# Patient Record
Sex: FEMALE | Race: WHITE | NOT HISPANIC OR LATINO | Employment: OTHER | URBAN - METROPOLITAN AREA
[De-identification: names, ages, dates, MRNs, and addresses within clinical notes are randomized per-mention and may not be internally consistent; named-entity substitution may affect disease eponyms.]

---

## 2017-01-10 RX ORDER — OMEPRAZOLE 40 MG/1
CAPSULE, DELAYED RELEASE ORAL
Qty: 90 CAPSULE | Refills: 49 | Status: SHIPPED | OUTPATIENT
Start: 2017-01-10 | End: 2018-02-01 | Stop reason: SDUPTHER

## 2017-03-25 RX ORDER — ATORVASTATIN CALCIUM 10 MG/1
TABLET, FILM COATED ORAL
Qty: 90 TABLET | Refills: 0 | Status: SHIPPED | OUTPATIENT
Start: 2017-03-25 | End: 2017-06-23 | Stop reason: SDUPTHER

## 2017-04-21 RX ORDER — ZOLPIDEM TARTRATE 10 MG/1
10 TABLET ORAL NIGHTLY PRN
Qty: 90 TABLET | Refills: 0 | Status: SHIPPED | OUTPATIENT
Start: 2017-04-21 | End: 2017-07-17 | Stop reason: SDUPTHER

## 2017-04-21 NOTE — TELEPHONE ENCOUNTER
RX FAXED  PT INFORMED    ----- Message from Brice Camarena Jr., MD sent at 4/21/2017  3:59 PM EDT -----  Contact: [phone]  Okay if time please check  ----- Message -----     From: Elli Mason MA     Sent: 4/21/2017   2:29 PM       To: Brice Camarena Jr., MD        ----- Message -----     From: Lainey Doss     Sent: 4/21/2017   2:17 PM       To: Elli Mason MA    PT NEEDS REFILL ON HER AMBIEN SENT TO [facility]. PLEASE CALL WHEN READY

## 2017-05-26 RX ORDER — SITAGLIPTIN 100 MG/1
TABLET, FILM COATED ORAL
Qty: 90 TABLET | Refills: 0 | Status: SHIPPED | OUTPATIENT
Start: 2017-05-26 | End: 2017-08-28 | Stop reason: SDUPTHER

## 2017-06-17 RX ORDER — GLIMEPIRIDE 2 MG/1
TABLET ORAL
Qty: 270 TABLET | Refills: 0 | Status: SHIPPED | OUTPATIENT
Start: 2017-06-17 | End: 2017-09-15 | Stop reason: SDUPTHER

## 2017-06-23 RX ORDER — ATORVASTATIN CALCIUM 10 MG/1
TABLET, FILM COATED ORAL
Qty: 90 TABLET | Refills: 0 | Status: SHIPPED | OUTPATIENT
Start: 2017-06-23 | End: 2017-09-24 | Stop reason: SDUPTHER

## 2017-06-30 RX ORDER — CANAGLIFLOZIN 100 MG/1
TABLET, FILM COATED ORAL
Qty: 90 TABLET | Refills: 0 | Status: SHIPPED | OUTPATIENT
Start: 2017-06-30 | End: 2017-09-28 | Stop reason: SDUPTHER

## 2017-07-10 RX ORDER — LEVOTHYROXINE SODIUM 0.07 MG/1
TABLET ORAL
Qty: 90 TABLET | Refills: 1 | Status: SHIPPED | OUTPATIENT
Start: 2017-07-10 | End: 2018-01-06 | Stop reason: SDUPTHER

## 2017-07-12 ENCOUNTER — OFFICE VISIT (OUTPATIENT)
Dept: FAMILY MEDICINE CLINIC | Facility: CLINIC | Age: 62
End: 2017-07-12

## 2017-07-12 VITALS
HEART RATE: 97 BPM | SYSTOLIC BLOOD PRESSURE: 100 MMHG | OXYGEN SATURATION: 98 % | TEMPERATURE: 98.9 F | HEIGHT: 69 IN | BODY MASS INDEX: 32.76 KG/M2 | RESPIRATION RATE: 16 BRPM | DIASTOLIC BLOOD PRESSURE: 70 MMHG | WEIGHT: 221.2 LBS

## 2017-07-12 DIAGNOSIS — R31.9 HEMATURIA: Primary | ICD-10-CM

## 2017-07-12 DIAGNOSIS — H92.02 OTALGIA, LEFT: ICD-10-CM

## 2017-07-12 LAB
BACTERIA UR QL AUTO: NORMAL /HPF
BILIRUB UR QL STRIP: NEGATIVE
CLARITY UR: CLEAR
COLOR UR: YELLOW
GLUCOSE UR STRIP-MCNC: ABNORMAL MG/DL
HGB UR QL STRIP.AUTO: ABNORMAL
KETONES UR QL STRIP: NEGATIVE
LEUKOCYTE ESTERASE UR QL STRIP.AUTO: NEGATIVE
NITRITE UR QL STRIP: NEGATIVE
PH UR STRIP.AUTO: 5.5 [PH] (ref 4.6–8)
PROT UR QL STRIP: NEGATIVE
RBC # UR: NORMAL /HPF
REF LAB TEST METHOD: NORMAL
SP GR UR STRIP: 1.01 (ref 1–1.03)
SQUAMOUS #/AREA URNS HPF: NORMAL /HPF
UROBILINOGEN UR QL STRIP: ABNORMAL
WBC UR QL AUTO: NORMAL /HPF

## 2017-07-12 PROCEDURE — 81001 URINALYSIS AUTO W/SCOPE: CPT | Performed by: NURSE PRACTITIONER

## 2017-07-12 PROCEDURE — 87186 SC STD MICRODIL/AGAR DIL: CPT | Performed by: NURSE PRACTITIONER

## 2017-07-12 PROCEDURE — 87086 URINE CULTURE/COLONY COUNT: CPT | Performed by: NURSE PRACTITIONER

## 2017-07-12 PROCEDURE — 99213 OFFICE O/P EST LOW 20 MIN: CPT | Performed by: NURSE PRACTITIONER

## 2017-07-12 RX ORDER — PRAMIPEXOLE DIHYDROCHLORIDE 0.5 MG/1
0.5 TABLET ORAL 2 TIMES DAILY
COMMUNITY
Start: 2017-04-25

## 2017-07-12 RX ORDER — FLUTICASONE PROPIONATE 50 MCG
2 SPRAY, SUSPENSION (ML) NASAL DAILY
Qty: 1 BOTTLE | Refills: 12 | Status: SHIPPED | OUTPATIENT
Start: 2017-07-12 | End: 2018-02-02

## 2017-07-12 NOTE — PROGRESS NOTES
Subjective   Wendi Ramos is a 61 y.o. female.     History of Present Illness   C/o dysuria, she states this started a couple of days ago, she tried cranberry and a lot of water. She thinks she flushed a lot of that out. She has left ear pain, he denies fever. Ear pain started about 1 week ago, she started her pain is traveling down her neck, she can feel fluid in her ear, she denies seasonal allergies, she states her hearing is normal. She did noticed cloudy urine initially. She denies sore throat and sinus pressure, she did not try any medication for ear.     The following portions of the patient's history were reviewed and updated as appropriate: allergies, current medications, past family history, past medical history, past social history, past surgical history and problem list.    Review of Systems   Constitutional: Negative for chills, diaphoresis and fever.   HENT: Positive for ear pain. Negative for ear discharge, postnasal drip, rhinorrhea, sinus pressure and sore throat.    Respiratory: Negative for cough and shortness of breath.    Cardiovascular: Negative for chest pain.   Musculoskeletal: Negative for arthralgias and myalgias.   Neurological: Negative for dizziness, light-headedness and headaches.   All other systems reviewed and are negative.      Objective   Physical Exam   Constitutional: She is oriented to person, place, and time. She appears well-developed and well-nourished.   HENT:   Head: Normocephalic.   Right Ear: Tympanic membrane and ear canal normal.   Left Ear: A middle ear effusion is present.   Nose: Nose normal.   Mouth/Throat: Uvula is midline, oropharynx is clear and moist and mucous membranes are normal.   Eyes: Pupils are equal, round, and reactive to light.   Neck: Neck supple.   Cardiovascular: Normal rate, regular rhythm and normal heart sounds.    Pulmonary/Chest: Effort normal and breath sounds normal.   Abdominal: There is no CVA tenderness.   Musculoskeletal: Normal range  of motion.   Lymphadenopathy:     She has no cervical adenopathy.   Neurological: She is alert and oriented to person, place, and time.   Skin: Skin is warm and dry.   Psychiatric: She has a normal mood and affect. Her behavior is normal. Judgment and thought content normal.   Nursing note and vitals reviewed.      Assessment/Plan   Wendi was seen today for urinary tract infection and earache.    Diagnoses and all orders for this visit:    Hematuria  -     Urinalysis With Microscopic  -     Urinalysis  -     Urinalysis, Microscopic Only  -     Urine Culture    Otalgia, left    Other orders  -     fluticasone (FLONASE) 50 MCG/ACT nasal spray; 2 sprays into each nostril Daily.        UA today, send for culture and call with results.  Start flonase 2 sprays each nostril daily.  May try claritin OTC as needed.   If ear pain persists, will consider ENT refer, previously saw , she will call for appt if needed. Deferred abx today.  Drink plenty of H2O, wipe front to back after urination.   Avoid bladder irritants- coffee, caffeine, carbonation.  Increase fluid intake, get plenty of rest.   Patient agrees with plan of care and understands instructions. Call if worsening symptoms or any problems or concerns.

## 2017-07-12 NOTE — PATIENT INSTRUCTIONS
UA today, send for culture and call with results.  Start flonase 2 sprays each nostril daily.  May try claritin OTC as needed.   If ear pain persists, will consider ENT refer, previously saw , she will call for appt if needed. Deferred abx today.  Drink plenty of H2O, wipe front to back after urination.   Avoid bladder irritants- coffee, caffeine, carbonation.  Increase fluid intake, get plenty of rest.   Patient agrees with plan of care and understands instructions. Call if worsening symptoms or any problems or concerns.

## 2017-07-14 ENCOUNTER — TELEPHONE (OUTPATIENT)
Dept: FAMILY MEDICINE CLINIC | Facility: CLINIC | Age: 62
End: 2017-07-14

## 2017-07-14 DIAGNOSIS — N30.01 ACUTE CYSTITIS WITH HEMATURIA: Primary | ICD-10-CM

## 2017-07-14 LAB — BACTERIA SPEC AEROBE CULT: ABNORMAL

## 2017-07-14 RX ORDER — CIPROFLOXACIN 500 MG/1
500 TABLET, FILM COATED ORAL 2 TIMES DAILY
Qty: 14 TABLET | Refills: 0 | Status: SHIPPED | OUTPATIENT
Start: 2017-07-14 | End: 2018-02-02

## 2017-07-14 NOTE — TELEPHONE ENCOUNTER
----- Message from ALESSANDRA Fitzgerald sent at 7/14/2017  8:02 AM EDT -----  Please call patient with results. She should monitor BS, cipro and amaryl can lower her BS. Call with problems.    Pt informed

## 2017-07-14 NOTE — TELEPHONE ENCOUNTER
----- Message from ALESSANDRA Fitzgerald sent at 7/14/2017  8:00 AM EDT -----  Please call patient with results. Her urine culture showed infection. We will start Cipro 2x day for 7 days, call with problems.    Pt informed of lab results

## 2017-07-17 RX ORDER — ZOLPIDEM TARTRATE 10 MG/1
10 TABLET ORAL NIGHTLY PRN
Qty: 90 TABLET | Refills: 0 | OUTPATIENT
Start: 2017-07-17 | End: 2017-10-19 | Stop reason: SDUPTHER

## 2017-08-28 RX ORDER — SITAGLIPTIN 100 MG/1
TABLET, FILM COATED ORAL
Qty: 90 TABLET | Refills: 0 | Status: SHIPPED | OUTPATIENT
Start: 2017-08-28 | End: 2017-11-26 | Stop reason: SDUPTHER

## 2017-09-15 RX ORDER — GLIMEPIRIDE 2 MG/1
TABLET ORAL
Qty: 270 TABLET | Refills: 0 | Status: SHIPPED | OUTPATIENT
Start: 2017-09-15 | End: 2017-12-14 | Stop reason: SDUPTHER

## 2017-09-20 PROBLEM — E11.9 TYPE 2 DIABETES MELLITUS WITHOUT COMPLICATION: Status: ACTIVE | Noted: 2017-09-20

## 2017-09-25 RX ORDER — ATORVASTATIN CALCIUM 10 MG/1
TABLET, FILM COATED ORAL
Qty: 90 TABLET | Refills: 0 | Status: SHIPPED | OUTPATIENT
Start: 2017-09-25 | End: 2017-12-24 | Stop reason: SDUPTHER

## 2017-09-28 RX ORDER — CANAGLIFLOZIN 100 MG/1
TABLET, FILM COATED ORAL
Qty: 90 TABLET | Refills: 0 | Status: SHIPPED | OUTPATIENT
Start: 2017-09-28 | End: 2017-12-27 | Stop reason: SDUPTHER

## 2017-10-10 ENCOUNTER — CLINICAL SUPPORT (OUTPATIENT)
Dept: FAMILY MEDICINE CLINIC | Facility: CLINIC | Age: 62
End: 2017-10-10

## 2017-10-10 PROCEDURE — 90686 IIV4 VACC NO PRSV 0.5 ML IM: CPT | Performed by: INTERNAL MEDICINE

## 2017-10-10 PROCEDURE — 90471 IMMUNIZATION ADMIN: CPT | Performed by: INTERNAL MEDICINE

## 2017-10-19 RX ORDER — ZOLPIDEM TARTRATE 10 MG/1
10 TABLET ORAL NIGHTLY PRN
Qty: 90 TABLET | Refills: 0 | OUTPATIENT
Start: 2017-10-19 | End: 2018-01-03 | Stop reason: SDUPTHER

## 2017-11-27 RX ORDER — SITAGLIPTIN 100 MG/1
TABLET, FILM COATED ORAL
Qty: 90 TABLET | Refills: 0 | Status: SHIPPED | OUTPATIENT
Start: 2017-11-27 | End: 2018-02-01 | Stop reason: SDUPTHER

## 2017-12-14 RX ORDER — GLIMEPIRIDE 2 MG/1
TABLET ORAL
Qty: 270 TABLET | Refills: 0 | Status: SHIPPED | OUTPATIENT
Start: 2017-12-14 | End: 2018-02-01 | Stop reason: SDUPTHER

## 2017-12-26 RX ORDER — ATORVASTATIN CALCIUM 10 MG/1
TABLET, FILM COATED ORAL
Qty: 90 TABLET | Refills: 0 | Status: SHIPPED | OUTPATIENT
Start: 2017-12-26 | End: 2018-02-01 | Stop reason: SDUPTHER

## 2017-12-27 RX ORDER — CANAGLIFLOZIN 100 MG/1
TABLET, FILM COATED ORAL
Qty: 90 TABLET | Refills: 0 | Status: SHIPPED | OUTPATIENT
Start: 2017-12-27 | End: 2018-01-31 | Stop reason: SDUPTHER

## 2018-01-03 ENCOUNTER — TELEPHONE (OUTPATIENT)
Dept: FAMILY MEDICINE CLINIC | Facility: CLINIC | Age: 63
End: 2018-01-03

## 2018-01-03 RX ORDER — METOPROLOL SUCCINATE 50 MG
50 TABLET, EXTENDED RELEASE 24 HR ORAL DAILY
Qty: 90 TABLET | Refills: 0 | Status: SHIPPED | OUTPATIENT
Start: 2018-01-03 | End: 2018-01-15 | Stop reason: SDUPTHER

## 2018-01-03 RX ORDER — ZOLPIDEM TARTRATE 10 MG/1
10 TABLET ORAL NIGHTLY PRN
Qty: 90 TABLET | Refills: 0 | OUTPATIENT
Start: 2018-01-03 | End: 2018-01-15 | Stop reason: SDUPTHER

## 2018-01-03 NOTE — TELEPHONE ENCOUNTER
CARDIOLOGIST JOSE ANTONIO, PRESCRIBING TOPROL, SHE DOES NOT LIKE JOSE ANTONIO SO NOW SUPPOSED TO GO TO DR JIMENEZ, BUT CANNOT BE SEEN UNTIL February 13, AND SHE IS OUT IF TOPROL AND DR BRDAY WILL NOT FILL IT. WOULD YOU CALL IT IN FOR HER? SHE IS COMPLETELY OUT. TOPROL 50 MG 1 TAB EACH PM   [facility] [phone] WILL NEED AT LEAST 2 MONTHS TO GET HER THROUGH. PLEASE.

## 2018-01-08 RX ORDER — LEVOTHYROXINE SODIUM 0.07 MG/1
TABLET ORAL
Qty: 90 TABLET | Refills: 1 | Status: SHIPPED | OUTPATIENT
Start: 2018-01-08 | End: 2018-02-01 | Stop reason: SDUPTHER

## 2018-01-15 ENCOUNTER — TELEPHONE (OUTPATIENT)
Dept: FAMILY MEDICINE CLINIC | Facility: CLINIC | Age: 63
End: 2018-01-15

## 2018-01-15 RX ORDER — ZOLPIDEM TARTRATE 10 MG/1
10 TABLET ORAL NIGHTLY PRN
Qty: 90 TABLET | Refills: 0 | OUTPATIENT
Start: 2018-01-15 | End: 2018-04-20 | Stop reason: SDUPTHER

## 2018-01-15 RX ORDER — METOPROLOL SUCCINATE 50 MG
50 TABLET, EXTENDED RELEASE 24 HR ORAL DAILY
Qty: 90 TABLET | Refills: 3 | Status: SHIPPED | OUTPATIENT
Start: 2018-01-15 | End: 2018-02-01 | Stop reason: SDUPTHER

## 2018-01-15 NOTE — TELEPHONE ENCOUNTER
REFILL ON zolpidem (AMBIEN) 10 MG tablet  METOPROLOL SUCC 50 MG ONCE DAILY     3 MONTH SUPPLY ON BOTH RX'S

## 2018-02-01 RX ORDER — LEVOTHYROXINE SODIUM 0.07 MG/1
75 TABLET ORAL DAILY
Qty: 10 TABLET | Refills: 0 | Status: SHIPPED | OUTPATIENT
Start: 2018-02-01 | End: 2018-02-08 | Stop reason: SDUPTHER

## 2018-02-01 RX ORDER — OMEPRAZOLE 40 MG/1
40 CAPSULE, DELAYED RELEASE ORAL DAILY
Qty: 10 CAPSULE | Refills: 49 | Status: SHIPPED | OUTPATIENT
Start: 2018-02-01 | End: 2018-02-08 | Stop reason: SDUPTHER

## 2018-02-01 RX ORDER — METOPROLOL SUCCINATE 50 MG
50 TABLET, EXTENDED RELEASE 24 HR ORAL DAILY
Qty: 10 TABLET | Refills: 0 | Status: SHIPPED | OUTPATIENT
Start: 2018-02-01 | End: 2018-06-02 | Stop reason: CLARIF

## 2018-02-01 RX ORDER — GLIMEPIRIDE 2 MG/1
2 TABLET ORAL
Qty: 21 TABLET | Refills: 0 | Status: SHIPPED | OUTPATIENT
Start: 2018-02-01 | End: 2018-02-08 | Stop reason: SDUPTHER

## 2018-02-01 RX ORDER — ATORVASTATIN CALCIUM 10 MG/1
10 TABLET, FILM COATED ORAL NIGHTLY
Qty: 10 TABLET | Refills: 0 | Status: SHIPPED | OUTPATIENT
Start: 2018-02-01 | End: 2018-02-08 | Stop reason: SDUPTHER

## 2018-02-02 ENCOUNTER — TELEPHONE (OUTPATIENT)
Dept: FAMILY MEDICINE CLINIC | Facility: CLINIC | Age: 63
End: 2018-02-02

## 2018-02-02 ENCOUNTER — OFFICE VISIT (OUTPATIENT)
Dept: FAMILY MEDICINE CLINIC | Facility: CLINIC | Age: 63
End: 2018-02-02

## 2018-02-02 VITALS
SYSTOLIC BLOOD PRESSURE: 108 MMHG | OXYGEN SATURATION: 94 % | HEART RATE: 98 BPM | TEMPERATURE: 97.8 F | DIASTOLIC BLOOD PRESSURE: 70 MMHG | HEIGHT: 69 IN | WEIGHT: 214.4 LBS | BODY MASS INDEX: 31.76 KG/M2

## 2018-02-02 DIAGNOSIS — E11.9 DIABETES MELLITUS WITHOUT COMPLICATION (HCC): Primary | ICD-10-CM

## 2018-02-02 DIAGNOSIS — E03.8 OTHER SPECIFIED HYPOTHYROIDISM: ICD-10-CM

## 2018-02-02 DIAGNOSIS — I10 HYPERTENSION, ESSENTIAL: ICD-10-CM

## 2018-02-02 DIAGNOSIS — E78.49 OTHER HYPERLIPIDEMIA: ICD-10-CM

## 2018-02-02 LAB
ALBUMIN SERPL-MCNC: 4.6 G/DL (ref 3.5–5.2)
ALBUMIN UR-MCNC: 20 MG/L (ref 0–20)
ALBUMIN/GLOB SERPL: 1.2 G/DL
ALP SERPL-CCNC: 90 U/L (ref 39–117)
ALT SERPL W P-5'-P-CCNC: 40 U/L (ref 1–33)
ANION GAP SERPL CALCULATED.3IONS-SCNC: 13.4 MMOL/L
ARTICHOKE IGE QN: 123 MG/DL (ref 0–100)
AST SERPL-CCNC: 34 U/L (ref 1–32)
BILIRUB SERPL-MCNC: 0.4 MG/DL (ref 0.1–1.2)
BUN BLD-MCNC: 13 MG/DL (ref 8–23)
BUN/CREAT SERPL: 17.6 (ref 7–25)
CALCIUM SPEC-SCNC: 10 MG/DL (ref 8.6–10.5)
CHLORIDE SERPL-SCNC: 99 MMOL/L (ref 98–107)
CHOLEST SERPL-MCNC: 286 MG/DL (ref 0–200)
CO2 SERPL-SCNC: 26.6 MMOL/L (ref 22–29)
CREAT BLD-MCNC: 0.74 MG/DL (ref 0.57–1)
GFR SERPL CREATININE-BSD FRML MDRD: 79 ML/MIN/1.73
GLOBULIN UR ELPH-MCNC: 4 GM/DL
GLUCOSE BLD-MCNC: 178 MG/DL (ref 65–99)
HBA1C MFR BLD: 7.51 % (ref 4.8–5.6)
HDLC SERPL-MCNC: 36 MG/DL (ref 40–60)
LDLC SERPL CALC-MCNC: ABNORMAL MG/DL (ref 0–100)
LDLC/HDLC SERPL: ABNORMAL {RATIO}
POTASSIUM BLD-SCNC: 4.6 MMOL/L (ref 3.5–5.2)
PROT SERPL-MCNC: 8.6 G/DL (ref 6–8.5)
SODIUM BLD-SCNC: 139 MMOL/L (ref 136–145)
TRIGL SERPL-MCNC: 1055 MG/DL (ref 0–150)
TSH SERPL DL<=0.05 MIU/L-ACNC: 4.18 MIU/ML (ref 0.27–4.2)
VLDLC SERPL-MCNC: ABNORMAL MG/DL (ref 5–40)

## 2018-02-02 PROCEDURE — 83721 ASSAY OF BLOOD LIPOPROTEIN: CPT | Performed by: INTERNAL MEDICINE

## 2018-02-02 PROCEDURE — 82043 UR ALBUMIN QUANTITATIVE: CPT | Performed by: INTERNAL MEDICINE

## 2018-02-02 PROCEDURE — 80061 LIPID PANEL: CPT | Performed by: INTERNAL MEDICINE

## 2018-02-02 PROCEDURE — 80053 COMPREHEN METABOLIC PANEL: CPT | Performed by: INTERNAL MEDICINE

## 2018-02-02 PROCEDURE — 84443 ASSAY THYROID STIM HORMONE: CPT | Performed by: INTERNAL MEDICINE

## 2018-02-02 PROCEDURE — 83036 HEMOGLOBIN GLYCOSYLATED A1C: CPT | Performed by: INTERNAL MEDICINE

## 2018-02-02 PROCEDURE — 36415 COLL VENOUS BLD VENIPUNCTURE: CPT | Performed by: INTERNAL MEDICINE

## 2018-02-02 PROCEDURE — 99214 OFFICE O/P EST MOD 30 MIN: CPT | Performed by: INTERNAL MEDICINE

## 2018-02-02 RX ORDER — LEFLUNOMIDE 20 MG/1
20 TABLET ORAL DAILY
COMMUNITY
End: 2021-02-12

## 2018-02-02 NOTE — PROGRESS NOTES
Subjective   Wendi Ramos is a 62 y.o. female.   Glu up follow-up on thyroid diabetes blood pressure and lipids compliant with meds also has rheumatoid arthritis.  Overall doing fairly well  History of Present Illness   Energy level feels okay thinks her thyroid is in lower lobe minimal fatigue complaints.  Fasting for lipids and hemoglobin A1c and will get thyroid today also.  Known rheumatoid arthritis for which she takes medication.    Review of Systems   Constitutional: Positive for fatigue.   Neurological: Positive for weakness.   All other systems reviewed and are negative.      Objective   Vitals:    02/02/18 0916   BP: 108/70   Pulse: 98   Temp: 97.8 °F (36.6 °C)   SpO2: 94%   Weight: 97.3 kg (214 lb 6.4 oz)     Physical Exam   Constitutional: She appears well-developed and well-nourished.   HENT:   Head: Normocephalic.   Right Ear: External ear normal.   Eyes: Conjunctivae are normal. Pupils are equal, round, and reactive to light.   Cardiovascular: Normal rate, regular rhythm and normal heart sounds.    Pulmonary/Chest: Effort normal and breath sounds normal.   Abdominal: Soft. Bowel sounds are normal.   Neurological: She is alert.   Unremarkable gait and station   Skin: Skin is warm and dry.   Nursing note and vitals reviewed.      No results found for: INR    Procedures    Assessment/Plan   1.  Type 2 diabetes plan await pending lab if satisfactory recheck 6 months    2.  Hypertension controlled plan continue present medicines with recheck in 6 months    3.  Hyperlipidemia await lab    4.  Hypothyroidism await TSH if she is in the high range of normal may increase her Synthroid dose by 12.5 for potential fatigue issues await pending lab    Much of this encounter note is an electronic transcription/translation of spoken language to printed text.  The electronic translation of spoken language may permit erroneous, or at times, nonsensical words or phrases to be inadvertently transcribed.  Although I have  reviewed the note for such errors, some may still exist.

## 2018-02-02 NOTE — TELEPHONE ENCOUNTER
PATIENT NEEDS SOMETHING ELSE THEN RAISAOKAKENYA THIS MEDICATION IS COSTING OVER $130.00  PATIENT CAN NOT TAKE METFORMIN

## 2018-02-08 RX ORDER — OMEPRAZOLE 40 MG/1
40 CAPSULE, DELAYED RELEASE ORAL DAILY
Qty: 90 CAPSULE | Refills: 1 | Status: SHIPPED | OUTPATIENT
Start: 2018-02-08 | End: 2018-06-05 | Stop reason: SDUPTHER

## 2018-02-08 RX ORDER — ATORVASTATIN CALCIUM 10 MG/1
10 TABLET, FILM COATED ORAL NIGHTLY
Qty: 90 TABLET | Refills: 1 | Status: SHIPPED | OUTPATIENT
Start: 2018-02-08 | End: 2018-06-29 | Stop reason: SDUPTHER

## 2018-02-08 RX ORDER — GLIMEPIRIDE 2 MG/1
2 TABLET ORAL
Qty: 90 TABLET | Refills: 1 | Status: SHIPPED | OUTPATIENT
Start: 2018-02-08 | End: 2019-02-08 | Stop reason: SDUPTHER

## 2018-02-08 RX ORDER — LEVOTHYROXINE SODIUM 0.07 MG/1
75 TABLET ORAL DAILY
Qty: 90 TABLET | Refills: 1 | Status: SHIPPED | OUTPATIENT
Start: 2018-02-08 | End: 2018-06-05 | Stop reason: SDUPTHER

## 2018-02-09 RX ORDER — METFORMIN HYDROCHLORIDE 500 MG/1
500 TABLET, EXTENDED RELEASE ORAL
Qty: 30 TABLET | Refills: 1 | Status: SHIPPED | OUTPATIENT
Start: 2018-02-09 | End: 2018-03-15 | Stop reason: SDUPTHER

## 2018-02-12 DIAGNOSIS — R79.89 ELEVATED LFTS: ICD-10-CM

## 2018-02-12 DIAGNOSIS — R77.8 ELEVATED TOTAL PROTEIN: Primary | ICD-10-CM

## 2018-02-12 DIAGNOSIS — E78.2 ELEVATED TRIGLYCERIDES WITH HIGH CHOLESTEROL: ICD-10-CM

## 2018-02-19 ENCOUNTER — TELEPHONE (OUTPATIENT)
Dept: FAMILY MEDICINE CLINIC | Facility: CLINIC | Age: 63
End: 2018-02-19

## 2018-02-19 RX ORDER — ICOSAPENT ETHYL 1000 MG/1
2 CAPSULE ORAL 2 TIMES DAILY WITH MEALS
Qty: 56 CAPSULE | Refills: 0 | Status: ON HOLD | COMMUNITY
Start: 2018-02-19 | End: 2018-06-09 | Stop reason: ALTCHOICE

## 2018-02-19 NOTE — TELEPHONE ENCOUNTER
----- Message from Brice Camarena Jr., MD sent at 2/19/2018 12:12 PM EST -----  Regarding: FW: Prescription Question  Contact: [phone]  Just keep patient's supplied with samples until she sees GI.  ----- Message -----     From: Elli Mason MA     Sent: 2/13/2018   1:20 PM       To: Brice Camarena Jr., MD  Subject: FW: Prescription Question                            ----- Message -----     From: Wendi Ramos     Sent: 2/13/2018   1:13 PM       To: brian [facility]  Subject: Prescription Question                            Elli Martinez,    The Vascepa coupon can't be used on any Medicare or Medicare Supplemental Insurance.  Mccall!!    The pharmacist checked for a cheaper generic, but it was still $97 a month.  Double Mccall!!    Going to [facility] (Dr. Aldair Trejo-who I've been to several times in the past) in 3 weeks.  Should I just wait to see what he says?    Thanks,   Wendi

## 2018-02-26 RX ORDER — SITAGLIPTIN 100 MG/1
TABLET, FILM COATED ORAL
Qty: 90 TABLET | Refills: 0 | Status: SHIPPED | OUTPATIENT
Start: 2018-02-26 | End: 2018-05-31 | Stop reason: SDUPTHER

## 2018-02-27 ENCOUNTER — LAB (OUTPATIENT)
Dept: FAMILY MEDICINE CLINIC | Facility: CLINIC | Age: 63
End: 2018-02-27

## 2018-02-27 DIAGNOSIS — E78.2 ELEVATED TRIGLYCERIDES WITH HIGH CHOLESTEROL: ICD-10-CM

## 2018-02-27 DIAGNOSIS — R77.8 ELEVATED TOTAL PROTEIN: ICD-10-CM

## 2018-02-27 LAB
ALBUMIN SERPL-MCNC: 4.4 G/DL (ref 3.5–5.2)
ALBUMIN/GLOB SERPL: 1.3 G/DL
ALP SERPL-CCNC: 89 U/L (ref 39–117)
ALT SERPL W P-5'-P-CCNC: 41 U/L (ref 1–33)
ANION GAP SERPL CALCULATED.3IONS-SCNC: 14.5 MMOL/L
ARTICHOKE IGE QN: 116 MG/DL (ref 0–100)
AST SERPL-CCNC: 25 U/L (ref 1–32)
BILIRUB SERPL-MCNC: 0.3 MG/DL (ref 0.1–1.2)
BUN BLD-MCNC: 11 MG/DL (ref 8–23)
BUN/CREAT SERPL: 17.2 (ref 7–25)
CALCIUM SPEC-SCNC: 9.9 MG/DL (ref 8.6–10.5)
CHLORIDE SERPL-SCNC: 97 MMOL/L (ref 98–107)
CHOLEST SERPL-MCNC: 257 MG/DL (ref 0–200)
CO2 SERPL-SCNC: 28.5 MMOL/L (ref 22–29)
CREAT BLD-MCNC: 0.64 MG/DL (ref 0.57–1)
GFR SERPL CREATININE-BSD FRML MDRD: 93 ML/MIN/1.73
GLOBULIN UR ELPH-MCNC: 3.4 GM/DL
GLUCOSE BLD-MCNC: 248 MG/DL (ref 65–99)
HDLC SERPL-MCNC: 40 MG/DL (ref 40–60)
LDLC SERPL CALC-MCNC: ABNORMAL MG/DL (ref 0–100)
LDLC/HDLC SERPL: ABNORMAL {RATIO}
POTASSIUM BLD-SCNC: 4.4 MMOL/L (ref 3.5–5.2)
PROT SERPL-MCNC: 7.8 G/DL (ref 6–8.5)
SODIUM BLD-SCNC: 140 MMOL/L (ref 136–145)
TRIGL SERPL-MCNC: 609 MG/DL (ref 0–150)
VLDLC SERPL-MCNC: ABNORMAL MG/DL (ref 5–40)

## 2018-02-27 PROCEDURE — 83721 ASSAY OF BLOOD LIPOPROTEIN: CPT | Performed by: INTERNAL MEDICINE

## 2018-02-27 PROCEDURE — 80061 LIPID PANEL: CPT | Performed by: INTERNAL MEDICINE

## 2018-02-27 PROCEDURE — 36415 COLL VENOUS BLD VENIPUNCTURE: CPT | Performed by: INTERNAL MEDICINE

## 2018-02-27 PROCEDURE — 80053 COMPREHEN METABOLIC PANEL: CPT | Performed by: INTERNAL MEDICINE

## 2018-02-28 LAB
ALBUMIN SERPL-MCNC: 3.9 G/DL (ref 2.9–4.4)
ALBUMIN/GLOB SERPL: 1 {RATIO} (ref 0.7–1.7)
ALPHA1 GLOB FLD ELPH-MCNC: 0.3 G/DL (ref 0–0.4)
ALPHA2 GLOB SERPL ELPH-MCNC: 1 G/DL (ref 0.4–1)
B-GLOBULIN SERPL ELPH-MCNC: 1.3 G/DL (ref 0.7–1.3)
GAMMA GLOB SERPL ELPH-MCNC: 1.3 G/DL (ref 0.4–1.8)
GLOBULIN SER CALC-MCNC: 3.9 G/DL (ref 2.2–3.9)
Lab: NORMAL
M-SPIKE: NORMAL G/DL
PROT PATTERN SERPL ELPH-IMP: NORMAL
PROT SERPL-MCNC: 7.8 G/DL (ref 6–8.5)

## 2018-03-01 RX ORDER — FENOFIBRATE 150 MG/1
150 CAPSULE ORAL DAILY
Qty: 30 EACH | Refills: 1 | Status: SHIPPED | OUTPATIENT
Start: 2018-03-01 | End: 2018-03-01 | Stop reason: SDUPTHER

## 2018-03-14 RX ORDER — GLIMEPIRIDE 2 MG/1
TABLET ORAL
Qty: 270 TABLET | Refills: 0 | Status: ON HOLD | OUTPATIENT
Start: 2018-03-14 | End: 2018-06-09 | Stop reason: ALTCHOICE

## 2018-03-15 ENCOUNTER — OFFICE (AMBULATORY)
Dept: URBAN - METROPOLITAN AREA CLINIC 75 | Facility: CLINIC | Age: 63
End: 2018-03-15

## 2018-03-15 VITALS
HEART RATE: 82 BPM | WEIGHT: 217 LBS | SYSTOLIC BLOOD PRESSURE: 142 MMHG | DIASTOLIC BLOOD PRESSURE: 80 MMHG | HEIGHT: 69 IN

## 2018-03-15 DIAGNOSIS — K30 FUNCTIONAL DYSPEPSIA: ICD-10-CM

## 2018-03-15 DIAGNOSIS — R94.5 ABNORMAL RESULTS OF LIVER FUNCTION STUDIES: ICD-10-CM

## 2018-03-15 DIAGNOSIS — D12.6 BENIGN NEOPLASM OF COLON, UNSPECIFIED: ICD-10-CM

## 2018-03-15 DIAGNOSIS — R19.7 DIARRHEA, UNSPECIFIED: ICD-10-CM

## 2018-03-15 DIAGNOSIS — D12.0 BENIGN NEOPLASM OF CECUM: ICD-10-CM

## 2018-03-15 DIAGNOSIS — K31.9 DISEASE OF STOMACH AND DUODENUM, UNSPECIFIED: ICD-10-CM

## 2018-03-15 DIAGNOSIS — K29.50 UNSPECIFIED CHRONIC GASTRITIS WITHOUT BLEEDING: ICD-10-CM

## 2018-03-15 DIAGNOSIS — R10.13 EPIGASTRIC PAIN: ICD-10-CM

## 2018-03-15 DIAGNOSIS — D12.3 BENIGN NEOPLASM OF TRANSVERSE COLON: ICD-10-CM

## 2018-03-15 DIAGNOSIS — R93.3 ABNORMAL FINDINGS ON DIAGNOSTIC IMAGING OF OTHER PARTS OF DI: ICD-10-CM

## 2018-03-15 DIAGNOSIS — K44.9 DIAPHRAGMATIC HERNIA WITHOUT OBSTRUCTION OR GANGRENE: ICD-10-CM

## 2018-03-15 DIAGNOSIS — R19.4 CHANGE IN BOWEL HABIT: ICD-10-CM

## 2018-03-15 PROCEDURE — 99214 OFFICE O/P EST MOD 30 MIN: CPT | Performed by: NURSE PRACTITIONER

## 2018-03-15 RX ORDER — METFORMIN HYDROCHLORIDE 500 MG/1
500 TABLET, EXTENDED RELEASE ORAL
Qty: 90 TABLET | Refills: 3 | Status: SHIPPED | OUTPATIENT
Start: 2018-03-15 | End: 2018-03-16 | Stop reason: SDUPTHER

## 2018-03-16 RX ORDER — METFORMIN HYDROCHLORIDE 500 MG/1
500 TABLET, EXTENDED RELEASE ORAL
Qty: 90 TABLET | Refills: 3 | Status: SHIPPED | OUTPATIENT
Start: 2018-03-16 | End: 2018-05-02 | Stop reason: SDUPTHER

## 2018-03-26 RX ORDER — ATORVASTATIN CALCIUM 10 MG/1
TABLET, FILM COATED ORAL
Qty: 90 TABLET | Refills: 0 | Status: SHIPPED | OUTPATIENT
Start: 2018-03-26 | End: 2018-05-31 | Stop reason: SDUPTHER

## 2018-03-27 RX ORDER — CANAGLIFLOZIN 100 MG/1
TABLET, FILM COATED ORAL
Qty: 90 TABLET | Refills: 0 | Status: ON HOLD | OUTPATIENT
Start: 2018-03-27 | End: 2018-06-09 | Stop reason: ALTCHOICE

## 2018-04-16 RX ORDER — METFORMIN HYDROCHLORIDE 500 MG/1
500 TABLET, EXTENDED RELEASE ORAL
Qty: 30 TABLET | Refills: 1 | Status: SHIPPED | OUTPATIENT
Start: 2018-04-16 | End: 2018-05-02 | Stop reason: SDUPTHER

## 2018-04-20 RX ORDER — ZOLPIDEM TARTRATE 10 MG/1
10 TABLET ORAL NIGHTLY PRN
Qty: 90 TABLET | Refills: 0 | Status: SHIPPED | OUTPATIENT
Start: 2018-04-20 | End: 2018-07-18 | Stop reason: SDUPTHER

## 2018-04-24 ENCOUNTER — CLINICAL SUPPORT (OUTPATIENT)
Dept: FAMILY MEDICINE CLINIC | Facility: CLINIC | Age: 63
End: 2018-04-24

## 2018-04-24 PROCEDURE — 90632 HEPA VACCINE ADULT IM: CPT | Performed by: INTERNAL MEDICINE

## 2018-04-24 PROCEDURE — 90471 IMMUNIZATION ADMIN: CPT | Performed by: INTERNAL MEDICINE

## 2018-05-02 RX ORDER — METFORMIN HYDROCHLORIDE 500 MG/1
500 TABLET, EXTENDED RELEASE ORAL
Qty: 90 TABLET | Refills: 3 | Status: SHIPPED | OUTPATIENT
Start: 2018-05-02 | End: 2019-06-27 | Stop reason: SDUPTHER

## 2018-05-07 DIAGNOSIS — M54.5 LOW BACK PAIN, UNSPECIFIED BACK PAIN LATERALITY, UNSPECIFIED CHRONICITY, WITH SCIATICA PRESENCE UNSPECIFIED: Primary | ICD-10-CM

## 2018-05-31 ENCOUNTER — OFFICE VISIT (OUTPATIENT)
Dept: NEUROSURGERY | Facility: CLINIC | Age: 63
End: 2018-05-31

## 2018-05-31 VITALS
DIASTOLIC BLOOD PRESSURE: 95 MMHG | BODY MASS INDEX: 30.75 KG/M2 | HEART RATE: 93 BPM | HEIGHT: 69 IN | WEIGHT: 207.6 LBS | SYSTOLIC BLOOD PRESSURE: 169 MMHG

## 2018-05-31 DIAGNOSIS — M54.16 LUMBAR RADICULOPATHY: Primary | ICD-10-CM

## 2018-05-31 PROCEDURE — 99204 OFFICE O/P NEW MOD 45 MIN: CPT | Performed by: PHYSICIAN ASSISTANT

## 2018-05-31 RX ORDER — HYDROCODONE BITARTRATE AND ACETAMINOPHEN 10; 325 MG/1; MG/1
1 TABLET ORAL 2 TIMES DAILY PRN
Refills: 0 | COMMUNITY
Start: 2018-05-22 | End: 2018-07-30 | Stop reason: SDUPTHER

## 2018-05-31 RX ORDER — METOPROLOL TARTRATE 50 MG/1
50 TABLET, FILM COATED ORAL EVERY 12 HOURS SCHEDULED
COMMUNITY
Start: 2018-02-22 | End: 2019-07-31 | Stop reason: SDUPTHER

## 2018-05-31 RX ORDER — DEXAMETHASONE 4 MG/1
8 TABLET ORAL TAKE AS DIRECTED
Qty: 2 TABLET | Refills: 0 | Status: SHIPPED | OUTPATIENT
Start: 2018-05-31 | End: 2018-06-07 | Stop reason: HOSPADM

## 2018-05-31 NOTE — PROGRESS NOTES
Subjective   Patient ID: Wendi Ramos is a 62 y.o. female is being seen for consultation today at the request of Brice Camarena Jr., MD for back pain that radiates down into bilateral legs. Patient reports that this pain started 02/14/2018. Patient is currently going to pain management. She is taking Flexeril 10 mg, Norco  mg TID, and Percocet  mg PRN for pain. Patient has a history of NICK and they didn't help. She also had trigger shots. Patient cant stand longer then 2-3 minutes and then the pain is very intense.    Back Pain   This is a recurrent problem. The current episode started more than 1 month ago. The problem occurs constantly. The problem has been gradually worsening since onset. The pain is present in the sacro-iliac. The quality of the pain is described as burning, aching, shooting and stabbing. The pain radiates to the left thigh. The pain is at a severity of 9/10. The pain is the same all the time. The symptoms are aggravated by bending, position and standing. Associated symptoms include leg pain, numbness, paresthesias, tingling and weakness. Pertinent negatives include no abdominal pain, bladder incontinence, bowel incontinence, chest pain, dysuria, fever, headaches, paresis, pelvic pain, perianal numbness or weight loss. Risk factors include lack of exercise, obesity and sedentary lifestyle. Treatments tried: NICK, Pain managment, Trigger shots, PT.   Leg Pain    The incident occurred more than 1 week ago. There was no injury mechanism. The pain is present in the left leg, left hip, left thigh, left knee, left ankle, left heel, left foot and left toes. The quality of the pain is described as shooting and stabbing. The pain is at a severity of 10/10. The pain is severe. The pain has been constant since onset. Associated symptoms include an inability to bear weight, muscle weakness, numbness and tingling. Pertinent negatives include no loss of motion or loss of sensation. She reports  no foreign bodies present. The symptoms are aggravated by movement and weight bearing. The treatment provided no relief.       The following portions of the patient's history were reviewed and updated as appropriate: allergies, current medications, past family history, past medical history, past social history, past surgical history and problem list.    Review of Systems   Constitutional: Positive for activity change and diaphoresis. Negative for fever and weight loss.   Cardiovascular: Negative for chest pain.   Gastrointestinal: Negative for abdominal pain and bowel incontinence.   Genitourinary: Negative for bladder incontinence, dysuria and pelvic pain.   Musculoskeletal: Positive for arthralgias, back pain, gait problem, joint swelling and myalgias.   Allergic/Immunologic: Positive for immunocompromised state.   Neurological: Positive for tingling, seizures, weakness, numbness and paresthesias. Negative for headaches.        Tingling   All other systems reviewed and are negative.      Objective   Physical Exam   Constitutional: She is oriented to person, place, and time. She appears well-developed and well-nourished.   HENT:   Head: Normocephalic and atraumatic.   Right Ear: External ear normal.   Left Ear: External ear normal.   Eyes: Conjunctivae and EOM are normal. Pupils are equal, round, and reactive to light. Right eye exhibits no discharge. Left eye exhibits no discharge.   Neck: Normal range of motion. Neck supple. No tracheal deviation present.   Pulmonary/Chest: Effort normal. No stridor. No respiratory distress.   Musculoskeletal: Normal range of motion. She exhibits no edema, tenderness or deformity.   Neurological: She is alert and oriented to person, place, and time. She has normal strength and normal reflexes. She displays no atrophy, no tremor and normal reflexes. No cranial nerve deficit or sensory deficit. She exhibits normal muscle tone. She displays a negative Romberg sign. She displays no  seizure activity. Coordination and gait normal.   No long tract signs   Skin: Skin is warm and dry.   Psychiatric: She has a normal mood and affect. Her behavior is normal. Judgment and thought content normal.   Nursing note and vitals reviewed.      Assessment/Plan   Independent Review of Radiographic Studies:    I did review the lumbar spine MRI done without contrast on February 22, 2018.  It does show multilevel disc degeneration with a central to right sided disc protrusion at L2-L3 with mild central stenosis and mild right foraminal narrowing.  There is also a moderate left paracentral disc protrusion at L3-L4 which is causing moderate left lateral recess narrowing.  Medical Decision Making:    Ms. Ramos was referred to us by Dr. Camarena for a 3 to four-month history of low back pain that radiates into the left buttock and lateral leg.  The pain began without accident or injury.  The back pain is quite mild at this point but the leg pain is severe and constant and severely limiting.  She denies any incontinence or focal weakness but finds any activity such as standing or walking extremely painful.  Laying down helps only minimally.  She has had 2 epidurals one in February and one in March by Dr. Gan without any relief.  She is currently using Norco with very minimal relief.    Her exam does not reveal any focal deficits.    Did review the MRI with her.  She does have moderate at least foraminal narrowing at L3-L4 secondary to a disc protrusion but her symptoms seem far greater than would be expected based on the MRI findings.  The fact that she has not improved with conservative management raises the concern that the nerve compression is more significant than the MRI indicates.  She is tearful and quite miserable.  I suggested moving forward with a myelogram and discussed the associated risks of bleeding, infection and headache with her.  She does wish to proceed.  She will continue to get any pain  medications from Dr. Gan and otherwise follow-up with Dr. Randall after the myelogram.   Wendi was seen today for back pain and leg pain.    Diagnoses and all orders for this visit:    Lumbar radiculopathy  -     IR Myelogram Lumbar Spine; Future  -     CT Lumbar Spine Without Contrast; Future  -     No Lab Testing Needed; Standing  -     dexamethasone (DECADRON) 4 MG tablet; Take 2 tablets by mouth Take As Directed. Take both tablets by mouth 2 hours before myelogram  -     XR Spine Lumbar Complete With Flex & Ext; Future      Return for follow up after radiology test with Dr Randall.

## 2018-06-05 RX ORDER — OMEPRAZOLE 40 MG/1
CAPSULE, DELAYED RELEASE ORAL
Qty: 90 CAPSULE | Refills: 1 | Status: SHIPPED | OUTPATIENT
Start: 2018-06-05 | End: 2018-07-06

## 2018-06-05 RX ORDER — LEVOTHYROXINE SODIUM 0.07 MG/1
TABLET ORAL
Qty: 90 TABLET | Refills: 1 | Status: SHIPPED | OUTPATIENT
Start: 2018-06-05 | End: 2018-07-06

## 2018-06-05 RX ORDER — GLIMEPIRIDE 2 MG/1
TABLET ORAL
Qty: 90 TABLET | Refills: 1 | Status: ON HOLD | OUTPATIENT
Start: 2018-06-05 | End: 2018-06-09 | Stop reason: ALTCHOICE

## 2018-06-07 ENCOUNTER — HOSPITAL ENCOUNTER (OUTPATIENT)
Dept: CT IMAGING | Facility: HOSPITAL | Age: 63
Discharge: HOME | End: 2018-06-07
Admitting: PHYSICIAN ASSISTANT

## 2018-06-07 ENCOUNTER — HOSPITAL ENCOUNTER (OUTPATIENT)
Dept: GENERAL RADIOLOGY | Facility: HOSPITAL | Age: 63
Discharge: HOME | End: 2018-06-07

## 2018-06-07 VITALS
OXYGEN SATURATION: 96 % | HEART RATE: 79 BPM | HEIGHT: 69 IN | SYSTOLIC BLOOD PRESSURE: 115 MMHG | BODY MASS INDEX: 30.76 KG/M2 | WEIGHT: 207.67 LBS | RESPIRATION RATE: 16 BRPM | DIASTOLIC BLOOD PRESSURE: 72 MMHG

## 2018-06-07 DIAGNOSIS — M54.16 LUMBAR RADICULOPATHY: ICD-10-CM

## 2018-06-07 PROCEDURE — A9270 NON-COVERED ITEM OR SERVICE: HCPCS | Performed by: NEUROLOGICAL SURGERY

## 2018-06-07 PROCEDURE — 0 IOPAMIDOL 41 % SOLUTION: Performed by: NEUROLOGICAL SURGERY

## 2018-06-07 PROCEDURE — 72265 MYELOGRAPHY L-S SPINE: CPT

## 2018-06-07 PROCEDURE — 72114 X-RAY EXAM L-S SPINE BENDING: CPT

## 2018-06-07 PROCEDURE — 63710000001 HYDROCODONE-ACETAMINOPHEN 5-325 MG TABLET: Performed by: NEUROLOGICAL SURGERY

## 2018-06-07 PROCEDURE — 72131 CT LUMBAR SPINE W/O DYE: CPT

## 2018-06-07 RX ORDER — HYDROCODONE BITARTRATE AND ACETAMINOPHEN 5; 325 MG/1; MG/1
1 TABLET ORAL EVERY 4 HOURS PRN
Status: DISCONTINUED | OUTPATIENT
Start: 2018-06-07 | End: 2018-06-08 | Stop reason: HOSPADM

## 2018-06-07 RX ORDER — ACETAMINOPHEN 325 MG/1
650 TABLET ORAL EVERY 4 HOURS PRN
Status: DISCONTINUED | OUTPATIENT
Start: 2018-06-07 | End: 2018-06-08 | Stop reason: HOSPADM

## 2018-06-07 RX ORDER — LIDOCAINE HYDROCHLORIDE 10 MG/ML
10 INJECTION, SOLUTION INFILTRATION; PERINEURAL ONCE
Status: COMPLETED | OUTPATIENT
Start: 2018-06-07 | End: 2018-06-07

## 2018-06-07 RX ADMIN — LIDOCAINE HYDROCHLORIDE 4 ML: 10 INJECTION, SOLUTION INFILTRATION; PERINEURAL at 07:27

## 2018-06-07 RX ADMIN — IOPAMIDOL 20 ML: 408 INJECTION, SOLUTION INTRATHECAL at 07:28

## 2018-06-07 RX ADMIN — HYDROCODONE BITARTATE AND ACETAMINOPHEN 1 TABLET: 5; 325 TABLET ORAL at 07:50

## 2018-06-07 NOTE — DISCHARGE INSTRUCTIONS
EDUCATION /DISCHARGE INSTRUCTIONS:  A myelogram is a special radiology procedure of the spinal cord, spinal nerves and other related structures.  You will be awake during the examination.  An area of your lower back will be cleansed with an antiseptic solution.  The physician will inject a numbing medication in your lower back.  While your back is numb, a needle will be placed in the lower back area.  A small amount of spinal fluid may be withdrawn and sent to the lab if ordered by your physician. While the needle is in the back, an injection of a contrast material (xray dye) will be given through the needle.  The contrast material will allow the physician to see the spinal cord and spinal nerves.  Once injected, the needle will be removed and a band aid will be placed over the injection site.  The table will be tilted during the process to allow the contrast material to flow to particular areas in the spine.  Following the injection and xrays, you will be taken to the CT scan where more pictures will be taken. After the procedure is finished, the contrast material will be absorbed by your body and eliminated through your kidneys.  The radiologist will study and interpret your myelogram and send the results to your physician.    Procedure risks of a myelogram include, but are not limited to:  *  Bleeding   *  seizure  *  Infection   *  Headache, possibly severe requiring  *  Contrast reaction      a blood patch  *  Nerve or cord injury  *  Paralysis and death    Benefits of the procedure:  *  Best examination for delineating pathology related to spinal cord compression from a disc and/or nerve root compression    Alternatives to the procedure:  MRI - a non invasive procedure requiring intravenous contrast injection.  Cannot be done on patients with certain pacemakers or metal in the body.  MRI risks include possible reaction to the contrast material, movement of metal located in the body.  Benefit to MRI:   Non-invasive and usually painless procedure.  THIS EDUCATION INFORMATION WAS REVIEWED PRIOR TO PROCEDURE AND CONSENT. Patient initials________________Time 0640    Important information following your myelogram:  *  Lie down with your head elevated no more than 2 pillows high today & tonight  *  Tomorrow, lie down with 1 pillow all day and all night  *  Sit up to eat meals and use the bathroom, otherwise, lie down  *  Do not drive for 48 hours following a myelogram  *  You may remove the bandage and shower in the morning  *  Increase your fluids for the next 24 hours.  Caffeinated drinks are encouraged.      Resume taking diabetic medication on   METFORMIN   6/10/18 Michael  Resume taking blood thinner or aspirin on 6/8/18    CALL YOUR PHYSICIAN FOR THE FOLLOWING:  * Pain at the injections site  * Reddness, swelling, bruising or drainage at the injection site.  * A fever by mouth of 101.0  * Any new symptoms    Headaches are a common side effect after a myelogram.  If you get a headache, you should stay flat in bed and drink plenty of fluids. If the headache persist and does not go away with rest/medication, CALL Dr. Randall at [phone]

## 2018-06-08 ENCOUNTER — ANESTHESIA (OUTPATIENT)
Dept: PERIOP | Facility: HOSPITAL | Age: 63
End: 2018-06-08

## 2018-06-08 ENCOUNTER — HOSPITAL ENCOUNTER (OUTPATIENT)
Facility: HOSPITAL | Age: 63
Discharge: HOME | End: 2018-06-09
Attending: EMERGENCY MEDICINE | Admitting: HOSPITALIST

## 2018-06-08 ENCOUNTER — TELEPHONE (OUTPATIENT)
Dept: INTERVENTIONAL RADIOLOGY/VASCULAR | Facility: HOSPITAL | Age: 63
End: 2018-06-08

## 2018-06-08 ENCOUNTER — APPOINTMENT (OUTPATIENT)
Dept: MRI IMAGING | Facility: HOSPITAL | Age: 63
End: 2018-06-08

## 2018-06-08 ENCOUNTER — ANESTHESIA EVENT (OUTPATIENT)
Dept: PERIOP | Facility: HOSPITAL | Age: 63
End: 2018-06-08

## 2018-06-08 ENCOUNTER — APPOINTMENT (OUTPATIENT)
Dept: GENERAL RADIOLOGY | Facility: HOSPITAL | Age: 63
End: 2018-06-08

## 2018-06-08 DIAGNOSIS — M51.26 LUMBAR DISC HERNIATION: ICD-10-CM

## 2018-06-08 DIAGNOSIS — M54.40 LOW BACK PAIN WITH SCIATICA, SCIATICA LATERALITY UNSPECIFIED, UNSPECIFIED BACK PAIN LATERALITY, UNSPECIFIED CHRONICITY: ICD-10-CM

## 2018-06-08 DIAGNOSIS — G83.4 CAUDA EQUINA SYNDROME (HCC): ICD-10-CM

## 2018-06-08 DIAGNOSIS — R93.89 ABNORMAL MRI: ICD-10-CM

## 2018-06-08 DIAGNOSIS — R15.9 INCONTINENCE OF FECES, UNSPECIFIED FECAL INCONTINENCE TYPE: Primary | ICD-10-CM

## 2018-06-08 LAB
ANION GAP SERPL CALCULATED.3IONS-SCNC: 14.9 MMOL/L
BASOPHILS # BLD AUTO: 0.07 10*3/MM3 (ref 0–0.2)
BASOPHILS NFR BLD AUTO: 0.9 % (ref 0–1.5)
BUN BLD-MCNC: 11 MG/DL (ref 8–23)
BUN/CREAT SERPL: 14.5 (ref 7–25)
CALCIUM SPEC-SCNC: 9.9 MG/DL (ref 8.6–10.5)
CHLORIDE SERPL-SCNC: 100 MMOL/L (ref 98–107)
CO2 SERPL-SCNC: 25.1 MMOL/L (ref 22–29)
CREAT BLD-MCNC: 0.76 MG/DL (ref 0.57–1)
DEPRECATED RDW RBC AUTO: 41.9 FL (ref 37–54)
EOSINOPHIL # BLD AUTO: 0.19 10*3/MM3 (ref 0–0.7)
EOSINOPHIL NFR BLD AUTO: 2.5 % (ref 0.3–6.2)
ERYTHROCYTE [DISTWIDTH] IN BLOOD BY AUTOMATED COUNT: 12.5 % (ref 11.7–13)
GFR SERPL CREATININE-BSD FRML MDRD: 77 ML/MIN/1.73
GLUCOSE BLD-MCNC: 249 MG/DL (ref 65–99)
HCT VFR BLD AUTO: 40.6 % (ref 35.6–45.5)
HGB BLD-MCNC: 13.5 G/DL (ref 11.9–15.5)
IMM GRANULOCYTES # BLD: 0.02 10*3/MM3 (ref 0–0.03)
IMM GRANULOCYTES NFR BLD: 0.3 % (ref 0–0.5)
LYMPHOCYTES # BLD AUTO: 3.33 10*3/MM3 (ref 0.9–4.8)
LYMPHOCYTES NFR BLD AUTO: 44.6 % (ref 19.6–45.3)
MCH RBC QN AUTO: 30.3 PG (ref 26.9–32)
MCHC RBC AUTO-ENTMCNC: 33.3 G/DL (ref 32.4–36.3)
MCV RBC AUTO: 91 FL (ref 80.5–98.2)
MONOCYTES # BLD AUTO: 0.52 10*3/MM3 (ref 0.2–1.2)
MONOCYTES NFR BLD AUTO: 7 % (ref 5–12)
NEUTROPHILS # BLD AUTO: 3.35 10*3/MM3 (ref 1.9–8.1)
NEUTROPHILS NFR BLD AUTO: 45 % (ref 42.7–76)
PLATELET # BLD AUTO: 244 10*3/MM3 (ref 140–500)
PMV BLD AUTO: 12 FL (ref 6–12)
POTASSIUM BLD-SCNC: 4.1 MMOL/L (ref 3.5–5.2)
RBC # BLD AUTO: 4.46 10*6/MM3 (ref 3.9–5.2)
SODIUM BLD-SCNC: 140 MMOL/L (ref 136–145)
WBC NRBC COR # BLD: 7.46 10*3/MM3 (ref 4.5–10.7)

## 2018-06-08 PROCEDURE — A9577 INJ MULTIHANCE: HCPCS | Performed by: EMERGENCY MEDICINE

## 2018-06-08 PROCEDURE — 25010000002 PROPOFOL 10 MG/ML EMULSION: Performed by: ANESTHESIOLOGY

## 2018-06-08 PROCEDURE — 25010000002 DEXAMETHASONE PER 1 MG

## 2018-06-08 PROCEDURE — G0378 HOSPITAL OBSERVATION PER HR: HCPCS

## 2018-06-08 PROCEDURE — 72020 X-RAY EXAM OF SPINE 1 VIEW: CPT

## 2018-06-08 PROCEDURE — 72158 MRI LUMBAR SPINE W/O & W/DYE: CPT

## 2018-06-08 PROCEDURE — 25010000002 MIDAZOLAM PER 1 MG

## 2018-06-08 PROCEDURE — 25010000002 DEXAMETHASONE PER 1 MG: Performed by: ANESTHESIOLOGY

## 2018-06-08 PROCEDURE — 25010000002 SUCCINYLCHOLINE PER 20 MG: Performed by: ANESTHESIOLOGY

## 2018-06-08 PROCEDURE — 80048 BASIC METABOLIC PNL TOTAL CA: CPT | Performed by: PHYSICIAN ASSISTANT

## 2018-06-08 PROCEDURE — 63030 LAMOT DCMPRN NRV RT 1 LMBR: CPT | Performed by: NEUROLOGICAL SURGERY

## 2018-06-08 PROCEDURE — 0 GADOBENATE DIMEGLUMINE 529 MG/ML SOLUTION: Performed by: EMERGENCY MEDICINE

## 2018-06-08 PROCEDURE — 85025 COMPLETE CBC W/AUTO DIFF WBC: CPT | Performed by: PHYSICIAN ASSISTANT

## 2018-06-08 PROCEDURE — 99284 EMERGENCY DEPT VISIT MOD MDM: CPT

## 2018-06-08 PROCEDURE — 25010000002 DEXAMETHASONE PER 1 MG: Performed by: PHYSICIAN ASSISTANT

## 2018-06-08 PROCEDURE — 96374 THER/PROPH/DIAG INJ IV PUSH: CPT

## 2018-06-08 PROCEDURE — 99222 1ST HOSP IP/OBS MODERATE 55: CPT | Performed by: NEUROLOGICAL SURGERY

## 2018-06-08 RX ORDER — BISACODYL 10 MG
10 SUPPOSITORY, RECTAL RECTAL DAILY PRN
Status: DISCONTINUED | OUTPATIENT
Start: 2018-06-08 | End: 2018-06-09 | Stop reason: HOSPADM

## 2018-06-08 RX ORDER — DEXAMETHASONE SODIUM PHOSPHATE 4 MG/ML
INJECTION, SOLUTION INTRA-ARTICULAR; INTRALESIONAL; INTRAMUSCULAR; INTRAVENOUS; SOFT TISSUE
Status: COMPLETED
Start: 2018-06-08 | End: 2018-06-08

## 2018-06-08 RX ORDER — FAMOTIDINE 10 MG/ML
INJECTION, SOLUTION INTRAVENOUS
Status: COMPLETED
Start: 2018-06-08 | End: 2018-06-08

## 2018-06-08 RX ORDER — SODIUM CHLORIDE AND POTASSIUM CHLORIDE 150; 900 MG/100ML; MG/100ML
100 INJECTION, SOLUTION INTRAVENOUS CONTINUOUS
Status: DISCONTINUED | OUTPATIENT
Start: 2018-06-08 | End: 2018-06-09 | Stop reason: HOSPADM

## 2018-06-08 RX ORDER — DEXAMETHASONE SODIUM PHOSPHATE 10 MG/ML
INJECTION INTRAMUSCULAR; INTRAVENOUS AS NEEDED
Status: DISCONTINUED | OUTPATIENT
Start: 2018-06-08 | End: 2018-06-09 | Stop reason: SURG

## 2018-06-08 RX ORDER — ONDANSETRON 4 MG/1
4 TABLET, ORALLY DISINTEGRATING ORAL EVERY 6 HOURS PRN
Status: DISCONTINUED | OUTPATIENT
Start: 2018-06-08 | End: 2018-06-09 | Stop reason: HOSPADM

## 2018-06-08 RX ORDER — FAMOTIDINE 10 MG/ML
20 INJECTION, SOLUTION INTRAVENOUS ONCE
Status: COMPLETED | OUTPATIENT
Start: 2018-06-08 | End: 2018-06-08

## 2018-06-08 RX ORDER — MORPHINE SULFATE 2 MG/ML
1 INJECTION, SOLUTION INTRAMUSCULAR; INTRAVENOUS
Status: DISCONTINUED | OUTPATIENT
Start: 2018-06-08 | End: 2018-06-09 | Stop reason: HOSPADM

## 2018-06-08 RX ORDER — DEXAMETHASONE SODIUM PHOSPHATE 4 MG/ML
4 INJECTION, SOLUTION INTRA-ARTICULAR; INTRALESIONAL; INTRAMUSCULAR; INTRAVENOUS; SOFT TISSUE EVERY 6 HOURS
Status: DISCONTINUED | OUTPATIENT
Start: 2018-06-09 | End: 2018-06-09 | Stop reason: HOSPADM

## 2018-06-08 RX ORDER — SODIUM CHLORIDE 0.9 % (FLUSH) 0.9 %
1-10 SYRINGE (ML) INJECTION AS NEEDED
Status: DISCONTINUED | OUTPATIENT
Start: 2018-06-08 | End: 2018-06-09 | Stop reason: HOSPADM

## 2018-06-08 RX ORDER — FENTANYL CITRATE 50 UG/ML
INJECTION, SOLUTION INTRAMUSCULAR; INTRAVENOUS
Status: COMPLETED
Start: 2018-06-08 | End: 2018-06-08

## 2018-06-08 RX ORDER — MIDAZOLAM HYDROCHLORIDE 1 MG/ML
2 INJECTION INTRAMUSCULAR; INTRAVENOUS
Status: DISCONTINUED | OUTPATIENT
Start: 2018-06-08 | End: 2018-06-09 | Stop reason: HOSPADM

## 2018-06-08 RX ORDER — ONDANSETRON 2 MG/ML
4 INJECTION INTRAMUSCULAR; INTRAVENOUS EVERY 6 HOURS PRN
Status: DISCONTINUED | OUTPATIENT
Start: 2018-06-08 | End: 2018-06-09 | Stop reason: HOSPADM

## 2018-06-08 RX ORDER — ACETAMINOPHEN 325 MG/1
650 TABLET ORAL EVERY 4 HOURS PRN
Status: DISCONTINUED | OUTPATIENT
Start: 2018-06-08 | End: 2018-06-09 | Stop reason: HOSPADM

## 2018-06-08 RX ORDER — LIDOCAINE HYDROCHLORIDE 10 MG/ML
INJECTION, SOLUTION INFILTRATION; PERINEURAL AS NEEDED
Status: DISCONTINUED | OUTPATIENT
Start: 2018-06-08 | End: 2018-06-09 | Stop reason: SURG

## 2018-06-08 RX ORDER — MIDAZOLAM HYDROCHLORIDE 1 MG/ML
1 INJECTION INTRAMUSCULAR; INTRAVENOUS
Status: DISCONTINUED | OUTPATIENT
Start: 2018-06-08 | End: 2018-06-09 | Stop reason: HOSPADM

## 2018-06-08 RX ORDER — DEXTROSE MONOHYDRATE 25 G/50ML
25 INJECTION, SOLUTION INTRAVENOUS
Status: DISCONTINUED | OUTPATIENT
Start: 2018-06-08 | End: 2018-06-09 | Stop reason: HOSPADM

## 2018-06-08 RX ORDER — BISACODYL 5 MG/1
5 TABLET, DELAYED RELEASE ORAL DAILY PRN
Status: DISCONTINUED | OUTPATIENT
Start: 2018-06-08 | End: 2018-06-09 | Stop reason: HOSPADM

## 2018-06-08 RX ORDER — ONDANSETRON 4 MG/1
4 TABLET, FILM COATED ORAL EVERY 6 HOURS PRN
Status: DISCONTINUED | OUTPATIENT
Start: 2018-06-08 | End: 2018-06-09 | Stop reason: HOSPADM

## 2018-06-08 RX ORDER — MIDAZOLAM HYDROCHLORIDE 1 MG/ML
INJECTION INTRAMUSCULAR; INTRAVENOUS
Status: COMPLETED
Start: 2018-06-08 | End: 2018-06-08

## 2018-06-08 RX ORDER — FENTANYL CITRATE 50 UG/ML
INJECTION, SOLUTION INTRAMUSCULAR; INTRAVENOUS AS NEEDED
Status: DISCONTINUED | OUTPATIENT
Start: 2018-06-08 | End: 2018-06-09 | Stop reason: SURG

## 2018-06-08 RX ORDER — NALOXONE HCL 0.4 MG/ML
0.4 VIAL (ML) INJECTION
Status: DISCONTINUED | OUTPATIENT
Start: 2018-06-08 | End: 2018-06-09 | Stop reason: HOSPADM

## 2018-06-08 RX ORDER — OXYCODONE AND ACETAMINOPHEN 7.5; 325 MG/1; MG/1
1 TABLET ORAL EVERY 4 HOURS PRN
Status: DISCONTINUED | OUTPATIENT
Start: 2018-06-08 | End: 2018-06-09 | Stop reason: HOSPADM

## 2018-06-08 RX ORDER — SODIUM CHLORIDE 0.9 % (FLUSH) 0.9 %
10 SYRINGE (ML) INJECTION AS NEEDED
Status: DISCONTINUED | OUTPATIENT
Start: 2018-06-08 | End: 2018-06-09 | Stop reason: HOSPADM

## 2018-06-08 RX ORDER — SUCCINYLCHOLINE CHLORIDE 20 MG/ML
INJECTION INTRAMUSCULAR; INTRAVENOUS AS NEEDED
Status: DISCONTINUED | OUTPATIENT
Start: 2018-06-08 | End: 2018-06-09 | Stop reason: SURG

## 2018-06-08 RX ORDER — SODIUM CHLORIDE, SODIUM LACTATE, POTASSIUM CHLORIDE, CALCIUM CHLORIDE 600; 310; 30; 20 MG/100ML; MG/100ML; MG/100ML; MG/100ML
9 INJECTION, SOLUTION INTRAVENOUS CONTINUOUS
Status: DISCONTINUED | OUTPATIENT
Start: 2018-06-08 | End: 2018-06-09 | Stop reason: HOSPADM

## 2018-06-08 RX ORDER — NICOTINE POLACRILEX 4 MG
15 LOZENGE BUCCAL
Status: DISCONTINUED | OUTPATIENT
Start: 2018-06-08 | End: 2018-06-09 | Stop reason: HOSPADM

## 2018-06-08 RX ORDER — PROPOFOL 10 MG/ML
VIAL (ML) INTRAVENOUS AS NEEDED
Status: DISCONTINUED | OUTPATIENT
Start: 2018-06-08 | End: 2018-06-09 | Stop reason: SURG

## 2018-06-08 RX ORDER — CLINDAMYCIN PHOSPHATE 900 MG/50ML
INJECTION INTRAVENOUS
Status: COMPLETED
Start: 2018-06-08 | End: 2018-06-09

## 2018-06-08 RX ORDER — CLINDAMYCIN PHOSPHATE 900 MG/50ML
900 INJECTION INTRAVENOUS ONCE
Status: COMPLETED | OUTPATIENT
Start: 2018-06-08 | End: 2018-06-08

## 2018-06-08 RX ORDER — LIDOCAINE HYDROCHLORIDE 20 MG/ML
INJECTION, SOLUTION INFILTRATION; PERINEURAL AS NEEDED
Status: DISCONTINUED | OUTPATIENT
Start: 2018-06-08 | End: 2018-06-08

## 2018-06-08 RX ADMIN — GADOBENATE DIMEGLUMINE 19 ML: 529 INJECTION, SOLUTION INTRAVENOUS at 19:54

## 2018-06-08 RX ADMIN — DEXAMETHASONE SODIUM PHOSPHATE 10 MG: 10 INJECTION INTRAMUSCULAR; INTRAVENOUS at 21:15

## 2018-06-08 RX ADMIN — SUCCINYLCHOLINE CHLORIDE 140 MG: 20 INJECTION, SOLUTION INTRAMUSCULAR; INTRAVENOUS; PARENTERAL at 23:11

## 2018-06-08 RX ADMIN — MIDAZOLAM 2 MG: 1 INJECTION INTRAMUSCULAR; INTRAVENOUS at 23:04

## 2018-06-08 RX ADMIN — CLINDAMYCIN PHOSPHATE 900 MG: 900 INJECTION INTRAVENOUS at 23:06

## 2018-06-08 RX ADMIN — MIDAZOLAM HYDROCHLORIDE 2 MG: 1 INJECTION INTRAMUSCULAR; INTRAVENOUS at 23:04

## 2018-06-08 RX ADMIN — LIDOCAINE HYDROCHLORIDE 80 MG: 10 INJECTION, SOLUTION INFILTRATION; PERINEURAL at 23:09

## 2018-06-08 RX ADMIN — DEXAMETHASONE SODIUM PHOSPHATE 4 MG: 4 INJECTION, SOLUTION INTRAMUSCULAR; INTRAVENOUS at 23:01

## 2018-06-08 RX ADMIN — DEXAMETHASONE SODIUM PHOSPHATE 8 MG: 10 INJECTION INTRAMUSCULAR; INTRAVENOUS at 23:14

## 2018-06-08 RX ADMIN — PROPOFOL 180 MG: 10 INJECTION, EMULSION INTRAVENOUS at 23:10

## 2018-06-08 RX ADMIN — CLINDAMYCIN PHOSPHATE 900 MG: 900 INJECTION INTRAVENOUS at 23:15

## 2018-06-08 RX ADMIN — FENTANYL CITRATE 100 MCG: 50 INJECTION, SOLUTION INTRAMUSCULAR; INTRAVENOUS at 23:08

## 2018-06-08 RX ADMIN — SODIUM CHLORIDE, POTASSIUM CHLORIDE, SODIUM LACTATE AND CALCIUM CHLORIDE 9 ML/HR: 600; 310; 30; 20 INJECTION, SOLUTION INTRAVENOUS at 22:50

## 2018-06-08 RX ADMIN — FAMOTIDINE 20 MG: 10 INJECTION, SOLUTION INTRAVENOUS at 22:59

## 2018-06-08 RX ADMIN — CLINDAMYCIN PHOSPHATE 900 MG: 18 INJECTION, SOLUTION INTRAMUSCULAR; INTRAVENOUS at 23:06

## 2018-06-08 RX ADMIN — FENTANYL CITRATE 50 MCG: 50 INJECTION, SOLUTION INTRAMUSCULAR; INTRAVENOUS at 23:35

## 2018-06-08 RX ADMIN — FAMOTIDINE 20 MG: 10 INJECTION INTRAVENOUS at 22:59

## 2018-06-08 NOTE — ED NOTES
Back pain x 4 months.  For the past 3 months pt has lost control off and on of bowel.  Pt stated this week the loss of control has gotten a lot worse.     Minna Garcia  06/08/18 [id number]

## 2018-06-08 NOTE — ED TRIAGE NOTES
Pt states that she has chronic back pain and had a myelogram yesterday at Dr. Randall's office. States that her pain is slightly better today but that she has lost control of her bowels. States that she usually has some short warning but today did not have any warning at all.

## 2018-06-08 NOTE — ED PROVIDER NOTES
EMERGENCY DEPARTMENT ENCOUNTER    Room Number:  35/35  Date seen:  6/8/2018  Time seen: 5:16 PM  PCP: Brice Camarena MD   Pain management specialist- Dr Donny Gan   Neurosurgeon- Dr Randall  History provided by- patient    HPI:  Chief complaint: bowel incontinence   Context:Wendi Ramos is a 62 y.o. female who states that she has had worsening of her chronic diffuse lower back pain since 2/2018 (no known injury or trauma). For this duration, she notes that she has also had intermittent LLE numbness/tingling and occasional bowel incontinence. She reports that she has seen PMD, pain management, and neurosurgeon for her sx and underwent l-spine myelogram, CT l-spine, and l-spine xray yesterday (results still pending) at neurosurgeon's office. Pt notes that today, she had an episode of bowel incontinence with normal stool that was worse than her previous episodes. Specifically, she usually she has a 5 to 10 second warning before she has bowel incontinence but did not have this warning today. She denies acute change in her chronic diffuse lower back pain today, acute change in chronic LLE numbness/tingling today, new numbness, motor loss, saddle anesthesia, bladder incontinence, N/V/D, abd pain, chest pain, dyspnea, fevers, and chills. She states that she last underwent outpatient MRI l-spine approximately 2 weeks ago. Additionally, she reports that she called neurosurgeon's office and was referred to ED for further evaluation. Pt has no other complaints at this time.     Onset: abrupt  Location: GI  Radiation: none  Duration: ongoing since 2/2018, worse today  Timing: episodic since 2/2018  Character: none  Aggravating Factors: none  Alleviating Factors: none  Severity: moderate    MEDICAL RECORD REVIEW    MRI l-spine report from 6/24/18-  1) L3-L4- disc protrusion resulting in moderate left lateral recess stenosis of the L4 nerve root  2) L4-L5- 3mm broad based disc protrusion  3) L2-L3- chronic 2mm disc  protrusion    CT l-spine myelogram report from 18-   The lumbar myelogram and postmyelogram CT scan of the lumbar spine demonstrates the followin. There is a small hiatus hernia measuring 7.6 cm.     2. The lower thoracic spinal cord appears normal. There is no  significant disc or bony abnormality at T9-T10 or T10-11.     3. At T11-12, there is a large central disc herniation causing marked  thecal sac indentation and localized flattening deformity of the  anterior margin of the conus extending slightly more to the left. There  is no foraminal encroachment.     4. At T12-L1, there is no disc abnormality. There is slight facet  spurring on the left without central or foraminal encroachment.     5. At L1-2, there is minimal annular disc bulge without central or  foraminal encroachment.     6. At L2-3, there is a prominent broad-based annular disc protrusion  combined with considerable facet hypertrophy and thickening of the  ligamentum flavum and producing moderate central stenosis. There is no  foraminal encroachment.     7.  At L3-4, there is an extremely large disc herniation including what  appears to be an extruded disc fragment extending cephalad along the  posterior margin of the L3 vertebral body and extending more to the  left. The disc fragment measures up to 12 mm in transverse diameter, 9  mm in AP diameter, and 19 mm in height. It causes very marked thecal sac  indentation with associated stenosis as well as left foraminal  encroachment. There is also very prominent posterior displacement of the  left L4 nerve root.     8. At L4-5, there is a prominent broad-based disc bulge extending more  to the right causing localized thecal sac indentation and slight  posterior deviation of the right L5 nerve root. There is no foraminal  encroachment.     9. At L5-S1, there is a mild broad-based disc bulge combined with  slight facet hypertrophy. There is no central or foraminal encroachment.        ALLERGIES  Sulfamethoxazole w/trimethoprim 800-160  [sulfamethoxazole-trimethoprim]; Adhesive tape; Cefpodoxime; Cephalosporins; Metformin and related; Topiramate; and Latex    PAST MEDICAL HISTORY  Active Ambulatory Problems     Diagnosis Date Noted   • Type 2 diabetes mellitus without complication 09/20/2017   • Lumbar radiculopathy 05/31/2018     Resolved Ambulatory Problems     Diagnosis Date Noted   • No Resolved Ambulatory Problems     Past Medical History:   Diagnosis Date   • Diabetes mellitus    • Fibromyalgia    • GERD (gastroesophageal reflux disease)    • Hiatal hernia    • Hyperlipidemia    • Hypertension    • Hypothyroidism    • Peripheral neuropathy    • Rheumatoid arthritis    • Rheumatoid arthritis 2005   • Seizures    • Type 2 diabetes mellitus    • West Nile fever        PAST SURGICAL HISTORY  Past Surgical History:   Procedure Laterality Date   • ABDOMINOPLASTY     • ADENOIDECTOMY     • BILATERAL BREAST REDUCTION     • BRONCHOSCOPY N/A 12/15/2016    Procedure: BRONCHOSCOPY WITH BAL AND ENDOBRONCHIAL ULTRASOUND WITH FNA;  Surgeon: Diego Ponce MD;  Location: [facility] ENDOSCOPY;  Service:    • CARDIAC ABLATION     • HYSTERECTOMY     • LASIK     • MOUTH SURGERY     • REPLACEMENT TOTAL KNEE Left    • TONSILLECTOMY         FAMILY HISTORY  Family History   Problem Relation Age of Onset   • Diabetes Mother    • Neuropathy Father    • Colon polyps Father    • No Known Problems Sister    • Diabetes Brother    • No Known Problems Son        SOCIAL HISTORY  Social History     Social History   • Marital status:      Spouse name: N/A   • Number of children: 1   • Years of education: 14     Occupational History   • RETIRED      Social History Main Topics   • Smoking status: Never Smoker   • Smokeless tobacco: Never Used   • Alcohol use Yes      Comment: social   • Drug use: No   • Sexual activity: Defer     Other Topics Concern   • Not on file     Social History Narrative    LIVES WITH SPOUSE        REVIEW OF SYSTEMS  Review of Systems   Constitutional: Negative for chills and fever.   HENT: Negative.    Eyes: Negative.    Respiratory: Negative for cough and shortness of breath.    Cardiovascular: Negative for chest pain.   Gastrointestinal: Negative for abdominal pain, diarrhea, nausea and vomiting.        Bowel incontinence (chronic, worse today)   Genitourinary: Negative.  Negative for difficulty urinating and dysuria.        No bladder incontinence    Musculoskeletal: Back pain: chronic diffuse lower back pain, worse since 2/2018, no acute change today.   Skin: Negative.    Neurological: Negative for weakness. Numbness: chronic LLE numbness/tingling since 2/2018 (no acute change today), no new numbness.        No saddle anesthesia    Psychiatric/Behavioral: Negative.        PHYSICAL EXAM  ED Triage Vitals   Temp Heart Rate Resp BP SpO2   06/08/18 1443 06/08/18 1443 06/08/18 1454 06/08/18 1454 06/08/18 1443   97.7 °F (36.5 °C) 86 18 135/84 96 % WNL      Temp src Heart Rate Source Patient Position BP Location FiO2 (%)   06/08/18 1443 06/08/18 1443 06/08/18 1454 06/08/18 1649 --   Tympanic Monitor Sitting Right arm      Physical Exam   Constitutional: She is oriented to person, place, and time and well-developed, well-nourished, and in no distress.   HENT:   Head: Normocephalic and atraumatic.   Right Ear: External ear normal.   Left Ear: External ear normal.   Nose: Nose normal.   Eyes: Conjunctivae are normal.   Neck: Normal range of motion.   Cardiovascular: Normal rate and regular rhythm.    Pulmonary/Chest: Effort normal and breath sounds normal. No respiratory distress.   Abdominal: Soft. She exhibits no distension. There is no tenderness.   Musculoskeletal: Normal range of motion.   Diffuse lumbosacral tenderness, no cervical or thoracic tenderness    Sensation is intact to light touch throughout the bilateral lower extremities. Muscle strength is 5/5 and symmetrical with plantarflexion and EHL.  Achilles and patellar reflexes are diminished bilaterally. DP and PT pulses are 2+ bilaterally.      Neurological: She is alert and oriented to person, place, and time.   Skin: Skin is warm and dry.   Psychiatric: Affect normal.   Nursing note and vitals reviewed.      LAB RESULTS  No results found for this or any previous visit (from the past 24 hour(s)).    I ordered the above labs and reviewed the results    RADIOLOGY  MRI Lumbar Spine With & Without Contrast (Preliminary result)   Result time 06/08/18 21:01:36   Preliminary result by Uziel Self MD (06/08/18 21:01:36)                Impression:    1. At L3-4 there is a large disc extrusion with potential disc fragment  that extends posteriorly and superiorly adjacent to the posterior left  aspect of the L3 vertebral body contributing to severe central canal and  left lateral recess stenosis with loss of normal CSF signal surrounding  the nerve roots at this level.  2. At T11-12 there is a central posterior disc protrusion/extrusion  contacting the anterior thoracic cord with moderate canal narrowing.  3. At L2-3 there is moderate narrowing of the central canal due to disc  bulge and bilateral facet arthritis.  4. At L4-5 there is a disc bulge and small right posterior disc  protrusion with mild narrowing of the central canal and narrowing of the  right lateral recess.     Findings discussed with Rebeakh Berg in the emergency department on  06/08/2018 at 8:28 p.m..               Narrative:    MRI LUMBAR SPINE WITH AND WITHOUT CONTRAST     HISTORY: 62-year-old low back and left leg pain and weakness. Bowel  incontinence.     TECHNIQUE: MRI lumbar spine: Sagittal T1, T2 fat sat, PD as well as  axial T1 and T2-weighted sequences. Gadolinium administered  intravenously followed by axial T1 and sagittal T1 fat saturated  sequences.     COMPARISON: CT myelogram lumbar spine 06/07/2018.     FINDINGS: There is a scoliotic curvature of the lumbar spine that  is  convex to the right at L3. The tip of the conus medullaris terminates at  mid body L1 level.     At T11-12 there is a central posterior disc extrusion that extends  posteriorly 8 mm and measures 8 mm transverse and contacts the anterior  thoracic cord with flattening and moderate narrowing of the central  canal. The neural foramina are patent.     At T12-L1 Central canal and neuroforamina are patent.     At L1-2 there is a mild broad disc bulge eccentric to the left with mild  narrowing of the left lateral recess. The central canal and  neuroforamina are patent.     At L2-3 there is disc space narrowing and there is a broad disc bulge.  Mild facet arthritis and flavum thickening are present and there is  moderate narrowing of the central canal and mild bilateral foraminal  narrowing. There is Schmorl's node formation along the inferior endplate  of L2.     At L3-4 there is a disc extrusion that appears to originate at the L3-4  space. Large extrusion extends posteriorly filling the central and left  aspect of the central canal with severe canal stenosis and loss of CSF  signal surrounding the nerve roots at this level. There is severe  narrowing of the left lateral recess. Disc extrusion or extruded  fragment measures 2.5 cm in height extending superiorly posterior to the  left aspect of the L3 vertebral body. Disc extrusion at the level of the  disc space measures 2 cm transverse and extends posteriorly 1.1 cm.  There is also endplate spurring and facet overgrowth with moderate left  neural foraminal narrowing. The right neural foramen is patent.     At L4-5 there is a disc bulge and small right posterior disc protrusion  with mild narrowing the central canal and narrowing of the right lateral  recess .This appears to contact and mildly deflects the transverse right  L5 nerve.     Neuroforamina are patent.     At L5-S1 broad disc bulge and there is mild facet overgrowth. The  central canal and neuroforamina are  "patent.                       I ordered the above noted radiological studies and reviewed the images on the PACS system.  Spoke with Dr. Self (radiologist) regarding MRI  l-spine scan results    MEDICATIONS GIVEN IN ER  Medications   gadobenate dimeglumine (MULTIHANCE) injection 19 mL (19 mL Intravenous Given 6/8/18 1954)       PROCEDURES  Procedures      PROGRESS AND CONSULTS    Progress Notes:       1809- Ordered MRI l-spine for further evaluation.    1927- Reviewed pt's history and workup with Dr. Perdomo.  After a bedside evaluation; Dr Perdomo agrees with the plan of care.     2023- Obtained MRI l-spine results from Dr Self (radiologist)-> Very large disc hernination at L3-L4. In the context of clinical sx that are concerning for cord compression, neurosurgery should be consulted.     2025- Discussed case with and transferred care to JUAN Elizabeth. Pending neurosurgery call back and final disposition.       Disposition vitals:  /81   Pulse 83   Temp 97.7 °F (36.5 °C) (Tympanic)   Resp 16   Ht 175.3 cm (69\")   Wt 90.7 kg (200 lb)   SpO2 94%   BMI 29.53 kg/m²         Documentation assistance provided by sierra Lentz for Rebekah Berg PA-C.  Information recorded by the scribe was done at my direction and has been verified and validated by me.      Fer Lentz  06/08/18 [id number]       JUAN Vaz  06/11/18 1343    "

## 2018-06-09 ENCOUNTER — APPOINTMENT (OUTPATIENT)
Dept: GENERAL RADIOLOGY | Facility: HOSPITAL | Age: 63
End: 2018-06-09

## 2018-06-09 VITALS
HEART RATE: 90 BPM | DIASTOLIC BLOOD PRESSURE: 67 MMHG | BODY MASS INDEX: 29.62 KG/M2 | HEIGHT: 69 IN | OXYGEN SATURATION: 98 % | RESPIRATION RATE: 15 BRPM | WEIGHT: 200 LBS | TEMPERATURE: 98.4 F | SYSTOLIC BLOOD PRESSURE: 116 MMHG

## 2018-06-09 LAB
ANION GAP SERPL CALCULATED.3IONS-SCNC: 16.2 MMOL/L
BUN BLD-MCNC: 13 MG/DL (ref 8–23)
BUN/CREAT SERPL: 17.1 (ref 7–25)
CALCIUM SPEC-SCNC: 9.2 MG/DL (ref 8.6–10.5)
CHLORIDE SERPL-SCNC: 96 MMOL/L (ref 98–107)
CO2 SERPL-SCNC: 23.8 MMOL/L (ref 22–29)
CREAT BLD-MCNC: 0.76 MG/DL (ref 0.57–1)
DEPRECATED RDW RBC AUTO: 42.5 FL (ref 37–54)
ERYTHROCYTE [DISTWIDTH] IN BLOOD BY AUTOMATED COUNT: 12.5 % (ref 11.7–13)
GFR SERPL CREATININE-BSD FRML MDRD: 77 ML/MIN/1.73
GLUCOSE BLD-MCNC: 431 MG/DL (ref 65–99)
GLUCOSE BLDC GLUCOMTR-MCNC: 336 MG/DL (ref 70–130)
HBA1C MFR BLD: 9 % (ref 4.8–5.6)
HCT VFR BLD AUTO: 38.8 % (ref 35.6–45.5)
HGB BLD-MCNC: 12.2 G/DL (ref 11.9–15.5)
MAGNESIUM SERPL-MCNC: 2.1 MG/DL (ref 1.6–2.4)
MCH RBC QN AUTO: 29.4 PG (ref 26.9–32)
MCHC RBC AUTO-ENTMCNC: 31.4 G/DL (ref 32.4–36.3)
MCV RBC AUTO: 93.5 FL (ref 80.5–98.2)
PHOSPHATE SERPL-MCNC: 4 MG/DL (ref 2.5–4.5)
PLATELET # BLD AUTO: 200 10*3/MM3 (ref 140–500)
PMV BLD AUTO: 11.9 FL (ref 6–12)
POTASSIUM BLD-SCNC: 4.4 MMOL/L (ref 3.5–5.2)
RBC # BLD AUTO: 4.15 10*6/MM3 (ref 3.9–5.2)
SODIUM BLD-SCNC: 136 MMOL/L (ref 136–145)
WBC NRBC COR # BLD: 4.8 10*3/MM3 (ref 4.5–10.7)

## 2018-06-09 PROCEDURE — A9270 NON-COVERED ITEM OR SERVICE: HCPCS | Performed by: HOSPITALIST

## 2018-06-09 PROCEDURE — 63710000001 INSULIN ASPART PER 5 UNITS: Performed by: HOSPITALIST

## 2018-06-09 PROCEDURE — 85027 COMPLETE CBC AUTOMATED: CPT | Performed by: HOSPITALIST

## 2018-06-09 PROCEDURE — 84100 ASSAY OF PHOSPHORUS: CPT | Performed by: HOSPITALIST

## 2018-06-09 PROCEDURE — 99024 POSTOP FOLLOW-UP VISIT: CPT | Performed by: NURSE PRACTITIONER

## 2018-06-09 PROCEDURE — A9270 NON-COVERED ITEM OR SERVICE: HCPCS | Performed by: NEUROLOGICAL SURGERY

## 2018-06-09 PROCEDURE — 25810000003 SODIUM CHLORIDE 0.9 % WITH KCL 20 MEQ 20-0.9 MEQ/L-% SOLUTION: Performed by: HOSPITALIST

## 2018-06-09 PROCEDURE — G0378 HOSPITAL OBSERVATION PER HR: HCPCS

## 2018-06-09 PROCEDURE — 63710000001 GLIPIZIDE 5 MG TABLET: Performed by: NEUROLOGICAL SURGERY

## 2018-06-09 PROCEDURE — 76000 FLUOROSCOPY <1 HR PHYS/QHP: CPT

## 2018-06-09 PROCEDURE — 63710000001 LINAGLIPTIN 5 MG TABLET: Performed by: NEUROLOGICAL SURGERY

## 2018-06-09 PROCEDURE — 83735 ASSAY OF MAGNESIUM: CPT | Performed by: HOSPITALIST

## 2018-06-09 PROCEDURE — 80048 BASIC METABOLIC PNL TOTAL CA: CPT | Performed by: HOSPITALIST

## 2018-06-09 PROCEDURE — 63710000001 PANTOPRAZOLE 40 MG TABLET DELAYED-RELEASE: Performed by: NEUROLOGICAL SURGERY

## 2018-06-09 PROCEDURE — 36415 COLL VENOUS BLD VENIPUNCTURE: CPT | Performed by: HOSPITALIST

## 2018-06-09 PROCEDURE — 63710000001 METOPROLOL TARTRATE 50 MG TABLET: Performed by: NEUROLOGICAL SURGERY

## 2018-06-09 PROCEDURE — 83036 HEMOGLOBIN GLYCOSYLATED A1C: CPT | Performed by: HOSPITALIST

## 2018-06-09 PROCEDURE — 25010000002 ONDANSETRON PER 1 MG: Performed by: ANESTHESIOLOGY

## 2018-06-09 PROCEDURE — 25010000002 FENTANYL CITRATE (PF) 100 MCG/2ML SOLUTION: Performed by: ANESTHESIOLOGY

## 2018-06-09 PROCEDURE — 82962 GLUCOSE BLOOD TEST: CPT

## 2018-06-09 RX ORDER — FENOFIBRATE 145 MG/1
145 TABLET, COATED ORAL DAILY
Status: DISCONTINUED | OUTPATIENT
Start: 2018-06-09 | End: 2018-06-09 | Stop reason: HOSPADM

## 2018-06-09 RX ORDER — ONDANSETRON 2 MG/ML
4 INJECTION INTRAMUSCULAR; INTRAVENOUS EVERY 6 HOURS PRN
Status: DISCONTINUED | OUTPATIENT
Start: 2018-06-09 | End: 2018-06-09 | Stop reason: HOSPADM

## 2018-06-09 RX ORDER — PRAMIPEXOLE DIHYDROCHLORIDE 0.5 MG/1
0.5 TABLET ORAL NIGHTLY
Status: DISCONTINUED | OUTPATIENT
Start: 2018-06-09 | End: 2018-06-09 | Stop reason: HOSPADM

## 2018-06-09 RX ORDER — OXYCODONE HYDROCHLORIDE AND ACETAMINOPHEN 5; 325 MG/1; MG/1
1 TABLET ORAL ONCE AS NEEDED
Status: DISCONTINUED | OUTPATIENT
Start: 2018-06-09 | End: 2018-06-09 | Stop reason: HOSPADM

## 2018-06-09 RX ORDER — ATORVASTATIN CALCIUM 10 MG/1
10 TABLET, FILM COATED ORAL NIGHTLY
Status: DISCONTINUED | OUTPATIENT
Start: 2018-06-09 | End: 2018-06-09 | Stop reason: HOSPADM

## 2018-06-09 RX ORDER — GLIPIZIDE 5 MG/1
2.5 TABLET ORAL
Status: DISCONTINUED | OUTPATIENT
Start: 2018-06-09 | End: 2018-06-09 | Stop reason: HOSPADM

## 2018-06-09 RX ORDER — BISACODYL 5 MG/1
10 TABLET, DELAYED RELEASE ORAL DAILY PRN
Status: DISCONTINUED | OUTPATIENT
Start: 2018-06-09 | End: 2018-06-09 | Stop reason: HOSPADM

## 2018-06-09 RX ORDER — HYDROMORPHONE HYDROCHLORIDE 1 MG/ML
0.5 INJECTION, SOLUTION INTRAMUSCULAR; INTRAVENOUS; SUBCUTANEOUS
Status: DISCONTINUED | OUTPATIENT
Start: 2018-06-09 | End: 2018-06-09 | Stop reason: HOSPADM

## 2018-06-09 RX ORDER — ONDANSETRON 2 MG/ML
4 INJECTION INTRAMUSCULAR; INTRAVENOUS ONCE AS NEEDED
Status: DISCONTINUED | OUTPATIENT
Start: 2018-06-09 | End: 2018-06-09 | Stop reason: HOSPADM

## 2018-06-09 RX ORDER — MAGNESIUM HYDROXIDE 1200 MG/15ML
LIQUID ORAL AS NEEDED
Status: DISCONTINUED | OUTPATIENT
Start: 2018-06-08 | End: 2018-06-09 | Stop reason: HOSPADM

## 2018-06-09 RX ORDER — PROMETHAZINE HYDROCHLORIDE 25 MG/ML
12.5 INJECTION, SOLUTION INTRAMUSCULAR; INTRAVENOUS ONCE AS NEEDED
Status: DISCONTINUED | OUTPATIENT
Start: 2018-06-09 | End: 2018-06-09 | Stop reason: HOSPADM

## 2018-06-09 RX ORDER — FENTANYL CITRATE 50 UG/ML
25 INJECTION, SOLUTION INTRAMUSCULAR; INTRAVENOUS
Status: DISCONTINUED | OUTPATIENT
Start: 2018-06-09 | End: 2018-06-09 | Stop reason: HOSPADM

## 2018-06-09 RX ORDER — NALOXONE HCL 0.4 MG/ML
0.4 VIAL (ML) INJECTION AS NEEDED
Status: DISCONTINUED | OUTPATIENT
Start: 2018-06-09 | End: 2018-06-09 | Stop reason: HOSPADM

## 2018-06-09 RX ORDER — LEFLUNOMIDE 20 MG/1
20 TABLET ORAL DAILY
Status: DISCONTINUED | OUTPATIENT
Start: 2018-06-09 | End: 2018-06-09 | Stop reason: HOSPADM

## 2018-06-09 RX ORDER — DOCUSATE SODIUM 100 MG/1
100 CAPSULE, LIQUID FILLED ORAL 2 TIMES DAILY PRN
Status: DISCONTINUED | OUTPATIENT
Start: 2018-06-09 | End: 2018-06-09 | Stop reason: HOSPADM

## 2018-06-09 RX ORDER — FLUMAZENIL 0.1 MG/ML
0.2 INJECTION INTRAVENOUS AS NEEDED
Status: DISCONTINUED | OUTPATIENT
Start: 2018-06-09 | End: 2018-06-09 | Stop reason: HOSPADM

## 2018-06-09 RX ORDER — HYDROMORPHONE HYDROCHLORIDE 1 MG/ML
0.25 INJECTION, SOLUTION INTRAMUSCULAR; INTRAVENOUS; SUBCUTANEOUS
Status: DISCONTINUED | OUTPATIENT
Start: 2018-06-09 | End: 2018-06-09 | Stop reason: HOSPADM

## 2018-06-09 RX ORDER — ZOLPIDEM TARTRATE 5 MG/1
10 TABLET ORAL NIGHTLY PRN
Status: DISCONTINUED | OUTPATIENT
Start: 2018-06-09 | End: 2018-06-09 | Stop reason: HOSPADM

## 2018-06-09 RX ORDER — OXYCODONE AND ACETAMINOPHEN 10; 325 MG/1; MG/1
1 TABLET ORAL EVERY 4 HOURS PRN
Status: DISCONTINUED | OUTPATIENT
Start: 2018-06-09 | End: 2018-06-09 | Stop reason: HOSPADM

## 2018-06-09 RX ORDER — DIPHENHYDRAMINE HYDROCHLORIDE 50 MG/ML
6.25 INJECTION INTRAMUSCULAR; INTRAVENOUS
Status: DISCONTINUED | OUTPATIENT
Start: 2018-06-09 | End: 2018-06-09 | Stop reason: HOSPADM

## 2018-06-09 RX ORDER — HYDROXYCHLOROQUINE SULFATE 200 MG/1
200 TABLET, FILM COATED ORAL
Status: DISCONTINUED | OUTPATIENT
Start: 2018-06-09 | End: 2018-06-09 | Stop reason: HOSPADM

## 2018-06-09 RX ORDER — NALOXONE HCL 0.4 MG/ML
0.4 VIAL (ML) INJECTION
Status: DISCONTINUED | OUTPATIENT
Start: 2018-06-09 | End: 2018-06-09 | Stop reason: HOSPADM

## 2018-06-09 RX ORDER — DULOXETIN HYDROCHLORIDE 60 MG/1
60 CAPSULE, DELAYED RELEASE ORAL DAILY
Status: DISCONTINUED | OUTPATIENT
Start: 2018-06-09 | End: 2018-06-09 | Stop reason: HOSPADM

## 2018-06-09 RX ORDER — PROMETHAZINE HYDROCHLORIDE 25 MG/1
25 SUPPOSITORY RECTAL ONCE AS NEEDED
Status: DISCONTINUED | OUTPATIENT
Start: 2018-06-09 | End: 2018-06-09 | Stop reason: HOSPADM

## 2018-06-09 RX ORDER — PROMETHAZINE HYDROCHLORIDE 25 MG/1
12.5 TABLET ORAL ONCE AS NEEDED
Status: DISCONTINUED | OUTPATIENT
Start: 2018-06-09 | End: 2018-06-09 | Stop reason: HOSPADM

## 2018-06-09 RX ORDER — LEVOTHYROXINE SODIUM 0.07 MG/1
75 TABLET ORAL DAILY
Status: DISCONTINUED | OUTPATIENT
Start: 2018-06-09 | End: 2018-06-09 | Stop reason: HOSPADM

## 2018-06-09 RX ORDER — CLINDAMYCIN PHOSPHATE 600 MG/50ML
600 INJECTION INTRAVENOUS EVERY 8 HOURS
Status: DISCONTINUED | OUTPATIENT
Start: 2018-06-09 | End: 2018-06-09 | Stop reason: HOSPADM

## 2018-06-09 RX ORDER — HYDRALAZINE HYDROCHLORIDE 20 MG/ML
5 INJECTION INTRAMUSCULAR; INTRAVENOUS
Status: DISCONTINUED | OUTPATIENT
Start: 2018-06-09 | End: 2018-06-09 | Stop reason: HOSPADM

## 2018-06-09 RX ORDER — GLIPIZIDE 5 MG/1
5 TABLET ORAL
Status: DISCONTINUED | OUTPATIENT
Start: 2018-06-09 | End: 2018-06-09 | Stop reason: HOSPADM

## 2018-06-09 RX ORDER — LEVETIRACETAM 500 MG/1
500 TABLET ORAL DAILY
Status: DISCONTINUED | OUTPATIENT
Start: 2018-06-09 | End: 2018-06-09 | Stop reason: HOSPADM

## 2018-06-09 RX ORDER — ONDANSETRON 2 MG/ML
INJECTION INTRAMUSCULAR; INTRAVENOUS AS NEEDED
Status: DISCONTINUED | OUTPATIENT
Start: 2018-06-09 | End: 2018-06-09 | Stop reason: SURG

## 2018-06-09 RX ORDER — MORPHINE SULFATE 2 MG/ML
6 INJECTION, SOLUTION INTRAMUSCULAR; INTRAVENOUS
Status: DISCONTINUED | OUTPATIENT
Start: 2018-06-09 | End: 2018-06-09 | Stop reason: HOSPADM

## 2018-06-09 RX ORDER — BACLOFEN 10 MG/1
20 TABLET ORAL 2 TIMES DAILY
Status: DISCONTINUED | OUTPATIENT
Start: 2018-06-09 | End: 2018-06-09 | Stop reason: HOSPADM

## 2018-06-09 RX ORDER — ONDANSETRON 4 MG/1
4 TABLET, FILM COATED ORAL EVERY 6 HOURS PRN
Status: DISCONTINUED | OUTPATIENT
Start: 2018-06-09 | End: 2018-06-09 | Stop reason: HOSPADM

## 2018-06-09 RX ORDER — OXCARBAZEPINE 300 MG/1
600 TABLET, FILM COATED ORAL EVERY 12 HOURS SCHEDULED
Status: DISCONTINUED | OUTPATIENT
Start: 2018-06-09 | End: 2018-06-09 | Stop reason: HOSPADM

## 2018-06-09 RX ORDER — HYDROCODONE BITARTRATE AND ACETAMINOPHEN 5; 325 MG/1; MG/1
2 TABLET ORAL EVERY 4 HOURS PRN
Status: DISCONTINUED | OUTPATIENT
Start: 2018-06-09 | End: 2018-06-09 | Stop reason: HOSPADM

## 2018-06-09 RX ORDER — SODIUM CHLORIDE, SODIUM LACTATE, POTASSIUM CHLORIDE, CALCIUM CHLORIDE 600; 310; 30; 20 MG/100ML; MG/100ML; MG/100ML; MG/100ML
75 INJECTION, SOLUTION INTRAVENOUS CONTINUOUS
Status: DISCONTINUED | OUTPATIENT
Start: 2018-06-09 | End: 2018-06-09 | Stop reason: HOSPADM

## 2018-06-09 RX ORDER — PANTOPRAZOLE SODIUM 40 MG/1
40 TABLET, DELAYED RELEASE ORAL EVERY MORNING
Status: DISCONTINUED | OUTPATIENT
Start: 2018-06-09 | End: 2018-06-09 | Stop reason: HOSPADM

## 2018-06-09 RX ORDER — LABETALOL HYDROCHLORIDE 5 MG/ML
5 INJECTION, SOLUTION INTRAVENOUS
Status: DISCONTINUED | OUTPATIENT
Start: 2018-06-09 | End: 2018-06-09 | Stop reason: HOSPADM

## 2018-06-09 RX ORDER — PROMETHAZINE HYDROCHLORIDE 25 MG/1
25 TABLET ORAL ONCE AS NEEDED
Status: DISCONTINUED | OUTPATIENT
Start: 2018-06-09 | End: 2018-06-09 | Stop reason: HOSPADM

## 2018-06-09 RX ORDER — ONDANSETRON 4 MG/1
4 TABLET, ORALLY DISINTEGRATING ORAL EVERY 6 HOURS PRN
Status: DISCONTINUED | OUTPATIENT
Start: 2018-06-09 | End: 2018-06-09 | Stop reason: HOSPADM

## 2018-06-09 RX ORDER — METOPROLOL TARTRATE 50 MG/1
50 TABLET, FILM COATED ORAL EVERY 12 HOURS SCHEDULED
Status: DISCONTINUED | OUTPATIENT
Start: 2018-06-09 | End: 2018-06-09 | Stop reason: HOSPADM

## 2018-06-09 RX ORDER — METFORMIN HYDROCHLORIDE 500 MG/1
500 TABLET, EXTENDED RELEASE ORAL
Status: DISCONTINUED | OUTPATIENT
Start: 2018-06-09 | End: 2018-06-09 | Stop reason: HOSPADM

## 2018-06-09 RX ORDER — METAXALONE 800 MG/1
800 TABLET ORAL EVERY MORNING
Status: DISCONTINUED | OUTPATIENT
Start: 2018-06-09 | End: 2018-06-09 | Stop reason: HOSPADM

## 2018-06-09 RX ORDER — EPHEDRINE SULFATE 50 MG/ML
5 INJECTION, SOLUTION INTRAVENOUS ONCE AS NEEDED
Status: DISCONTINUED | OUTPATIENT
Start: 2018-06-09 | End: 2018-06-09 | Stop reason: HOSPADM

## 2018-06-09 RX ORDER — PROMETHAZINE HYDROCHLORIDE 25 MG/ML
6.25 INJECTION, SOLUTION INTRAMUSCULAR; INTRAVENOUS ONCE AS NEEDED
Status: DISCONTINUED | OUTPATIENT
Start: 2018-06-09 | End: 2018-06-09 | Stop reason: HOSPADM

## 2018-06-09 RX ADMIN — CLINDAMYCIN PHOSPHATE 600 MG: 12 INJECTION, SOLUTION INTRAMUSCULAR; INTRAVENOUS at 06:16

## 2018-06-09 RX ADMIN — INSULIN ASPART 7 UNITS: 100 INJECTION, SOLUTION INTRAVENOUS; SUBCUTANEOUS at 08:14

## 2018-06-09 RX ADMIN — GLIPIZIDE 5 MG: 5 TABLET ORAL at 08:16

## 2018-06-09 RX ADMIN — POTASSIUM CHLORIDE AND SODIUM CHLORIDE 100 ML/HR: 900; 150 INJECTION, SOLUTION INTRAVENOUS at 06:16

## 2018-06-09 RX ADMIN — FENTANYL CITRATE 50 MCG: 50 INJECTION, SOLUTION INTRAMUSCULAR; INTRAVENOUS at 00:51

## 2018-06-09 RX ADMIN — METOPROLOL TARTRATE 50 MG: 50 TABLET, FILM COATED ORAL at 08:18

## 2018-06-09 RX ADMIN — LINAGLIPTIN 5 MG: 5 TABLET, FILM COATED ORAL at 08:17

## 2018-06-09 RX ADMIN — FENTANYL CITRATE 50 MCG: 50 INJECTION, SOLUTION INTRAMUSCULAR; INTRAVENOUS at 00:41

## 2018-06-09 RX ADMIN — ONDANSETRON 4 MG: 2 INJECTION INTRAMUSCULAR; INTRAVENOUS at 00:38

## 2018-06-09 RX ADMIN — PANTOPRAZOLE SODIUM 40 MG: 40 TABLET, DELAYED RELEASE ORAL at 06:16

## 2018-06-09 NOTE — PLAN OF CARE
Problem: Patient Care Overview  Goal: Plan of Care Review  Outcome: Ongoing (interventions implemented as appropriate)   06/09/18 4751   Coping/Psychosocial   Plan of Care Reviewed With patient;family   Plan of Care Review   Progress improving   OTHER   Outcome Summary dc home with family

## 2018-06-09 NOTE — ED PROVIDER NOTES
EMERGENCY DEPARTMENT ENCOUNTER    Room number:  35/35  Date Seen:  6/8/2018  Time of transfer: 20:30  PCP:  Brice Camarena MD          Laboratory Results:  Recent Results (from the past 24 hour(s))   Basic Metabolic Panel    Collection Time: 06/08/18  9:11 PM   Result Value Ref Range    Glucose 249 (H) 65 - 99 mg/dL    BUN 11 8 - 23 mg/dL    Creatinine 0.76 0.57 - 1.00 mg/dL    Sodium 140 136 - 145 mmol/L    Potassium 4.1 3.5 - 5.2 mmol/L    Chloride 100 98 - 107 mmol/L    CO2 25.1 22.0 - 29.0 mmol/L    Calcium 9.9 8.6 - 10.5 mg/dL    eGFR Non African Amer 77 >60 mL/min/1.73    BUN/Creatinine Ratio 14.5 7.0 - 25.0    Anion Gap 14.9 mmol/L   CBC Auto Differential    Collection Time: 06/08/18  9:11 PM   Result Value Ref Range    WBC 7.46 4.50 - 10.70 10*3/mm3    RBC 4.46 3.90 - 5.20 10*6/mm3    Hemoglobin 13.5 11.9 - 15.5 g/dL    Hematocrit 40.6 35.6 - 45.5 %    MCV 91.0 80.5 - 98.2 fL    MCH 30.3 26.9 - 32.0 pg    MCHC 33.3 32.4 - 36.3 g/dL    RDW 12.5 11.7 - 13.0 %    RDW-SD 41.9 37.0 - 54.0 fl    MPV 12.0 6.0 - 12.0 fL    Platelets 244 140 - 500 10*3/mm3    Neutrophil % 45.0 42.7 - 76.0 %    Lymphocyte % 44.6 19.6 - 45.3 %    Monocyte % 7.0 5.0 - 12.0 %    Eosinophil % 2.5 0.3 - 6.2 %    Basophil % 0.9 0.0 - 1.5 %    Immature Grans % 0.3 0.0 - 0.5 %    Neutrophils, Absolute 3.35 1.90 - 8.10 10*3/mm3    Lymphocytes, Absolute 3.33 0.90 - 4.80 10*3/mm3    Monocytes, Absolute 0.52 0.20 - 1.20 10*3/mm3    Eosinophils, Absolute 0.19 0.00 - 0.70 10*3/mm3    Basophils, Absolute 0.07 0.00 - 0.20 10*3/mm3    Immature Grans, Absolute 0.02 0.00 - 0.03 10*3/mm3     I reviewed the above results.        Radiology:  MRI Lumbar Spine With & Without Contrast   Preliminary Result   1. At L3-4 there is a large disc extrusion with potential disc fragment   that extends posteriorly and superiorly adjacent to the posterior left   aspect of the L3 vertebral body contributing to severe central canal and   left lateral recess stenosis  with loss of normal CSF signal surrounding   the nerve roots at this level.   2. At T11-12 there is a central posterior disc protrusion/extrusion   contacting the anterior thoracic cord with moderate canal narrowing.   3. At L2-3 there is moderate narrowing of the central canal due to disc   bulge and bilateral facet arthritis.   4. At L4-5 there is a disc bulge and small right posterior disc   protrusion with mild narrowing of the central canal and narrowing of the   right lateral recess.       Findings discussed with Rebekah Berg in the emergency department on   06/08/2018 at 8:28 p.m..            I reviewed the above results        Medications ordered in ED:  Medications   sodium chloride 0.9 % flush 10 mL (not administered)   gadobenate dimeglumine (MULTIHANCE) injection 19 mL (19 mL Intravenous Given 6/8/18 1954)   dexamethasone (DECADRON) 10 mg/20ml NS IV syringe (10 mg Intravenous Given 6/8/18 2115)           Progress and Consult Notes:    8:30 PM:  Pt care transferred from Ms. Eleni Berg PA-C, pending pt's MRI L-Spine results.    8:31 PM:  Obtained pt's MRI L-Spine results. Placed call to the neurosurgeon to discuss further course of care.     8:49 PM:  Case d/w Dr. Goldberg, neurosurgeon. He would like me to perform a rectal exam and pt's pre-void and post-void residual bladder scan on pt. He would then like me to call him back with the results.     9:02 PM:  Blood work ordered for further evaluation. Decadron ordered for pt.     9:06 PM:  Rechecked pt. Informed pt of her MRI L-Spine results. Need for bladder scan, rectal exam, and pt's admission to the hospital (for further evaluation and management). Pt agrees with plan. Decision time to admit: Now.     Rectal exam was performed with female chaperone at pt's bedside - pt's rectal tone is decreased.     9:34 PM:  Case d/w Dr. Goldberg, neurosurgeon. He would like pt admitted to the hospitalist and he will consult during the admission.    9:37 PM:  Placed call  to [facility] for admission.     9:49 PM:  Discussed case with Dr. Ramirez, hospitalist. He will admit pt to a med/surg bed.     Pt's bladder scan results:  Pre-void: 429 ccs of urine  Post-void: 30 ccs of urine         Diagnosis:  Final diagnoses:   Incontinence of feces, unspecified fecal incontinence type   Low back pain with sciatica, sciatica laterality unspecified, unspecified back pain laterality, unspecified chronicity   Abnormal MRI         Disposition:  Pt admitted to med/surg.      ADMISSION    Discussed treatment plan and reason for admission with pt/family and admitting physician.  Pt/family voiced understanding of the plan for admission for further testing/treatment as needed.             Provider attestation:  I personally reviewed the past medical history, past surgical history, social history, family history, current medications, and allergies as they appear in the chart.    Documentation assistance provided by Alley Lentz. Information recorded by the scribe was done at my direction and has been verified and validated by me.     Entered by Alley Lentz, acting as scribe for Mr. Richie Elizabeth PA-C.             Alley Lentz  06/08/18 2157       JUAN Manriquez III  06/08/18 [id number]

## 2018-06-09 NOTE — ANESTHESIA PREPROCEDURE EVALUATION
Anesthesia Evaluation                  Airway   Mallampati: II  no difficulty expected  Dental - normal exam     Pulmonary - normal exam   (+) sleep apnea (presumed by pt),     PE comment: nonlabored  Cardiovascular - normal exam    Rhythm: regular  Rate: normal    (+) hypertension, dysrhythmias (s/p Ablation) PVC, hyperlipidemia,       Neuro/Psych  (+) seizures, numbness,       ROS Comment: Cauda Equina Syndrome  GI/Hepatic/Renal/Endo    (+)  hiatal hernia, GERD,  liver disease (KEEN), diabetes mellitus type 2 poorly controlled, hypothyroidism,     Musculoskeletal     (+) arthralgias, back pain, myalgias (Fibromyalgia),   Abdominal    Substance History      OB/GYN          Other   (+) arthritis (Rheumatoid)       Other Comment: H/o West Nile viral infection                Anesthesia Plan    ASA 3 - emergent     general     intravenous induction   Anesthetic plan and risks discussed with patient.

## 2018-06-09 NOTE — H&P
Name: Wendi Ramos ADMIT: 2018   : 1955  PCP: Brice Camarena MD    MRN: [id number] LOS: 0 days   AGE/SEX: 62 y.o. female  ROOM: /     Chief Complaint   Patient presents with   • Back Pain       Subjective   Patient is a 62 y.o. female who presents to [facility] with the above chief complaint.    She started having issues with fecal incontinence this afternoon.  She's had issues with back pain for about 4 months now and has had fecal issues for the last month.  Over the last 48 hours her symptoms have progressively worsened.  She does have numbness and tingling down her left leg that is been more frequent and worsening.  She's denies any perianal or  perineal anesthesia.  In the emergency room they did a rectal exam and she had a loss of rectal tone.  She denies any fevers or chills but has chronic swelling.  Her pains are worse with movement and standing straight up.  Her pain is better if she lies in the bed with her knees bent and her back somewhat elevated.  She's had multiple nonnarcotic treatments but she's had no improvement in her symptoms.  She was referred to see Dr. Alonso and has an appointment on the .  She has a history hypertension rheumatoid arthritis fibromyalgia seizures diabetes and nonalcoholic steatohepatitis.  Her last stress test was around 5 years ago she denies any chest pain at rest or with exertion.  She has had PVCs in the past and had an ablation about 6 or 7 years ago.      History of Present Illness    Past Medical History:   Diagnosis Date   • Diabetes mellitus    • Fibromyalgia    • GERD (gastroesophageal reflux disease)    • Hiatal hernia    • Hyperlipidemia    • Hypertension    • Hypothyroidism    • Peripheral neuropathy    • Rheumatoid arthritis    • Rheumatoid arthritis     DR SMYTH   • Seizures    • Type 2 diabetes mellitus    • West Nile fever      Past Surgical History:   Procedure Laterality Date   • ABDOMINOPLASTY     •  ADENOIDECTOMY     • BILATERAL BREAST REDUCTION     • BRONCHOSCOPY N/A 12/15/2016    Procedure: BRONCHOSCOPY WITH BAL AND ENDOBRONCHIAL ULTRASOUND WITH FNA;  Surgeon: Diego Ponce MD;  Location: [facility] ENDOSCOPY;  Service:    • CARDIAC ABLATION     • HYSTERECTOMY     • LASIK     • MOUTH SURGERY     • REPLACEMENT TOTAL KNEE Left    • TONSILLECTOMY       Family History   Problem Relation Age of Onset   • Diabetes Mother    • Neuropathy Father    • Colon polyps Father    • No Known Problems Sister    • Diabetes Brother    • No Known Problems Son      Social History   Substance Use Topics   • Smoking status: Never Smoker   • Smokeless tobacco: Never Used   • Alcohol use Yes      Comment: social     Prescriptions Prior to Admission   Medication Sig Dispense Refill Last Dose   • aspirin 81 MG EC tablet Take 81 mg by mouth daily.   6/6/2018 at Unknown time   • atorvastatin (LIPITOR) 10 MG tablet Take 1 tablet by mouth Every Night. 90 tablet 1 6/6/2018 at Unknown time   • B Complex Vitamins (VITAMIN B COMPLEX PO) Take  by mouth.   6/6/2018 at Unknown time   • baclofen (LIORESAL) 20 MG tablet Take 20 mg by mouth 2 (Two) Times a Day.   6/6/2018 at Unknown time   • cholecalciferol (VITAMIN D3) 400 units tablet Take 400 Units by mouth Daily.   6/6/2018 at Unknown time   • choline fenofibrate (TRILIPIX) 135 MG capsule Take 1 capsule by mouth Daily. 90 capsule 2 6/6/2018 at Unknown time   • cyclobenzaprine (FLEXERIL) 10 MG tablet    6/6/2018 at Unknown time   • DULoxetine (CYMBALTA) 60 MG capsule 60 mg Daily.   6/6/2018 at Unknown time   • Ferrous Gluconate (IRON 27 PO) Take  by mouth.   6/6/2018 at Unknown time   • glimepiride (AMARYL) 2 MG tablet Take 1 tablet by mouth Every Morning Before Breakfast. 90 tablet 1 6/6/2018 at Unknown time   • HYDROcodone-acetaminophen (NORCO)  MG per tablet Take 1 tablet by mouth 2 (Two) Times a Day As Needed. for pain  0 6/6/2018 at Unknown time   • hydroxychloroquine (PLAQUENIL) 200  MG tablet    6/6/2018 at Unknown time   • leflunomide (ARAVA) 20 MG tablet Take 20 mg by mouth Daily.   6/6/2018 at Unknown time   • levETIRAcetam (KEPPRA) 500 MG tablet Take 500 mg by mouth 2 (two) times a day. Take 4 tabs bid   6/6/2018 at Unknown time   • levothyroxine (SYNTHROID, LEVOTHROID) 75 MCG tablet TAKE 1 TABLET EVERY DAY 90 tablet 1 6/6/2018 at Unknown time   • metFORMIN ER (GLUCOPHAGE-XR) 500 MG 24 hr tablet Take 1 tablet by mouth Daily With Breakfast. 90 tablet 3 6/6/2018 at Unknown time   • metoprolol tartrate (LOPRESSOR) 50 MG tablet Take 50 mg by mouth Every 12 (Twelve) Hours.   6/6/2018 at Unknown time   • nabumetone (RELAFEN) 500 MG tablet    6/6/2018 at Unknown time   • omeprazole (priLOSEC) 40 MG capsule TAKE 1 CAPSULE EVERY DAY 90 capsule 1 6/6/2018 at Unknown time   • OXcarbazepine (TRILEPTAL) 300 MG tablet 600 mg.   6/6/2018 at Unknown time   • pramipexole (MIRAPEX) 0.5 MG tablet    6/6/2018 at Unknown time   • vitamin E 400 UNIT capsule Take 400 Units by mouth Daily.   6/6/2018 at Unknown time   • zolpidem (AMBIEN) 10 MG tablet Take 1 tablet by mouth At Night As Needed for Sleep. 90 tablet 0 6/6/2018 at Unknown time   • dapagliflozin (FARXIGA) 5 MG tablet tablet Take 1 tablet by mouth Daily. 90 tablet 0 6/6/2018 at Unknown time   • Fenofibrate (LIPOFEN) 150 MG capsule Take  by mouth Daily.   6/6/2018 at Unknown time   • glimepiride (AMARYL) 2 MG tablet TAKE 2 TABLETS EVERY MORNING AND THEN 1 TABLET AT DINNER 270 tablet 0 Patient Taking Differently   • glimepiride (AMARYL) 2 MG tablet TAKE 1 TABLET EVERY MORNING BEFORE BREAKFAST 90 tablet 1 6/6/2018 at Unknown time   • icosapent ethyl (VASCEPA) 1 g capsule capsule Take 2 g by mouth 2 (Two) Times a Day With Meals. 56 capsule 0 6/6/2018 at Unknown time   • INVOKANA 100 MG tablet TAKE 1 TABLET DAILY 90 tablet 0 6/6/2018 at Unknown time   • metaxalone (SKELAXIN) 800 MG tablet Take 800 mg by mouth Every Morning.   6/6/2018 at Unknown time   •  oxyCODONE-acetaminophen (PERCOCET)  MG per tablet TAKE 1 TABLET BY MOUTH EVERY DAY AS NEEDED FOR PAIN  0 Patient Taking Differently   • SITagliptin (JANUVIA) 100 MG tablet Take 1 tablet by mouth Daily. 10 tablet 0 6/6/2018 at Unknown time     Allergies:  Sulfamethoxazole w/trimethoprim 800-160  [sulfamethoxazole-trimethoprim]; Adhesive tape; Cefpodoxime; Cephalosporins; Metformin and related; Topiramate; and Latex    Review of Systems   Constitutional: Negative for chills, fatigue and fever.   HENT: Negative for congestion, rhinorrhea and sore throat.    Eyes: Negative for photophobia, redness and visual disturbance.   Respiratory: Negative for apnea, cough and shortness of breath.    Cardiovascular: Negative for chest pain and palpitations.   Gastrointestinal: Negative for abdominal distention, diarrhea and rectal pain.   Endocrine: Negative for polydipsia, polyphagia and polyuria.   Genitourinary: Negative for difficulty urinating, dysuria and hematuria.   Musculoskeletal: Positive for back pain and gait problem. Negative for arthralgias, neck pain and neck stiffness.   Skin: Negative for color change and rash.   Allergic/Immunologic: Negative for environmental allergies and immunocompromised state.   Neurological: Negative for dizziness, facial asymmetry and headaches.   Hematological: Negative for adenopathy. Does not bruise/bleed easily.   Psychiatric/Behavioral: Negative for agitation, behavioral problems and confusion.        Objective    Vital Signs  Temp:  [97.7 °F (36.5 °C)] 97.7 °F (36.5 °C)  Heart Rate:  [80-86] 80  Resp:  [16-18] 18  BP: (118-154)/(77-89) 136/77  SpO2:  [92 %-98 %] 92 %  on   ;   Device (Oxygen Therapy): room air  Body mass index is 29.53 kg/m².    Physical Exam   Constitutional: She is oriented to person, place, and time. She appears well-developed and well-nourished. No distress.   HENT:   Head: Normocephalic and atraumatic.   Nose: Nose normal.   Eyes: Conjunctivae and EOM are  normal.   Neck: Neck supple. No JVD present.   Cardiovascular: Normal rate, regular rhythm and normal heart sounds.    Pulmonary/Chest: Effort normal and breath sounds normal. No respiratory distress.   Abdominal: Soft. Bowel sounds are normal. She exhibits no distension.   Musculoskeletal: Normal range of motion. She exhibits no edema.   Neurological: She is alert and oriented to person, place, and time. No cranial nerve deficit.   Skin: Skin is warm and dry. Capillary refill takes less than 2 seconds.   Psychiatric: She has a normal mood and affect. Her behavior is normal.   Nursing note and vitals reviewed.      Results Review:   I reviewed the patient's new clinical results.    Results from last 7 days  Lab Units 06/08/18  2111   WBC 10*3/mm3 7.46   HEMOGLOBIN g/dL 13.5   PLATELETS 10*3/mm3 244     Results from last 7 days  Lab Units 06/08/18  2111   SODIUM mmol/L 140   POTASSIUM mmol/L 4.1   CHLORIDE mmol/L 100   CO2 mmol/L 25.1   BUN mg/dL 11   CREATININE mg/dL 0.76   GLUCOSE mg/dL 249*   Estimated Creatinine Clearance: 89.7 mL/min (by C-G formula based on SCr of 0.76 mg/dL).        Invalid input(s): LDLCALC    MRI Lumbar Spine With & Without Contrast   Final Result   1. At L3-4 there is a large disc extrusion with potential disc fragment   that extends posteriorly and superiorly adjacent to the posterior left   aspect of the L3 vertebral body contributing to severe central canal and   left lateral recess stenosis with loss of normal CSF signal surrounding   the nerve roots at this level.   2. At T11-12 there is a central posterior disc protrusion/extrusion   contacting the anterior thoracic cord with moderate canal narrowing.   3. At L2-3 there is moderate narrowing of the central canal due to disc   bulge and bilateral facet arthritis.   4. At L4-5 there is a disc bulge and small right posterior disc   protrusion with mild narrowing of the central canal and narrowing of the   right lateral recess.        Findings discussed with Rebekah Berg in the emergency department on   06/08/2018 at 8:28 p.m..       This report was finalized on 6/8/2018 9:13 PM by Dr. Uziel Self M.D.            Assessment/Plan     Active Problems:    Type 2 diabetes mellitus without complication    Lumbar radiculopathy    Cauda equina syndrome    L3/4 Lumbar disc herniation    Incontinence of feces      Assessment & Plan  She is being admitted to the hospital cot equinus syndrome noted on imaging.  Neurosurgery's been consulted.  The plan is to take to the OR tonight for operative intervention.  We'll monitor blood sugars closely following surgery.  We'll continue supportive care postoperatively.  Mechanical DVT prophylaxis for the time being.      I discussed the patients findings and my recommendations with patient, family and nursing staff.          Tyrone Ramirez MD  [facility]  06/08/18  10:37 PM

## 2018-06-09 NOTE — DISCHARGE SUMMARY
Date of Admission: 6/8/2018  Date of Discharge:  6/9/2018  Primary Care Physician: Brice Camarena MD     Discharge Diagnosis:  Active Hospital Problems (** Indicates Principal Problem)    Diagnosis Date Noted   • **Cauda equina syndrome [G83.4] 06/08/2018   • L3/4 Lumbar disc herniation [M51.26] 06/08/2018   • Incontinence of feces [R15.9] 06/08/2018   • Lumbar radiculopathy [M54.16] 05/31/2018   • Type 2 diabetes mellitus without complication [E11.9] 09/20/2017      Resolved Hospital Problems    Diagnosis Date Noted Date Resolved   No resolved problems to display.       Presenting Problem/History of Present Illness:  Cauda equina syndrome [G83.4]  Abnormal MRI [R93.8]  Lumbar disc herniation [M51.26]  Low back pain with sciatica, sciatica laterality unspecified, unspecified back pain laterality, unspecified chronicity [M54.40]  Incontinence of feces, unspecified fecal incontinence type [R15.9]     Patient is a 62 y.o. female who presents to [facility] with the above chief complaint.    She started having issues with fecal incontinence this afternoon.  She's had issues with back pain for about 4 months now and has had fecal issues for the last month.  Over the last 48 hours her symptoms have progressively worsened.  She does have numbness and tingling down her left leg that is been more frequent and worsening.  She's denies any perianal or  perineal anesthesia.  In the emergency room they did a rectal exam and she had a loss of rectal tone.  She denies any fevers or chills but has chronic swelling.  Her pains are worse with movement and standing straight up.  Her pain is better if she lies in the bed with her knees bent and her back somewhat elevated.  She's had multiple nonnarcotic treatments but she's had no improvement in her symptoms.  She was referred to see Dr. Alonso and has an appointment on the 19th.  She has a history hypertension rheumatoid arthritis fibromyalgia seizures diabetes and  nonalcoholic steatohepatitis.  Her last stress test was around 5 years ago she denies any chest pain at rest or with exertion.  She has had PVCs in the past and had an ablation about 6 or 7 years ago.     Hospital Course:  The patient is a 62 y.o. female who presented with cauda equina syndrome. She was admitted and Dr Goldberg, Neurosurgery, was consulted. She underwent urgenr Left T3/4 lumbar discectomy on 06/08/18. She has had very quick recovery with resolution of pain and no numbness. She does have some mild left thigh numbness anteriorly but denies saddle paresthesia. She is asking for discharge today and has seen Dr Goldberg earlier this morning. She can be discharged after completion of her postop antibiotics. She should follow up with Neurosurgery as scheduled in clinic and resume activities as instructed by them.    Exam Today:  Constitutional: She is oriented to person, place, and time. She appears well-developed and well-nourished. No distress.   HENT:   Head: Normocephalic and atraumatic.   Nose: Nose normal.   Eyes: Conjunctivae and EOM are normal.   Neck: Neck supple. No JVD present.   Cardiovascular: Normal rate, regular rhythm and normal heart sounds.    Pulmonary/Chest: Effort normal and breath sounds normal. No respiratory distress.   Abdominal: Soft. Bowel sounds are normal. She exhibits no distension.   Musculoskeletal: Normal range of motion. She exhibits no edema.   Neurological: She is alert and oriented to person, place, and time. No cranial nerve deficit.   Skin: Skin is warm and dry. Capillary refill takes less than 2 seconds.   Psychiatric: She has a normal mood and affect. Her behavior is normal.     Results:  MRI Lumbar Spine  1. At L3-4 there is a large disc extrusion with potential disc fragment  that extends posteriorly and superiorly adjacent to the posterior left  aspect of the L3 vertebral body contributing to severe central canal and  left lateral recess stenosis with loss of normal  CSF signal surrounding  the nerve roots at this level.  2. At T11-12 there is a central posterior disc protrusion/extrusion  contacting the anterior thoracic cord with moderate canal narrowing.  3. At L2-3 there is moderate narrowing of the central canal due to disc  bulge and bilateral facet arthritis.  4. At L4-5 there is a disc bulge and small right posterior disc  protrusion with mild narrowing of the central canal and narrowing of the  right lateral recess.    Procedures Performed:  Procedure(s):  LEFT L3/4 lumbar discectomy       Consults:   Consults     Date and Time Order Name Status Description    6/8/2018 2212 Inpatient Neurosurgery Consult Completed     6/8/2018 2137 [facility] (on-call MD unless specified) Completed     6/8/2018 2031 Neurosurgery (on-call MD unless specified) Completed            Discharge Disposition:  Home or Self Care    Discharge Medications:   Wendi Ramos   Home Medication Instructions KAYY:[id number]    Printed on:06/09/18 0854   Medication Information                      aspirin 81 MG EC tablet  Take 81 mg by mouth daily.             atorvastatin (LIPITOR) 10 MG tablet  Take 1 tablet by mouth Every Night.             B Complex Vitamins (VITAMIN B COMPLEX PO)  Take  by mouth.             baclofen (LIORESAL) 20 MG tablet  Take 20 mg by mouth 2 (Two) Times a Day.             cholecalciferol (VITAMIN D3) 400 units tablet  Take 400 Units by mouth Daily.             cyclobenzaprine (FLEXERIL) 10 MG tablet               DULoxetine (CYMBALTA) 60 MG capsule  60 mg Daily.             Fenofibrate (LIPOFEN) 150 MG capsule  Take  by mouth Daily.             Ferrous Gluconate (IRON 27 PO)  Take  by mouth.             glimepiride (AMARYL) 2 MG tablet  Take 1 tablet by mouth Every Morning Before Breakfast.             HYDROcodone-acetaminophen (NORCO)  MG per tablet  Take 1 tablet by mouth 2 (Two) Times a Day As Needed. for pain             hydroxychloroquine (PLAQUENIL) 200 MG tablet                leflunomide (ARAVA) 20 MG tablet  Take 20 mg by mouth Daily.             levETIRAcetam (KEPPRA) 500 MG tablet  Take 500 mg by mouth 2 (two) times a day. Take 4 tabs bid             levothyroxine (SYNTHROID, LEVOTHROID) 75 MCG tablet  TAKE 1 TABLET EVERY DAY             metFORMIN ER (GLUCOPHAGE-XR) 500 MG 24 hr tablet  Take 1 tablet by mouth Daily With Breakfast.             metoprolol tartrate (LOPRESSOR) 50 MG tablet  Take 50 mg by mouth Every 12 (Twelve) Hours.             omeprazole (priLOSEC) 40 MG capsule  TAKE 1 CAPSULE EVERY DAY             OXcarbazepine (TRILEPTAL) 300 MG tablet  600 mg.             pramipexole (MIRAPEX) 0.5 MG tablet               vitamin E 400 UNIT capsule  Take 400 Units by mouth Daily.             zolpidem (AMBIEN) 10 MG tablet  Take 1 tablet by mouth At Night As Needed for Sleep.                 Discharge Diet:   Diet Instructions     Diet: Consistent Carbohydrate       Discharge Diet:  Consistent Carbohydrate          Activity at Discharge:   Activity Instructions     Activity as Tolerated       Follow neurosurgery Instructions          Follow-up Appointments:  Future Appointments  Date Time Provider Department Center   6/19/2018 9:45 AM Jef Randall MD [facility] None     Additional Instructions for the Follow-ups that You Need to Schedule     Discharge Follow-up with PCP    As directed      Follow Up Details:  1-2 weeks         Discharge Follow-up with Specified Provider: Neurosurgery    As directed      To:  Neurosurgery    Follow Up Details:  as scheduled               Test Results Pending at Discharge:       Lazarus Medina MD  06/09/18  8:54 AM    Time Spent on Discharge Activities: >30 minutes

## 2018-06-09 NOTE — CONSULTS
Patient name: Wendi Ramos  Referring Provider: Tyrone Ramirez M.D.  Reason for Consultation: Excruciating back pain with fecal incontinence    Patient Care Team:  Brice Camarena Jr., MD as PCP - General    Chief complaint: Severe low back pain    Subjective .     History of present illness:    Patient is a 62 y.o. right handed female who presents with intractable left leg pain worsened bowel incontinence.   She was evaluated May 31 in the neurosurgery office with complaints of February 2018.  He has been treated for hypertension management.  She was on multiple pain medications including 10 mg are: Percocet 10 mg as well as Flexeril.  She apparently had a lumbar epidural that have not helped.  She notes that the patient has had its way with her.  Pain is located in the left side of her low back into the groin area into the anterior thigh on the left side.  She rates the pain as 9 out of 10 on a scale of present exudate with A 10.    She notes worsening of her pain with moving and most any position.  Standing is really the worst.  She has numbness and paresthesias and tingling as well as a sense of weakness in the left lower extremity.    The nurse practitioner and the patient had no bowel or bladder symptomatology she told me however that she has bilateral incontinence now for at least 2 -4 weeks.  She denies urinary incontinence.    In the emergency room her post residual of 30 cc from over 400 cc prior to voiding.    Patient has a complicated past medical history including fibromyalgia, rheumatoid arthritis, history of seizures, chest discomfort, breast mass that was removed, left anterior fascicular block etc.      Review of Systems  Review of Systems   Constitutional: Negative for fever and unexpected weight change.   HENT: Negative for congestion.    Respiratory: Negative for cough, chest tightness and shortness of breath.    Cardiovascular: Positive for palpitations. Negative for chest pain.    Endocrine: Negative for polyuria.   Genitourinary: Negative for decreased urine volume and difficulty urinating.   Musculoskeletal: Positive for arthralgias, back pain, gait problem and myalgias.   Allergic/Immunologic: Negative for immunocompromised state.   Neurological: Positive for seizures. Negative for syncope, facial asymmetry and speech difficulty.   Hematological: Negative for adenopathy. Bruises/bleeds easily.   Psychiatric/Behavioral: Negative for agitation and hallucinations.       History  PAST MEDICAL HISTORY  Past Medical History:   Diagnosis Date   • Diabetes mellitus    • Fibromyalgia    • GERD (gastroesophageal reflux disease)    • Hiatal hernia    • Hyperlipidemia    • Hypertension    • Hypothyroidism    • Peripheral neuropathy    • Rheumatoid arthritis    • Rheumatoid arthritis 2005    DR SMYTH   • Seizures    • Type 2 diabetes mellitus    • West Nile fever        PAST SURGICAL HISTORY  Past Surgical History:   Procedure Laterality Date   • ABDOMINOPLASTY     • ADENOIDECTOMY     • BILATERAL BREAST REDUCTION     • BRONCHOSCOPY N/A 12/15/2016    Procedure: BRONCHOSCOPY WITH BAL AND ENDOBRONCHIAL ULTRASOUND WITH FNA;  Surgeon: Diego Ponce MD;  Location: [facility];  Service:    • CARDIAC ABLATION     • HYSTERECTOMY     • LASIK     • MOUTH SURGERY     • REPLACEMENT TOTAL KNEE Left    • TONSILLECTOMY         FAMILY HISTORY  Family History   Problem Relation Age of Onset   • Diabetes Mother    • Neuropathy Father    • Colon polyps Father    • No Known Problems Sister    • Diabetes Brother    • No Known Problems Son        SOCIAL HISTORY  Social History   Substance Use Topics   • Smoking status: Never Smoker   • Smokeless tobacco: Never Used   • Alcohol use Yes      Comment: social       retired      Allergies:  Sulfamethoxazole w/trimethoprim 800-160  [sulfamethoxazole-trimethoprim]; Adhesive tape; Cefpodoxime; Cephalosporins; Metformin and related; Topiramate; and  Latex    MEDICATIONS:    (Not in a hospital admission)    Objective     Results Review:  LABS:    Admission on 06/08/2018   Component Date Value Ref Range Status   • Glucose 06/08/2018 249* 65 - 99 mg/dL Final   • BUN 06/08/2018 11  8 - 23 mg/dL Final   • Creatinine 06/08/2018 0.76  0.57 - 1.00 mg/dL Final   • Sodium 06/08/2018 140  136 - 145 mmol/L Final   • Potassium 06/08/2018 4.1  3.5 - 5.2 mmol/L Final   • Chloride 06/08/2018 100  98 - 107 mmol/L Final   • CO2 06/08/2018 25.1  22.0 - 29.0 mmol/L Final   • Calcium 06/08/2018 9.9  8.6 - 10.5 mg/dL Final   • eGFR Non  Amer 06/08/2018 77  >60 mL/min/1.73 Final   • BUN/Creatinine Ratio 06/08/2018 14.5  7.0 - 25.0 Final   • Anion Gap 06/08/2018 14.9  mmol/L Final   • WBC 06/08/2018 7.46  4.50 - 10.70 10*3/mm3 Final   • RBC 06/08/2018 4.46  3.90 - 5.20 10*6/mm3 Final   • Hemoglobin 06/08/2018 13.5  11.9 - 15.5 g/dL Final   • Hematocrit 06/08/2018 40.6  35.6 - 45.5 % Final   • MCV 06/08/2018 91.0  80.5 - 98.2 fL Final   • MCH 06/08/2018 30.3  26.9 - 32.0 pg Final   • MCHC 06/08/2018 33.3  32.4 - 36.3 g/dL Final   • RDW 06/08/2018 12.5  11.7 - 13.0 % Final   • RDW-SD 06/08/2018 41.9  37.0 - 54.0 fl Final   • MPV 06/08/2018 12.0  6.0 - 12.0 fL Final   • Platelets 06/08/2018 244  140 - 500 10*3/mm3 Final   • Neutrophil % 06/08/2018 45.0  42.7 - 76.0 % Final   • Lymphocyte % 06/08/2018 44.6  19.6 - 45.3 % Final   • Monocyte % 06/08/2018 7.0  5.0 - 12.0 % Final   • Eosinophil % 06/08/2018 2.5  0.3 - 6.2 % Final   • Basophil % 06/08/2018 0.9  0.0 - 1.5 % Final   • Immature Grans % 06/08/2018 0.3  0.0 - 0.5 % Final   • Neutrophils, Absolute 06/08/2018 3.35  1.90 - 8.10 10*3/mm3 Final   • Lymphocytes, Absolute 06/08/2018 3.33  0.90 - 4.80 10*3/mm3 Final   • Monocytes, Absolute 06/08/2018 0.52  0.20 - 1.20 10*3/mm3 Final   • Eosinophils, Absolute 06/08/2018 0.19  0.00 - 0.70 10*3/mm3 Final   • Basophils, Absolute 06/08/2018 0.07  0.00 - 0.20 10*3/mm3 Final   • Immature  [place], Absolute 06/08/2018 0.02  0.00 - 0.03 10*3/mm3 Final       DIAGNOSTICS:  I personally reviewed a myelogram and CT accomplished yesterday the MRI done today: These demonstrate central T11-12 protrusion with some compression of the lower spinal cord.     The most striking finding however is an enormous disc herniation at L3 4 left side with near complete obliteration of the spinal canal and significant compression of cauda equina.    Results Review:   I reviewed the patient's new clinical results.  I personally viewed and interpreted the patient's imaging.    Vital Signs   Temp:  [97.7 °F (36.5 °C)] 97.7 °F (36.5 °C)  Heart Rate:  [80-86] 80  Resp:  [16-18] 18  BP: (118-154)/(77-89) 136/77    Physical Exam:  Physical Exam   Constitutional: She is oriented to person, place, and time. She appears well-developed and well-nourished. She is cooperative.   HENT:   Head: Normocephalic and atraumatic.   Eyes: EOM are normal. Pupils are equal, round, and reactive to light. No scleral icterus.   Neck: Normal range of motion. Neck supple.   Cardiovascular: Normal rate, regular rhythm and intact distal pulses.    No murmur heard.  Pulmonary/Chest: Effort normal and breath sounds normal.   Abdominal: Soft. Bowel sounds are normal. There is no tenderness.   Musculoskeletal: Normal range of motion.   Neurological: She is alert and oriented to person, place, and time. She has normal strength. She displays no atrophy. No cranial nerve deficit or sensory deficit. She exhibits normal muscle tone. She has a normal Finger-Nose-Finger Test, a normal Heel to Shin Test, a normal Romberg Test and a normal Tandem Gait Test. She displays a negative Romberg sign. Coordination and gait normal. GCS eye subscore is 4. GCS verbal subscore is 5. GCS motor subscore is 6.   Reflex Scores:       Tricep reflexes are 2+ on the right side and 2+ on the left side.       Bicep reflexes are 2+ on the right side and 2+ on the left side.        Brachioradialis reflexes are 2+ on the right side and 2+ on the left side.       Patellar reflexes are 2+ on the right side and 0 on the left side.       Achilles reflexes are 2+ on the right side and 2+ on the left side.  Negative Arredondo and clonus  Finger to nose intact   Heel to shin intact  She was unable to ambulate because of very severe pain to the left lower extremity pain.   Skin: Skin is warm, dry and intact.   Psychiatric: She has a normal mood and affect. Her speech is normal and behavior is normal. Judgment and thought content normal. Cognition and memory are normal.   Vitals reviewed.    Neurologic Exam     Mental Status   Oriented to person, place, and time.   Follows 3 step commands.   Attention: normal. Concentration: normal.   Speech: speech is normal   Level of consciousness: alert  Knowledge: good.   Able to read. Able to repeat. Able to write. Normal comprehension.     Cranial Nerves     CN II   Visual fields full to confrontation.     CN III, IV, VI   Pupils are equal, round, and reactive to light.  Extraocular motions are normal.   Right pupil: Consensual response: intact.   Left pupil: Consensual response: intact.   CN III: no CN III palsy  CN VI: no CN VI palsy  Nystagmus: none   Diplopia: none  Ophthalmoparesis: none  Upgaze: normal  Downgaze: normal  Conjugate gaze: present  Vestibulo-ocular reflex: present    CN V   Facial sensation intact.     CN VII   Facial expression full, symmetric.     CN VIII   CN VIII normal.     CN IX, X   CN IX normal.     CN XI   CN XI normal.     CN XII   CN XII normal.     Motor Exam   Muscle bulk: normal  Overall muscle tone: normal  Right arm tone: normal  Left arm tone: normal  Right arm pronator drift: absent  Left arm pronator drift: absent  Right leg tone: normal  Left leg tone: normal    Strength   Strength 5/5 throughout.   Right neck flexion: 5/5  Left neck flexion: 5/5  Right neck extension: 5/5  Left neck extension: 5/5  Right deltoid: 5/5  Left  deltoid: 5/5  Right biceps: 5/5  Left biceps: 5/5  Right triceps: 5/5  Left triceps: 5/5  Right wrist flexion: 5/5  Left wrist flexion: 5/5  Right wrist extension: 5/5  Left wrist extension: 5/5  Right interossei: 5/5  Left interossei: 5/5  Right abdominals: 5/5  Left abdominals: 5/5  Right iliopsoas: 5/5  Left iliopsoas: 3/5  Right quadriceps: 5/5  Left quadriceps: 3/5  Right hamstrin/5  Left hamstrin/5  Right glutei: 5/5  Left glutei: 5/5  Right anterior tibial: 5/5  Left anterior tibial: 5/5  Right posterior tibial: 5/5  Left posterior tibial: 5/5  Right peroneal: 5/5  Left peroneal: 5/5  Right gastroc: 5/5  Left gastroc: 5/5    Sensory Exam   Vibration normal.   Proprioception normal.   Sensory deficit distribution on left: L3  Sensation intact to pinprick/LT except left.  The anterior thigh.  She was able to distinguish pinprick and light touch in the left anterior thigh.  She indicated that it was less sharp than the right side.         Gait, Coordination, and Reflexes     Coordination   Romberg: negative  Finger to nose coordination: normal  Heel to shin coordination: normal  Tandem walking coordination: normal    Tremor   Resting tremor: absent  Intention tremor: absent  Action tremor: absent    Reflexes   Right brachioradialis: 2+  Left brachioradialis: 2+  Right biceps: 2+  Left biceps: 2+  Right triceps: 2+  Left triceps: 2+  Right patellar: 2+  Left patellar: 0  Right achilles: 2+  Left achilles: 2+  Right : 2+  Left : 2+  Right plantar: normal  Left plantar: normal  Right Arredondo: absent  Left Arredondo: absent  Right ankle clonus: absent  Left ankle clonus: absentUnable to ambulate because of severe pain in the left leg.       Assessment/Plan     Active Problems:    Cauda equina syndrome    L3/4 Lumbar disc herniation    Incontinence of feces      PLAN: This patient has signs and symptoms of cauda equina syndrome.  She has an enormous disc herniation evident on the MRI today with near  obliteration of the spinal canal.    The risks, benefits, and alternatives of an open or MAST discectomy and/or decompression were explained in detail to the patient. The alternative is not to perform an operative procedure. The benefit should be, though is not guaranteed, an improvement in the patient's neurosensory and/or motor dysfunction. The risks include, but are not limited to, the possibility of death, infection, stroke, bleeding, blindness, paralysis , lack of improvement or worsening of the pain syndrome, recurrence of the pain syndrome, meningitis, osteomyelitis, recurrent disc herniation, spinal instability, need for further operative intervention, sexual dysfunction, incontinence, inability to urinate without catheterization, inability to have a normal bowel movement, epidural scarring resulting in chronic back pain, leakage of cerebral spinal fluid at the time of surgery or after recovery from surgery requiring further operative intervention. I also explained the realistic expectations as they pertain to the procedure. The patient voiced understanding of these risks, benefits, alternatives, and realistic expectations and requests that we proceed with the operative intervention.    Our plan will be for an emergent left.  L3/4 lumbar discectomy.    I discussed the patients findings and my recommendations with patient, consulting provider and Dr Prakash Goldberg MD  06/08/18  10:23 PM

## 2018-06-09 NOTE — ANESTHESIA PROCEDURE NOTES
Airway  Urgency: elective    Airway not difficult    General Information and Staff    Patient location during procedure: OR  Anesthesiologist: WEN CAVANAUGH    Indications and Patient Condition  Indications for airway management: airway protection    Preoxygenated: yes  Mask difficulty assessment: 1 - vent by mask    Final Airway Details  Final airway type: endotracheal airway      Successful airway: ETT  Cuffed: yes   Successful intubation technique: direct laryngoscopy  Endotracheal tube insertion site: oral  Blade: Brett  Blade size: #3  ETT size: 7.0 mm  Cormack-Lehane Classification: grade IIb - view of arytenoids or posterior of glottis only  Placement verified by: chest auscultation and capnometry   Measured from: lips  ETT to lips (cm): 22  Number of attempts at approach: 1

## 2018-06-09 NOTE — PLAN OF CARE
Problem: Patient Care Overview  Goal: Plan of Care Review   06/09/18 0233   Coping/Psychosocial   Plan of Care Reviewed With patient   Plan of Care Review   Progress no change   OTHER   Outcome Summary patient new admission from [facility]. meds continued, vitals stable, will monitor and control pain.

## 2018-06-09 NOTE — ED PROVIDER NOTES
The REJI and I have discussed this patient's history, physical exam, and treatment plan.  I have reviewed the documentation and personally had a face to face interaction with the patient. I affirm the documentation and agree with the treatment and plan.  The attached note describes my personal findings.    CC: Back pain  Pt presents to ED c/o chronic lower back pain that has gradually worsened. Pt admits to bowel incontinence.  On exam, pt is awake, alert, and in no acute distress, no focal or neurological deficits noted. Notified pt of plan to admit pt. Pt understands and agrees with the plan, all questions answered.      Documentation assistance provided by sierra Leonardo for Dr. Perdomo.  Information recorded by the sunniibe was done at my direction and has been verified and validated by me.       Sp Leonardo  06/08/18 [id number]       Dexter Perdomo MD  06/09/18 [id number]

## 2018-06-09 NOTE — ANESTHESIA POSTPROCEDURE EVALUATION
"Patient: Wendi Ramos    Procedure Summary     Date:  06/08/18 Room / Location:  [facility] OR [address] [place] [address] MAIN OR    Anesthesia Start:  2307 Anesthesia Stop:  06/09/18 0103    Procedure:  LEFT L3/4 lumbar discectomy (Left Spine Lumbar) Diagnosis:       Cauda equina syndrome      Lumbar disc herniation      (Cauda equina syndrome [G83.4])      (Lumbar disc herniation [M51.26])    Surgeon:  Miguelangel Goldberg MD Provider:  Salomon Chau MD    Anesthesia Type:  general ASA Status:  3 - Emergent          Anesthesia Type: general  Last vitals  BP   120/68 (06/09/18 0130)   Temp   36.8 °C (98.2 °F) (06/09/18 0100)   Pulse   89 (06/09/18 0130)   Resp   12 (06/09/18 0130)     SpO2   99 % (06/09/18 0130)     Post Anesthesia Care and Evaluation    Patient location during evaluation: bedside  Patient participation: complete - patient participated  Level of consciousness: sleepy but conscious  Pain management: adequate  Airway patency: patent  Anesthetic complications: No anesthetic complications    Cardiovascular status: acceptable  Respiratory status: acceptable  Hydration status: acceptable    Comments: */68   Pulse 89   Temp 36.8 °C (98.2 °F) (Oral)   Resp 12   Ht 175.3 cm (69\")   Wt 90.7 kg (200 lb)   SpO2 99%   BMI 29.53 kg/m²         "

## 2018-06-09 NOTE — OP NOTE
LUMBAR DISCECTOMY POSTERIOR WITH METRIX  Procedure Note    Wendi Ramos  6/8/2018  [id number]    SURGEON  Miguelangel Goldberg MD    ASSISTANT:  Lainey Munroe CSA  INDICATION:  Intractable leg pain    Pre-op Diagnosis:   Cauda equina syndrome [G83.4]  Lumbar disc herniation [M51.26]      Post-Op Diagnosis Codes:     * Cauda equina syndrome [G83.4]     * Lumbar disc herniation [M51.26]      PROCEDURE PERFORMED:  Procedure(s):  Left l3/4 lumbar discectomy    Anesthesia: General    Staff:   Circulator: Viridiana Black RN; Sangita Tubbs, CIELO; Mary Kate Morrissey RN  Radiology Technologist: Shweta Drake  Scrub Person: Akila Mareso  Assistant: Lainey Munroe CSA    Estimated Blood Loss: minimal    Specimens:                  Order Name Source Comment Collection Info Order Time   PREGNANCY, URINE    POCT. PRN for all menstruating females.  6/8/2018 10:21 PM         Findings: Huge what looked like a chronic disc herniation with severe compression of the thecal sac and traversing and exiting nerve root.  There were free fragments in the epidural space.    Complications: none    Details: Positioning/approach: The patient was placed onto the Bakari table and care was taken to pad all susceptible areas. The lumbar area was prepped and draped in the usual sterile manner. Under fluoroscopic guidance a short K wire was used under fluoroscopic guidance to identify the interspace at L3/4. And then a series of telescoping dilators up to 18 mm were used and then a 18 mm X 7 cm tubular retractor was placed. Once in place a minimal amount soft tissue was removed from the bone with Bovie cautery.    Microscope: The operating microscope was draped sterilely and brought into the field and the rest of the operation performed with micro- dissection technique and micro-illumination.    Lumbar decompression: Using the high-speed drill I then removed the inferior lamina of L 3 and the superior lamina of L4  and I removed the  medial aspect of the facet on the the left side - about 1/4 of the facet.     I gently retracted the dural tube and the traversing nerverrot and identified the offending fragment of disc. I removed this with the pituitary rongeur. There was a huge what looked like a chronic disc herniation with severe compression of the thecal sac and traversing and exiting nerve root.  There were free fragments in the epidural space.     I identified the exiting L 3 and the traversing L4 nerve root and made sure that they were completely decompressed.     Prior to decompression they were obviously compressed by the surrounding tissue and the disc herniation and by the end of the procedure I could pass a Lee ball hook both out into the neural foramen alongside the exiting nerve root and along side the traversing nerve root inside the spinal canal without disturbing the nerve roots.    Copious irrigation was used hemostasis was obtained the wound was then closed and appropriate layers skin reapproximated with Dermabond              Miguelangel Goldberg MD     Date: 6/9/2018  Time: 12:36 AM

## 2018-06-09 NOTE — PROGRESS NOTES
[facility] PROGRESS NOTE    PATIENT IDENTIFICATION:   Name:  Wendi Ramos      MRN:  [id number]     62 y.o.  female               CC:POD #1 s/p L3 4 left-sided lumbar discectomy for cauda equina syndrome        Subjective     Interval History: has no complaint of of L leg pain, not taking any pain medicine, feels good, ready to go home.    Objective     Vital signs in last 24 hours:  Temp:  [97.7 °F (36.5 °C)-98.4 °F (36.9 °C)] 98.4 °F (36.9 °C)  Heart Rate:  [80-98] 90  Resp:  [12-18] 15  BP: (116-156)/(66-89) 116/67  ICP ranges-    Intake/Output last 3 shifts:  I/O last 3 completed shifts:  In: 800 [I.V.:800]  Out: 30 [Urine:30]    Intake/Output this shift:  No intake/output data recorded.      Physical Exam:  General:   Awake, alert, and oriented x 3. NAD  Appears well, moving around in bed without pain  Posterior incision site CDI, flat, normal surrounding skin  Up walking without dififcukty  Normal bowel and bladder control  Coordination: Finger to nose test shows no dysmetria.  Rapid alternating movements are normal.  Heel to shin normal.  Extremities:  Patient is wearing VICKIE and SCD bilaterally    LABS:    Lab Results (last 24 hours)     Procedure Component Value Units Date/Time    CBC & Differential [[id number]] Collected:  06/08/18 2111    Specimen:  Blood Updated:  06/08/18 2127    Narrative:       The following orders were created for panel order CBC & Differential.  Procedure                               Abnormality         Status                     ---------                               -----------         ------                     CBC Auto Differential[[id number]]        Normal              Final result                 Please view results for these tests on the individual orders.    Basic Metabolic Panel [[id number]]  (Abnormal) Collected:  06/08/18 2111    Specimen:  Blood Updated:  06/08/18 2151     Glucose 249 (H) mg/dL      BUN 11 mg/dL      Creatinine 0.76 mg/dL       Sodium 140 mmol/L      Potassium 4.1 mmol/L      Chloride 100 mmol/L      CO2 25.1 mmol/L      Calcium 9.9 mg/dL      eGFR Non African Amer 77 mL/min/1.73      BUN/Creatinine Ratio 14.5     Anion Gap 14.9 mmol/L     Narrative:       GFR Normal >60  Chronic Kidney Disease <60  Kidney Failure <15    CBC Auto Differential [[id number]]  (Normal) Collected:  06/08/18 2111    Specimen:  Blood Updated:  06/08/18 2127     WBC 7.46 10*3/mm3      RBC 4.46 10*6/mm3      Hemoglobin 13.5 g/dL      Hematocrit 40.6 %      MCV 91.0 fL      MCH 30.3 pg      MCHC 33.3 g/dL      RDW 12.5 %      RDW-SD 41.9 fl      MPV 12.0 fL      Platelets 244 10*3/mm3      Neutrophil % 45.0 %      Lymphocyte % 44.6 %      Monocyte % 7.0 %      Eosinophil % 2.5 %      Basophil % 0.9 %      Immature Grans % 0.3 %      Neutrophils, Absolute 3.35 10*3/mm3      Lymphocytes, Absolute 3.33 10*3/mm3      Monocytes, Absolute 0.52 10*3/mm3      Eosinophils, Absolute 0.19 10*3/mm3      Basophils, Absolute 0.07 10*3/mm3      Immature Grans, Absolute 0.02 10*3/mm3     Basic Metabolic Panel [[id number]]  (Abnormal) Collected:  06/09/18 0429    Specimen:  Blood Updated:  06/09/18 0553     Glucose 431 (C) mg/dL      BUN 13 mg/dL      Creatinine 0.76 mg/dL      Sodium 136 mmol/L      Potassium 4.4 mmol/L      Chloride 96 (L) mmol/L      CO2 23.8 mmol/L      Calcium 9.2 mg/dL      eGFR Non African Amer 77 mL/min/1.73      BUN/Creatinine Ratio 17.1     Anion Gap 16.2 mmol/L     Narrative:       GFR Normal >60  Chronic Kidney Disease <60  Kidney Failure <15    CBC (No Diff) [[id number]]  (Abnormal) Collected:  06/09/18 0429    Specimen:  Blood Updated:  06/09/18 0503     WBC 4.80 10*3/mm3      RBC 4.15 10*6/mm3      Hemoglobin 12.2 g/dL      Hematocrit 38.8 %      MCV 93.5 fL      MCH 29.4 pg      MCHC 31.4 (L) g/dL      RDW 12.5 %      RDW-SD 42.5 fl      MPV 11.9 fL      Platelets 200 10*3/mm3     Magnesium [[id number]]  (Normal) Collected:  06/09/18 0429    Specimen:   Blood Updated:  06/09/18 0528     Magnesium 2.1 mg/dL     Phosphorus [[id number]]  (Normal) Collected:  06/09/18 0429    Specimen:  Blood Updated:  06/09/18 0528     Phosphorus 4.0 mg/dL     Hemoglobin A1c [[id number]]  (Abnormal) Collected:  06/09/18 0429    Specimen:  Blood Updated:  06/09/18 0509     Hemoglobin A1C 9.00 (H) %     Narrative:       Hemoglobin A1C Ranges:    Increased Risk for Diabetes  5.7% to 6.4%  Diabetes                     >= 6.5%  Diabetic Goal                < 7.0%    POC Glucose Once [[id number]]  (Abnormal) Collected:  06/09/18 0755    Specimen:  Blood Updated:  06/09/18 0757     Glucose 336 (H) mg/dL     Narrative:       Meter: [id number] : [id number] Israel METZGER          IMAGING STUDIES:  No new imaging      Meds reviewed/changed: Yes    Current Facility-Administered Medications   Medication Dose Route Frequency Provider Last Rate Last Dose   • acetaminophen (TYLENOL) tablet 650 mg  650 mg Oral Q4H PRN Tyrone Ramirez MD       • atorvastatin (LIPITOR) tablet 10 mg  10 mg Oral Nightly Miguelangel Goldberg MD       • baclofen (LIORESAL) tablet 20 mg  20 mg Oral BID Miguelangel Goldberg MD       • bisacodyl (DULCOLAX) EC tablet 10 mg  10 mg Oral Daily PRN Miguelangel Goldberg MD       • bisacodyl (DULCOLAX) EC tablet 5 mg  5 mg Oral Daily PRN Tyrone Ramirez MD       • bisacodyl (DULCOLAX) suppository 10 mg  10 mg Rectal Daily PRN Tyrone Ramirez MD       • clindamycin (CLEOCIN) 600 mg in dextrose 5% 50 mL IVPB (premix)  600 mg Intravenous Q8H Miguelangel Goldberg MD   600 mg at 06/09/18 0616   • dextrose (D50W) solution 25 g  25 g Intravenous Q15 Min PRN Tyrone Ramirez MD       • dextrose (GLUTOSE) oral gel 15 g  15 g Oral Q15 Min PRN Tyrone Ramirez MD       • docusate sodium (COLACE) capsule 100 mg  100 mg Oral BID PRN Miguelangel Goldberg MD       • DULoxetine (CYMBALTA) DR capsule 60 mg  60 mg Oral Daily Miguelangel Goldberg MD       • fenofibrate (TRICOR) tablet 145 mg  145  mg Oral Daily Miguelangel Goldberg MD       • glipiZIDE (GLUCOTROL) tablet 2.5 mg  2.5 mg Oral QAM AC Miguelangel Goldberg MD       • glipiZIDE (GLUCOTROL) tablet 5 mg  5 mg Oral QAM AC Miguelangel Goldberg MD   5 mg at 06/09/18 0816   • glucagon (human recombinant) (GLUCAGEN DIAGNOSTIC) injection 1 mg  1 mg Subcutaneous PRN Tyrone Ramirez MD       • HYDROcodone-acetaminophen (NORCO) 5-325 MG per tablet 2 tablet  2 tablet Oral Q4H PRN Miguelangel Goldberg MD       • hydroxychloroquine (PLAQUENIL) tablet 200 mg  200 mg Oral Q24H Miguelangel Goldberg MD       • insulin aspart (novoLOG) injection 0-9 Units  0-9 Units Subcutaneous 4x Daily With Meals & Nightly Tyrone Ramirez MD   7 Units at 06/09/18 0814   • lactated ringers infusion  9 mL/hr Intravenous Continuous Salomon Chau MD 9 mL/hr at 06/08/18 2250 9 mL/hr at 06/08/18 2250   • lactated ringers infusion  75 mL/hr Intravenous Continuous Miguelangel Goldberg MD       • leflunomide (ARAVA) tablet 20 mg  20 mg Oral Daily Miguelangel Goldberg MD       • levETIRAcetam (KEPPRA) tablet 500 mg  500 mg Oral Daily Miguelangel Goldberg MD       • levothyroxine (SYNTHROID, LEVOTHROID) tablet 75 mcg  75 mcg Oral Daily Miguelangel Goldberg MD       • linagliptin (TRADJENTA) tablet 5 mg  5 mg Oral Daily Miguelangel Goldberg MD   5 mg at 06/09/18 0817   • metaxalone (SKELAXIN) tablet 800 mg  800 mg Oral QAM Miguelangel Goldberg MD       • metFORMIN ER (GLUCOPHAGE-XR) 24 hr tablet 500 mg  500 mg Oral Daily With Breakfast Miguelangel Goldberg MD       • metoprolol tartrate (LOPRESSOR) tablet 50 mg  50 mg Oral Q12H Miguelangel Goldberg MD   50 mg at 06/09/18 0818   • morphine injection 1 mg  1 mg Intravenous Q2H PRN Tyrone Ramirez MD        And   • naloxone (NARCAN) injection 0.4 mg  0.4 mg Intravenous Q5 Min PRN Tyrone Ramirez MD       • Morphine sulfate (PF) injection 6 mg  6 mg Intravenous Q2H PRN Miguelangel Goldberg MD        And   • naloxone (NARCAN) injection 0.4 mg  0.4 mg Intravenous Q5  Min PRN Miguelangel Goldberg MD       • ondansetron (ZOFRAN) tablet 4 mg  4 mg Oral Q6H PRN Tyrone Ramirez MD        Or   • ondansetron ODT (ZOFRAN-ODT) disintegrating tablet 4 mg  4 mg Oral Q6H PRN Tyrone Ramirez MD        Or   • ondansetron (ZOFRAN) injection 4 mg  4 mg Intravenous Q6H PRN Tyrone Ramirez MD       • ondansetron (ZOFRAN) tablet 4 mg  4 mg Oral Q6H PRN Miguelangel Goldberg MD        Or   • ondansetron ODT (ZOFRAN-ODT) disintegrating tablet 4 mg  4 mg Oral Q6H PRN Miguelangel Goldberg MD        Or   • ondansetron (ZOFRAN) injection 4 mg  4 mg Intravenous Q6H PRN Miguelangel Goldberg MD       • OXcarbazepine (TRILEPTAL) tablet 600 mg  600 mg Oral Q12H Miguelangel Goldberg MD       • oxyCODONE-acetaminophen (PERCOCET)  MG per tablet 1 tablet  1 tablet Oral Q4H PRN Miguelangel Goldberg MD       • oxyCODONE-acetaminophen (PERCOCET) 7.5-325 MG per tablet 1 tablet  1 tablet Oral Q4H PRN Tyrone Ramirez MD       • pantoprazole (PROTONIX) EC tablet 40 mg  40 mg Oral QAM Miguelangel Goldberg MD   40 mg at 06/09/18 0616   • pramipexole (MIRAPEX) tablet 0.5 mg  0.5 mg Oral Nightly Miguelangel Goldberg MD       • sodium chloride 0.9 % flush 1-10 mL  1-10 mL Intravenous PRN Tyrone Ramirez MD       • sodium chloride 0.9 % flush 10 mL  10 mL Intravenous PRN JUAN Manriquez III       • sodium chloride 0.9 % with KCl 20 mEq/L infusion  100 mL/hr Intravenous Continuous Tyrone Ramirez  mL/hr at 06/09/18 0616 100 mL/hr at 06/09/18 0616   • zolpidem (AMBIEN) tablet 10 mg  10 mg Oral Nightly PRN Miguelangel Goldberg MD           Assessment/Plan     ASSESSMENT:    Principal Problem:    Cauda equina syndrome  Active Problems:    Type 2 diabetes mellitus without complication    Lumbar radiculopathy    L3/4 Lumbar disc herniation    Incontinence of feces      PLAN: She is doing very well postop day 1 status post emergent left lumbar decompressive surgery secondary to cauda equina syndrome.  She has  been up walking this morning and denies any gait or balance issues.  She has had complete resolution of the left leg pain but does continue to have a little tingling in the foot and toes.  The incision site is healing well.  From our standpoint she is okay for discharge home today.  We will arrange outpatient follow-up in 2 weeks for a wound check.  She is to call our office at anytime with any questions or concerns following discharge.    I discussed the patients findings and my recommendations with patient, family, nursing staff and Dr Goldberg       LOS: 1 day       ALESSANDRA Rodriguez  6/9/2018  9:35 AM

## 2018-06-11 ENCOUNTER — TELEPHONE (OUTPATIENT)
Dept: NEUROSURGERY | Facility: CLINIC | Age: 63
End: 2018-06-11

## 2018-06-11 NOTE — TELEPHONE ENCOUNTER
----- Message from ALESSANDRA Rodriguez sent at 6/11/2018 12:22 PM EDT -----  Please arrange OP follow up in 2-3 weeks s/p lumbar discectomy with JH for a large dis herniation. NO imaging needed. OK to see NP

## 2018-06-11 NOTE — TELEPHONE ENCOUNTER
Patient scheduled with MATT June 25 @ 4:00 PM, 3:30 arrival. Patient voiced understanding. Letter mailed

## 2018-06-25 ENCOUNTER — HOSPITAL ENCOUNTER (OUTPATIENT)
Dept: MRI IMAGING | Facility: HOSPITAL | Age: 63
Discharge: HOME | End: 2018-06-25
Admitting: NURSE PRACTITIONER

## 2018-06-25 ENCOUNTER — OFFICE VISIT (OUTPATIENT)
Dept: NEUROSURGERY | Facility: CLINIC | Age: 63
End: 2018-06-25

## 2018-06-25 VITALS
DIASTOLIC BLOOD PRESSURE: 80 MMHG | SYSTOLIC BLOOD PRESSURE: 142 MMHG | RESPIRATION RATE: 18 BRPM | BODY MASS INDEX: 30.66 KG/M2 | HEIGHT: 69 IN | HEART RATE: 96 BPM | WEIGHT: 207 LBS

## 2018-06-25 DIAGNOSIS — M54.16 LUMBAR RADICULOPATHY: ICD-10-CM

## 2018-06-25 DIAGNOSIS — M51.26 LUMBAR DISC HERNIATION: ICD-10-CM

## 2018-06-25 DIAGNOSIS — M79.605 LEFT LEG PAIN: ICD-10-CM

## 2018-06-25 DIAGNOSIS — M79.605 LEFT LEG PAIN: Primary | ICD-10-CM

## 2018-06-25 PROCEDURE — A9577 INJ MULTIHANCE: HCPCS | Performed by: NURSE PRACTITIONER

## 2018-06-25 PROCEDURE — 82565 ASSAY OF CREATININE: CPT

## 2018-06-25 PROCEDURE — 72158 MRI LUMBAR SPINE W/O & W/DYE: CPT

## 2018-06-25 PROCEDURE — 99024 POSTOP FOLLOW-UP VISIT: CPT | Performed by: NURSE PRACTITIONER

## 2018-06-25 PROCEDURE — 0 GADOBENATE DIMEGLUMINE 529 MG/ML SOLUTION: Performed by: NURSE PRACTITIONER

## 2018-06-25 RX ORDER — OXYCODONE AND ACETAMINOPHEN 10; 325 MG/1; MG/1
1 TABLET ORAL EVERY 6 HOURS PRN
COMMUNITY
End: 2018-07-06

## 2018-06-25 RX ORDER — ZOLPIDEM TARTRATE 10 MG/1
TABLET ORAL
Qty: 90 TABLET | Refills: 0 | OUTPATIENT
Start: 2018-06-25

## 2018-06-25 RX ADMIN — GADOBENATE DIMEGLUMINE 20 ML: 529 INJECTION, SOLUTION INTRAVENOUS at 17:56

## 2018-06-25 NOTE — TELEPHONE ENCOUNTER
Information I have says she got quantity of 90 i.e. 90 days worth on 5/18/18 this is too soon to do so these check this out since his information is accurate

## 2018-06-25 NOTE — PROGRESS NOTES
" HPI:   Wendi Ramos is a 62 y.o. female for follow-up of L3 4 discectomy on June 8, 2018 Dr. Goldberg.  The patient presented to the emergency room with left-sided intractable leg pain and an L3 4 disc herniation resulting in cauda equina syndrome.  She was emergently taken to the operating room and did so well postop she was discharged home the next morning in stable condition.  She reported almost complete resolution of the left leg pain following surgery.  She also regained normal control of bowel and bladder function before leaving the hospital.     Today, she reports return of the left leg pain that remains \"unbearable.\"  In fact, she feels that it is worse now than it was before surgery.  Pain radiates posteriorly and laterally down the left thigh to her left knee.  Pain does not go below the knee that intermittently she will have numbness and tingling that radiates into the left calf.  She denies any worsening issues with incontinence of bowel or bladder.  Walking remains very difficult secondary to the left leg pain and she does feel that the left leg is getting weaker.  She is taking pain medication from a prior surgery on an as-needed basis when it gets severe.    She denies any problems with the right leg.  She denies any falls.  She mostly has been in bed since hospital discharge secondary to the pain.  She has had no issues with the incision site and it is healing well.    She presents unaccompanied.      /80 (BP Location: Right arm, Patient Position: Sitting)   Pulse 96   Resp 18   Ht 175.3 cm (69\")   Wt 93.9 kg (207 lb)   BMI 30.57 kg/m²       Review of Systems   Constitutional: Negative for chills and fever.   Gastrointestinal: Negative for constipation.   Genitourinary: Negative for difficulty urinating and enuresis.   Musculoskeletal: Negative for back pain.        LLE pain   Skin: Negative for wound (wound redness, swelling, or drainage).   Neurological: Positive for weakness (LLE) and " "numbness (LLE ). Negative for headaches.   Psychiatric/Behavioral: Positive for sleep disturbance (occ'l, when rubbing wound in sleep).        /80 (BP Location: Right arm, Patient Position: Sitting)   Pulse 96   Resp 18   Ht 175.3 cm (69\")   Wt 93.9 kg (207 lb)   BMI 30.57 kg/m²     Physical Exam   Constitutional: She is oriented to person, place, and time. She appears well-developed and well-nourished. She is cooperative.   HENT:   Head: Normocephalic and atraumatic.   Neck: Neck supple. No tracheal deviation present.   Pulmonary/Chest: Effort normal.   Abdominal: Soft.   Musculoskeletal: She exhibits no tenderness.   Decreased strength left hip flexor 4/5, otherwise strength intact bilateral lower extremities  Deferred lumbar range of motion exam secondary to pain and instability when standing  Normal muscle bulk and tone in bilateral lower extremities   Neurological: She is alert and oriented to person, place, and time. She displays no tremor and normal reflexes. No cranial nerve deficit or sensory deficit. She exhibits normal muscle tone. Gait abnormal. Coordination normal. GCS eye subscore is 4. GCS verbal subscore is 5. GCS motor subscore is 6.   Reflex Scores:       Patellar reflexes are 1+ on the right side and 2+ on the left side.       Achilles reflexes are 2+ on the right side and 2+ on the left side.  Left antalgic gait, dragging of the left foot, unable to stand up straight  Positive straight leg raise on the left  Negative clonus  Sensory intact to soft touch, temperature and vibration   Skin: Skin is warm and dry.   Well-healing left lumbar incision site, flat, normal surrounding skin   Psychiatric: She has a normal mood and affect. Her behavior is normal. Thought content normal.   Vitals reviewed.    Neurologic Exam     Mental Status   Oriented to person, place, and time.     Gait, Coordination, and Reflexes     Reflexes   Right patellar: 1+  Left patellar: 2+  Right achilles: 2+  Left " "achilles: 2+      Findings/Results:  No new imaging      Assessment/Plan:  Wendi was seen today for post-op.    Diagnoses and all orders for this visit:    Left leg pain  -     MRI Lumbar Spine With & Without Contrast; Future    L3/4 Lumbar disc herniation    Lumbar radiculopathy        Discussion/Summary  She presents to the office today for first hospital follow-up status post emergent left L3 4 discectomy on June 8 with Dr. Goldberg.  Exam as noted above, no red flags.  She does report worsening left leg pain that she now states is even more severe now than it was before her surgery.  She has ongoing weakness.  She did have the left leg pain for almost 5 months before surgery and likely has some chronic left lower extremity weakness as result.  She states walking is extremely difficult and pain worsens with weightbearing and walking. She denies worsening incontinence of bowel or bladder and overall feels that these issues have improved.  She also no longer has back pain and is able to stand up \"straighter than I used to.\"  Given the ongoing left lower extremity weakness and worsening pain, I've ordered stat lumbar MRI with and without contrast to further evaluate.  Further recommendations to follow pending completion of the MRI.    Plan: Stat MRI of the lumbar spine, further recommendations to follow    Plan: Return in about 4 weeks (around 7/23/2018).         Patient Care Team    Patient Care Team:  Brice Camarena Jr., MD as PCP - General    Iesha Handy, APRN  6/25/2018    Dragon disclaimer:   Much of this encounter note is an electronic transcription/translation of spoken language to printed text. The electronic translation of spoken language may permit erroneous, or at times, nonsensical words or phrases to be inadvertently transcribed; Although I have reviewed the note for such errors, some may still exist.   "

## 2018-06-26 ENCOUNTER — OFFICE VISIT (OUTPATIENT)
Dept: NEUROSURGERY | Facility: CLINIC | Age: 63
End: 2018-06-26

## 2018-06-26 VITALS
WEIGHT: 207 LBS | BODY MASS INDEX: 30.55 KG/M2 | HEART RATE: 84 BPM | DIASTOLIC BLOOD PRESSURE: 84 MMHG | RESPIRATION RATE: 16 BRPM | SYSTOLIC BLOOD PRESSURE: 148 MMHG

## 2018-06-26 DIAGNOSIS — M51.26 LUMBAR DISC HERNIATION: ICD-10-CM

## 2018-06-26 DIAGNOSIS — M54.16 LUMBAR RADICULOPATHY: Primary | ICD-10-CM

## 2018-06-26 LAB — CREAT BLDA-MCNC: 0.6 MG/DL (ref 0.6–1.3)

## 2018-06-26 PROCEDURE — 99024 POSTOP FOLLOW-UP VISIT: CPT | Performed by: NEUROLOGICAL SURGERY

## 2018-06-26 RX ORDER — SODIUM CHLORIDE, SODIUM LACTATE, POTASSIUM CHLORIDE, CALCIUM CHLORIDE 600; 310; 30; 20 MG/100ML; MG/100ML; MG/100ML; MG/100ML
100 INJECTION, SOLUTION INTRAVENOUS CONTINUOUS
Status: CANCELLED | OUTPATIENT
Start: 2018-07-12

## 2018-06-26 NOTE — NURSING NOTE
Phone call from Iesha Handy stating pt may go home and that their office would call her in the am. Pt informed, s/l dc'd with cath tip intact and pt dc'd via WC and home with son.

## 2018-06-26 NOTE — PROGRESS NOTES
Subjective   Patient ID: Wendimathew Ramos is a 62 y.o. female is here today for follow-up of left leg pain secondary to disc reherniation. She underwent repeat MRI yesterday and is unaccompanied.    History of Present Illness     She returns to the office this morning for surgical discussion with abnormal lumbar MRI imaging consistent with a disc reherniation at L3 4.  The patient was seen in the office yesterday at her first postop visit status post L3 4 discectomy on June 8 with Dr. Goldberg. She reported ongoing and intractable left leg pain.  She states that it is unimproved following her surgery 2 weeks ago.  Pain is constant in the left lateral and posterior thigh and makes walking difficult.  She has a hard time putting any weight on the left leg.  She denies any change with regard to bowel or bladder issues.    She also feels that the left leg is getting weaker.  She is taking pain medication she had left over from a prior surgery, which is making the ongoing discomfort more tolerable.  She denies any falls and mostly has been lying in bed since hospital discharge due to the pain.    She presents unaccompanied.    /84 (BP Location: Right arm, Patient Position: Sitting, Cuff Size: Adult)   Pulse 84   Resp 16   Wt 93.9 kg (207 lb)   BMI 30.55 kg/m²         The following portions of the patient's history were reviewed and updated as appropriate: allergies, current medications, past family history, past medical history, past social history, past surgical history and problem list.    Review of Systems   Constitutional: Negative for fever.   Respiratory: Negative for cough and wheezing.    Genitourinary: Negative for difficulty urinating and enuresis.   Musculoskeletal: Positive for back pain and gait problem.   Neurological: Positive for weakness and numbness.   Psychiatric/Behavioral: Positive for sleep disturbance.       Objective   Physical Exam   Constitutional: She is oriented to person, place, and  time. She appears well-developed and well-nourished. She is cooperative.   HENT:   Head: Normocephalic and atraumatic.   Neck: Neck supple. No tracheal deviation present.   Pulmonary/Chest: Effort normal.   Abdominal: Soft.   Musculoskeletal: She exhibits no tenderness.   Decreased strength left hip flexor 4/5, otherwise strength intact bilateral lower extremities  Deferred lumbar range of motion exam secondary to pain and instability when standing  Normal muscle bulk and tone in bilateral lower extremities   Neurological: She is alert and oriented to person, place, and time. She displays no tremor and normal reflexes. No cranial nerve deficit or sensory deficit. She exhibits normal muscle tone. Gait abnormal. Coordination normal. GCS eye subscore is 4. GCS verbal subscore is 5. GCS motor subscore is 6.   Reflex Scores:       Patellar reflexes are 1+ on the right side and 2+ on the left side.       Achilles reflexes are 2+ on the right side and 2+ on the left side.  Left antalgic gait, dragging of the left foot, unable to stand up straight  Positive straight leg raise on the left  Negative clonus  Sensory intact to soft touch, temperature and vibration   Skin: Skin is warm and dry.   Well-healing left lumbar incision site, flat, normal surrounding skin   Psychiatric: She has a normal mood and affect. Her behavior is normal. Thought content normal.   Vitals reviewed.      Neurologic Exam  Mental Status   Oriented to person, place, and time.      Gait, Coordination, and Reflexes      Reflexes   Right patellar: 1+  Left patellar: 2+  Right achilles: 2+  Left achilles: 2+        Assessment/Plan   Independent Review of Radiographic Studies:  I personally reviewed the MRI scan done last night: This demonstrates a residual or recurrent disc herniation to the left at L3 4.  There remains lateral recess stenosis.  This disc protrusion extends from the inferior pedicle of L3 to the pedicle of L4.      Medical Decision Making:     I confirmed and obtained the above history as recorded by the nurse practitioner acting as a scribe. I performed the above examination and it is documented by the nurse practitioner acting as a scribe.    Postoperatively, on the day of surgical intervention, the patient had a substantial relief of some of her symptomatology.  Unfortunately by the time she got home she had recurrence or residual aspects of pain into the left leg.  This remains in the distribution of the disc herniation.    Treatment options include continued conservative treatment or surgical management.  The patient and I feel that another try with further surgery is warranted considering the degree of discomfort that she is experiencing.    I explained to the patient that after this length of time with discomfort which is been ongoing for quite a while at this point she may or may not get relief of her discomfort.  Additionally she has the complaint of bowel incontinence which she thinks may be a little bit better but she and I understand that this may not get better as a result of further surgery.    Considering the size of the disc protrusion and the recurrent nature of this disc I think we best do a lumbar fusion at the same time as removing this disc material.    The risks, benefits, and alternatives of decompression and fusion were explained in detail to the patient. The alternative is not to perform an operative procedure. The benefit should be, though is not guaranteed, primarily a potential reduction in the neurosensory and/or motor dysfunction; secondarily, a possible reduction in back pain. The risks include, but are not limited to, the possibility of death, infection, stroke, bleeding, blindness, paralysis, blindness, lack of improvement in the patient's pain syndrome, worsening of the pain syndrome, meningitis, osteomyelitis, recurrent disc herniation, need for further operative intervention, recurrence of the pain syndrome, sexual  dysfunction, incontinence, inability to urinate without catheterization, inability to have a normal bowel movement, epidural scarring resulting in chronic back pain, leakage of cerebral spinal fluid at the time of surgery or after recovery from surgery requiring further operative intervention, lack of bony fusion requiring further surgery, instrumentation breakdown or pull out, adjacent level spinal degeneration, spinal instability. I also explained the realistic expectations as they pertain to the procedure. The patient voiced understanding of these risks, benefits, alternatives, and realistic expectations and requests that we proceed with the operative intervention.    After a complete physical exam, the patient has been informed of the consequences, benefits, appropriate us, and office policies regarding the medication being prescribed. A ELICEO check will be made on-line, and will be repeated if prescription is renewed after a 90 day period. The patient agrees to adhering to the medication regimen as prescribed.    The patient has been advised that we will manage post-operative pain for 1 month. If further narcotic medication is needed beyond that period, a referral back to the primary care physician or to a pain management specialist will be made. If the patient cancels or fails to show for scheduled follow-up visits or the pain management referral,  further narcotic prescriptions from this practice may cease.      Wendi was seen today for leg pain.    Diagnoses and all orders for this visit:    Lumbar radiculopathy    L3/4 Lumbar disc herniation  -     Case Request; Standing  -     CBC and Differential; Future  -     Basic metabolic panel; Future  -     Sedimentation rate; Future  -     lactated ringers infusion; Infuse 100 mL/hr into a venous catheter Continuous.  -     vancomycin (VANCOCIN) 1,500 mg in sodium chloride 0.9 % 250 mL IVPB; Infuse 1,500 mg into a venous catheter 1 (One) Time.  -     Case  Request    Other orders  -     Follow anesthesia standing orders.  -     Obtain informed consent  -     Clorhexidine skin prep; Future  -     Follow anesthesia standing orders.; Standing  -     Verify NPO Status; Standing  -     VICKIE hose- To be placed on patient in pre-op; Standing  -     SCD (sequential compression device)- to be placed on patient in Pre-op; Standing  -     POC Glucose Once; Standing  -     Provide instructions to patient on NPO status; Future  -     Inpatient Admission; Standing      Return for Follow up with nurse practitioner, Recheck 2 weeks after surgery.

## 2018-06-29 ENCOUNTER — TELEPHONE (OUTPATIENT)
Dept: NEUROSURGERY | Facility: CLINIC | Age: 63
End: 2018-06-29

## 2018-06-29 DIAGNOSIS — M51.26 LUMBAR DISC HERNIATION: Primary | ICD-10-CM

## 2018-06-29 RX ORDER — ATORVASTATIN CALCIUM 10 MG/1
10 TABLET, FILM COATED ORAL NIGHTLY
Qty: 90 TABLET | Refills: 0 | Status: SHIPPED | OUTPATIENT
Start: 2018-06-29 | End: 2018-07-05 | Stop reason: SDUPTHER

## 2018-06-29 NOTE — TELEPHONE ENCOUNTER
Please enter an order for POST OP lumbar xray AP/lat standing in brace for diagnosis of lumbar disc herniation. Post op appt is with Iwona on July 30 at 3:15. Thanks!

## 2018-07-01 ENCOUNTER — RESULTS ENCOUNTER (OUTPATIENT)
Dept: NEUROSURGERY | Facility: CLINIC | Age: 63
End: 2018-07-01

## 2018-07-01 DIAGNOSIS — M51.26 LUMBAR DISC HERNIATION: ICD-10-CM

## 2018-07-05 RX ORDER — ATORVASTATIN CALCIUM 10 MG/1
10 TABLET, FILM COATED ORAL NIGHTLY
Qty: 90 TABLET | Refills: 0 | Status: SHIPPED | OUTPATIENT
Start: 2018-07-05 | End: 2018-08-17 | Stop reason: SDUPTHER

## 2018-07-06 ENCOUNTER — APPOINTMENT (OUTPATIENT)
Dept: PREADMISSION TESTING | Facility: HOSPITAL | Age: 63
End: 2018-07-06

## 2018-07-06 VITALS
OXYGEN SATURATION: 99 % | DIASTOLIC BLOOD PRESSURE: 70 MMHG | BODY MASS INDEX: 29.62 KG/M2 | SYSTOLIC BLOOD PRESSURE: 113 MMHG | HEIGHT: 69 IN | WEIGHT: 200 LBS | HEART RATE: 80 BPM | RESPIRATION RATE: 16 BRPM | TEMPERATURE: 97.9 F

## 2018-07-06 LAB
ANION GAP SERPL CALCULATED.3IONS-SCNC: 15.3 MMOL/L
BASOPHILS # BLD AUTO: 0.08 10*3/MM3 (ref 0–0.2)
BASOPHILS NFR BLD AUTO: 1.4 % (ref 0–1.5)
BUN BLD-MCNC: 12 MG/DL (ref 8–23)
BUN/CREAT SERPL: 16 (ref 7–25)
CALCIUM SPEC-SCNC: 10.1 MG/DL (ref 8.6–10.5)
CHLORIDE SERPL-SCNC: 100 MMOL/L (ref 98–107)
CO2 SERPL-SCNC: 23.7 MMOL/L (ref 22–29)
CREAT BLD-MCNC: 0.75 MG/DL (ref 0.57–1)
DEPRECATED RDW RBC AUTO: 42.6 FL (ref 37–54)
EOSINOPHIL # BLD AUTO: 0.19 10*3/MM3 (ref 0–0.7)
EOSINOPHIL NFR BLD AUTO: 3.2 % (ref 0.3–6.2)
ERYTHROCYTE [DISTWIDTH] IN BLOOD BY AUTOMATED COUNT: 12.5 % (ref 11.7–13)
ERYTHROCYTE [SEDIMENTATION RATE] IN BLOOD: 33 MM/HR (ref 0–30)
GFR SERPL CREATININE-BSD FRML MDRD: 78 ML/MIN/1.73
GLUCOSE BLD-MCNC: 210 MG/DL (ref 65–99)
HCT VFR BLD AUTO: 44 % (ref 35.6–45.5)
HGB BLD-MCNC: 14.4 G/DL (ref 11.9–15.5)
IMM GRANULOCYTES # BLD: 0.03 10*3/MM3 (ref 0–0.03)
IMM GRANULOCYTES NFR BLD: 0.5 % (ref 0–0.5)
LYMPHOCYTES # BLD AUTO: 2.2 10*3/MM3 (ref 0.9–4.8)
LYMPHOCYTES NFR BLD AUTO: 37.2 % (ref 19.6–45.3)
MCH RBC QN AUTO: 30.5 PG (ref 26.9–32)
MCHC RBC AUTO-ENTMCNC: 32.7 G/DL (ref 32.4–36.3)
MCV RBC AUTO: 93.2 FL (ref 80.5–98.2)
MONOCYTES # BLD AUTO: 0.55 10*3/MM3 (ref 0.2–1.2)
MONOCYTES NFR BLD AUTO: 9.3 % (ref 5–12)
NEUTROPHILS # BLD AUTO: 2.86 10*3/MM3 (ref 1.9–8.1)
NEUTROPHILS NFR BLD AUTO: 48.4 % (ref 42.7–76)
PLATELET # BLD AUTO: 231 10*3/MM3 (ref 140–500)
PMV BLD AUTO: 12.4 FL (ref 6–12)
POTASSIUM BLD-SCNC: 4.1 MMOL/L (ref 3.5–5.2)
RBC # BLD AUTO: 4.72 10*6/MM3 (ref 3.9–5.2)
SODIUM BLD-SCNC: 139 MMOL/L (ref 136–145)
WBC NRBC COR # BLD: 5.91 10*3/MM3 (ref 4.5–10.7)

## 2018-07-06 PROCEDURE — 85652 RBC SED RATE AUTOMATED: CPT | Performed by: NEUROLOGICAL SURGERY

## 2018-07-06 PROCEDURE — 85025 COMPLETE CBC W/AUTO DIFF WBC: CPT | Performed by: NEUROLOGICAL SURGERY

## 2018-07-06 PROCEDURE — 36415 COLL VENOUS BLD VENIPUNCTURE: CPT | Performed by: NEUROLOGICAL SURGERY

## 2018-07-06 PROCEDURE — 80048 BASIC METABOLIC PNL TOTAL CA: CPT | Performed by: NEUROLOGICAL SURGERY

## 2018-07-06 RX ORDER — LEVOTHYROXINE SODIUM 0.07 MG/1
75 TABLET ORAL DAILY
COMMUNITY
End: 2019-02-08 | Stop reason: SDUPTHER

## 2018-07-06 RX ORDER — OMEPRAZOLE 40 MG/1
40 CAPSULE, DELAYED RELEASE ORAL DAILY
COMMUNITY
End: 2019-02-08 | Stop reason: SDUPTHER

## 2018-07-06 RX ORDER — OXCARBAZEPINE 600 MG/1
600 TABLET, FILM COATED ORAL 2 TIMES DAILY
COMMUNITY
End: 2021-01-01

## 2018-07-06 RX ORDER — CYCLOBENZAPRINE HCL 10 MG
10 TABLET ORAL NIGHTLY
COMMUNITY
End: 2019-09-05 | Stop reason: ALTCHOICE

## 2018-07-12 ENCOUNTER — ANESTHESIA EVENT (OUTPATIENT)
Dept: PERIOP | Facility: HOSPITAL | Age: 63
End: 2018-07-12

## 2018-07-12 ENCOUNTER — HOSPITAL ENCOUNTER (INPATIENT)
Facility: HOSPITAL | Age: 63
LOS: 1 days | Discharge: HOME | End: 2018-07-13
Attending: NEUROLOGICAL SURGERY | Admitting: NEUROLOGICAL SURGERY

## 2018-07-12 ENCOUNTER — ANESTHESIA (OUTPATIENT)
Dept: PERIOP | Facility: HOSPITAL | Age: 63
End: 2018-07-12

## 2018-07-12 ENCOUNTER — APPOINTMENT (OUTPATIENT)
Dept: GENERAL RADIOLOGY | Facility: HOSPITAL | Age: 63
End: 2018-07-12

## 2018-07-12 DIAGNOSIS — M51.26 LUMBAR DISC HERNIATION: ICD-10-CM

## 2018-07-12 LAB
GLUCOSE BLDC GLUCOMTR-MCNC: 224 MG/DL (ref 70–130)
GLUCOSE BLDC GLUCOMTR-MCNC: 252 MG/DL (ref 70–130)
GLUCOSE BLDC GLUCOMTR-MCNC: 258 MG/DL (ref 70–130)
GLUCOSE BLDC GLUCOMTR-MCNC: 267 MG/DL (ref 70–130)
GLUCOSE BLDC GLUCOMTR-MCNC: 283 MG/DL (ref 70–130)

## 2018-07-12 PROCEDURE — C1713 ANCHOR/SCREW BN/BN,TIS/BN: HCPCS | Performed by: NEUROLOGICAL SURGERY

## 2018-07-12 PROCEDURE — 0SB20ZZ EXCISION OF LUMBAR VERTEBRAL DISC, OPEN APPROACH: ICD-10-PCS | Performed by: NEUROLOGICAL SURGERY

## 2018-07-12 PROCEDURE — 25010000002 SUCCINYLCHOLINE PER 20 MG: Performed by: NURSE ANESTHETIST, CERTIFIED REGISTERED

## 2018-07-12 PROCEDURE — 82962 GLUCOSE BLOOD TEST: CPT

## 2018-07-12 PROCEDURE — 25010000002 HYDROMORPHONE PER 4 MG: Performed by: NURSE ANESTHETIST, CERTIFIED REGISTERED

## 2018-07-12 PROCEDURE — 63710000001 INSULIN REGULAR HUMAN PER 5 UNITS: Performed by: ANESTHESIOLOGY

## 2018-07-12 PROCEDURE — 25010000002 HYDROMORPHONE PER 4 MG

## 2018-07-12 PROCEDURE — 25010000002 ONDANSETRON PER 1 MG: Performed by: NURSE ANESTHETIST, CERTIFIED REGISTERED

## 2018-07-12 PROCEDURE — 25010000002 PROPOFOL 10 MG/ML EMULSION: Performed by: NURSE ANESTHETIST, CERTIFIED REGISTERED

## 2018-07-12 PROCEDURE — 0SG00AJ FUSION OF LUMBAR VERTEBRAL JOINT WITH INTERBODY FUSION DEVICE, POSTERIOR APPROACH, ANTERIOR COLUMN, OPEN APPROACH: ICD-10-PCS | Performed by: NEUROLOGICAL SURGERY

## 2018-07-12 PROCEDURE — 25010000002 PHENYLEPHRINE PER 1 ML: Performed by: NURSE ANESTHETIST, CERTIFIED REGISTERED

## 2018-07-12 PROCEDURE — 25010000002 FENTANYL CITRATE (PF) 100 MCG/2ML SOLUTION

## 2018-07-12 PROCEDURE — 22630 ARTHRD PST TQ 1NTRSPC LUM: CPT | Performed by: NEUROLOGICAL SURGERY

## 2018-07-12 PROCEDURE — 25010000002 VANCOMYCIN 10 G RECONSTITUTED SOLUTION: Performed by: NEUROLOGICAL SURGERY

## 2018-07-12 PROCEDURE — C1769 GUIDE WIRE: HCPCS | Performed by: NEUROLOGICAL SURGERY

## 2018-07-12 PROCEDURE — 0QH004Z INSERTION OF INTERNAL FIXATION DEVICE INTO LUMBAR VERTEBRA, OPEN APPROACH: ICD-10-PCS | Performed by: NEUROLOGICAL SURGERY

## 2018-07-12 PROCEDURE — 25010000002 MIDAZOLAM PER 1 MG: Performed by: ANESTHESIOLOGY

## 2018-07-12 PROCEDURE — 94799 UNLISTED PULMONARY SVC/PX: CPT

## 2018-07-12 PROCEDURE — 72110 X-RAY EXAM L-2 SPINE 4/>VWS: CPT

## 2018-07-12 PROCEDURE — 61783 SCAN PROC SPINAL: CPT | Performed by: NEUROLOGICAL SURGERY

## 2018-07-12 PROCEDURE — 25010000002 FENTANYL CITRATE (PF) 100 MCG/2ML SOLUTION: Performed by: NURSE ANESTHETIST, CERTIFIED REGISTERED

## 2018-07-12 PROCEDURE — 76000 FLUOROSCOPY <1 HR PHYS/QHP: CPT

## 2018-07-12 PROCEDURE — 25010000002 VANCOMYCIN PER 500 MG: Performed by: NEUROLOGICAL SURGERY

## 2018-07-12 PROCEDURE — 22853 INSJ BIOMECHANICAL DEVICE: CPT | Performed by: NEUROLOGICAL SURGERY

## 2018-07-12 PROCEDURE — 22840 INSERT SPINE FIXATION DEVICE: CPT | Performed by: NEUROLOGICAL SURGERY

## 2018-07-12 DEVICE — WAX BONE HEMO NAT 2.5G: Type: IMPLANTABLE DEVICE | Site: SPINE LUMBAR | Status: FUNCTIONAL

## 2018-07-12 DEVICE — GRFT BONE MAGNIFUSE PC 1.75X5CM: Type: IMPLANTABLE DEVICE | Site: SPINE LUMBAR | Status: FUNCTIONAL

## 2018-07-12 DEVICE — SPACER 7771028 ELEVATE STD 28X10MM
Type: IMPLANTABLE DEVICE | Site: SPINE LUMBAR | Status: FUNCTIONAL
Brand: ELEVATE™ SPINAL SYSTEM

## 2018-07-12 DEVICE — SET SCREW 5440430 4.75 TI NS SXT BRK OFF
Type: IMPLANTABLE DEVICE | Site: SPINE LUMBAR | Status: NON-FUNCTIONAL
Brand: CD HORIZON® SPINAL SYSTEM
Removed: 2021-02-15

## 2018-07-12 DEVICE — SCREW 54840016555 CN MAS 6.5X55 CC
Type: IMPLANTABLE DEVICE | Site: SPINE LUMBAR | Status: FUNCTIONAL
Brand: CD HORIZON® SPINAL SYSTEM

## 2018-07-12 RX ORDER — SUFENTANIL CITRATE 50 UG/ML
INJECTION EPIDURAL; INTRAVENOUS AS NEEDED
Status: DISCONTINUED | OUTPATIENT
Start: 2018-07-12 | End: 2018-07-12 | Stop reason: SURG

## 2018-07-12 RX ORDER — GLYCOPYRROLATE 0.2 MG/ML
INJECTION INTRAMUSCULAR; INTRAVENOUS AS NEEDED
Status: DISCONTINUED | OUTPATIENT
Start: 2018-07-12 | End: 2018-07-12 | Stop reason: SURG

## 2018-07-12 RX ORDER — SODIUM CHLORIDE, SODIUM LACTATE, POTASSIUM CHLORIDE, CALCIUM CHLORIDE 600; 310; 30; 20 MG/100ML; MG/100ML; MG/100ML; MG/100ML
9 INJECTION, SOLUTION INTRAVENOUS CONTINUOUS PRN
Status: DISCONTINUED | OUTPATIENT
Start: 2018-07-12 | End: 2018-07-13 | Stop reason: HOSPADM

## 2018-07-12 RX ORDER — EPHEDRINE SULFATE 50 MG/ML
5 INJECTION, SOLUTION INTRAVENOUS ONCE AS NEEDED
Status: DISCONTINUED | OUTPATIENT
Start: 2018-07-12 | End: 2018-07-12 | Stop reason: HOSPADM

## 2018-07-12 RX ORDER — ROCURONIUM BROMIDE 10 MG/ML
INJECTION, SOLUTION INTRAVENOUS AS NEEDED
Status: DISCONTINUED | OUTPATIENT
Start: 2018-07-12 | End: 2018-07-12 | Stop reason: SURG

## 2018-07-12 RX ORDER — LEVETIRACETAM 500 MG/1
2000 TABLET ORAL EVERY 12 HOURS SCHEDULED
Status: DISCONTINUED | OUTPATIENT
Start: 2018-07-12 | End: 2018-07-13 | Stop reason: HOSPADM

## 2018-07-12 RX ORDER — FENTANYL CITRATE 50 UG/ML
INJECTION, SOLUTION INTRAMUSCULAR; INTRAVENOUS
Status: COMPLETED
Start: 2018-07-12 | End: 2018-07-12

## 2018-07-12 RX ORDER — METOPROLOL TARTRATE 50 MG/1
50 TABLET, FILM COATED ORAL EVERY 12 HOURS SCHEDULED
Status: DISCONTINUED | OUTPATIENT
Start: 2018-07-12 | End: 2018-07-13 | Stop reason: HOSPADM

## 2018-07-12 RX ORDER — SODIUM CHLORIDE, SODIUM LACTATE, POTASSIUM CHLORIDE, CALCIUM CHLORIDE 600; 310; 30; 20 MG/100ML; MG/100ML; MG/100ML; MG/100ML
75 INJECTION, SOLUTION INTRAVENOUS CONTINUOUS
Status: DISCONTINUED | OUTPATIENT
Start: 2018-07-12 | End: 2018-07-13 | Stop reason: HOSPADM

## 2018-07-12 RX ORDER — MIDAZOLAM HYDROCHLORIDE 1 MG/ML
2 INJECTION INTRAMUSCULAR; INTRAVENOUS
Status: DISCONTINUED | OUTPATIENT
Start: 2018-07-12 | End: 2018-07-12 | Stop reason: HOSPADM

## 2018-07-12 RX ORDER — ATORVASTATIN CALCIUM 10 MG/1
10 TABLET, FILM COATED ORAL NIGHTLY
Status: DISCONTINUED | OUTPATIENT
Start: 2018-07-12 | End: 2018-07-13 | Stop reason: HOSPADM

## 2018-07-12 RX ORDER — SUCCINYLCHOLINE CHLORIDE 20 MG/ML
INJECTION INTRAMUSCULAR; INTRAVENOUS AS NEEDED
Status: DISCONTINUED | OUTPATIENT
Start: 2018-07-12 | End: 2018-07-12 | Stop reason: SURG

## 2018-07-12 RX ORDER — HYDROCODONE BITARTRATE AND ACETAMINOPHEN 5; 325 MG/1; MG/1
2 TABLET ORAL EVERY 4 HOURS PRN
Status: DISCONTINUED | OUTPATIENT
Start: 2018-07-12 | End: 2018-07-13 | Stop reason: HOSPADM

## 2018-07-12 RX ORDER — FAMOTIDINE 10 MG/ML
20 INJECTION, SOLUTION INTRAVENOUS
Status: DISCONTINUED | OUTPATIENT
Start: 2018-07-12 | End: 2018-07-12 | Stop reason: HOSPADM

## 2018-07-12 RX ORDER — OXYCODONE AND ACETAMINOPHEN 7.5; 325 MG/1; MG/1
1 TABLET ORAL ONCE AS NEEDED
Status: DISCONTINUED | OUTPATIENT
Start: 2018-07-12 | End: 2018-07-12 | Stop reason: HOSPADM

## 2018-07-12 RX ORDER — LIDOCAINE HYDROCHLORIDE 20 MG/ML
INJECTION, SOLUTION INFILTRATION; PERINEURAL AS NEEDED
Status: DISCONTINUED | OUTPATIENT
Start: 2018-07-12 | End: 2018-07-12 | Stop reason: SURG

## 2018-07-12 RX ORDER — SODIUM CHLORIDE, SODIUM LACTATE, POTASSIUM CHLORIDE, CALCIUM CHLORIDE 600; 310; 30; 20 MG/100ML; MG/100ML; MG/100ML; MG/100ML
100 INJECTION, SOLUTION INTRAVENOUS CONTINUOUS
Status: DISCONTINUED | OUTPATIENT
Start: 2018-07-12 | End: 2018-07-13 | Stop reason: HOSPADM

## 2018-07-12 RX ORDER — DOCUSATE SODIUM 100 MG/1
100 CAPSULE, LIQUID FILLED ORAL 2 TIMES DAILY PRN
Status: DISCONTINUED | OUTPATIENT
Start: 2018-07-12 | End: 2018-07-13 | Stop reason: HOSPADM

## 2018-07-12 RX ORDER — PROMETHAZINE HYDROCHLORIDE 25 MG/ML
12.5 INJECTION, SOLUTION INTRAMUSCULAR; INTRAVENOUS ONCE AS NEEDED
Status: DISCONTINUED | OUTPATIENT
Start: 2018-07-12 | End: 2018-07-12 | Stop reason: HOSPADM

## 2018-07-12 RX ORDER — FLUMAZENIL 0.1 MG/ML
0.2 INJECTION INTRAVENOUS AS NEEDED
Status: DISCONTINUED | OUTPATIENT
Start: 2018-07-12 | End: 2018-07-12 | Stop reason: HOSPADM

## 2018-07-12 RX ORDER — LABETALOL HYDROCHLORIDE 5 MG/ML
5 INJECTION, SOLUTION INTRAVENOUS
Status: DISCONTINUED | OUTPATIENT
Start: 2018-07-12 | End: 2018-07-12 | Stop reason: HOSPADM

## 2018-07-12 RX ORDER — EPHEDRINE SULFATE 50 MG/ML
INJECTION, SOLUTION INTRAVENOUS AS NEEDED
Status: DISCONTINUED | OUTPATIENT
Start: 2018-07-12 | End: 2018-07-12 | Stop reason: SURG

## 2018-07-12 RX ORDER — METFORMIN HYDROCHLORIDE 500 MG/1
500 TABLET, EXTENDED RELEASE ORAL
Status: DISCONTINUED | OUTPATIENT
Start: 2018-07-13 | End: 2018-07-13 | Stop reason: HOSPADM

## 2018-07-12 RX ORDER — BISACODYL 5 MG/1
10 TABLET, DELAYED RELEASE ORAL DAILY PRN
Status: DISCONTINUED | OUTPATIENT
Start: 2018-07-12 | End: 2018-07-13 | Stop reason: HOSPADM

## 2018-07-12 RX ORDER — PROPOFOL 10 MG/ML
VIAL (ML) INTRAVENOUS AS NEEDED
Status: DISCONTINUED | OUTPATIENT
Start: 2018-07-12 | End: 2018-07-12 | Stop reason: SURG

## 2018-07-12 RX ORDER — HYDROCODONE BITARTRATE AND ACETAMINOPHEN 10; 325 MG/1; MG/1
1 TABLET ORAL 2 TIMES DAILY PRN
Status: DISCONTINUED | OUTPATIENT
Start: 2018-07-12 | End: 2018-07-13 | Stop reason: HOSPADM

## 2018-07-12 RX ORDER — PROMETHAZINE HYDROCHLORIDE 25 MG/1
12.5 TABLET ORAL ONCE AS NEEDED
Status: DISCONTINUED | OUTPATIENT
Start: 2018-07-12 | End: 2018-07-12 | Stop reason: HOSPADM

## 2018-07-12 RX ORDER — FENTANYL CITRATE 50 UG/ML
50 INJECTION, SOLUTION INTRAMUSCULAR; INTRAVENOUS
Status: DISCONTINUED | OUTPATIENT
Start: 2018-07-12 | End: 2018-07-12 | Stop reason: HOSPADM

## 2018-07-12 RX ORDER — HYDROCODONE BITARTRATE AND ACETAMINOPHEN 7.5; 325 MG/1; MG/1
1 TABLET ORAL ONCE AS NEEDED
Status: DISCONTINUED | OUTPATIENT
Start: 2018-07-12 | End: 2018-07-12 | Stop reason: HOSPADM

## 2018-07-12 RX ORDER — ALBUTEROL SULFATE 2.5 MG/3ML
2.5 SOLUTION RESPIRATORY (INHALATION) ONCE AS NEEDED
Status: DISCONTINUED | OUTPATIENT
Start: 2018-07-12 | End: 2018-07-12 | Stop reason: HOSPADM

## 2018-07-12 RX ORDER — ONDANSETRON 2 MG/ML
INJECTION INTRAMUSCULAR; INTRAVENOUS AS NEEDED
Status: DISCONTINUED | OUTPATIENT
Start: 2018-07-12 | End: 2018-07-12 | Stop reason: SURG

## 2018-07-12 RX ORDER — GLIPIZIDE 5 MG/1
2.5 TABLET ORAL
Status: DISCONTINUED | OUTPATIENT
Start: 2018-07-13 | End: 2018-07-13 | Stop reason: HOSPADM

## 2018-07-12 RX ORDER — ONDANSETRON 2 MG/ML
4 INJECTION INTRAMUSCULAR; INTRAVENOUS EVERY 6 HOURS PRN
Status: DISCONTINUED | OUTPATIENT
Start: 2018-07-12 | End: 2018-07-13 | Stop reason: HOSPADM

## 2018-07-12 RX ORDER — LEVOTHYROXINE SODIUM 0.07 MG/1
75 TABLET ORAL DAILY
Status: DISCONTINUED | OUTPATIENT
Start: 2018-07-12 | End: 2018-07-13 | Stop reason: HOSPADM

## 2018-07-12 RX ORDER — PROMETHAZINE HYDROCHLORIDE 25 MG/1
25 TABLET ORAL ONCE AS NEEDED
Status: DISCONTINUED | OUTPATIENT
Start: 2018-07-12 | End: 2018-07-12 | Stop reason: HOSPADM

## 2018-07-12 RX ORDER — NALOXONE HCL 0.4 MG/ML
0.4 VIAL (ML) INJECTION
Status: DISCONTINUED | OUTPATIENT
Start: 2018-07-12 | End: 2018-07-13 | Stop reason: HOSPADM

## 2018-07-12 RX ORDER — MIDAZOLAM HYDROCHLORIDE 1 MG/ML
1 INJECTION INTRAMUSCULAR; INTRAVENOUS
Status: DISCONTINUED | OUTPATIENT
Start: 2018-07-12 | End: 2018-07-12 | Stop reason: HOSPADM

## 2018-07-12 RX ORDER — FENTANYL CITRATE 50 UG/ML
25 INJECTION, SOLUTION INTRAMUSCULAR; INTRAVENOUS
Status: DISCONTINUED | OUTPATIENT
Start: 2018-07-12 | End: 2018-07-12 | Stop reason: HOSPADM

## 2018-07-12 RX ORDER — BACLOFEN 10 MG/1
20 TABLET ORAL 2 TIMES DAILY
Status: DISCONTINUED | OUTPATIENT
Start: 2018-07-12 | End: 2018-07-13 | Stop reason: HOSPADM

## 2018-07-12 RX ORDER — ONDANSETRON 2 MG/ML
4 INJECTION INTRAMUSCULAR; INTRAVENOUS ONCE AS NEEDED
Status: DISCONTINUED | OUTPATIENT
Start: 2018-07-12 | End: 2018-07-12 | Stop reason: HOSPADM

## 2018-07-12 RX ORDER — LEFLUNOMIDE 20 MG/1
20 TABLET ORAL DAILY
Status: DISCONTINUED | OUTPATIENT
Start: 2018-07-12 | End: 2018-07-13 | Stop reason: HOSPADM

## 2018-07-12 RX ORDER — FENOFIBRATE 145 MG/1
145 TABLET, COATED ORAL DAILY
Status: DISCONTINUED | OUTPATIENT
Start: 2018-07-12 | End: 2018-07-13 | Stop reason: HOSPADM

## 2018-07-12 RX ORDER — ONDANSETRON 4 MG/1
4 TABLET, FILM COATED ORAL EVERY 6 HOURS PRN
Status: DISCONTINUED | OUTPATIENT
Start: 2018-07-12 | End: 2018-07-13 | Stop reason: HOSPADM

## 2018-07-12 RX ORDER — PRAMIPEXOLE DIHYDROCHLORIDE 0.5 MG/1
0.5 TABLET ORAL NIGHTLY
Status: DISCONTINUED | OUTPATIENT
Start: 2018-07-12 | End: 2018-07-13 | Stop reason: HOSPADM

## 2018-07-12 RX ORDER — HYDROMORPHONE HYDROCHLORIDE 1 MG/ML
0.5 INJECTION, SOLUTION INTRAMUSCULAR; INTRAVENOUS; SUBCUTANEOUS
Status: DISCONTINUED | OUTPATIENT
Start: 2018-07-12 | End: 2018-07-12 | Stop reason: HOSPADM

## 2018-07-12 RX ORDER — MORPHINE SULFATE 10 MG/ML
6 INJECTION INTRAMUSCULAR; INTRAVENOUS; SUBCUTANEOUS
Status: DISCONTINUED | OUTPATIENT
Start: 2018-07-12 | End: 2018-07-13 | Stop reason: HOSPADM

## 2018-07-12 RX ORDER — PROMETHAZINE HYDROCHLORIDE 25 MG/1
25 SUPPOSITORY RECTAL ONCE AS NEEDED
Status: DISCONTINUED | OUTPATIENT
Start: 2018-07-12 | End: 2018-07-12 | Stop reason: HOSPADM

## 2018-07-12 RX ORDER — MAGNESIUM HYDROXIDE 1200 MG/15ML
LIQUID ORAL AS NEEDED
Status: DISCONTINUED | OUTPATIENT
Start: 2018-07-12 | End: 2018-07-12 | Stop reason: HOSPADM

## 2018-07-12 RX ORDER — NALOXONE HCL 0.4 MG/ML
0.2 VIAL (ML) INJECTION AS NEEDED
Status: DISCONTINUED | OUTPATIENT
Start: 2018-07-12 | End: 2018-07-12 | Stop reason: HOSPADM

## 2018-07-12 RX ORDER — CYCLOBENZAPRINE HCL 10 MG
10 TABLET ORAL NIGHTLY
Status: DISCONTINUED | OUTPATIENT
Start: 2018-07-12 | End: 2018-07-13 | Stop reason: HOSPADM

## 2018-07-12 RX ORDER — ONDANSETRON 4 MG/1
4 TABLET, ORALLY DISINTEGRATING ORAL EVERY 6 HOURS PRN
Status: DISCONTINUED | OUTPATIENT
Start: 2018-07-12 | End: 2018-07-13 | Stop reason: HOSPADM

## 2018-07-12 RX ORDER — OXCARBAZEPINE 300 MG/1
600 TABLET, FILM COATED ORAL EVERY 12 HOURS SCHEDULED
Status: DISCONTINUED | OUTPATIENT
Start: 2018-07-12 | End: 2018-07-13 | Stop reason: HOSPADM

## 2018-07-12 RX ORDER — DIPHENHYDRAMINE HYDROCHLORIDE 50 MG/ML
12.5 INJECTION INTRAMUSCULAR; INTRAVENOUS
Status: DISCONTINUED | OUTPATIENT
Start: 2018-07-12 | End: 2018-07-12 | Stop reason: HOSPADM

## 2018-07-12 RX ORDER — DULOXETIN HYDROCHLORIDE 60 MG/1
60 CAPSULE, DELAYED RELEASE ORAL DAILY
Status: DISCONTINUED | OUTPATIENT
Start: 2018-07-12 | End: 2018-07-13 | Stop reason: HOSPADM

## 2018-07-12 RX ORDER — SODIUM CHLORIDE 0.9 % (FLUSH) 0.9 %
1-10 SYRINGE (ML) INJECTION AS NEEDED
Status: DISCONTINUED | OUTPATIENT
Start: 2018-07-12 | End: 2018-07-12 | Stop reason: HOSPADM

## 2018-07-12 RX ORDER — PANTOPRAZOLE SODIUM 40 MG/1
40 TABLET, DELAYED RELEASE ORAL EVERY MORNING
Status: DISCONTINUED | OUTPATIENT
Start: 2018-07-13 | End: 2018-07-13 | Stop reason: HOSPADM

## 2018-07-12 RX ADMIN — LEVETIRACETAM 2000 MG: 500 TABLET, FILM COATED ORAL at 17:51

## 2018-07-12 RX ADMIN — FAMOTIDINE 20 MG: 10 INJECTION, SOLUTION INTRAVENOUS at 06:57

## 2018-07-12 RX ADMIN — PROPOFOL 150 MG: 10 INJECTION, EMULSION INTRAVENOUS at 08:05

## 2018-07-12 RX ADMIN — EPHEDRINE SULFATE 10 MG: 50 INJECTION INTRAMUSCULAR; INTRAVENOUS; SUBCUTANEOUS at 08:41

## 2018-07-12 RX ADMIN — FENTANYL CITRATE 50 MCG: 50 INJECTION, SOLUTION INTRAMUSCULAR; INTRAVENOUS at 12:31

## 2018-07-12 RX ADMIN — HYDROMORPHONE HYDROCHLORIDE 0.5 MG: 1 INJECTION, SOLUTION INTRAMUSCULAR; INTRAVENOUS; SUBCUTANEOUS at 12:57

## 2018-07-12 RX ADMIN — SUFENTANIL CITRATE 25 MCG: 50 INJECTION EPIDURAL; INTRAVENOUS at 09:35

## 2018-07-12 RX ADMIN — SUCCINYLCHOLINE CHLORIDE 100 MG: 20 INJECTION, SOLUTION INTRAMUSCULAR; INTRAVENOUS; PARENTERAL at 08:05

## 2018-07-12 RX ADMIN — LIDOCAINE HYDROCHLORIDE 100 MG: 20 INJECTION, SOLUTION INFILTRATION; PERINEURAL at 08:05

## 2018-07-12 RX ADMIN — SUFENTANIL CITRATE 30 MCG: 50 INJECTION EPIDURAL; INTRAVENOUS at 08:57

## 2018-07-12 RX ADMIN — ROCURONIUM BROMIDE 20 MG: 10 INJECTION INTRAVENOUS at 08:15

## 2018-07-12 RX ADMIN — CYCLOBENZAPRINE 10 MG: 10 TABLET, FILM COATED ORAL at 20:32

## 2018-07-12 RX ADMIN — EPHEDRINE SULFATE 10 MG: 50 INJECTION INTRAMUSCULAR; INTRAVENOUS; SUBCUTANEOUS at 08:17

## 2018-07-12 RX ADMIN — SODIUM CHLORIDE, POTASSIUM CHLORIDE, SODIUM LACTATE AND CALCIUM CHLORIDE 75 ML/HR: 600; 310; 30; 20 INJECTION, SOLUTION INTRAVENOUS at 17:59

## 2018-07-12 RX ADMIN — FENTANYL CITRATE 50 MCG: 50 INJECTION, SOLUTION INTRAMUSCULAR; INTRAVENOUS at 12:42

## 2018-07-12 RX ADMIN — HYDROMORPHONE HYDROCHLORIDE 0.5 MG: 1 INJECTION, SOLUTION INTRAMUSCULAR; INTRAVENOUS; SUBCUTANEOUS at 12:36

## 2018-07-12 RX ADMIN — GLYCOPYRROLATE 0.2 MG: 0.2 INJECTION INTRAMUSCULAR; INTRAVENOUS at 08:03

## 2018-07-12 RX ADMIN — ROCURONIUM BROMIDE 5 MG: 10 INJECTION INTRAVENOUS at 08:05

## 2018-07-12 RX ADMIN — EPHEDRINE SULFATE 10 MG: 50 INJECTION INTRAMUSCULAR; INTRAVENOUS; SUBCUTANEOUS at 08:48

## 2018-07-12 RX ADMIN — PRAMIPEXOLE DIHYDROCHLORIDE 0.5 MG: 0.5 TABLET ORAL at 20:32

## 2018-07-12 RX ADMIN — EPHEDRINE SULFATE 10 MG: 50 INJECTION INTRAMUSCULAR; INTRAVENOUS; SUBCUTANEOUS at 08:22

## 2018-07-12 RX ADMIN — ATORVASTATIN CALCIUM 10 MG: 10 TABLET, FILM COATED ORAL at 20:32

## 2018-07-12 RX ADMIN — VANCOMYCIN HYDROCHLORIDE 1500 MG: 10 INJECTION, POWDER, LYOPHILIZED, FOR SOLUTION INTRAVENOUS at 06:57

## 2018-07-12 RX ADMIN — ONDANSETRON 4 MG: 2 INJECTION INTRAMUSCULAR; INTRAVENOUS at 11:43

## 2018-07-12 RX ADMIN — OXCARBAZEPINE 600 MG: 300 TABLET, FILM COATED ORAL at 17:54

## 2018-07-12 RX ADMIN — HYDROCODONE BITARTRATE AND ACETAMINOPHEN 2 TABLET: 5; 325 TABLET ORAL at 15:25

## 2018-07-12 RX ADMIN — HYDROCODONE BITARTRATE AND ACETAMINOPHEN 1 TABLET: 10; 325 TABLET ORAL at 20:32

## 2018-07-12 RX ADMIN — VANCOMYCIN HYDROCHLORIDE 1500 MG: 10 INJECTION, POWDER, LYOPHILIZED, FOR SOLUTION INTRAVENOUS at 20:39

## 2018-07-12 RX ADMIN — EPHEDRINE SULFATE 10 MG: 50 INJECTION INTRAMUSCULAR; INTRAVENOUS; SUBCUTANEOUS at 10:35

## 2018-07-12 RX ADMIN — SUFENTANIL CITRATE 20 MCG: 50 INJECTION EPIDURAL; INTRAVENOUS at 08:03

## 2018-07-12 RX ADMIN — BACLOFEN 20 MG: 10 TABLET ORAL at 20:32

## 2018-07-12 RX ADMIN — SUFENTANIL CITRATE 25 MCG: 50 INJECTION EPIDURAL; INTRAVENOUS at 10:31

## 2018-07-12 RX ADMIN — METOPROLOL TARTRATE 50 MG: 50 TABLET, FILM COATED ORAL at 17:53

## 2018-07-12 RX ADMIN — HUMAN INSULIN 8 UNITS: 100 INJECTION, SOLUTION SUBCUTANEOUS at 06:58

## 2018-07-12 RX ADMIN — SUFENTANIL CITRATE 25 MCG: 50 INJECTION EPIDURAL; INTRAVENOUS at 10:07

## 2018-07-12 RX ADMIN — FENTANYL CITRATE 50 MCG: 50 INJECTION, SOLUTION INTRAMUSCULAR; INTRAVENOUS at 13:00

## 2018-07-12 RX ADMIN — FENOFIBRATE 145 MG: 145 TABLET ORAL at 17:51

## 2018-07-12 RX ADMIN — MIDAZOLAM HYDROCHLORIDE 2 MG: 2 INJECTION, SOLUTION INTRAMUSCULAR; INTRAVENOUS at 06:57

## 2018-07-12 RX ADMIN — DULOXETINE HYDROCHLORIDE 60 MG: 60 CAPSULE, DELAYED RELEASE ORAL at 17:52

## 2018-07-12 RX ADMIN — PHENYLEPHRINE HYDROCHLORIDE 100 MCG: 10 INJECTION INTRAVENOUS at 10:40

## 2018-07-12 RX ADMIN — FENTANYL CITRATE 50 MCG: 50 INJECTION, SOLUTION INTRAMUSCULAR; INTRAVENOUS at 13:11

## 2018-07-12 RX ADMIN — SODIUM CHLORIDE, POTASSIUM CHLORIDE, SODIUM LACTATE AND CALCIUM CHLORIDE 100 ML/HR: 600; 310; 30; 20 INJECTION, SOLUTION INTRAVENOUS at 06:57

## 2018-07-12 RX ADMIN — SUGAMMADEX 50 MG: 100 INJECTION, SOLUTION INTRAVENOUS at 08:51

## 2018-07-12 RX ADMIN — SODIUM CHLORIDE, POTASSIUM CHLORIDE, SODIUM LACTATE AND CALCIUM CHLORIDE: 600; 310; 30; 20 INJECTION, SOLUTION INTRAVENOUS at 10:47

## 2018-07-12 RX ADMIN — LEVOTHYROXINE SODIUM 75 MCG: 75 TABLET ORAL at 17:53

## 2018-07-12 NOTE — ANESTHESIA PROCEDURE NOTES
Airway  Urgency: elective    Airway not difficult    General Information and Staff    Patient location during procedure: OR  Anesthesiologist: WILLIS JETT  CRNA: ROXANA PUGA    Indications and Patient Condition  Indications for airway management: airway protection    Preoxygenated: yes  MILS maintained throughout  Mask difficulty assessment: 1 - vent by mask    Final Airway Details  Final airway type: endotracheal airway      Successful airway: ETT and reinforced tube  Cuffed: yes   Successful intubation technique: direct laryngoscopy  Facilitating devices/methods: intubating stylet and cricoid pressure  Endotracheal tube insertion site: oral  Blade: Brett  Blade size: #3  ETT size: 7.0 mm  Cormack-Lehane Classification: grade I - full view of glottis  Placement verified by: chest auscultation and capnometry   Inital cuff pressure (cm H2O): 22  Cuff volume (mL): 7  Measured from: lips  ETT to lips (cm): 22  Number of attempts at approach: 1

## 2018-07-12 NOTE — ANESTHESIA POSTPROCEDURE EVALUATION
Patient: Wendi Ramos    Procedure Summary     Date:  07/12/18 Room / Location:  [facility] OR [address] [place] [address] MAIN OR    Anesthesia Start:  0757 Anesthesia Stop:  1223    Procedure:  Left L3 4 lumbar decompression and discectomy with transforaminal lumbar interbody fusion and posterior instrumentation with posterior lateral arthrodesis (Left Spine Lumbar) Diagnosis:       Lumbar disc herniation      (Lumbar disc herniation [M51.26])    Surgeon:  Miguelangel Goldberg MD Provider:  Yoan Mina MD    Anesthesia Type:  general ASA Status:  3          Anesthesia Type: general  Last vitals  BP   128/71 (07/12/18 1300)   Temp   36.8 °C (98.2 °F) (07/12/18 1222)   Pulse   95 (07/12/18 1300)   Resp   14 (07/12/18 1300)     SpO2   99 % (07/12/18 1300)     Post Anesthesia Care and Evaluation    Patient location during evaluation: PACU  Patient participation: complete - patient participated  Level of consciousness: awake  Pain score: 3  Pain management: adequate  Airway patency: patent  Anesthetic complications: No anesthetic complications  PONV Status: none  Cardiovascular status: acceptable  Respiratory status: acceptable  Hydration status: acceptable

## 2018-07-12 NOTE — ANESTHESIA PREPROCEDURE EVALUATION
Anesthesia Evaluation     Patient summary reviewed                Airway   Mallampati: II  Neck ROM: full  No difficulty expected  Dental      Pulmonary    (+) sleep apnea,   Cardiovascular     Rhythm: regular    (+) hypertension,       Neuro/Psych  GI/Hepatic/Renal/Endo    (+)   diabetes mellitus,     Musculoskeletal     Abdominal    Substance History      OB/GYN          Other          Other Comment: glc [id number]                  Anesthesia Plan    ASA 3     general     Anesthetic plan and risks discussed with patient.  Use of blood products discussed with patient .

## 2018-07-12 NOTE — INTERVAL H&P NOTE
H&P reviewed. The patient was examined and there are no changes to the H&P.       I again went over the risks, benefits, and alternatives of surgery including infection, bleeding, paralysis, death etc. The patient voiced understanding and requests that we proceed. Her son was present and voiced understanding.

## 2018-07-12 NOTE — OP NOTE
LUMBAR FUSION SEXTANT AND METRIX WITH STEALTH  Procedure Note    Wendi Ramos  7/12/2018  [id number]    SURGEON  Miguelangel Goldberg MD    ASSISTANT:  LORNA Jordan  INDICATION:  Recurrent disc herniation with severe disc space subsidence and scoliosis.  He underwent operative intervention for large disc herniation and is having large recurrence of disc protrusion with severe compression of nerve roots.    Pre-op Diagnosis:   Lumbar disc herniation [M51.26]    Post-Op Diagnosis Codes:     * Lumbar disc herniation [M51.26]    PROCEDURE PERFORMED:  Procedure(s):  Left L3 4 lumbar decompression and discectomy with transforaminal lumbar interbody fusion and posterior instrumentation with posterior lateral arthrodesis      1.  L3/4 lumbar decompressive laminectomy medial facetectomy and foraminotomies bilateral  2.  L3/4 Interbody arthrodesis  3.  [place] of autograft bone from the same incision  4.  Placement of interbody prosthetic device   5. Microdissection technique and micro-illumination with the aid of the microscope  6. Continuous EMG throughout the operative intervention - 4 hours  7.  Neural junction monitoring test × 4  8.  Posterior lumbar instrumentation L3-4 -segmental  9.  Preoperative planning and intraoperative stereotactic guidance with the use of the Stealth navigation system and O arm intraoperative imagery    Anesthesia: General    Staff:   Circulator: Liliam Davies RN; Brooklyn Alejandro RN  Scrub Person: Jennifer Orozco RN  Vendor Representative: Kieran Jacobo  Assistant: Lainey Munroe CSA    Estimated Blood Loss: 100ml    Specimens: * No orders in the log *               Drains:   Urethral Catheter Silicone 16 Fr. (Active)       Findings: Very large disc herniation with severe compression of the thecal sac and nerve roots.    Complications: None    Details:  Positioning/EMG electrodes/ continuous EMG recording/ and neural junction monitoring: The patient was brought to the  operating room where general endotracheal anesthesia was induced.   Supine on the [facility] EMG electrodes were placed into the adductor medius, vastus lateralis, extensor hallucis longus, and medial gastrocnemius muscles bilaterally.  Continuous EMG monitoring was then accomplished throughout the  procedure.  No changes in EMG electrode recording were noted that could not be rapidly changed and eliminated by a minor change and operative intervention.  Neural junction monitoring: During placement of pedicle screws and electrified Jamshidi needle at 6 mA was advanced under stereotactic guidance into the pedicles at L4 and L5 bilaterally.  A K wire was placed and then a electrified at 20 mA was used over the K wire to tap each pedicle.  Finally placement of the pedicle screws was accomplished with electrodesiccation of the screws to 20 mA.  No abnormal EMG activity was noted during the neural junction monitoring tests.    Positioning/approach: The patient was obese and it required four people to position the patient on the operative table.  Once the patient was placed onto the Bakari table care was taken to pad all susceptible areas.  The lumbar area was prepped and draped in the usual sterile manner.  Under fluoroscopic guidance a post was placed into the right iliac crest and attached to a stereotactic array.  A rotational CT was then accomplished and reconstructed on a separate workstation for stereotactic guidance.  Using a stereotactic localizing device identify the appropriate entry point over each pedicle at L4 and L5 and local anesthesia was injected and then an incision was made approximately 4 cm from midline on either side measuring approximately 2-1/2 cm in length.    Placement of localizing K wires under stereotactic guidance: Electrified Jamshidi needle was then advanced into each pedicle as described above. The L3 and L4 pedicles were targeted.  Once in place C arm x-ray confirmation of placement was  accomplished.  The Jamshidi needle was also localized in stereotactic space.  A long K wire was placed into each pedicle.  On the patient's right side over each K wire I then tapped and then placed the screws.  No abnormal EMG electrical activity occurred.  The paul was then placed using sextant technology and the disc space was distracted.    Microscope: The operating microscope was draped sterilely and brought into the field and the rest of the operation performed with micro- dissection technique and micro-illumination.    Spade of autograft bone: All bone removed was harvested and used for the interbody arthrodesis.  A bone  was placed in the suction line and all suction drill trailing's were collected and used for arthrodesis.    Lumbar decompression: On the left side a short K wire was used under fluoroscopic guidance to identify the interspace at L3/4.  And then a series of telescoping dilators up to 26 mm were used and then a 26 x 8 cm tubular retractor was placed. Once in place soft tissue was removed from the bone with Bovie cautery.    Using the high-speed drill I then removed the lamina bilaterally of L3 and the superior lamina of L4 and I removed the facet on the right and the left side.  I completed the facetectomy on the right and the left.  I encountered significant thickened ligamentum flavum which was removed.  There was a significant amount of scar tissue.  Lateral to most of the scar tissue and identified a very large free fragment of disc.  This was removed.  I decompressed the disc space.  I decompressed the spinal canal by removing both scar tissue and an enormous amount of recurrent disc.      I identified the exiting L3 nerve root and the traversing L4 nerve root and made sure that they were completely decompressed.  Prior to decompression they were obviously compressed by the surrounding tissue by the end of the procedure I could pass a Lee ball hook both out into the neural  foramen alongside the exiting nerve root and along side the traversing nerve root inside the spinal canal without disturbing the nerve roots.    Interbody arthrodesis: I entered the disc space on the left side retracting the dural tube medially.  A formal discectomy was then performed with curettes and pituitary rongeurs.  I scraped the endplates very well down to bleeding bone.    Interbody prosthetic device: I sized the graft.  I stuffed the interspace with the autograft bone and then I stuffed the graft itself with the drill trailing's and the autograft bone that was collected.  The device was then impacted into the interspace and x-ray confirmation of placement completely within the interspace and in the midline was accomplished.  The Elevate graft was then expanded.    I then placed pedicle screws on the left side over the existing K wires using the electrified tap and the electrified screws.  I then compressed on the right side and placed a paul on the left side with sextant technology.  I compressed on the left side as well.    Copious irrigation was used hemostasis was obtained the wound was then closed and appropriate layers skin reapproximated with Dermabond        Miguelangel Goldberg MD     Date: 7/12/2018  Time: 11:34 AM

## 2018-07-13 VITALS
RESPIRATION RATE: 20 BRPM | DIASTOLIC BLOOD PRESSURE: 69 MMHG | TEMPERATURE: 98.1 F | SYSTOLIC BLOOD PRESSURE: 120 MMHG | BODY MASS INDEX: 30.37 KG/M2 | HEIGHT: 69 IN | OXYGEN SATURATION: 94 % | HEART RATE: 82 BPM | WEIGHT: 205.03 LBS

## 2018-07-13 LAB — GLUCOSE BLDC GLUCOMTR-MCNC: 260 MG/DL (ref 70–130)

## 2018-07-13 PROCEDURE — 82962 GLUCOSE BLOOD TEST: CPT

## 2018-07-13 PROCEDURE — 97162 PT EVAL MOD COMPLEX 30 MIN: CPT

## 2018-07-13 PROCEDURE — 99024 POSTOP FOLLOW-UP VISIT: CPT | Performed by: NEUROLOGICAL SURGERY

## 2018-07-13 RX ORDER — HYDROCODONE BITARTRATE AND ACETAMINOPHEN 10; 325 MG/1; MG/1
1 TABLET ORAL EVERY 4 HOURS PRN
Qty: 20 TABLET | Refills: 0 | Status: SHIPPED | OUTPATIENT
Start: 2018-07-13 | End: 2018-10-23 | Stop reason: SDUPTHER

## 2018-07-13 RX ADMIN — HYDROCODONE BITARTRATE AND ACETAMINOPHEN 2 TABLET: 5; 325 TABLET ORAL at 06:51

## 2018-07-13 RX ADMIN — BACLOFEN 20 MG: 10 TABLET ORAL at 09:07

## 2018-07-13 RX ADMIN — HYDROCODONE BITARTRATE AND ACETAMINOPHEN 1 TABLET: 10; 325 TABLET ORAL at 11:33

## 2018-07-13 RX ADMIN — LEVETIRACETAM 2000 MG: 500 TABLET, FILM COATED ORAL at 09:06

## 2018-07-13 RX ADMIN — OXCARBAZEPINE 600 MG: 300 TABLET, FILM COATED ORAL at 09:07

## 2018-07-13 RX ADMIN — LEVOTHYROXINE SODIUM 75 MCG: 75 TABLET ORAL at 09:07

## 2018-07-13 RX ADMIN — HYDROCODONE BITARTRATE AND ACETAMINOPHEN 2 TABLET: 5; 325 TABLET ORAL at 01:05

## 2018-07-13 RX ADMIN — GLIPIZIDE 2.5 MG: 5 TABLET ORAL at 06:51

## 2018-07-13 RX ADMIN — FENOFIBRATE 145 MG: 145 TABLET ORAL at 09:15

## 2018-07-13 RX ADMIN — METFORMIN HYDROCHLORIDE 500 MG: 500 TABLET, EXTENDED RELEASE ORAL at 09:07

## 2018-07-13 RX ADMIN — PANTOPRAZOLE SODIUM 40 MG: 40 TABLET, DELAYED RELEASE ORAL at 06:51

## 2018-07-13 RX ADMIN — DULOXETINE HYDROCHLORIDE 60 MG: 60 CAPSULE, DELAYED RELEASE ORAL at 09:15

## 2018-07-13 RX ADMIN — METOPROLOL TARTRATE 50 MG: 50 TABLET, FILM COATED ORAL at 09:07

## 2018-07-13 RX ADMIN — LEFLUNOMIDE 20 MG: 20 TABLET ORAL at 09:07

## 2018-07-13 NOTE — PLAN OF CARE
Problem: Patient Care Overview  Goal: Plan of Care Review   07/13/18 1007   Coping/Psychosocial   Plan of Care Reviewed With patient   OTHER   Outcome Summary Patient is a pleasant 62 y.o. female POD1 Left L3 4 lumbar decompression and discectomy with transforaminal lumbar interbody fusion. Patient is independent with all ADLs at baseline and no prior use of AD. All mobility today performed with SV- no LOB or veering noted with ambulation. Denies any stair concerns. Reviewed donning brace, log roll, and spinal precautions. Based on clinical presentation, patient not appropriate for skilled PT services acutely. Will sign off. Notified CIELO Billy.

## 2018-07-13 NOTE — THERAPY EVALUATION
Acute Care - Physical Therapy Initial Evaluation/Discharge  [facility]     Patient Name: Wendi Ramos  : 1955  MRN: [id number]  Today's Date: 2018   Onset of Illness/Injury or Date of Surgery: 18  Date of Referral to PT: 18  Referring Physician: Dr. Goldberg      Admit Date: 2018    Visit Dx:     ICD-10-CM ICD-9-CM   1. L3/4 Lumbar disc herniation M51.26 722.10     Patient Active Problem List   Diagnosis   • Type 2 diabetes mellitus without complication (CMS/HCC)   • Lumbar radiculopathy   • Cauda equina syndrome (CMS/HCC)   • L3/4 Lumbar disc herniation   • Incontinence of feces     Past Medical History:   Diagnosis Date   • Diabetes mellitus (CMS/HCC)    • Fibromyalgia    • GERD (gastroesophageal reflux disease)    • Hiatal hernia    • Hyperlipidemia    • Hypertension    • Hypothyroidism    • Irregular heart beat    • Liver disease     FATTY LIVER   • Peripheral neuropathy    • Rheumatoid arthritis (CMS/HCC)    • Rheumatoid arthritis (CMS/HCC)     DR SMYTH   • Seizures (CMS/HCC)    • Sleep apnea     NO CPAP   • Type 2 diabetes mellitus (CMS/HCC)    • West Nile fever      Past Surgical History:   Procedure Laterality Date   • ABDOMINOPLASTY     • ADENOIDECTOMY     • BILATERAL BREAST REDUCTION     • BRONCHOSCOPY N/A 12/15/2016    Procedure: BRONCHOSCOPY WITH BAL AND ENDOBRONCHIAL ULTRASOUND WITH FNA;  Surgeon: Diego Ponce MD;  Location: [facility] ENDOSCOPY;  Service:    • CARDIAC ABLATION     • HYSTERECTOMY     • LASIK     • LUMBAR DISCECTOMY FUSION INSTRUMENTATION Left 2018    Procedure: LEFT L3/4 lumbar discectomy;  Surgeon: Miguelangel Goldberg MD;  Location: [facility] OR;  Service: Neurosurgery   • LUMBAR FUSION Left 2018    Procedure: Left L3 4 lumbar decompression and discectomy with transforaminal lumbar interbody fusion and posterior instrumentation with posterior lateral arthrodesis;  Surgeon: Miguelangel Goldberg MD;  Location: [facility] OR;  Service:  "Neurosurgery   • MOUTH SURGERY     • REPLACEMENT TOTAL KNEE Left    • TONSILLECTOMY     • TONSILLECTOMY AND ADENOIDECTOMY  1966        PT ASSESSMENT (last 12 hours)      Physical Therapy Evaluation     Row Name 07/13/18 1003          PT Evaluation Time/Intention    Subjective Information no complaints  -MA     Document Type evaluation;discharge evaluation/summary  -MA     Mode of Treatment physical therapy  -MA     Patient Effort good  -MA     Comment L hip \"pinch\" upon standing- patient voiced this a chronic OA issue.  -MA     Row Name 07/13/18 1003          General Information    Patient Profile Reviewed? yes  -MA     Onset of Illness/Injury or Date of Surgery 07/12/18  -MA     Referring Physician Dr. Goldberg  -MA     Patient Observations alert;cooperative;agree to therapy  -MA     General Observations of Patient Supine in bed with HOB elevated, no acute distress noted at rest  -MA     Prior Level of Function independent:;all household mobility  -MA     Equipment Currently Used at Home none  -MA     Pertinent History of Current Functional Problem POD1 Left L3 4 lumbar decompression and discectomy with transforaminal lumbar interbody fusion  -MA     Existing Precautions/Restrictions fall;brace worn when out of bed;spinal  -MA     Limitations/Impairments safety/cognitive  -MA     Risks Reviewed patient:  -MA     Benefits Reviewed patient:  -MA     Barriers to Rehab none identified  -MA     Row Name 07/13/18 1003          Cognitive Assessment/Intervention- PT/OT    Orientation Status (Cognition) oriented x 4  -MA     Follows Commands (Cognition) WFL  -MA     Safety Deficit (Cognitive) mild deficit  -MA     Personal Safety Interventions fall prevention program maintained;gait belt;nonskid shoes/slippers when out of bed  -MA     Row Name 07/13/18 1003          Safety Issues, Functional Mobility    Safety Issues Affecting Function (Mobility) safety precautions follow-through/compliance  -MA     Impairments Affecting " Function (Mobility) endurance/activity tolerance  -MA     Row Name 07/13/18 1003          Bed Mobility Assessment/Treatment    Bed Mobility Assessment/Treatment supine-sit;sit-supine  -MA     Supine-Sit [place] (Bed Mobility) supervision;verbal cues  -MA     Sit-Supine [place] (Bed Mobility) supervision;verbal cues  -MA     Assistive Device (Bed Mobility) bed rails;head of bed elevated  -MA     Comment (Bed Mobility) Log roll technique education. Donned brace in sitting.  -MA     Row Name 07/13/18 1003          Transfer Assessment/Treatment    Transfer Assessment/Treatment sit-stand transfer;stand-sit transfer  -MA     Sit-Stand [place] (Transfers) supervision;verbal cues  -MA     Stand-Sit [place] (Transfers) supervision;verbal cues  -MA     Row Name 07/13/18 1003          Gait/Stairs Assessment/Training    Gait/Stairs Assessment/Training gait/ambulation independence  -MA     [place] Level (Gait) supervision;verbal cues  -MA     Assistive Device (Gait) --   no AD  -MA     Distance in Feet (Gait) 150  -MA     Pattern (Gait) step-through  -MA     Deviations/Abnormal Patterns (Gait) ashley decreased  -MA     Comment (Gait/Stairs) No LOB or veering noted.  -MA     Row Name 07/13/18 1003          General ROM    GENERAL ROM COMMENTS B LE WFL  -[facility] Name 07/13/18 1003          General Assessment (Manual Muscle Testing)    General Manual Muscle Testing (MMT) Assessment no strength deficits identified  -[facility] Name 07/13/18 1003          Sensory Assessment/Intervention    Sensory General Assessment no sensation deficits identified  -[facility] Name 07/13/18 1003          Vision Assessment/Intervention    Visual Impairment/Limitations WFL  -[facility] Name 07/13/18 1003          Pain Assessment    Additional Documentation --   No report of pain  -[facility] Name             Wound 07/12/18 1144 Other (See comments) back incision    Wound - Properties Group Date first assessed: 07/12/18  -AS  Time first assessed: 1144  -AS Side: Other (See comments)  -AS Location: back  -AS Type: incision  -AS    Row Name 07/13/18 1003          Plan of Care Review    Plan of Care Reviewed With patient  -MA     Row Name 07/13/18 1003          Physical Therapy Clinical Impression    Date of Referral to PT 07/13/18  -MA     Criteria for Skilled Interventions Met (PT Clinical Impression) no;no problems identified which require skilled intervention  -MA     Pathology/Pathophysiology Noted (Describe Specifically for Each System) neuromuscular  -MA     Care Plan Review (PT) patient/other agree to care plan  -MA     Row Name 07/13/18 1003          Vital Signs    O2 Delivery Pre Treatment room air  -MA     O2 Delivery Post Treatment room air  -MA     Row Name 07/13/18 1003          Positioning and Restraints    Pre-Treatment Position in bed  -MA     Post Treatment Position chair  -MA     In Chair notified nsg;sitting;call light within reach;encouraged to call for assist;legs elevated  -MA     Row Name 07/13/18 1003          Living Environment    Home Accessibility stairs to enter home   Patient voiced no concerns  -MA       User Key  (r) = Recorded By, (t) = Taken By, (c) = Cosigned By    Initials Name Provider Type    AS Liliam Davies, RN Registered Nurse    SUE Mackenzie, PT Physical Therapist              PT Recommendation and Plan  Anticipated Discharge Disposition (PT): home with assist  Therapy Frequency (PT Clinical Impression): evaluation only  Outcome Summary/Treatment Plan (PT)  Anticipated Discharge Disposition (PT): home with assist  Plan of Care Reviewed With: patient  Outcome Summary: Patient is a pleasant 62 y.o. female POD1 Left L3 4 lumbar decompression and discectomy with transforaminal lumbar interbody fusion. Patient is independent with all ADLs at baseline and no prior use of AD. All mobility today performed with SV- no LOB or veering noted with ambulation. Denies any stair concerns. Based on  clinical presentation, patient not appropriate for skilled PT services acutely. Will sign off.          Outcome Measures     Row Name 07/13/18 1000             How much help from another person do you currently need...    Turning from your back to your side while in flat bed without using bedrails? 4  -MA      Moving from lying on back to sitting on the side of a flat bed without bedrails? 4  -MA      Moving to and from a bed to a chair (including a wheelchair)? 4  -MA      Standing up from a chair using your arms (e.g., wheelchair, bedside chair)? 4  -MA      Climbing 3-5 steps with a railing? 3  -MA      To walk in hospital room? 4  -MA      AM-PAC 6 Clicks Score 23  -MA         Functional Assessment    Outcome Measure Options AM-PAC 6 Clicks Basic Mobility (PT)  -MA        User Key  (r) = Recorded By, (t) = Taken By, (c) = Cosigned By    Initials Name Provider Type    SUE Mackenzie PT Physical Therapist           Time Calculation:         PT Charges     Row Name 07/13/18 0936             Time Calculation    Start Time 0920  -MA      Stop Time 0936  -MA      Time Calculation (min) 16 min  -MA      PT Received On 07/13/18  -MA        User Key  (r) = Recorded By, (t) = Taken By, (c) = Cosigned By    Initials Name Provider Type    SUE Mackenzie PT Physical Therapist        Therapy Suggested Charges     Code   Minutes Charges    None           Therapy Charges for Today     Code Description Service Date Service Provider Modifiers Qty    [id number]  PT EVAL MOD COMPLEXITY 1 7/13/2018 Brooklyn Mackenzie PT GP 1          PT G-Codes  Outcome Measure Options: AM-PAC 6 Clicks Basic Mobility (PT)      Brooklyn Mackenzie PT  7/13/2018

## 2018-07-13 NOTE — PLAN OF CARE
Problem: Patient Care Overview  Goal: Plan of Care Review  Outcome: Ongoing (interventions implemented as appropriate)   07/13/18 8929   Coping/Psychosocial   Plan of Care Reviewed With patient   Plan of Care Review   Progress improving   OTHER   Outcome Summary Pt A/Ox4, VSS. C/o low back pain well controlled with po pain meds. Amb in halls with assist x1 without diff. Instructed on use of incentive spirometer. Proper technique demonstrated. Will continue to monitor       Problem: Pain, Acute (Adult)  Goal: Identify Related Risk Factors and Signs and Symptoms  Outcome: Ongoing (interventions implemented as appropriate)    Goal: Acceptable Pain Control/Comfort Level  Outcome: Ongoing (interventions implemented as appropriate)      Problem: Fall Risk (Adult)  Goal: Identify Related Risk Factors and Signs and Symptoms  Outcome: Ongoing (interventions implemented as appropriate)    Goal: Absence of Fall  Outcome: Ongoing (interventions implemented as appropriate)      Problem: Laminectomy/Foraminotomy/Discectomy (Adult)  Goal: Signs and Symptoms of Listed Potential Problems Will be Absent, Minimized or Managed (Laminectomy/Foraminotomy/Discectomy)  Outcome: Ongoing (interventions implemented as appropriate)    Goal: Anesthesia/Sedation Recovery  Outcome: Ongoing (interventions implemented as appropriate)

## 2018-07-13 NOTE — DISCHARGE SUMMARY
Wendi Ramos  1955    Patient Care Team:  Jomar Steve MD as PCP - General (Interventional Cardiology)    Date of Admit: 7/12/2018    Date of Discharge:  7/13/2018    Discharge Diagnosis:Principal Problem:    L3/4 Lumbar disc herniation  Active Problems:    Lumbar radiculopathy      Procedures Performed  Procedure(s):  Left L3 4 lumbar decompression and discectomy with transforaminal lumbar interbody fusion and posterior instrumentation with posterior lateral arthrodesis       Consultants:   Consults     No orders found for last 30 day(s).          Condition on Discharge: stable    Discharge disposition: home    Pertinent Test Results: none new    Brief HPI: Recurrent left L3/4 disc herniation with severe left leg pain and muscle weakness    Hospital Course: The patient was admitted to the hospital and underwent the above noted procedure.  Postoperatively she did remarkably well.  Her pain was controlled oral pain medication, she was voiding spontaneously after removal of the Jerome catheter, and she is ambulatory.  Her strength appears to be improving.  Her pain was gone.    Discharge Physical Exam:    General Appearance No acute distress   Neck No adenopathy, supple, trachea midline, no thyromegaly, no carotid bruit, no JVD   Lungs Clear to auscultation,respirations regular, even and unlabored   Heart Regular rhythm and normal rate, normal S1 and S2, no murmur, no gallop, no rub, no click   Chest wall No abnormalities observed   Abdomen Normal bowel sounds, no masses, no hepatomegaly, soft   Extremities Moves all extremities well, no edema, no cyanosis, no redness   Neurological Alert and oriented x 3     Discharge Medications     Your medication list      CHANGE how you take these medications      Instructions Last Dose Given Next Dose Due   HYDROcodone-acetaminophen  MG per tablet  Commonly known as:  NORCO  What changed:  Another medication with the same name was added. Make sure you  understand how and when to take each.      Take 1 tablet by mouth 2 (Two) Times a Day As Needed. for pain       HYDROcodone-acetaminophen  MG per tablet  Commonly known as:  IFRAH  What changed:  You were already taking a medication with the same name, and this prescription was added. Make sure you understand how and when to take each.      Take 1 tablet by mouth Every 4 (Four) Hours As Needed for Moderate Pain  (Pain).          CONTINUE taking these medications      Instructions Last Dose Given Next Dose Due   aspirin 81 MG EC tablet      Take 81 mg by mouth daily.       atorvastatin 10 MG tablet  Commonly known as:  LIPITOR      Take 1 tablet by mouth Every Night.       baclofen 20 MG tablet  Commonly known as:  LIORESAL      Take 20 mg by mouth 2 (Two) Times a Day.       cholecalciferol 400 units tablet  Commonly known as:  VITAMIN D3      Take 400 Units by mouth Daily.       cyclobenzaprine 10 MG tablet  Commonly known as:  FLEXERIL      Take 10 mg by mouth Every Night.       DULoxetine 60 MG capsule  Commonly known as:  CYMBALTA      Take 60 mg by mouth Daily.       Fenofibrate 150 MG capsule  Commonly known as:  LIPOFEN      Take 150 mg by mouth Daily.       glimepiride 2 MG tablet  Commonly known as:  AMARYL      Take 1 tablet by mouth Every Morning Before Breakfast.       hydroxychloroquine 200 MG tablet  Commonly known as:  PLAQUENIL      Take 200 mg by mouth 2 (Two) Times a Day.       IRON 27 PO      Take 1 tablet by mouth Every Other Day.       leflunomide 20 MG tablet  Commonly known as:  ARAVA      Take 20 mg by mouth Daily.       levETIRAcetam 500 MG tablet  Commonly known as:  KEPPRA      Take 2,000 mg by mouth 2 (Two) Times a Day. Take 4 tabs bid       levothyroxine 75 MCG tablet  Commonly known as:  SYNTHROID, LEVOTHROID      Take 75 mcg by mouth Daily.       metFORMIN  MG 24 hr tablet  Commonly known as:  GLUCOPHAGE-XR      Take 1 tablet by mouth Daily With Breakfast.       metoprolol  tartrate 50 MG tablet  Commonly known as:  LOPRESSOR      Take 50 mg by mouth Every 12 (Twelve) Hours.       omeprazole 40 MG capsule  Commonly known as:  priLOSEC      Take 40 mg by mouth Daily.       OXcarbazepine 600 MG tablet  Commonly known as:  TRILEPTAL      Take 600 mg by mouth 2 (Two) Times a Day.       pramipexole 0.5 MG tablet  Commonly known as:  MIRAPEX      Take 0.5 mg by mouth Every Night.       VITAMIN B COMPLEX PO      Take 1 tablet by mouth Daily.       vitamin E 400 UNIT capsule      Take 400 Units by mouth Daily.       zolpidem 10 MG tablet  Commonly known as:  AMBIEN      Take 1 tablet by mouth At Night As Needed for Sleep.             Where to Get Your Medications      You can get these medications from any pharmacy    Bring a paper prescription for each of these medications  · HYDROcodone-acetaminophen  MG per tablet         Discharge Diet:   Diet Instructions     Diet:       Diet Texture / Consistency:  Regular          Diet Order   Procedures   • Diet Regular       Activity at Discharge:   Activity Instructions     Discharge Activity       Dr. Miguelangel Goldberg  [facility]  [address] [place] [address] [address]  [place] [address]   P: [phone]  F: [phone]      Discharge Instructions for Discectomy      Go home, rest and take it easy today; however, you should get up and move about several times today to reduce the risk of developing a clot in your legs.      You may experience some dizziness or memory loss from the anesthesia.  This may last for the next 24 hours.  Someone should plan on staying with you for the first 24 hours for your safety.    Do not make any important legal decisions or sign any legal papers for the next 24 hours.      Eat and drink lightly today.  Start off with liquids, jello, soup, crackers or other bland foods at first. You may advance your diet tomorrow as tolerated as long as you do not experience any nausea or vomiting.      Daily exercise is the best postoperative exercise. Be sure to rest after walking. Take it easy and don’t overdo it.    It is okay to climb stairs, just take your time, use handrails when possible and do not over exert yourself.     When you are in bed, do not sit.  Remain on your back or side.  Stay off your stomach.  Use a firm mattress and avoid water beds.       Keep the support hose on from the hospital for about 1 week.      For the first week, sit only for short periods of time such as when you are eating or using the bathroom.  Sit only in firm, straight-backed chairs.  Do not slouch.  Avoid couches or recliners.    You may ride in a car but may NOT drive until after your first postoperative visit and only with permission from Dr. Goldberg.      You may shower, but immediately pat your incision area dry afterwards.  Keep the incision covered with a loose gauze dressing.  Do NOT sock in a bathtub.           When in bed, use extra pillows. Place these between your knees when on your side or under your knees when on your back.      For pain, you may take Tylenol, Bufferin or Ascriptin--2 tablets as needed.  If you have been prescribed other pain medication, you should take it only as directed and gradually try to reduce th amount that you take.  Begin by increasing the length of time between each dose and then reduce the number of pills taken at each dose.  A certain amount of discomfort can be expected after surgery until the swelling goes down and the nerve sensitivity decreases. You may substitute an ice pack or heating pad on low setting for about 30 minutes at a time, gentle exercise and short rest periods for pain medication whenever possible..        Do not bend forward to  objects for 1 month. After 1 month, bend only at the knees to get down to the object while keeping your back straight. Avoid strenuous activity and heavy lifting until you return to the office. Heavy lifting is considered  anything over 10 pounds. (A gallon of milk weighs 8 pounds) If you have to hold something that weighs more than 10 pounds (such as a baby), have someone place that object in your lap or arms.      Avoid becoming constipated. Increase your intake of fresh fruits, vegetables and whole grain foods.     Watch your incision site. It is normal to see some swelling, bruising and redness around the edges of the incision. This should slowly start to get better with time. You may also notice a small amount of clear drainage.     Call our office immediately ([phone]) if you notice sudden swelling, an increase in the redness at the incision site, or greenish, yellowish, or foul-smelling drainage. You should also contact the office if you develop a fever >101 degrees by mouth, green or yellow sputum when you cough, or if you cannot control the pain with your pain medication, heat or ice. If you feel you need to go to the emergency room after hours, please inform their staff that Dr. Goldberg has performed a microscopic discectomy on you.     If your return appointment has not already been schedule, please call our office as soon as you are discharged and we will schedule an appointment for 2-3 weeks for suture removal.    Your return to work status will be discussed at your follow-up appointment.    The best way to avoid recurrences of a ruptured disc is to maintain a healthy lifestyle.  It is import that you:    eat a well-balanced diet in order to aid in proper healing.(avoid foods  high in calories and fat content)  continue to eat a  healthy diet in the future to reach and maintain your ideal body weight  get the proper amount of sleep  participate in some form of regular aerobic exercise such as walking, swimming, riding a bike, etc.  take extra care when lifting, bending or twisting  take care of other health problems such as heart disease, diabetes, and hypertension  don’t smoke! If you do smoke, make an effort to stop  now.            Call for: Patient instructed to call for fever >100.5, chills, wound swelling/drainage/redness, change in neurologic status including but not limited to recurrent leg pain    Follow-up Appointments  Future Appointments  Date Time Provider Department Center   7/30/2018 3:15 PM ALESSANDRA Zendejas [facility] None     Additional Instructions for the Follow-ups that You Need to Schedule     Discharge Follow-up with Specified Provider: [facility]    As directed      To:  [facility]    Follow Up Details:  as arranged               Test Results Pending at Discharge       Miguelangel Goldberg MD  07/13/18  8:54 AM    45 min spent in reviewing records, discussion and examination of the patient and discussion with other members of the patient's medical team.

## 2018-07-17 LAB — GLUCOSE BLDC GLUCOMTR-MCNC: 226 MG/DL (ref 70–130)

## 2018-07-19 RX ORDER — ZOLPIDEM TARTRATE 10 MG/1
TABLET ORAL
Qty: 90 TABLET | Refills: 0 | Status: SHIPPED | OUTPATIENT
Start: 2018-07-19 | End: 2018-11-01 | Stop reason: SDUPTHER

## 2018-07-30 ENCOUNTER — OFFICE VISIT (OUTPATIENT)
Dept: NEUROSURGERY | Facility: CLINIC | Age: 63
End: 2018-07-30

## 2018-07-30 ENCOUNTER — HOSPITAL ENCOUNTER (OUTPATIENT)
Dept: GENERAL RADIOLOGY | Facility: HOSPITAL | Age: 63
Discharge: HOME | End: 2018-07-30
Admitting: NURSE PRACTITIONER

## 2018-07-30 VITALS
WEIGHT: 201 LBS | RESPIRATION RATE: 18 BRPM | SYSTOLIC BLOOD PRESSURE: 132 MMHG | HEIGHT: 69 IN | BODY MASS INDEX: 29.77 KG/M2 | DIASTOLIC BLOOD PRESSURE: 82 MMHG | TEMPERATURE: 97 F | HEART RATE: 89 BPM

## 2018-07-30 DIAGNOSIS — Z09 POSTOP CHECK: Primary | ICD-10-CM

## 2018-07-30 DIAGNOSIS — M51.26 LUMBAR DISC HERNIATION: ICD-10-CM

## 2018-07-30 PROBLEM — G83.4 CAUDA EQUINA SYNDROME (HCC): Status: RESOLVED | Noted: 2018-06-08 | Resolved: 2018-07-30

## 2018-07-30 PROCEDURE — 72100 X-RAY EXAM L-S SPINE 2/3 VWS: CPT

## 2018-07-30 PROCEDURE — 99024 POSTOP FOLLOW-UP VISIT: CPT | Performed by: NURSE PRACTITIONER

## 2018-07-30 NOTE — PROGRESS NOTES
" HPI:   Wendi Ramos is a 62 y.o. female for follow-up of L3/4 recurrent disc herniation with left leg pain and weakness.  She has a history of large L3/4 disc herniation, incontinence syndrome including bowel incontinence.  She is status post lumbar discectomy on June 8, 2018.  She had recurrent pain. She is status post L3/4 decompression and TLIF on July 12, 2018. She was discharged to home. She has not had postoperative complications. She reports 99% improved as compared to preop. She denies bowel or bladder incontinence. She has leg pain when she lies on her left side. Back pain better but some discomfort with position changes. Had one day of fever-resolved. No wound issues. She is on chronic, stable doses of Baclofen, Flexeril, and Norco.     She presents accompanied by her spouse.     Review of Systems   Constitutional: Negative for chills and fever.   Gastrointestinal: Negative for constipation.   Genitourinary: Negative for difficulty urinating and enuresis.   Musculoskeletal: Positive for back pain.        Hip and LLE pain   Skin: Negative for wound (wound redness, swelling, or drainage).   Neurological: Negative for weakness, numbness and headaches.   Psychiatric/Behavioral: Positive for sleep disturbance.        /82 (BP Location: Right arm, Patient Position: Sitting)   Pulse 89   Temp 97 °F (36.1 °C) (Tympanic)   Resp 18   Ht 175.3 cm (69\")   Wt 91.2 kg (201 lb)   BMI 29.68 kg/m²     Physical Exam   Constitutional: She appears well-developed and well-nourished.   Body mass index is 29.68 kg/m².     Musculoskeletal:   Patient presents with LSO brace, but is not wearing it   Neurological: She is alert. She has normal strength. Gait normal.   Reflex Scores:       Patellar reflexes are 1+ on the right side and 1+ on the left side.  Skin: Skin is warm and dry.   Scattered abrasions, skin lesions in various stages of healing;   Multiple lumbar incisions well approximated with proud flesh but no " swelling tenderness or drainage.      The superior left incision appears that the sutures are no longer intact and there is a scab present.  There is mild surrounding erythema with minimal drops of purulent drainage but no fluctuance, tenderness.   Psychiatric: She has a normal mood and affect. Her behavior is normal. Judgment normal.   Vitals reviewed.    Neurologic Exam     Mental Status   Level of consciousness: alert  Knowledge: good.     Motor Exam   Muscle bulk: normal    Strength   Strength 5/5 throughout.     Sensory Exam   Right leg light touch: normal  Left leg light touch: normal    Gait, Coordination, and Reflexes     Gait  Gait: normal    Reflexes   Right patellar: 1+  Left patellar: 1+  Able to heel and toe walk bilaterally.  Able to squat to stand unassisted       Findings/Results:  Lumbar x-rays from [facility] dated July 31, 2018 were reviewed.  These reveal postoperative changes at L3/4 with instrumentation in good position and no complicating features.  She has a dextroscoliotic deformity that is stable postoperatively.    Assessment/Plan:  Wendi was seen today for post-op.    Diagnoses and all orders for this visit:    Postop check  -     mupirocin (BACTROBAN) 2 % ointment; Apply  topically to the appropriate area as directed Every 12 (Twelve) Hours.      Discussion/Summary  Patient presents for first postoperative visit after L3/4 decompression and TLIF.  Overall she is much improved.  She denies any leg pain.  She has back soreness.  She denies any wound-related issues.  Her exam is as noted above with no neurologic red flags.  X-rays look good.  Incisions all look good except for the superior left-sided incision has some erythema and a minute amount of purulent drainage with deep manipulation.  Unfortunately, she has also allergies to antibiotics as well as history of C. difficile.  She has no systemic complaints.  I asked Dr. Solorzano to look in on the patient and we agreed that  at this time we will do local wound care due to her history with antibiotics.  She will wash the wound twice daily with antibacterial soap and apply Bactroban ointment and cover with a dry dressing.  We also discussed the importance of her brace.  She was given both verbal and written postoperative instructions today.  She will contact us with any wound issues or fevers, but I plan to see her back later this week for clinical follow-up.    Plan: Return in about 4 days (around 8/3/2018) for Follow-up with NP.         Patient Care Team    Patient Care Team:  Brice Camarena Jr., MD as PCP - General (Internal Medicine)  Jomar Steve MD as Consulting Physician (Interventional Cardiology)    Iwona Neal, APRN  7/30/2018    Dragon disclaimer:   Much of this encounter note is an electronic transcription/translation of spoken language to printed text. The electronic translation of spoken language may permit erroneous, or at times, nonsensical words or phrases to be inadvertently transcribed; Although I have reviewed the note for such errors, some may still exist.

## 2018-08-03 ENCOUNTER — OFFICE VISIT (OUTPATIENT)
Dept: NEUROSURGERY | Facility: CLINIC | Age: 63
End: 2018-08-03

## 2018-08-03 VITALS
TEMPERATURE: 96.2 F | WEIGHT: 200 LBS | RESPIRATION RATE: 18 BRPM | HEIGHT: 69 IN | DIASTOLIC BLOOD PRESSURE: 82 MMHG | SYSTOLIC BLOOD PRESSURE: 120 MMHG | HEART RATE: 72 BPM | BODY MASS INDEX: 29.62 KG/M2

## 2018-08-03 DIAGNOSIS — Z09 POSTOP CHECK: Primary | ICD-10-CM

## 2018-08-03 PROCEDURE — 99024 POSTOP FOLLOW-UP VISIT: CPT | Performed by: NURSE PRACTITIONER

## 2018-08-03 NOTE — PROGRESS NOTES
Subjective   Patient ID: Wendipenelope Ramos is a 62 y.o. female is here today for follow-up wound check. Patient presents unaccompanied.     History of Present Illness    Patient is status post L3/4 decompression and TLIF on July 12, 2018 for recurrent disc herniation following lumbar discectomy on June 8, 2018. Patient presents for wound check follow-up.  At the last visit her left upper lumbar incision was showing some erythema and small amount of drainage with scabbing.  She has been cleaning the wound with antibacterial soap and applying mupirocin ointment twice daily.  She denies any fever.  She states there has not been any significant drainage in that overall the incision is looking better per her .  She denies any increase in back or new neurologic issues.    The following portions of the patient's history were reviewed and updated as appropriate: allergies, current medications and problem list.    Review of Systems   Constitutional: Negative for chills and fever.   Skin: Positive for wound (redness and drainage improving.).        itching       Objective   Physical Exam   Constitutional: She appears well-developed and well-nourished.   Body mass index is 29.53 kg/m².     Pulmonary/Chest: Effort normal.   Musculoskeletal:   Wearing TLSO brace   Neurological: She is alert. Gait normal.   Skin: Skin is warm and dry.   Multiple lumbar incisions healing without drainage or swelling.  Mild stitch irritation at all incisions.  Left upper lumbar incision still with mild erythema, but area of erythema is smaller.  There is a scab in the center of the incision that is smaller.  There is no expressible drainage with palpation.  No significant tenderness.   Psychiatric: She has a normal mood and affect. Her behavior is normal. Judgment normal.   Vitals reviewed.    Neurologic Exam     Gait, Coordination, and Reflexes     Gait  Gait: normal      Assessment/Plan   Independent Review of Radiographic Studies:    No new  imgaing    Medical Decision Making:    Patient's wound is healing with local treatment.  She'll continue this for now.  We will see her back in 5 days for additional wound check.  I've asked her to give the wound a little bit of air when she is at home.  She will call she develops any increase  drainage or fevers.    Plan: Return to office 5 days for wound check.  Continue local wound care as directed at last office visit.    Wendi was seen today for wound check.    Diagnoses and all orders for this visit:    Postop check      Return in about 5 days (around 8/8/2018).

## 2018-08-08 ENCOUNTER — OFFICE VISIT (OUTPATIENT)
Dept: NEUROSURGERY | Facility: CLINIC | Age: 63
End: 2018-08-08

## 2018-08-08 VITALS
BODY MASS INDEX: 29.92 KG/M2 | HEART RATE: 85 BPM | HEIGHT: 69 IN | SYSTOLIC BLOOD PRESSURE: 126 MMHG | DIASTOLIC BLOOD PRESSURE: 76 MMHG | RESPIRATION RATE: 18 BRPM | TEMPERATURE: 96.8 F | WEIGHT: 202 LBS

## 2018-08-08 DIAGNOSIS — Z09 POSTOP CHECK: Primary | ICD-10-CM

## 2018-08-08 PROBLEM — R15.9 INCONTINENCE OF FECES: Status: RESOLVED | Noted: 2018-06-08 | Resolved: 2018-08-08

## 2018-08-08 PROBLEM — M54.16 LUMBAR RADICULOPATHY: Status: RESOLVED | Noted: 2018-05-31 | Resolved: 2018-08-08

## 2018-08-08 PROCEDURE — 99024 POSTOP FOLLOW-UP VISIT: CPT | Performed by: NURSE PRACTITIONER

## 2018-08-08 NOTE — PROGRESS NOTES
Subjective   Patient ID: Wendipenelope Ramos is a 62 y.o. female is here today for wound check. Patient presents unaccompanied.     History of Present Illness  Patient presents for wound check.  She is status post L3/4 decompression and TLIF on July 12, 2018 for recurrent disc herniation following lumbar discectomy in June.  We've been monitoring her left upper lumbar incision secondary to erythema and small amount of drainage.  She's been treated with local wound care including washes with antibacterial soap and application of mupirocin ointment twice daily.  She states her  indicates that the wound has been doing fine.  There is been no drainage.  She does feel like her brace is rubbing the incision which is causing it irritation.  She reports ongoing weakness of the left leg but not worse since prior to surgery.  No fevers or chills.    The following portions of the patient's history were reviewed and updated as appropriate: allergies, current medications and problem list.    Review of Systems   Constitutional: Negative for chills and fever.   Genitourinary: Negative for difficulty urinating and enuresis.   Musculoskeletal: Positive for back pain (when getting out of bed).        Left hip pain   Skin: Positive for wound (irritation, improving).        itching   Neurological: Positive for weakness (LLE) and numbness (LLE).       Objective    Temp:  [96.8 °F (36 °C)] 96.8 °F (36 °C)  Heart Rate:  [85] 85  Resp:  [18] 18  BP: (126)/(76) 126/76    Physical Exam   Constitutional: She appears well-developed and well-nourished.   Body mass index is 29.83 kg/m².     Pulmonary/Chest: Effort normal.   Musculoskeletal:   Wearing TLSO brace   Neurological: She is alert. She has normal strength. Gait normal.   Skin: Skin is warm and dry.   Multiple lumbar incisions healing without drainage or swelling.  Mild stitch irritation at all incisions.  Left upper lumbar incision still with mild erythema-blanchable, but area of  erythema is further lessened.  There is a scab superior/medial portion of the incision.  There is no expressible drainage with palpation.  No significant tenderness.   Psychiatric: She has a normal mood and affect. Her behavior is normal. Judgment normal.   Vitals reviewed.    Neurologic Exam     Motor Exam     Strength   Strength 5/5 throughout.     Gait, Coordination, and Reflexes     Gait  Gait: normal  She is able to shallow squat on each leg individually       Assessment/Plan   Independent Review of Radiographic Studies:    No new imaging    Medical Decision Making:    Patient presents ongoing follow-up of lumbar wound following surgery.  He continues to improve.  She does still have some mild erythema, but no drainage or swelling. Tenderness has improved.  She will monitor the wound daily the scab is gone.  She will contact us if she develops any drainage, increased discomfort, swelling or redness.  Otherwise, we will see her back in 1 month for clinical visit.     Plan: Return to office one month.    Wendi was seen today for wound check.    Diagnoses and all orders for this visit:    Postop check      Return in about 1 month (around 9/8/2018) for Follow-up with NP.

## 2018-08-17 RX ORDER — ATORVASTATIN CALCIUM 10 MG/1
TABLET, FILM COATED ORAL
Qty: 90 TABLET | Refills: 0 | Status: SHIPPED | OUTPATIENT
Start: 2018-08-17 | End: 2018-11-01 | Stop reason: SDUPTHER

## 2018-08-21 ENCOUNTER — OFFICE VISIT (OUTPATIENT)
Dept: FAMILY MEDICINE CLINIC | Facility: CLINIC | Age: 63
End: 2018-08-21

## 2018-08-21 VITALS
SYSTOLIC BLOOD PRESSURE: 150 MMHG | HEIGHT: 69 IN | WEIGHT: 199 LBS | HEART RATE: 98 BPM | DIASTOLIC BLOOD PRESSURE: 90 MMHG | OXYGEN SATURATION: 98 % | BODY MASS INDEX: 29.47 KG/M2 | TEMPERATURE: 97.7 F

## 2018-08-21 DIAGNOSIS — M54.40 LOW BACK PAIN WITH SCIATICA, SCIATICA LATERALITY UNSPECIFIED, UNSPECIFIED BACK PAIN LATERALITY, UNSPECIFIED CHRONICITY: ICD-10-CM

## 2018-08-21 DIAGNOSIS — E11.9 DIABETES MELLITUS WITHOUT COMPLICATION (HCC): Primary | ICD-10-CM

## 2018-08-21 DIAGNOSIS — I10 HYPERTENSION, ESSENTIAL: ICD-10-CM

## 2018-08-21 PROCEDURE — 99214 OFFICE O/P EST MOD 30 MIN: CPT | Performed by: INTERNAL MEDICINE

## 2018-08-21 NOTE — PROGRESS NOTES
Subjective   Wendi Ramos is a 62 y.o. female.   Lumbar pain status post back surgery ×2 with past summer had a new herniated disc subsequent to her original back surgery in June of this year.  Improved now.  Blood pressures not problem during hospital stay  History of Present Illness   Glu running in the mid to slightly elevated 150s to 200 range.  128/80 at home blood pressure is doing satisfactorily .  Recently saw a rheumatologist thyroid test is off those labs are being sent to us we await those levels for react on it.  They're not in our inbox just now.  Does need to get his him on A1c in one month's time follow-up at that point.  Back is doing better always will follow also with her surgery.  We checked we do not have lab work back yet scanned in to address the abnormal thyroid test we'll see what other tests she's had done she does need a follow-up hemoglobin A1c in one month's time as well as follow-up appointment then.  She's been eating a fair amount is compliant with her diabetic medications.  Back is better although still sore so wearing a back brace  Review of Systems   Musculoskeletal: Positive for back pain.   All other systems reviewed and are negative.      Objective   Vitals:    08/21/18 1305   BP: 150/90   Pulse: 98   Temp: 97.7 °F (36.5 °C)   SpO2: 98%   Weight: 90.3 kg (199 lb)     Physical Exam   Constitutional: She appears well-developed and well-nourished.   Referring back brace to support lumbar spine area   HENT:   Head: Normocephalic and atraumatic.   Eyes: Pupils are equal, round, and reactive to light. Conjunctivae are normal.   Cardiovascular: Normal rate, regular rhythm and normal heart sounds.    Pulmonary/Chest: Effort normal and breath sounds normal.   Abdominal: Soft. Bowel sounds are normal.   Neurological: She is alert.   Gait appears relatively stable slow do not detect pronounced limp with left leg which she states still is weak with recent back issues affecting her left leg    Skin: Skin is warm and dry.   Nursing note and vitals reviewed.      No results found for: INR    Procedures    Assessment/Plan 1.  Type 2 diabetes plan await lab results, follow-up hemoglobin A1c and visit in one month's time    2.  Hypertension controlled by history patient continue monitor blood pressure at home today's reading perhaps nonweightbearing pain from increased activity    3.  Lumbar pain status post surgery improved second surgery secondary to a another disc problem improving of note patient is on iron we'll get updated pending lab values to see what we need to address which may include lipids as well as anemia and thyroid.          Fu 1mo

## 2018-09-10 ENCOUNTER — TELEPHONE (OUTPATIENT)
Dept: NEUROSURGERY | Facility: CLINIC | Age: 63
End: 2018-09-10

## 2018-09-10 NOTE — TELEPHONE ENCOUNTER
I spoke to patient. Her dad is dying; she is driving to be there now. Once she has a better idea of what's going to happen, she'll call back to reschedule.

## 2018-09-10 NOTE — TELEPHONE ENCOUNTER
----- Message from Nadia Thomas sent at 9/10/2018  8:25 AM EDT -----  Regarding: cancelling appt   Pt called this morning and cancelled her appt via the answering service. I have no showed her for today

## 2018-09-17 ENCOUNTER — OFFICE VISIT (OUTPATIENT)
Dept: NEUROSURGERY | Facility: CLINIC | Age: 63
End: 2018-09-17

## 2018-09-17 VITALS
BODY MASS INDEX: 29.33 KG/M2 | RESPIRATION RATE: 18 BRPM | WEIGHT: 198 LBS | HEART RATE: 96 BPM | SYSTOLIC BLOOD PRESSURE: 140 MMHG | DIASTOLIC BLOOD PRESSURE: 90 MMHG | HEIGHT: 69 IN

## 2018-09-17 DIAGNOSIS — Z98.890 POST-OPERATIVE STATE: ICD-10-CM

## 2018-09-17 DIAGNOSIS — Z98.1 S/P LUMBAR FUSION: Primary | ICD-10-CM

## 2018-09-17 PROCEDURE — 99024 POSTOP FOLLOW-UP VISIT: CPT | Performed by: NURSE PRACTITIONER

## 2018-09-17 NOTE — PROGRESS NOTES
"Subjective   Patient ID: Wendipenelope Ramos is a 62 y.o. female is here today for follow-up back pain s/p TLIF 7/12. Patient presents unaccompanied.     History of Present Illness     She returns to the office today for 2 month follow-up status post L3 4 decompression and TLIF for recurrent disc herniation following lumbar discectomy in June.  She is being followed closely for concern of a postop lumbar wound.  She she was placed in a TLSO brace postop, but this was discontinued secondary to the brace rubbing the incision sites causing irritation.    She denies any pain, numbness, tingling or weakness in either leg.  She is feeling well and denies back pain.  She has had no drainage from the posterior lumbar incision sites.  She denies fever, chills or any other systemic signs of infection.  She denies any balance or gait instability.    She presents unaccompanied.      /90 (BP Location: Right arm, Patient Position: Sitting)   Pulse 96   Resp 18   Ht 175.3 cm (69\")   Wt 89.8 kg (198 lb)   BMI 29.24 kg/m²     The following portions of the patient's history were reviewed and updated as appropriate: allergies, current medications, past family history, past medical history, past social history, past surgical history and problem list.    Review of Systems   Genitourinary: Positive for difficulty urinating and enuresis.   Musculoskeletal: Positive for back pain.        Denies leg pain   Neurological: Positive for weakness (LLE) and numbness (unchanged).   Psychiatric/Behavioral: Negative for sleep disturbance.       Objective   Physical Exam   Constitutional: She is oriented to person, place, and time. She appears well-developed and well-nourished. She is cooperative.   Very pleasant, well-appearing older female   HENT:   Head: Normocephalic.   Neck: Neck supple.   Pulmonary/Chest: Effort normal.   Abdominal: Soft.   Musculoskeletal: She exhibits no tenderness.   Strength equal bilateral lower extremities  Full " lumbar range of motion   Neurological: She is alert and oriented to person, place, and time. She has normal strength. She displays no tremor. No cranial nerve deficit or sensory deficit. Coordination normal. GCS eye subscore is 4. GCS verbal subscore is 5.   Gait is stable and upright  Heel and toe walk   Skin: Skin is warm and dry.        Well-healed bilateral lumbar incision sites, flat, normal surrounding skin, no sign of drainage, without redness and edema, nontender   Psychiatric: She has a normal mood and affect. Her behavior is normal. Thought content normal.   Vitals reviewed.    Neurologic Exam     Mental Status   Oriented to person, place, and time.     Motor Exam     Strength   Strength 5/5 throughout.       Assessment/Plan   Independent Review of Radiographic Studies:    No new imaging      Medical Decision Making:    She returns to the office today for 2 month postop visit and wound check.  Exam as noted above, no red flags.  He was initially some concern regarding wound drainage from one of the posterior lumbar incision sites.  At this point, the incision sites have healed well, are nontender without any sign/symptoms of infection.  She has no pain in her low back or in her legs.  Walking is stable and she is feeling very well.    We will have her return to the office in one month with lumbar flexion and extension x-rays.  She will see Dr. Goldberg at that visit.    Plan: Return to office in one month with x-rays        Wendi was seen today for back pain.    Diagnoses and all orders for this visit:    S/P lumbar fusion  -     XR Spine Lumbar Complete With Flex & Ext; Future    Post-operative state  -     XR Spine Lumbar Complete With Flex & Ext; Future      Return in about 4 weeks (around 10/15/2018).

## 2018-09-19 RX ORDER — ZOLPIDEM TARTRATE 10 MG/1
TABLET ORAL
Qty: 90 TABLET | Refills: 0 | OUTPATIENT
Start: 2018-09-19

## 2018-09-21 ENCOUNTER — TELEPHONE (OUTPATIENT)
Dept: FAMILY MEDICINE CLINIC | Facility: CLINIC | Age: 63
End: 2018-09-21

## 2018-09-21 ENCOUNTER — OFFICE VISIT (OUTPATIENT)
Dept: FAMILY MEDICINE CLINIC | Facility: CLINIC | Age: 63
End: 2018-09-21

## 2018-09-21 VITALS
HEART RATE: 87 BPM | HEIGHT: 69 IN | TEMPERATURE: 98.2 F | BODY MASS INDEX: 29.62 KG/M2 | DIASTOLIC BLOOD PRESSURE: 80 MMHG | SYSTOLIC BLOOD PRESSURE: 130 MMHG | WEIGHT: 200 LBS | OXYGEN SATURATION: 97 %

## 2018-09-21 DIAGNOSIS — R61 EXCESSIVE SWEATING: Primary | ICD-10-CM

## 2018-09-21 PROCEDURE — 99213 OFFICE O/P EST LOW 20 MIN: CPT | Performed by: INTERNAL MEDICINE

## 2018-09-21 RX ORDER — PAROXETINE 7.5 MG/1
7.5 CAPSULE ORAL DAILY
Qty: 30 CAPSULE | Refills: 0 | Status: SHIPPED | OUTPATIENT
Start: 2018-09-21 | End: 2018-10-03 | Stop reason: SDUPTHER

## 2018-09-21 NOTE — TELEPHONE ENCOUNTER
The medication you call to [facility] paroxetine is not covered by insurance it is 155$ 30 pills and no coupon cam be used BC of her SSI. Please call and let her know what is figured out

## 2018-09-21 NOTE — PROGRESS NOTES
Subjective   Wendi Ramos is a 62 y.o. female.   Complaining of long history of  Excessive sweating.  Looking for something for relief.  History of Present Illness   Patient with a history of excess sweating thyroid is felt to be a satisfactory range and some activating factor.  As patient had hysterectomy in her late 30s not sure when she had menopause but is been going on for a while.  No other problems she does have rheumatologic disorder followed by a specialist her thyroid is controlled by her endocrinologist no overt reason for them try that blood patient try [place] see if that helps with excessive sweating symptoms.  If don't give symptomatic relief with initial trials for patient will get dermatologist for further treatment.  Male patient does have rheumatoid arthritis also    Review of Systems   All other systems reviewed and are negative.      Objective   Vitals:    09/21/18 1250   BP: 130/80   Pulse: 87   Temp: 98.2 °F (36.8 °C)   SpO2: 97%   Weight: 90.7 kg (200 lb)     Physical Exam   Constitutional: She appears well-developed.   HENT:   Head: Normocephalic and atraumatic.   Eyes: Pupils are equal, round, and reactive to light. Conjunctivae are normal.   Cardiovascular: Normal rate and regular rhythm.    Pulmonary/Chest: Effort normal and breath sounds normal.   Abdominal: Soft. Bowel sounds are normal.   Skin: Skin is warm.   Skin does seem dry at present       No results found for: INR    Procedures    Assessment/Plan excessive sweating plan brisdelle 7.5 mg daily call regarding status after weeks treatment    Much of this encounter note is an electronic transcription/translation of spoken language to printed text.  The electronic translation of spoken language may permit erroneous, or at times, nonsensical words or phrases to be inadvertently transcribed.  Although I have reviewed the note for such errors, some may still exist. If there are questions or for further clarification, please contact  me.

## 2018-09-24 NOTE — TELEPHONE ENCOUNTER
Jackieell is a branded delayed release Paxil formulation 7.5 mg a day.  See if she ever got a Price from wishes of not we will try to rule out something else to give her.

## 2018-09-24 NOTE — TELEPHONE ENCOUNTER
Specifically they will not cover a generic Paxil we initially sent this through as [facility].  Have her price it wishes from state also

## 2018-10-02 RX ORDER — CIPROFLOXACIN 250 MG/1
250 TABLET, FILM COATED ORAL EVERY 12 HOURS SCHEDULED
Qty: 14 TABLET | Refills: 0 | Status: SHIPPED | OUTPATIENT
Start: 2018-10-02 | End: 2018-10-09

## 2018-10-03 RX ORDER — PAROXETINE 10 MG/1
10 TABLET, FILM COATED ORAL EVERY MORNING
Qty: 90 TABLET | Refills: 0 | Status: SHIPPED | OUTPATIENT
Start: 2018-10-03 | End: 2019-07-29

## 2018-10-23 ENCOUNTER — HOSPITAL ENCOUNTER (OUTPATIENT)
Dept: GENERAL RADIOLOGY | Facility: HOSPITAL | Age: 63
Discharge: HOME | End: 2018-10-23
Admitting: NURSE PRACTITIONER

## 2018-10-23 ENCOUNTER — OFFICE VISIT (OUTPATIENT)
Dept: NEUROSURGERY | Facility: CLINIC | Age: 63
End: 2018-10-23

## 2018-10-23 VITALS
RESPIRATION RATE: 16 BRPM | BODY MASS INDEX: 29.09 KG/M2 | SYSTOLIC BLOOD PRESSURE: 106 MMHG | HEART RATE: 76 BPM | WEIGHT: 197 LBS | DIASTOLIC BLOOD PRESSURE: 60 MMHG

## 2018-10-23 DIAGNOSIS — Z98.1 S/P LUMBAR FUSION: Primary | ICD-10-CM

## 2018-10-23 DIAGNOSIS — Z98.1 S/P LUMBAR FUSION: ICD-10-CM

## 2018-10-23 DIAGNOSIS — Z98.890 POST-OPERATIVE STATE: ICD-10-CM

## 2018-10-23 PROCEDURE — 72114 X-RAY EXAM L-S SPINE BENDING: CPT

## 2018-10-23 PROCEDURE — 99213 OFFICE O/P EST LOW 20 MIN: CPT | Performed by: NEUROLOGICAL SURGERY

## 2018-10-23 RX ORDER — HYDROCODONE BITARTRATE AND ACETAMINOPHEN 7.5; 325 MG/1; MG/1
1 TABLET ORAL EVERY 4 HOURS PRN
COMMUNITY
Start: 2018-10-20 | End: 2021-02-18 | Stop reason: HOSPADM

## 2018-10-23 NOTE — PROGRESS NOTES
Subjective   Patient ID: Wendi Ramos is a 62 y.o. female is here today for follow-up of back pain s/p L3/4 decompression and TLIF for recurrent disc herniation following lumbar discectomy in June. She is unaccompanied.    History of Present Illness  Patient presents for 3 month postoperative visit status post L3/4 decompression and TLIF on July 12, 2018 secondary to recurrent disc herniation.  Since her last office visit she has had lumbar x-rays. She feels she is doing well. She does get some low back pain with repetitive bending. She does have a sense of weakness in the left thigh with walking; no falls. She has not had any PT.     The following portions of the patient's history were reviewed and updated as appropriate: allergies, current medications and problem list.    Review of Systems   Genitourinary: Positive for enuresis (improving). Negative for difficulty urinating.   Musculoskeletal: Positive for back pain. Negative for gait problem.   Neurological: Positive for weakness (left leg) and numbness (peripheral neuropathy).       Objective   Physical Exam   Constitutional: She appears well-developed and well-nourished.   Body mass index is 29.09 kg/m².     Pulmonary/Chest: Effort normal.   Musculoskeletal:        Lumbar back: She exhibits normal range of motion, no tenderness, no bony tenderness and no pain.   Neurological: She is alert. She has normal strength. Gait normal.   Reflex Scores:       Patellar reflexes are 1+ (trace) on the right side and 1+ on the left side.       Achilles reflexes are 1+ on the right side and 1+ on the left side.  Skin: Skin is warm and dry.   Well-healed multiple lumbar incisions   Psychiatric: She has a normal mood and affect. Her behavior is normal. Judgment normal.   Vitals reviewed.    Neurologic Exam     Mental Status   Level of consciousness: alert  Knowledge: good.   Normal comprehension.     Motor Exam   Muscle bulk: normal    Strength   Strength 5/5 throughout.      Sensory Exam   Right leg light touch: normal  Left leg light touch: normal  Right leg vibration: decreased from knee  Left leg vibration: decreased from knee    Gait, Coordination, and Reflexes     Gait  Gait: normal    Reflexes   Right patellar: 1+ (trace)  Left patellar: 1+  Right achilles: 1+  Left achilles: 1+  Able to heel and toe walk       Assessment/Plan   Independent Review of Radiographic Studies:    I personally reviewed the flexion extension x-rays of the lumbar spine done recently: These demonstrate instrumentation to be good position, maintenance of the interbody fusion device and at this level, and ongoing fusion.  No abnormal motion is noted.  Medical Decision Making:    I confirmed and obtained the above history as recorded by the nurse practitioner acting as a scribe. I performed the above examination and it is documented by the nurse practitioner acting as a scribe.    Clinically the patient is doing very well.  She does as noted above have a sensation of weakness of the lower extremity.  I've encouraged her to consider physical therapy however she is recalcitrant in refusing.    I suggested that she continue ambulation, the exercises that she has learned in the past and physical therapy, and return to the office should she have recurrent problems.  Otherwise were not going to go ahead and schedule a follow-up at this time.    Wendi was seen today for back pain.    Diagnoses and all orders for this visit:    S/P lumbar fusion      Return if symptoms worsen or fail to improve.

## 2018-10-30 ENCOUNTER — CLINICAL SUPPORT (OUTPATIENT)
Dept: FAMILY MEDICINE CLINIC | Facility: CLINIC | Age: 63
End: 2018-10-30

## 2018-10-30 PROCEDURE — 90471 IMMUNIZATION ADMIN: CPT | Performed by: INTERNAL MEDICINE

## 2018-10-30 PROCEDURE — 90632 HEPA VACCINE ADULT IM: CPT | Performed by: INTERNAL MEDICINE

## 2018-10-30 PROCEDURE — G0008 ADMIN INFLUENZA VIRUS VAC: HCPCS | Performed by: INTERNAL MEDICINE

## 2018-10-30 PROCEDURE — 90674 CCIIV4 VAC NO PRSV 0.5 ML IM: CPT | Performed by: INTERNAL MEDICINE

## 2018-11-02 RX ORDER — ZOLPIDEM TARTRATE 10 MG/1
TABLET ORAL
Qty: 90 TABLET | Refills: 0 | Status: SHIPPED | OUTPATIENT
Start: 2018-11-02 | End: 2019-02-18 | Stop reason: SDUPTHER

## 2018-11-02 RX ORDER — ATORVASTATIN CALCIUM 10 MG/1
TABLET, FILM COATED ORAL
Qty: 90 TABLET | Refills: 0 | Status: SHIPPED | OUTPATIENT
Start: 2018-11-02 | End: 2019-04-24 | Stop reason: SDUPTHER

## 2018-11-21 RX ORDER — LANCETS 30 GAUGE
EACH MISCELLANEOUS
Qty: 100 EACH | Refills: 3 | Status: SHIPPED | OUTPATIENT
Start: 2018-11-21 | End: 2019-09-27 | Stop reason: SDUPTHER

## 2018-11-27 VITALS
HEART RATE: 95 BPM | HEART RATE: 121 BPM | DIASTOLIC BLOOD PRESSURE: 89 MMHG | RESPIRATION RATE: 18 BRPM | SYSTOLIC BLOOD PRESSURE: 150 MMHG | RESPIRATION RATE: 26 BRPM | RESPIRATION RATE: 32 BRPM | HEART RATE: 97 BPM | HEART RATE: 105 BPM | OXYGEN SATURATION: 95 % | DIASTOLIC BLOOD PRESSURE: 77 MMHG | TEMPERATURE: 16 F | SYSTOLIC BLOOD PRESSURE: 141 MMHG | SYSTOLIC BLOOD PRESSURE: 143 MMHG | WEIGHT: 195 LBS | OXYGEN SATURATION: 96 % | SYSTOLIC BLOOD PRESSURE: 133 MMHG | TEMPERATURE: 97.8 F | HEART RATE: 100 BPM | HEART RATE: 103 BPM | RESPIRATION RATE: 14 BRPM | DIASTOLIC BLOOD PRESSURE: 84 MMHG | RESPIRATION RATE: 25 BRPM | OXYGEN SATURATION: 94 % | DIASTOLIC BLOOD PRESSURE: 92 MMHG | HEART RATE: 104 BPM | SYSTOLIC BLOOD PRESSURE: 120 MMHG | DIASTOLIC BLOOD PRESSURE: 87 MMHG | HEART RATE: 108 BPM | HEART RATE: 101 BPM | DIASTOLIC BLOOD PRESSURE: 99 MMHG | RESPIRATION RATE: 27 BRPM | DIASTOLIC BLOOD PRESSURE: 73 MMHG | OXYGEN SATURATION: 97 % | SYSTOLIC BLOOD PRESSURE: 132 MMHG | RESPIRATION RATE: 10 BRPM | RESPIRATION RATE: 30 BRPM | DIASTOLIC BLOOD PRESSURE: 54 MMHG | SYSTOLIC BLOOD PRESSURE: 135 MMHG | SYSTOLIC BLOOD PRESSURE: 142 MMHG | RESPIRATION RATE: 29 BRPM | SYSTOLIC BLOOD PRESSURE: 90 MMHG | SYSTOLIC BLOOD PRESSURE: 158 MMHG | SYSTOLIC BLOOD PRESSURE: 151 MMHG | DIASTOLIC BLOOD PRESSURE: 63 MMHG | TEMPERATURE: 98 F | SYSTOLIC BLOOD PRESSURE: 155 MMHG | HEIGHT: 69 IN | OXYGEN SATURATION: 92 % | OXYGEN SATURATION: 98 % | DIASTOLIC BLOOD PRESSURE: 72 MMHG | HEART RATE: 98 BPM | DIASTOLIC BLOOD PRESSURE: 70 MMHG | DIASTOLIC BLOOD PRESSURE: 96 MMHG | SYSTOLIC BLOOD PRESSURE: 170 MMHG | SYSTOLIC BLOOD PRESSURE: 152 MMHG | DIASTOLIC BLOOD PRESSURE: 93 MMHG

## 2018-11-28 ENCOUNTER — AMBULATORY SURGICAL CENTER (AMBULATORY)
Dept: URBAN - METROPOLITAN AREA SURGERY 17 | Facility: SURGERY | Age: 63
End: 2018-11-28
Payer: MEDICARE

## 2018-11-28 ENCOUNTER — OFFICE (AMBULATORY)
Dept: URBAN - METROPOLITAN AREA PATHOLOGY 4 | Facility: PATHOLOGY | Age: 63
End: 2018-11-28
Payer: MEDICARE

## 2018-11-28 DIAGNOSIS — Z86.010 PERSONAL HISTORY OF COLONIC POLYPS: ICD-10-CM

## 2018-11-28 DIAGNOSIS — D12.2 BENIGN NEOPLASM OF ASCENDING COLON: ICD-10-CM

## 2018-11-28 DIAGNOSIS — D12.3 BENIGN NEOPLASM OF TRANSVERSE COLON: ICD-10-CM

## 2018-11-28 DIAGNOSIS — K64.8 OTHER HEMORRHOIDS: ICD-10-CM

## 2018-11-28 DIAGNOSIS — K64.4 RESIDUAL HEMORRHOIDAL SKIN TAGS: ICD-10-CM

## 2018-11-28 LAB
GI HISTOLOGY: A. UNSPECIFIED: (no result)
GI HISTOLOGY: B. UNSPECIFIED: (no result)
GI HISTOLOGY: PDF REPORT: (no result)

## 2018-11-28 PROCEDURE — 45385 COLONOSCOPY W/LESION REMOVAL: CPT | Mod: PT | Performed by: INTERNAL MEDICINE

## 2018-11-28 PROCEDURE — 88305 TISSUE EXAM BY PATHOLOGIST: CPT | Performed by: INTERNAL MEDICINE

## 2018-11-28 NOTE — SERVICEHPINOTES
63 yo WF with a hx. of diarrhea. She also has a history of adenomatous polyps and is due for a follow up colonoscopy.

## 2019-01-04 RX ORDER — ZOLPIDEM TARTRATE 10 MG/1
TABLET ORAL
Qty: 90 TABLET | Refills: 0 | OUTPATIENT
Start: 2019-01-04

## 2019-01-04 NOTE — TELEPHONE ENCOUNTER
Per records last refill done was on 11/2/18 for quantity 90 which been she would run out roughly one month.  We'll wait a couple weeks before we send this through please check it to make sure that what I have in front of me is correct.

## 2019-01-07 RX ORDER — FENOFIBRATE 160 MG/1
160 TABLET ORAL DAILY
Qty: 90 TABLET | Refills: 1 | Status: SHIPPED | OUTPATIENT
Start: 2019-01-07 | End: 2019-06-27 | Stop reason: SDUPTHER

## 2019-02-08 RX ORDER — LEVOTHYROXINE SODIUM 0.07 MG/1
TABLET ORAL
Qty: 90 TABLET | Refills: 1 | Status: SHIPPED | OUTPATIENT
Start: 2019-02-08 | End: 2019-07-31 | Stop reason: DRUGHIGH

## 2019-02-08 RX ORDER — GLIMEPIRIDE 2 MG/1
TABLET ORAL
Qty: 90 TABLET | Refills: 1 | Status: SHIPPED | OUTPATIENT
Start: 2019-02-08 | End: 2019-07-29

## 2019-02-08 RX ORDER — OMEPRAZOLE 40 MG/1
CAPSULE, DELAYED RELEASE ORAL
Qty: 90 CAPSULE | Refills: 1 | Status: SHIPPED | OUTPATIENT
Start: 2019-02-08 | End: 2019-06-27 | Stop reason: SDUPTHER

## 2019-02-18 DIAGNOSIS — Z51.81 ENCOUNTER FOR MEDICATION MONITORING: Primary | ICD-10-CM

## 2019-02-19 ENCOUNTER — LAB (OUTPATIENT)
Dept: FAMILY MEDICINE CLINIC | Facility: CLINIC | Age: 64
End: 2019-02-19

## 2019-02-19 DIAGNOSIS — Z51.81 ENCOUNTER FOR MEDICATION MONITORING: ICD-10-CM

## 2019-02-20 RX ORDER — ZOLPIDEM TARTRATE 10 MG/1
TABLET ORAL
Qty: 90 TABLET | Refills: 0 | Status: SHIPPED | OUTPATIENT
Start: 2019-02-20 | End: 2019-07-29

## 2019-02-25 LAB — CONV REPORT SUMMARY: NORMAL

## 2019-02-26 PROBLEM — G47.00 INSOMNIA: Status: ACTIVE | Noted: 2019-02-26

## 2019-04-24 RX ORDER — ATORVASTATIN CALCIUM 10 MG/1
TABLET, FILM COATED ORAL
Qty: 90 TABLET | Refills: 0 | Status: SHIPPED | OUTPATIENT
Start: 2019-04-24 | End: 2019-06-27 | Stop reason: SDUPTHER

## 2019-06-10 ENCOUNTER — OFFICE (AMBULATORY)
Dept: URBAN - METROPOLITAN AREA CLINIC 75 | Facility: CLINIC | Age: 64
End: 2019-06-10

## 2019-06-10 VITALS
WEIGHT: 200 LBS | HEART RATE: 64 BPM | DIASTOLIC BLOOD PRESSURE: 86 MMHG | RESPIRATION RATE: 16 BRPM | SYSTOLIC BLOOD PRESSURE: 126 MMHG | HEIGHT: 69 IN

## 2019-06-10 DIAGNOSIS — R19.4 CHANGE IN BOWEL HABIT: ICD-10-CM

## 2019-06-10 DIAGNOSIS — K29.50 UNSPECIFIED CHRONIC GASTRITIS WITHOUT BLEEDING: ICD-10-CM

## 2019-06-10 DIAGNOSIS — K44.9 DIAPHRAGMATIC HERNIA WITHOUT OBSTRUCTION OR GANGRENE: ICD-10-CM

## 2019-06-10 DIAGNOSIS — R94.5 ABNORMAL RESULTS OF LIVER FUNCTION STUDIES: ICD-10-CM

## 2019-06-10 DIAGNOSIS — K31.9 DISEASE OF STOMACH AND DUODENUM, UNSPECIFIED: ICD-10-CM

## 2019-06-10 DIAGNOSIS — K30 FUNCTIONAL DYSPEPSIA: ICD-10-CM

## 2019-06-10 DIAGNOSIS — D12.2 BENIGN NEOPLASM OF ASCENDING COLON: ICD-10-CM

## 2019-06-10 DIAGNOSIS — D12.6 BENIGN NEOPLASM OF COLON, UNSPECIFIED: ICD-10-CM

## 2019-06-10 DIAGNOSIS — R19.7 DIARRHEA, UNSPECIFIED: ICD-10-CM

## 2019-06-10 DIAGNOSIS — D12.0 BENIGN NEOPLASM OF CECUM: ICD-10-CM

## 2019-06-10 DIAGNOSIS — R93.3 ABNORMAL FINDINGS ON DIAGNOSTIC IMAGING OF OTHER PARTS OF DI: ICD-10-CM

## 2019-06-10 DIAGNOSIS — D12.3 BENIGN NEOPLASM OF TRANSVERSE COLON: ICD-10-CM

## 2019-06-10 PROCEDURE — 99213 OFFICE O/P EST LOW 20 MIN: CPT | Performed by: INTERNAL MEDICINE

## 2019-06-28 RX ORDER — FENOFIBRATE 160 MG/1
TABLET ORAL
Qty: 90 TABLET | Refills: 1 | Status: SHIPPED | OUTPATIENT
Start: 2019-06-28 | End: 2020-01-21

## 2019-06-28 RX ORDER — ATORVASTATIN CALCIUM 10 MG/1
TABLET, FILM COATED ORAL
Qty: 90 TABLET | Refills: 0 | Status: SHIPPED | OUTPATIENT
Start: 2019-06-28 | End: 2019-09-05

## 2019-06-28 RX ORDER — METFORMIN HYDROCHLORIDE 500 MG/1
TABLET, EXTENDED RELEASE ORAL
Qty: 90 TABLET | Refills: 3 | Status: SHIPPED | OUTPATIENT
Start: 2019-06-28 | End: 2019-08-01

## 2019-06-28 RX ORDER — OMEPRAZOLE 40 MG/1
CAPSULE, DELAYED RELEASE ORAL
Qty: 90 CAPSULE | Refills: 1 | Status: SHIPPED | OUTPATIENT
Start: 2019-06-28 | End: 2019-11-30 | Stop reason: SDUPTHER

## 2019-07-29 ENCOUNTER — OFFICE VISIT (OUTPATIENT)
Dept: FAMILY MEDICINE CLINIC | Facility: CLINIC | Age: 64
End: 2019-07-29

## 2019-07-29 VITALS
DIASTOLIC BLOOD PRESSURE: 70 MMHG | SYSTOLIC BLOOD PRESSURE: 120 MMHG | BODY MASS INDEX: 29.43 KG/M2 | HEART RATE: 81 BPM | WEIGHT: 198.7 LBS | TEMPERATURE: 98.4 F | OXYGEN SATURATION: 96 % | HEIGHT: 69 IN

## 2019-07-29 DIAGNOSIS — Z00.00 MEDICARE ANNUAL WELLNESS VISIT, INITIAL: Primary | ICD-10-CM

## 2019-07-29 DIAGNOSIS — E11.9 DIABETES MELLITUS WITHOUT COMPLICATION (HCC): ICD-10-CM

## 2019-07-29 DIAGNOSIS — E78.5 HYPERLIPIDEMIA, UNSPECIFIED HYPERLIPIDEMIA TYPE: ICD-10-CM

## 2019-07-29 DIAGNOSIS — E03.9 HYPOTHYROIDISM, UNSPECIFIED TYPE: ICD-10-CM

## 2019-07-29 LAB
ALBUMIN UR-MCNC: 0 MG/L (ref 0–20)
ARTICHOKE IGE QN: 105 MG/DL (ref 0–100)
CHOLEST SERPL-MCNC: 220 MG/DL (ref 0–200)
HBA1C MFR BLD: 11.58 % (ref 4.8–5.6)
HDLC SERPL-MCNC: 32 MG/DL (ref 40–60)
LDLC SERPL CALC-MCNC: ABNORMAL MG/DL (ref 0–100)
LDLC/HDLC SERPL: ABNORMAL {RATIO}
TRIGL SERPL-MCNC: 692 MG/DL (ref 0–150)
TSH SERPL DL<=0.05 MIU/L-ACNC: 7.03 MIU/ML (ref 0.27–4.2)
VLDLC SERPL-MCNC: ABNORMAL MG/DL (ref 5–40)

## 2019-07-29 PROCEDURE — 83721 ASSAY OF BLOOD LIPOPROTEIN: CPT | Performed by: INTERNAL MEDICINE

## 2019-07-29 PROCEDURE — G0438 PPPS, INITIAL VISIT: HCPCS | Performed by: INTERNAL MEDICINE

## 2019-07-29 PROCEDURE — 99214 OFFICE O/P EST MOD 30 MIN: CPT | Performed by: INTERNAL MEDICINE

## 2019-07-29 PROCEDURE — 96160 PT-FOCUSED HLTH RISK ASSMT: CPT | Performed by: INTERNAL MEDICINE

## 2019-07-29 PROCEDURE — 80061 LIPID PANEL: CPT | Performed by: INTERNAL MEDICINE

## 2019-07-29 PROCEDURE — 83036 HEMOGLOBIN GLYCOSYLATED A1C: CPT | Performed by: INTERNAL MEDICINE

## 2019-07-29 PROCEDURE — 82043 UR ALBUMIN QUANTITATIVE: CPT | Performed by: INTERNAL MEDICINE

## 2019-07-29 PROCEDURE — 36415 COLL VENOUS BLD VENIPUNCTURE: CPT | Performed by: INTERNAL MEDICINE

## 2019-07-29 PROCEDURE — 84443 ASSAY THYROID STIM HORMONE: CPT | Performed by: INTERNAL MEDICINE

## 2019-07-29 RX ORDER — MULTIVITAMIN
1 TABLET ORAL DAILY
COMMUNITY
End: 2021-02-12

## 2019-07-29 RX ORDER — ZOLPIDEM TARTRATE 10 MG/1
10 TABLET ORAL NIGHTLY PRN
Qty: 90 TABLET | Refills: 0 | Status: SHIPPED | OUTPATIENT
Start: 2019-07-29 | End: 2019-10-25 | Stop reason: SDUPTHER

## 2019-07-29 RX ORDER — GLIMEPIRIDE 2 MG/1
2 TABLET ORAL DAILY
COMMUNITY
End: 2019-09-05

## 2019-07-29 NOTE — PROGRESS NOTES
Subjective   Wendi Ramos is a 63 y.o. female.   Medicare wellness visit initial also needs follow-up on lipids thyroid diabetes  History of Present Illness   Medicare wellness visit initial sugars been doing fairly well needs follow-up on diabetes thyroid as well as lipids.  No other complaints at this point energy level is fair mood is somewhat decreased due to limitations p.m. get out due to visual difficulties.  Doing fine patient has not been as conscientious on diet as she can exercise is limited also.  We will increase her Metformin  Her adhesive tape Septra causing hives cephalosporins which I believe gave her C. difficile metformin because of nausea we will try the extended release version with food sulfa causing hives Topamax hives and latex dermatitis and rash.  Patient is a non-smoker.  Family history for diabetes neuropathy: Polyps cancer undefined  Review of Systems   All other systems reviewed and are negative.      Objective   Vitals:    07/29/19 1132   BP: 120/70   Pulse: 81   Temp: 98.4 °F (36.9 °C)   SpO2: 96%   Weight: 90.1 kg (198 lb 11.2 oz)     Physical Exam   Constitutional: She appears well-developed and well-nourished.   HENT:   Head: Normocephalic and atraumatic.   Eyes: Conjunctivae are normal. Pupils are equal, round, and reactive to light.   Cardiovascular: Normal rate, regular rhythm and normal heart sounds.   Pulmonary/Chest: Effort normal and breath sounds normal.   Abdominal: Soft. Bowel sounds are normal.   Neurological: She is alert.   Somewhat slow but stable gait and station   Skin: Skin is warm and dry.   Nursing note and vitals reviewed.      No results found for: INR    Procedures    Assessment/Plan   1.  Medicare wellness visit initial    2.  Hypothyroidism await pending lab    3.  Hyperlipidemia weight pending lab if good recheck 6 months    4.  Type 2 diabetes increase metformin up to 2 tabs a day take with food.  Follow-up with hemoglobin A1c in 3 months call in blood  sugars in 2 weeks time.    Increase metformin up to 2 tablets a day i.e. 1000 mg    much of this encounter note is an electronic transcription/translation of spoken language to printed text.  The electronic translation of spoken language may permit erroneous, or at times, nonsensical words or phrases to be inadvertently transcribed.  Although I have reviewed the note for such errors, some may still exist. If there are questions or for further clarification, please contact me.

## 2019-07-29 NOTE — PROGRESS NOTES
The ABCs of the Annual Wellness Visit  Initial Medicare Wellness Visit    Chief Complaint   Patient presents with   • Medicare Wellness-Initial Visit       Subjective   History of Present Illness:  Wendi Ramos is a 63 y.o. female who presents for an Initial Medicare Wellness Visit.    HEALTH RISK ASSESSMENT    Recent Hospitalizations:  Recently treated at the following:  [facility]    Current Medical Providers:  Patient Care Team:  Brice Camarena Jr., MD as PCP - General (Internal Medicine)  Jomar Steve MD as Consulting Physician (Interventional Cardiology)  Donny Gan MD as Consulting Physician (Pain Medicine)    Smoking Status:  Social History     Tobacco Use   Smoking Status Never Smoker   Smokeless Tobacco Never Used       Alcohol Consumption:  Social History     Substance and Sexual Activity   Alcohol Use Yes    Comment: social       Depression Screen:   PHQ-2/PHQ-9 Depression Screening 8/21/2018   Little interest or pleasure in doing things 0   Feeling down, depressed, or hopeless 0   Total Score 0       Fall Risk Screen:  STEADI Fall Risk Assessment has not been completed.    Health Habits and Functional and Cognitive Screening:  No flowsheet data found.      Does the patient have evidence of cognitive impairment? No    Asprin use counseling:Taking ASA appropriately as indicated    Age-appropriate Screening Schedule:  Refer to the list below for future screening recommendations based on patient's age, sex and/or medical conditions. Orders for these recommended tests are listed in the plan section. The patient has been provided with a written plan.    Health Maintenance   Topic Date Due   • TDAP/TD VACCINES (1 - Tdap) 12/16/1972   • DIABETIC FOOT EXAM  05/10/2016   • PAP SMEAR  05/10/2016   • MAMMOGRAM  11/28/2018   • HEMOGLOBIN A1C  12/09/2018   • PNEUMOCOCCAL VACCINES (65+ LOW/MEDIUM RISK) (1 of 2 - PCV13) 12/16/2018   • URINE MICROALBUMIN  02/02/2019   • ZOSTER  VACCINE (2 of 2) 02/10/2019   • LIPID PANEL  02/27/2019   • DIABETIC EYE EXAM  05/11/2019   • INFLUENZA VACCINE  08/01/2019   • COLONOSCOPY  11/28/2028          The following portions of the patient's history were reviewed and updated as appropriate: allergies, current medications, past family history, past medical history, past social history, past surgical history and problem list.    Outpatient Medications Prior to Visit   Medication Sig Dispense Refill   • aspirin 81 MG EC tablet Take 81 mg by mouth daily.     • atorvastatin (LIPITOR) 10 MG tablet TAKE 1 TABLET EVERY NIGHT 90 tablet 0   • B Complex Vitamins (VITAMIN B COMPLEX PO) Take 1 tablet by mouth Daily.     • baclofen (LIORESAL) 20 MG tablet Take 20 mg by mouth 2 (Two) Times a Day.     • cholecalciferol (VITAMIN D3) 400 units tablet Take 400 Units by mouth Daily.     • choline fenofibrate (TRILIPIX) 135 MG capsule TAKE 1 CAPSULE EVERY DAY 90 capsule 2   • cyclobenzaprine (FLEXERIL) 10 MG tablet Take 10 mg by mouth Every Night.     • DULoxetine (CYMBALTA) 60 MG capsule Take 60 mg by mouth Daily.     • fenofibrate 160 MG tablet TAKE 1 TABLET EVERY DAY 90 tablet 1   • Ferrous Gluconate (IRON 27 PO) Take 1 tablet by mouth Every Other Day.     • glucose blood test strip Test glucose once daily 100 each 3   • HYDROcodone-acetaminophen (NORCO) 7.5-325 MG per tablet 4 (Four) Times a Day As Needed.     • hydroxychloroquine (PLAQUENIL) 200 MG tablet Take 200 mg by mouth 2 (Two) Times a Day.     • Lancets misc Test glucose once daily 100 each 3   • leflunomide (ARAVA) 20 MG tablet Take 20 mg by mouth Daily.     • levETIRAcetam (KEPPRA) 500 MG tablet Take 2,000 mg by mouth 2 (Two) Times a Day. Take 4 tabs bid      • levothyroxine (SYNTHROID, LEVOTHROID) 75 MCG tablet TAKE 1 TABLET EVERY DAY 90 tablet 1   • metFORMIN ER (GLUCOPHAGE-XR) 500 MG 24 hr tablet TAKE 1 TABLET EVERY DAY WITH BREAKFAST 90 tablet 3   • metoprolol tartrate (LOPRESSOR) 50 MG tablet Take 50 mg by  mouth Every 12 (Twelve) Hours.     • omeprazole (priLOSEC) 40 MG capsule TAKE 1 CAPSULE EVERY DAY 90 capsule 1   • OXcarbazepine (TRILEPTAL) 600 MG tablet Take 600 mg by mouth 2 (Two) Times a Day.     • pramipexole (MIRAPEX) 0.5 MG tablet Take 0.5 mg by mouth Every Night.     • vitamin E 400 UNIT capsule Take 400 Units by mouth Daily.     • zolpidem (AMBIEN) 10 MG tablet TAKE 1 TABLET AT BEDTIME AS NEEDED FOR SLEEP 90 tablet 0   • glimepiride (AMARYL) 2 MG tablet Take 2 mg by mouth Daily.     • Multiple Vitamin (MULTIVITAMIN) tablet Take 1 tablet by mouth Daily.     • PARoxetine (PAXIL) 10 MG tablet Take 1 tablet by mouth Every Morning. 90 tablet 0   • glimepiride (AMARYL) 2 MG tablet TAKE 1 TABLET EVERY MORNING BEFORE BREAKFAST 90 tablet 1     Facility-Administered Medications Prior to Visit   Medication Dose Route Frequency Provider Last Rate Last Dose   • mupirocin (BACTROBAN) 2 % ointment   Topical Q12H Iwona Neal, ALESSANDRA           Patient Active Problem List   Diagnosis   • Type 2 diabetes mellitus without complication (CMS/HCC)   • Post-operative state   • S/P lumbar fusion   • Insomnia   • Abnormal EKG   • Cellulitis of breast   • Chest pain   • Dyspnea on exertion   • Fibromyalgia   • Hyperhidrosis   • LAFB (left anterior fascicular block)   • Mass of right breast   • Obesity   • PVC (premature ventricular contraction)   • Rheumatoid arthritis (CMS/HCC)   • S/P catheter ablation of slow pathway   • Seizures (CMS/HCC)   • Sinus tachycardia       Advanced Care Planning:  Patient has an advance directive - a copy has been provided and is visible in patient header    Review of Systems    Compared to one year ago, the patient feels her physical health is worse.  Compared to one year ago, the patient feels her mental health is worse.    Reviewed chart for potential of high risk medication in the elderly: not applicable  Reviewed chart for potential of harmful drug interactions in the elderly:not  "applicable    Objective         Vitals:    07/29/19 1132   BP: 120/70   BP Location: Left arm   Patient Position: Sitting   Cuff Size: Adult   Pulse: 81   Temp: 98.4 °F (36.9 °C)   TempSrc: Oral   SpO2: 96%   Weight: 90.1 kg (198 lb 11.2 oz)   Height: 175.3 cm (69\")   PainSc:   5       Body mass index is 29.34 kg/m².  Discussed the patient's BMI with her. The BMI is above average; BMI management plan is completed.    Physical Exam          Assessment/Plan   Medicare Risks and Personalized Health Plan  CMS Preventative Services Quick Reference  Breast Cancer/Mammogram Screening    The above risks/problems have been discussed with the patient.  Pertinent information has been shared with the patient in the After Visit Summary.  Follow up plans and orders are seen below in the Assessment/Plan Section.    There are no diagnoses linked to this encounter.  Follow Up:  No Follow-up on file.     An After Visit Summary and PPPS were given to the patient.           "

## 2019-07-29 NOTE — PATIENT INSTRUCTIONS
Medicare Wellness  Personal Prevention Plan of Service     Date of Office Visit:  2019  Encounter Provider:  Brice Camarena MD  Place of Service:  [facility]  Patient Name: Wendi Ramos  :  1955    As part of the Medicare Wellness portion of your visit today, we are providing you with this personalized preventive plan of services (PPPS). This plan is based upon recommendations of the [place] Preventive Services Task Force (USPSTF) and the Advisory Committee on Immunization Practices (ACIP).    This lists the preventive care services that should be considered, and provides dates of when you are due. Items listed as completed are up-to-date and do not require any further intervention.    Health Maintenance   Topic Date Due   • TDAP/TD VACCINES (1 - Tdap) 1972   • HEPATITIS C SCREENING  05/10/2016   • PAP SMEAR  05/10/2016   • MAMMOGRAM  2018   • HEMOGLOBIN A1C  2018   • PNEUMOCOCCAL VACCINES (65+ LOW/MEDIUM RISK) (1 of 2 - PCV13) 2018   • URINE MICROALBUMIN  2019   • ZOSTER VACCINE (2 of 2) 02/10/2019   • LIPID PANEL  2019   • INFLUENZA VACCINE  2019   • DIABETIC FOOT EXAM  2020   • DIABETIC EYE EXAM  2020   • MEDICARE ANNUAL WELLNESS  2020   • COLONOSCOPY  2028       No orders of the defined types were placed in this encounter.      No Follow-up on file.

## 2019-07-31 DIAGNOSIS — E11.65 TYPE 2 DIABETES MELLITUS WITH HYPERGLYCEMIA, WITHOUT LONG-TERM CURRENT USE OF INSULIN (HCC): Primary | ICD-10-CM

## 2019-07-31 RX ORDER — LEVOTHYROXINE SODIUM 88 UG/1
88 TABLET ORAL DAILY
Qty: 90 TABLET | Refills: 1 | Status: SHIPPED | OUTPATIENT
Start: 2019-07-31 | End: 2019-09-12

## 2019-07-31 RX ORDER — METOPROLOL TARTRATE 50 MG/1
50 TABLET, FILM COATED ORAL EVERY 12 HOURS SCHEDULED
Qty: 180 TABLET | Refills: 2 | Status: SHIPPED | OUTPATIENT
Start: 2019-07-31 | End: 2020-08-03 | Stop reason: SDUPTHER

## 2019-08-01 RX ORDER — METFORMIN HYDROCHLORIDE 500 MG/1
TABLET, EXTENDED RELEASE ORAL
Qty: 180 TABLET | Refills: 0 | Status: SHIPPED | OUTPATIENT
Start: 2019-08-01 | End: 2019-09-05

## 2019-09-05 ENCOUNTER — OFFICE VISIT (OUTPATIENT)
Dept: ENDOCRINOLOGY | Age: 64
End: 2019-09-05

## 2019-09-05 VITALS
DIASTOLIC BLOOD PRESSURE: 84 MMHG | RESPIRATION RATE: 16 BRPM | BODY MASS INDEX: 29.51 KG/M2 | SYSTOLIC BLOOD PRESSURE: 130 MMHG | HEIGHT: 69 IN | WEIGHT: 199.2 LBS

## 2019-09-05 DIAGNOSIS — R53.82 CHRONIC FATIGUE: ICD-10-CM

## 2019-09-05 DIAGNOSIS — E11.9 TYPE 2 DIABETES MELLITUS WITHOUT COMPLICATION, WITHOUT LONG-TERM CURRENT USE OF INSULIN (HCC): Primary | ICD-10-CM

## 2019-09-05 DIAGNOSIS — E55.9 VITAMIN D DEFICIENCY: ICD-10-CM

## 2019-09-05 DIAGNOSIS — M79.7 FIBROMYALGIA: ICD-10-CM

## 2019-09-05 DIAGNOSIS — E78.5 COMPLEX DYSLIPIDEMIA: ICD-10-CM

## 2019-09-05 DIAGNOSIS — E06.3 HYPOTHYROIDISM DUE TO HASHIMOTO'S THYROIDITIS: ICD-10-CM

## 2019-09-05 DIAGNOSIS — E03.8 HYPOTHYROIDISM DUE TO HASHIMOTO'S THYROIDITIS: ICD-10-CM

## 2019-09-05 PROCEDURE — 99205 OFFICE O/P NEW HI 60 MIN: CPT | Performed by: INTERNAL MEDICINE

## 2019-09-05 RX ORDER — ROSUVASTATIN CALCIUM 40 MG/1
40 TABLET, COATED ORAL DAILY
Qty: 90 TABLET | Refills: 3 | Status: SHIPPED | OUTPATIENT
Start: 2019-09-05 | End: 2019-11-17 | Stop reason: SDUPTHER

## 2019-09-05 RX ORDER — DAPAGLIFLOZIN AND METFORMIN HYDROCHLORIDE 5; 1000 MG/1; MG/1
2 TABLET, FILM COATED, EXTENDED RELEASE ORAL DAILY
Qty: 180 TABLET | Refills: 3 | Status: SHIPPED | OUTPATIENT
Start: 2019-09-05 | End: 2020-06-08

## 2019-09-05 RX ORDER — INSULIN GLARGINE AND LIXISENATIDE 100; 33 U/ML; UG/ML
60 INJECTION, SOLUTION SUBCUTANEOUS
Qty: 15 PEN | Refills: 3 | Status: SHIPPED | OUTPATIENT
Start: 2019-09-05 | End: 2019-12-27

## 2019-09-05 RX ORDER — ICOSAPENT ETHYL 1000 MG/1
2 CAPSULE ORAL 2 TIMES DAILY WITH MEALS
Qty: 360 CAPSULE | Refills: 3 | Status: SHIPPED | OUTPATIENT
Start: 2019-09-05 | End: 2019-09-17

## 2019-09-05 RX ORDER — ESTRADIOL 0.07 MG/D
1 FILM, EXTENDED RELEASE TRANSDERMAL 2 TIMES WEEKLY
Qty: 24 PATCH | Refills: 3 | Status: SHIPPED | OUTPATIENT
Start: 2019-09-05 | End: 2019-12-12 | Stop reason: SDUPTHER

## 2019-09-05 NOTE — PROGRESS NOTES
"Subjective   Wendi Ramos is a 63 y.o. female seen as a new patient for DM2. She is checking BG 1-2 times a day. She states that her BG is always running high. She states that she is also thirsty all of the time. She does have neuropathy but does not know if it is related to her diabetes.     History of Present Illness this is a 63-year-old female who has been suffering from type 2 diabetes has been referred for further evaluation of uncontrolled type 2 diabetes and associated disorders.  She was diagnosed with fatty liver about 7 to 8 years prior to her diagnosis of type 2 diabetes.  She is also a known patient with hypertension and dyslipidemia as well as hypothyroidism and vitamin D deficiency.  She is a status post hysterectomy in 1999 and has never been on any hormone replacement therapy however she complains of hot flashes as well as significant degree of dyspareunia to the point of not being able to have a fulfilling and pleasurable sexual relations.  She has no personal history of breast cancer nor are there any female members of the family with breast cancer.  Her family history is significant for preponderance of type 2 diabetes heart disease and stroke in many members of the family.  Additionally she is complaining and has a diagnosis of rheumatoid arthritis as well as seizure disorder and fibromyalgia.    /84   Resp 16   Ht 175.3 cm (69\")   Wt 90.4 kg (199 lb 3.2 oz)   BMI 29.42 kg/m²      Allergies   Allergen Reactions   • Adhesive Tape Other (See Comments)     SKIN ON HANDS SHED OFF   • Sulfamethoxazole W/Trimethoprim 800-160  [Sulfamethoxazole-Trimethoprim] Hives   • Cefpodoxime    • Cephalosporins Other (See Comments)     C-DIFF   • Metformin And Related Nausea Only     Regular Metformin(NOT EXTENDED RELEASE)   • Sulfa Antibiotics Hives   • Topiramate Hives and Itching   • Latex Dermatitis and Rash       Current Outpatient Medications:   •  aspirin 81 MG EC tablet, Take 81 mg by mouth " daily., Disp: , Rfl:   •  atorvastatin (LIPITOR) 10 MG tablet, TAKE 1 TABLET EVERY NIGHT, Disp: 90 tablet, Rfl: 0  •  B Complex Vitamins (VITAMIN B COMPLEX PO), Take 1 tablet by mouth Daily., Disp: , Rfl:   •  baclofen (LIORESAL) 20 MG tablet, Take 20 mg by mouth 2 (Two) Times a Day., Disp: , Rfl:   •  cholecalciferol (VITAMIN D3) 400 units tablet, Take 400 Units by mouth Daily., Disp: , Rfl:   •  DULoxetine (CYMBALTA) 60 MG capsule, Take 60 mg by mouth Daily., Disp: , Rfl:   •  fenofibrate 160 MG tablet, TAKE 1 TABLET EVERY DAY, Disp: 90 tablet, Rfl: 1  •  Ferrous Gluconate (IRON 27 PO), Take 1 tablet by mouth Every Other Day., Disp: , Rfl:   •  glimepiride (AMARYL) 2 MG tablet, Take 2 mg by mouth Daily. And 1 tab in the evening, Disp: , Rfl:   •  glucose blood test strip, Test glucose once daily, Disp: 100 each, Rfl: 3  •  HYDROcodone-acetaminophen (NORCO) 7.5-325 MG per tablet, 4 (Four) Times a Day As Needed., Disp: , Rfl:   •  hydroxychloroquine (PLAQUENIL) 200 MG tablet, Take 200 mg by mouth 2 (Two) Times a Day., Disp: , Rfl:   •  Lancets misc, Test glucose once daily, Disp: 100 each, Rfl: 3  •  leflunomide (ARAVA) 20 MG tablet, Take 20 mg by mouth Daily., Disp: , Rfl:   •  levETIRAcetam (KEPPRA) 500 MG tablet, Take 2,000 mg by mouth 2 (Two) Times a Day. Take 4 tabs bid , Disp: , Rfl:   •  levothyroxine (SYNTHROID) 88 MCG tablet, Take 1 tablet by mouth Daily., Disp: 90 tablet, Rfl: 1  •  metFORMIN ER (GLUCOPHAGE-XR) 500 MG 24 hr tablet, Take 2 tabs every morning with breakfast (Patient taking differently: Take 1,500 mg by mouth Daily With Breakfast. Take 2 tabs every morning with breakfast), Disp: 180 tablet, Rfl: 0  •  metoprolol tartrate (LOPRESSOR) 50 MG tablet, Take 1 tablet by mouth Every 12 (Twelve) Hours., Disp: 180 tablet, Rfl: 2  •  Multiple Vitamin (MULTIVITAMIN) tablet, Take 1 tablet by mouth Daily., Disp: , Rfl:   •  omeprazole (priLOSEC) 40 MG capsule, TAKE 1 CAPSULE EVERY DAY, Disp: 90 capsule,  Rfl: 1  •  OXcarbazepine (TRILEPTAL) 600 MG tablet, Take 600 mg by mouth 2 (Two) Times a Day., Disp: , Rfl:   •  pramipexole (MIRAPEX) 0.5 MG tablet, Take 0.5 mg by mouth Every Night., Disp: , Rfl:   •  vitamin E 400 UNIT capsule, Take 400 Units by mouth Daily., Disp: , Rfl:   •  zolpidem (AMBIEN) 10 MG tablet, Take 1 tablet by mouth At Night As Needed for Sleep. for sleep, Disp: 90 tablet, Rfl: 0      The following portions of the patient's history were reviewed and updated as appropriate: allergies, current medications, past family history, past medical history, past social history, past surgical history and problem list.    Review of Systems   Constitutional: Negative.  Negative for fatigue.   HENT: Negative.    Eyes: Negative.    Respiratory: Negative.    Cardiovascular: Negative.  Negative for chest pain.   Gastrointestinal: Negative.    Endocrine: Positive for polydipsia, polyphagia and polyuria.   Genitourinary: Negative.    Musculoskeletal: Negative.    Skin: Negative.  Negative for pallor.   Allergic/Immunologic: Negative.    Neurological: Positive for numbness. Negative for dizziness, tremors, seizures, speech difficulty, weakness and headaches.   Hematological: Negative.    Psychiatric/Behavioral: Negative.  Negative for confusion. The patient is not nervous/anxious.        Objective   Physical Exam   Constitutional: She is oriented to person, place, and time. She appears well-developed and well-nourished. No distress.   HENT:   Head: Normocephalic and atraumatic.   Right Ear: External ear normal.   Left Ear: External ear normal.   Nose: Nose normal.   Mouth/Throat: Oropharynx is clear and moist. No oropharyngeal exudate.   Eyes: Conjunctivae and EOM are normal. Pupils are equal, round, and reactive to light. Right eye exhibits no discharge. Left eye exhibits no discharge. No scleral icterus.   Neck: Normal range of motion. Neck supple. No JVD present. No tracheal deviation present. No thyromegaly  present.   Cardiovascular: Normal rate, regular rhythm, normal heart sounds and intact distal pulses. Exam reveals no gallop and no friction rub.   No murmur heard.  Pulmonary/Chest: Effort normal and breath sounds normal. No stridor. No respiratory distress. She has no wheezes. She has no rales. She exhibits no tenderness.   Abdominal: Soft. Bowel sounds are normal. She exhibits no distension and no mass. There is no tenderness. There is no rebound and no guarding. No hernia.   Musculoskeletal: Normal range of motion. She exhibits no edema, tenderness or deformity.   Lymphadenopathy:     She has no cervical adenopathy.   Neurological: She is alert and oriented to person, place, and time. She has normal reflexes. She displays normal reflexes. No cranial nerve deficit or sensory deficit. She exhibits normal muscle tone. Coordination normal.   Skin: Skin is warm and dry. No rash noted. She is not diaphoretic. No erythema. No pallor.   Psychiatric: She has a normal mood and affect. Her behavior is normal. Judgment and thought content normal.   Nursing note and vitals reviewed.        Assessment/Plan   Diagnoses and all orders for this visit:    Type 2 diabetes mellitus without complication, without long-term current use of insulin (CMS/[facility])  -     T4 & TSH ([facility])  -     T3, Free  -     T4, Free  -     Thyroglobulin With Anti-TG  -     Uric Acid  -     Vitamin D 25 Hydroxy  -     Comprehensive Metabolic Panel  -     C-Peptide  -     Follicle Stimulating Hormone  -     Hemoglobin A1c  -     Luteinizing Hormone  -     MicroAlbumin, Urine, Random - Urine, Clean Catch  -     Prolactin  -     ACTH  -     Cortisol  -     Catecholamines, Fractionated, Plasma  -     NMR LipoProfile  -     T3, Free; Future  -     T4 & TSH ([facility]); Future  -     T4, Free; Future  -     Uric Acid; Future  -     Vitamin D 25 Hydroxy; Future  -     Comprehensive Metabolic Panel; Future  -     C-Peptide; Future  -     Hemoglobin A1c; Future  -      MicroAlbumin, Urine, Random - Urine, Clean Catch; Future  -     NMR LipoProfile; Future    Fibromyalgia  -     T4 & TSH ([facility])  -     T3, Free  -     T4, Free  -     Thyroglobulin With Anti-TG  -     Uric Acid  -     Vitamin D 25 Hydroxy  -     Comprehensive Metabolic Panel  -     C-Peptide  -     Follicle Stimulating Hormone  -     Hemoglobin A1c  -     Luteinizing Hormone  -     MicroAlbumin, Urine, Random - Urine, Clean Catch  -     Prolactin  -     ACTH  -     Cortisol  -     Catecholamines, Fractionated, Plasma  -     NMR LipoProfile  -     T3, Free; Future  -     T4 & TSH ([facility]); Future  -     T4, Free; Future  -     Uric Acid; Future  -     Vitamin D 25 Hydroxy; Future  -     Comprehensive Metabolic Panel; Future  -     C-Peptide; Future  -     Hemoglobin A1c; Future  -     MicroAlbumin, Urine, Random - Urine, Clean Catch; Future  -     NMR LipoProfile; Future    Hypothyroidism due to Hashimoto's thyroiditis  -     T4 & TSH ([facility])  -     T3, Free  -     T4, Free  -     Thyroglobulin With Anti-TG  -     Uric Acid  -     Vitamin D 25 Hydroxy  -     Comprehensive Metabolic Panel  -     C-Peptide  -     Follicle Stimulating Hormone  -     Hemoglobin A1c  -     Luteinizing Hormone  -     MicroAlbumin, Urine, Random - Urine, Clean Catch  -     Prolactin  -     ACTH  -     Cortisol  -     Catecholamines, Fractionated, Plasma  -     NMR LipoProfile  -     T3, Free; Future  -     T4 & TSH ([facility]); Future  -     T4, Free; Future  -     Uric Acid; Future  -     Vitamin D 25 Hydroxy; Future  -     Comprehensive Metabolic Panel; Future  -     C-Peptide; Future  -     Hemoglobin A1c; Future  -     MicroAlbumin, Urine, Random - Urine, Clean Catch; Future  -     NMR LipoProfile; Future    Complex dyslipidemia  -     T4 & TSH ([facility])  -     T3, Free  -     T4, Free  -     Thyroglobulin With Anti-TG  -     Uric Acid  -     Vitamin D 25 Hydroxy  -     Comprehensive Metabolic Panel  -     C-Peptide  -     Follicle  Stimulating Hormone  -     Hemoglobin A1c  -     Luteinizing Hormone  -     MicroAlbumin, Urine, Random - Urine, Clean Catch  -     Prolactin  -     ACTH  -     Cortisol  -     Catecholamines, Fractionated, Plasma  -     NMR LipoProfile  -     T3, Free; Future  -     T4 & TSH ([facility]); Future  -     T4, Free; Future  -     Uric Acid; Future  -     Vitamin D 25 Hydroxy; Future  -     Comprehensive Metabolic Panel; Future  -     C-Peptide; Future  -     Hemoglobin A1c; Future  -     MicroAlbumin, Urine, Random - Urine, Clean Catch; Future  -     NMR LipoProfile; Future    Vitamin D deficiency  -     T4 & TSH ([facility])  -     T3, Free  -     T4, Free  -     Thyroglobulin With Anti-TG  -     Uric Acid  -     Vitamin D 25 Hydroxy  -     Comprehensive Metabolic Panel  -     C-Peptide  -     Follicle Stimulating Hormone  -     Hemoglobin A1c  -     Luteinizing Hormone  -     MicroAlbumin, Urine, Random - Urine, Clean Catch  -     Prolactin  -     ACTH  -     Cortisol  -     Catecholamines, Fractionated, Plasma  -     NMR LipoProfile  -     T3, Free; Future  -     T4 & TSH ([facility]); Future  -     T4, Free; Future  -     Uric Acid; Future  -     Vitamin D 25 Hydroxy; Future  -     Comprehensive Metabolic Panel; Future  -     C-Peptide; Future  -     Hemoglobin A1c; Future  -     MicroAlbumin, Urine, Random - Urine, Clean Catch; Future  -     NMR LipoProfile; Future    Chronic fatigue  -     T4 & TSH ([facility])  -     T3, Free  -     T4, Free  -     Thyroglobulin With Anti-TG  -     Uric Acid  -     Vitamin D 25 Hydroxy  -     Comprehensive Metabolic Panel  -     C-Peptide  -     Follicle Stimulating Hormone  -     Hemoglobin A1c  -     Luteinizing Hormone  -     MicroAlbumin, Urine, Random - Urine, Clean Catch  -     Prolactin  -     ACTH  -     Cortisol  -     Catecholamines, Fractionated, Plasma  -     NMR LipoProfile  -     T3, Free; Future  -     T4 & TSH ([facility]); Future  -     T4, Free; Future  -     Uric Acid;  Future  -     Vitamin D 25 Hydroxy; Future  -     Comprehensive Metabolic Panel; Future  -     C-Peptide; Future  -     Hemoglobin A1c; Future  -     MicroAlbumin, Urine, Random - Urine, Clean Catch; Future  -     NMR LipoProfile; Future    Other orders  -     SOLIQUA 100-33 UNT-MCG/ML solution pen-injector injection; Inject 60 Units under the skin into the appropriate area as directed Daily With Breakfast.  -     XIGDUO XR 5-1000 MG tablet; Take 2 tablets by mouth Daily.  -     VASCEPA 1 g capsule capsule; Take 2 g by mouth 2 (Two) Times a Day With Meals.  -     rosuvastatin (CRESTOR) 40 MG tablet; Take 1 tablet by mouth Daily.  -     Insulin Pen Needle 31G X 8 MM misc; Use every morning for insulin injection  -     estradiol (MINIVELLE, VIVELLE-DOT) 0.075 MG/24HR patch; Place 1 patch on the skin as directed by provider 2 (Two) Times a Week.      In summary I saw and examined this 63-year-old female for above-mentioned problems.  I reviewed her laboratory evaluations of 7/12/2018 as well as 7/29/2019 and at this point we will go ahead and order extensive laboratory evaluation and once the results come back we will go ahead and call for any possible modification or new medications.  In the meantime I asked her to stop taking metformin and Amaryl and started her on Xigduo 2.5/1000 mg 2 tablets every morning for 4 weeks and if tolerated increase the dose to 5/1000 mg 2 tablets every morning.  I cautioned her to make sure she drinks plenty of water during the day and cleans her genital area thoroughly and completely after use of toilet or after sexual intercourse.  Additionally I am starting her on Soliqua 16 units every morning and instructed her to increase the dose by 2 units every 3 days if her fasting blood glucose is more than 110.  Also due to a very high triglycerides I am starting her on Vascepa 2 capsules twice daily and for her high cholesterol and LDL I will switch her to Crestor 40 mg daily.  And finally  for her menopausal symptoms I started her on Vivelle-Dot 0.75 mg patch twice weekly. This office visit lasted 70 minutes of which 40 minutes was a spent on face-to-face counseling and education and planning her care moving forward..  She will see Ms. Chana Hong in 4 months or sooner if needed with laboratory evaluation prior to each office visit.

## 2019-09-11 LAB
25(OH)D3+25(OH)D2 SERPL-MCNC: 86 NG/ML (ref 30–100)
ACTH PLAS-MCNC: 59.3 PG/ML (ref 7.2–63.3)
ALBUMIN SERPL-MCNC: 4.9 G/DL (ref 3.5–5.2)
ALBUMIN/GLOB SERPL: 1.6 G/DL
ALP SERPL-CCNC: 73 U/L (ref 39–117)
ALT SERPL-CCNC: 44 U/L (ref 1–33)
AST SERPL-CCNC: 47 U/L (ref 1–32)
BILIRUB SERPL-MCNC: 0.3 MG/DL (ref 0.2–1.2)
BUN SERPL-MCNC: 13 MG/DL (ref 8–23)
BUN/CREAT SERPL: 19.4 (ref 7–25)
C PEPTIDE SERPL-MCNC: 5.5 NG/ML (ref 1.1–4.4)
CALCIUM SERPL-MCNC: 10.9 MG/DL (ref 8.6–10.5)
CHLORIDE SERPL-SCNC: 96 MMOL/L (ref 98–107)
CHOLEST SERPL-MCNC: 210 MG/DL (ref 100–199)
CO2 SERPL-SCNC: 26.6 MMOL/L (ref 22–29)
CORTIS SERPL-MCNC: 24.5 UG/DL
CREAT SERPL-MCNC: 0.67 MG/DL (ref 0.57–1)
DOPAMINE SERPL-MCNC: <30 PG/ML (ref 0–48)
EPINEPH PLAS-MCNC: 20 PG/ML (ref 0–62)
FSH SERPL-ACNC: 35.1 MIU/ML
GLOBULIN SER CALC-MCNC: 3.1 GM/DL
GLUCOSE SERPL-MCNC: 322 MG/DL (ref 65–99)
HBA1C MFR BLD: 11 % (ref 4.8–5.6)
HDL SERPL-SCNC: 30.9 UMOL/L
HDLC SERPL-MCNC: 34 MG/DL
LDL SERPL QN: 19.7 NM
LDL SERPL-SCNC: 2001 NMOL/L
LDL SMALL SERPL-SCNC: 1656 NMOL/L
LDLC SERPL CALC-MCNC: ABNORMAL MG/DL (ref 0–99)
LH SERPL-ACNC: 14 MIU/ML
MICROALBUMIN UR-MCNC: 5 UG/ML
NOREPINEPH PLAS-MCNC: 1122 PG/ML (ref 0–874)
POTASSIUM SERPL-SCNC: 4.6 MMOL/L (ref 3.5–5.2)
PROLACTIN SERPL-MCNC: 2.7 NG/ML (ref 4.8–23.3)
PROT SERPL-MCNC: 8 G/DL (ref 6–8.5)
SODIUM SERPL-SCNC: 137 MMOL/L (ref 136–145)
T3FREE SERPL-MCNC: 2 PG/ML (ref 2–4.4)
T4 FREE SERPL-MCNC: 0.87 NG/DL (ref 0.93–1.7)
T4 SERPL-MCNC: 5.54 MCG/DL (ref 4.5–11.7)
THYROGLOB AB SERPL-ACNC: 33.3 IU/ML (ref 0–0.9)
THYROGLOB SERPL-MCNC: 6.6 NG/ML
TRIGL SERPL-MCNC: 550 MG/DL (ref 0–149)
TSH SERPL DL<=0.005 MIU/L-ACNC: 6.41 UIU/ML (ref 0.27–4.2)
URATE SERPL-MCNC: 1.7 MG/DL (ref 2.4–5.7)

## 2019-09-12 RX ORDER — LEVOTHYROXINE SODIUM 125 MCG
125 TABLET ORAL DAILY
Qty: 30 TABLET | Refills: 11 | Status: SHIPPED | OUTPATIENT
Start: 2019-09-12 | End: 2019-09-17 | Stop reason: SDUPTHER

## 2019-09-17 ENCOUNTER — TELEPHONE (OUTPATIENT)
Dept: ENDOCRINOLOGY | Age: 64
End: 2019-09-17

## 2019-09-17 RX ORDER — LEVOTHYROXINE SODIUM 0.12 MG/1
125 TABLET ORAL DAILY
Qty: 90 TABLET | Refills: 3 | Status: SHIPPED | OUTPATIENT
Start: 2019-09-17 | End: 2020-01-08

## 2019-09-17 RX ORDER — OMEGA-3-ACID ETHYL ESTERS 1 G/1
2 CAPSULE, LIQUID FILLED ORAL 2 TIMES DAILY
Qty: 360 CAPSULE | Refills: 3 | Status: SHIPPED | OUTPATIENT
Start: 2019-09-17 | End: 2020-06-11

## 2019-09-17 NOTE — TELEPHONE ENCOUNTER
PT CALLED STATED SHE IS HAVING TROUBLE WITH HER INSURANCE GETTING HER MEDICATIONS. WAS TOLD BY DR. PIRES SHE CAN GET SAMPLES. NEED SAMPLES OF THE FOLLOWING . PLEASE CALL PT BACK TO [facility].  XIGDUO XR  VASCEPA  SOLIQUA   PT SAID SHE WILL COME BY TOMORROW TO PICK THEM UP.

## 2019-09-27 RX ORDER — LANCETS 30 GAUGE
EACH MISCELLANEOUS
Qty: 100 EACH | Refills: 3 | Status: SHIPPED | OUTPATIENT
Start: 2019-09-27 | End: 2020-06-30

## 2019-10-24 ENCOUNTER — CLINICAL SUPPORT (OUTPATIENT)
Dept: FAMILY MEDICINE CLINIC | Facility: CLINIC | Age: 64
End: 2019-10-24

## 2019-10-24 PROCEDURE — 90653 IIV ADJUVANT VACCINE IM: CPT | Performed by: INTERNAL MEDICINE

## 2019-10-24 PROCEDURE — G0008 ADMIN INFLUENZA VIRUS VAC: HCPCS | Performed by: INTERNAL MEDICINE

## 2019-10-25 RX ORDER — ZOLPIDEM TARTRATE 10 MG/1
10 TABLET ORAL NIGHTLY PRN
Qty: 90 TABLET | Refills: 0 | Status: SHIPPED | OUTPATIENT
Start: 2019-10-25 | End: 2020-01-28

## 2019-11-15 ENCOUNTER — TELEPHONE (OUTPATIENT)
Dept: ENDOCRINOLOGY | Age: 64
End: 2019-11-15

## 2019-11-15 NOTE — TELEPHONE ENCOUNTER
Patient said she is in the doughnut hole and Soliqua cost to her for 1 mo supply is $800.  She asked for samples until the end of the year.  She is up to 60 units 4xweek and her blood sugar reading is 180 today before breakfast fasting.  She said blood sugars still fluctuating between 220 and 180 for the last week.     She said if you don't have samples to prescribe cheaper alternative. I asked her to call her insurance for affordable alternatives and to call you back.  [facility] - [phone]    Pt - [phone]

## 2019-11-18 RX ORDER — ROSUVASTATIN CALCIUM 40 MG/1
TABLET, COATED ORAL
Qty: 90 TABLET | Refills: 3 | Status: SHIPPED | OUTPATIENT
Start: 2019-11-18 | End: 2020-06-26

## 2019-12-02 RX ORDER — OMEPRAZOLE 40 MG/1
CAPSULE, DELAYED RELEASE ORAL
Qty: 90 CAPSULE | Refills: 0 | Status: SHIPPED | OUTPATIENT
Start: 2019-12-02 | End: 2019-12-16 | Stop reason: SDUPTHER

## 2019-12-12 RX ORDER — ESTRADIOL 0.07 MG/D
1 FILM, EXTENDED RELEASE TRANSDERMAL 2 TIMES WEEKLY
Qty: 24 PATCH | Refills: 3 | Status: SHIPPED | OUTPATIENT
Start: 2019-12-12 | End: 2020-09-23

## 2019-12-16 RX ORDER — OMEPRAZOLE 40 MG/1
CAPSULE, DELAYED RELEASE ORAL
Qty: 90 CAPSULE | Refills: 0 | Status: SHIPPED | OUTPATIENT
Start: 2019-12-16 | End: 2020-07-21 | Stop reason: SDUPTHER

## 2019-12-27 ENCOUNTER — TELEPHONE (OUTPATIENT)
Dept: FAMILY MEDICINE CLINIC | Facility: CLINIC | Age: 64
End: 2019-12-27

## 2019-12-27 RX ORDER — INSULIN GLARGINE AND LIXISENATIDE 100; 33 U/ML; UG/ML
60 INJECTION, SOLUTION SUBCUTANEOUS
Qty: 15 PEN | Refills: 3 | COMMUNITY
Start: 2019-12-27 | End: 2020-01-07 | Stop reason: SDUPTHER

## 2019-12-28 LAB
25(OH)D3+25(OH)D2 SERPL-MCNC: 66.9 NG/ML (ref 30–100)
ALBUMIN SERPL-MCNC: 4.7 G/DL (ref 3.5–5.2)
ALBUMIN/GLOB SERPL: 1.8 G/DL
ALP SERPL-CCNC: 62 U/L (ref 39–117)
ALT SERPL-CCNC: 34 U/L (ref 1–33)
AST SERPL-CCNC: 48 U/L (ref 1–32)
BILIRUB SERPL-MCNC: <0.2 MG/DL (ref 0.2–1.2)
BUN SERPL-MCNC: 18 MG/DL (ref 8–23)
BUN/CREAT SERPL: 22.5 (ref 7–25)
C PEPTIDE SERPL-MCNC: 2.6 NG/ML (ref 1.1–4.4)
CALCIUM SERPL-MCNC: 10.4 MG/DL (ref 8.6–10.5)
CHLORIDE SERPL-SCNC: 102 MMOL/L (ref 98–107)
CHOLEST SERPL-MCNC: 131 MG/DL (ref 100–199)
CO2 SERPL-SCNC: 25 MMOL/L (ref 22–29)
CREAT SERPL-MCNC: 0.8 MG/DL (ref 0.57–1)
GLOBULIN SER CALC-MCNC: 2.6 GM/DL
GLUCOSE SERPL-MCNC: 102 MG/DL (ref 65–99)
HBA1C MFR BLD: 6.9 % (ref 4.8–5.6)
HDL SERPL-SCNC: 39.5 UMOL/L
HDLC SERPL-MCNC: 47 MG/DL
LDL SERPL QN: 19.7 NM
LDL SERPL-SCNC: 686 NMOL/L
LDL SMALL SERPL-SCNC: 475 NMOL/L
LDLC SERPL CALC-MCNC: 38 MG/DL (ref 0–99)
MICROALBUMIN UR-MCNC: 5.2 UG/ML
POTASSIUM SERPL-SCNC: 4.7 MMOL/L (ref 3.5–5.2)
PROT SERPL-MCNC: 7.3 G/DL (ref 6–8.5)
SODIUM SERPL-SCNC: 145 MMOL/L (ref 136–145)
T3FREE SERPL-MCNC: 2.1 PG/ML (ref 2–4.4)
T4 FREE SERPL-MCNC: 0.94 NG/DL (ref 0.93–1.7)
T4 SERPL-MCNC: 5.79 MCG/DL (ref 4.5–11.7)
TRIGL SERPL-MCNC: 230 MG/DL (ref 0–149)
TSH SERPL DL<=0.005 MIU/L-ACNC: 4.9 UIU/ML (ref 0.27–4.2)
URATE SERPL-MCNC: 2 MG/DL (ref 2.4–5.7)

## 2019-12-30 ENCOUNTER — RESULTS ENCOUNTER (OUTPATIENT)
Dept: ENDOCRINOLOGY | Age: 64
End: 2019-12-30

## 2019-12-30 DIAGNOSIS — E11.9 TYPE 2 DIABETES MELLITUS WITHOUT COMPLICATION, WITHOUT LONG-TERM CURRENT USE OF INSULIN (HCC): ICD-10-CM

## 2019-12-30 DIAGNOSIS — E03.8 HYPOTHYROIDISM DUE TO HASHIMOTO'S THYROIDITIS: ICD-10-CM

## 2019-12-30 DIAGNOSIS — E55.9 VITAMIN D DEFICIENCY: ICD-10-CM

## 2019-12-30 DIAGNOSIS — M79.7 FIBROMYALGIA: ICD-10-CM

## 2019-12-30 DIAGNOSIS — E06.3 HYPOTHYROIDISM DUE TO HASHIMOTO'S THYROIDITIS: ICD-10-CM

## 2019-12-30 DIAGNOSIS — E78.5 COMPLEX DYSLIPIDEMIA: ICD-10-CM

## 2019-12-30 DIAGNOSIS — R53.82 CHRONIC FATIGUE: ICD-10-CM

## 2020-01-07 RX ORDER — INSULIN GLARGINE AND LIXISENATIDE 100; 33 U/ML; UG/ML
60 INJECTION, SOLUTION SUBCUTANEOUS
Qty: 15 PEN | Refills: 3 | Status: SHIPPED | OUTPATIENT
Start: 2020-01-07 | End: 2020-10-05

## 2020-01-08 ENCOUNTER — OFFICE VISIT (OUTPATIENT)
Dept: ENDOCRINOLOGY | Age: 65
End: 2020-01-08

## 2020-01-08 VITALS
DIASTOLIC BLOOD PRESSURE: 68 MMHG | HEIGHT: 69 IN | SYSTOLIC BLOOD PRESSURE: 114 MMHG | BODY MASS INDEX: 29.21 KG/M2 | WEIGHT: 197.2 LBS

## 2020-01-08 DIAGNOSIS — E03.8 HYPOTHYROIDISM DUE TO HASHIMOTO'S THYROIDITIS: ICD-10-CM

## 2020-01-08 DIAGNOSIS — E78.5 HYPERLIPIDEMIA, UNSPECIFIED HYPERLIPIDEMIA TYPE: ICD-10-CM

## 2020-01-08 DIAGNOSIS — E06.3 HYPOTHYROIDISM DUE TO HASHIMOTO'S THYROIDITIS: ICD-10-CM

## 2020-01-08 DIAGNOSIS — R53.82 CHRONIC FATIGUE: ICD-10-CM

## 2020-01-08 DIAGNOSIS — E55.9 VITAMIN D DEFICIENCY: ICD-10-CM

## 2020-01-08 DIAGNOSIS — I10 ESSENTIAL HYPERTENSION: ICD-10-CM

## 2020-01-08 DIAGNOSIS — Z79.4 TYPE 2 DIABETES MELLITUS WITHOUT COMPLICATION, WITH LONG-TERM CURRENT USE OF INSULIN (HCC): Primary | ICD-10-CM

## 2020-01-08 DIAGNOSIS — E11.9 TYPE 2 DIABETES MELLITUS WITHOUT COMPLICATION, WITH LONG-TERM CURRENT USE OF INSULIN (HCC): Primary | ICD-10-CM

## 2020-01-08 PROCEDURE — 99214 OFFICE O/P EST MOD 30 MIN: CPT | Performed by: NURSE PRACTITIONER

## 2020-01-08 RX ORDER — LEVOTHYROXINE SODIUM 137 UG/1
137 TABLET ORAL DAILY
Qty: 90 TABLET | Refills: 1 | Status: SHIPPED | OUTPATIENT
Start: 2020-01-08 | End: 2020-06-07

## 2020-01-08 NOTE — PATIENT INSTRUCTIONS
Continue all current medications follow-up with Dr. Julien her next visit  Increase levothyroxine to 137 mcg

## 2020-01-08 NOTE — PROGRESS NOTES
"Kelby Ramos is a 64 y.o. female is here today for follow-up.  Chief Complaint   Patient presents with   • Diabetes     recent labs, pt is testing BG once daily, pt did not bring meter   • Hypothyroidism   • Hyperlipidemia   • Vitamin D Deficiency     /68   Ht 175.3 cm (69.02\")   Wt 89.4 kg (197 lb 3.2 oz)   BMI 29.11 kg/m²   Current Outpatient Medications on File Prior to Visit   Medication Sig   • aspirin 81 MG EC tablet Take 81 mg by mouth daily.   • B Complex Vitamins (VITAMIN B COMPLEX PO) Take 1 tablet by mouth Daily.   • baclofen (LIORESAL) 20 MG tablet Take 20 mg by mouth 2 (Two) Times a Day.   • cholecalciferol (VITAMIN D3) 400 units tablet Take 400 Units by mouth Daily.   • DULoxetine (CYMBALTA) 60 MG capsule Take 60 mg by mouth Daily.   • estradiol (MINIVELLE, VIVELLE-DOT) 0.075 MG/24HR patch Place 1 patch on the skin as directed by provider 2 (Two) Times a Week.   • fenofibrate 160 MG tablet TAKE 1 TABLET EVERY DAY   • Ferrous Gluconate (IRON 27 PO) Take 1 tablet by mouth Every Other Day.   • glucose blood test strip Test glucose once daily   • HYDROcodone-acetaminophen (NORCO) 7.5-325 MG per tablet 4 (Four) Times a Day As Needed.   • hydroxychloroquine (PLAQUENIL) 200 MG tablet Take 200 mg by mouth 2 (Two) Times a Day.   • Insulin Pen Needle 31G X 8 MM misc Use every morning for insulin injection   • Lancets misc Test glucose once daily   • leflunomide (ARAVA) 20 MG tablet Take 20 mg by mouth Daily.   • levETIRAcetam (KEPPRA) 500 MG tablet Take 2,000 mg by mouth 2 (Two) Times a Day. Take 4 tabs bid    • metoprolol tartrate (LOPRESSOR) 50 MG tablet Take 1 tablet by mouth Every 12 (Twelve) Hours.   • Multiple Vitamin (MULTIVITAMIN) tablet Take 1 tablet by mouth Daily.   • omega-3 acid ethyl esters (LOVAZA) 1 g capsule Take 2 capsules by mouth 2 (Two) Times a Day.   • omeprazole (priLOSEC) 40 MG capsule TAKE 1 CAPSULE EVERY DAY   • OXcarbazepine (TRILEPTAL) 600 MG tablet Take 600 mg " by mouth 2 (Two) Times a Day.   • pramipexole (MIRAPEX) 0.5 MG tablet Take 0.5 mg by mouth Every Night.   • rosuvastatin (CRESTOR) 40 MG tablet TAKE 1 TABLET BY MOUTH EVERY DAY   • SOLIQUA 100-33 UNT-MCG/ML solution pen-injector injection Inject 60 Units under the skin into the appropriate area as directed Daily With Breakfast.   • vitamin E 400 UNIT capsule Take 400 Units by mouth Daily.   • XIGDUO XR 5-1000 MG tablet Take 2 tablets by mouth Daily.   • zolpidem (AMBIEN) 10 MG tablet TAKE 1 TABLET BY MOUTH AT NIGHT AS NEEDED FOR SLEEP. FOR SLEEP   • [DISCONTINUED] levothyroxine (SYNTHROID) 125 MCG tablet Take 1 tablet by mouth Daily.     No current facility-administered medications on file prior to visit.      Family History   Problem Relation Age of Onset   • Diabetes Mother    • Neuropathy Father    • Colon polyps Father    • Diabetes Sister    • No Known Problems Son    • Diabetes Maternal Grandmother    • Cancer Maternal Grandmother    • Diabetes Paternal Grandmother    • Cancer Paternal Grandmother    • Malig Hyperthermia Neg Hx      Social History     Tobacco Use   • Smoking status: Never Smoker   • Smokeless tobacco: Never Used   Substance Use Topics   • Alcohol use: Yes     Comment: social   • Drug use: No     Allergies   Allergen Reactions   • Adhesive Tape Other (See Comments)     SKIN ON HANDS SHED OFF   • Sulfamethoxazole W/Trimethoprim 800-160  [Sulfamethoxazole-Trimethoprim] Hives   • Cefpodoxime    • Cephalosporins Other (See Comments)     C-DIFF   • Metformin And Related Nausea Only     Regular Metformin(NOT EXTENDED RELEASE)   • Sulfa Antibiotics Hives   • Topiramate Hives and Itching   • Latex Dermatitis and Rash         History of Present Illness   Encounter Diagnoses   Name Primary?   • Type 2 diabetes mellitus without complication, with long-term current use of insulin (CMS/[facility]) Yes   • Vitamin D deficiency    • Chronic fatigue    • Hypothyroidism due to Hashimoto's thyroiditis    •  Hyperlipidemia, unspecified hyperlipidemia type    • Essential hypertension      64-year-old female patient here today for follow-up visit.  She has been seen for the above-mentioned problems.  She has had significant improvement of her symptoms of diabetes after seeing Dr. Julien and having her medications changed.  Her most recent hemoglobin A1c reflects significantly improved glycemic control.  She is taking her medications as prescribed.  She denies any hypoglycemic events.  She is monitoring her blood sugars on her phone which was reviewed.  She has lost 65 pounds in the past 1.5 years.  She states she is feeling like a new person with renewed energy since getting her diabetes under better control.  Her medication list was reviewed and updated.  She was educated today regarding mechanism of action of her current medications.      The following portions of the patient's history were reviewed and updated as appropriate: allergies, current medications, past family history, past medical history, past social history, past surgical history and problem list.    Review of Systems   Constitutional: Positive for fatigue.   Cardiovascular: Negative for palpitations.   Gastrointestinal: Negative for nausea.   Endocrine: Negative for polydipsia.   Genitourinary: Negative for frequency.       Objective   Physical Exam   Constitutional: She is oriented to person, place, and time. She appears well-developed and well-nourished. No distress.   HENT:   Head: Normocephalic and atraumatic.   Right Ear: External ear normal.   Left Ear: External ear normal.   Nose: Nose normal.   Mouth/Throat: Oropharynx is clear and moist. No oropharyngeal exudate.   Eyes: Pupils are equal, round, and reactive to light. EOM are normal. Right eye exhibits no discharge. Left eye exhibits no discharge.   Neck: Trachea normal, normal range of motion and full passive range of motion without pain. Neck supple. No tracheal tenderness present. Carotid bruit  is not present. No tracheal deviation, no edema and no erythema present. No thyroid mass and no thyromegaly present.   Cardiovascular: Normal rate, regular rhythm, normal heart sounds and intact distal pulses. Exam reveals no gallop and no friction rub.   No murmur heard.  Pulmonary/Chest: Effort normal and breath sounds normal. No stridor. No respiratory distress. She has no wheezes. She has no rales.   Abdominal: Soft. Bowel sounds are normal. She exhibits no distension.   Musculoskeletal: Normal range of motion. She exhibits no edema or deformity.   Lymphadenopathy:     She has no cervical adenopathy.   Neurological: She is alert and oriented to person, place, and time.   Skin: Skin is warm and dry. No rash noted. She is not diaphoretic. No erythema. No pallor.   Psychiatric: She has a normal mood and affect. Her behavior is normal. Judgment and thought content normal.   Nursing note and vitals reviewed.    Results for orders placed or performed in visit on 12/30/19   T3, Free   Result Value Ref Range    T3, Free 2.1 2.0 - 4.4 pg/mL   T4 & TSH ([facility])   Result Value Ref Range    TSH 4.900 (H) 0.270 - 4.200 uIU/mL    T4, Total 5.79 4.50 - 11.70 mcg/dL   T4, Free   Result Value Ref Range    Free T4 0.94 0.93 - 1.70 ng/dL   Uric Acid   Result Value Ref Range    Uric Acid 2.0 (L) 2.4 - 5.7 mg/dL   Vitamin D 25 Hydroxy   Result Value Ref Range    25 Hydroxy, Vitamin D 66.9 30.0 - 100.0 ng/ml   Comprehensive Metabolic Panel   Result Value Ref Range    Glucose 102 (H) 65 - 99 mg/dL    BUN 18 8 - 23 mg/dL    Creatinine 0.80 0.57 - 1.00 mg/dL    eGFR Non African Am 72 >60 mL/min/1.73    eGFR African Am 87 >60 mL/min/1.73    BUN/Creatinine Ratio 22.5 7.0 - 25.0    Sodium 145 136 - 145 mmol/L    Potassium 4.7 3.5 - 5.2 mmol/L    Chloride 102 98 - 107 mmol/L    Total CO2 25.0 22.0 - 29.0 mmol/L    Calcium 10.4 8.6 - 10.5 mg/dL    Total Protein 7.3 6.0 - 8.5 g/dL    Albumin 4.70 3.50 - 5.20 g/dL    Globulin 2.6 gm/dL    A/G  Ratio 1.8 g/dL    Total Bilirubin <0.2 (L) 0.2 - 1.2 mg/dL    Alkaline Phosphatase 62 39 - 117 U/L    AST (SGOT) 48 (H) 1 - 32 U/L    ALT (SGPT) 34 (H) 1 - 33 U/L   C-Peptide   Result Value Ref Range    C-Peptide 2.6 1.1 - 4.4 ng/mL   Hemoglobin A1c   Result Value Ref Range    Hemoglobin A1C 6.90 (H) 4.80 - 5.60 %   MicroAlbumin, Urine, Random - Urine, Clean Catch   Result Value Ref Range    Microalbumin, Urine 5.2 Not Estab. ug/mL   NMR LipoProfile   Result Value Ref Range    LDL-P 686 <1,000 nmol/L    LDL-C 38 0 - 99 mg/dL    HDL-C 47 >39 mg/dL    Triglycerides 230 (H) 0 - 149 mg/dL    Total Cholesterol 131 100 - 199 mg/dL    HDL-P (Total) 39.5 >=30.5 umol/L    Small LDL-P 475 <=527 nmol/L    LDL Size 19.7 (L) >20.5 nm         Assessment/Plan   Problems Addressed this Visit        Cardiovascular and Mediastinum    Hypertension    Hyperlipidemia       Digestive    Vitamin D deficiency       Endocrine    Type 2 diabetes mellitus without complication (CMS/HCC) - Primary    Hypothyroidism due to Hashimoto's thyroiditis    Relevant Medications    levothyroxine (SYNTHROID) 137 MCG tablet       Other    Chronic fatigue        Patient was seen and examined.  Metabolically and clinically she is significantly improved.  Her labs were reviewed and she was provided a copy.  She will continue all her current medications as prescribed.  She was educated today regarding mechanism of action of her current therapies.  She will follow-up with Dr. Julien her next visit with labs prior.  Her blood pressures in satisfactory range.  Her energy has improved as a result of her overall better glycemic control.  She is hypothyroid according to her recent TSH.  We will increase her levothyroxine to 137 mcg once daily on empty stomach.  A new prescription was sent and she was provided samples at today's visit.  Cholesterol is stable.

## 2020-01-21 RX ORDER — FENOFIBRATE 160 MG/1
TABLET ORAL
Qty: 90 TABLET | Refills: 1 | Status: SHIPPED | OUTPATIENT
Start: 2020-01-21 | End: 2020-06-16

## 2020-01-28 RX ORDER — ZOLPIDEM TARTRATE 10 MG/1
TABLET ORAL
Qty: 90 TABLET | Refills: 0 | Status: SHIPPED | OUTPATIENT
Start: 2020-01-28 | End: 2020-04-08

## 2020-04-08 RX ORDER — ZOLPIDEM TARTRATE 10 MG/1
TABLET ORAL
Qty: 90 TABLET | Refills: 0 | Status: SHIPPED | OUTPATIENT
Start: 2020-04-08 | End: 2020-07-23

## 2020-06-07 RX ORDER — LEVOTHYROXINE SODIUM 137 UG/1
TABLET ORAL
Qty: 90 TABLET | Refills: 0 | Status: SHIPPED | OUTPATIENT
Start: 2020-06-07 | End: 2020-08-24

## 2020-06-08 RX ORDER — DAPAGLIFLOZIN AND METFORMIN HYDROCHLORIDE 5; 1000 MG/1; MG/1
TABLET, FILM COATED, EXTENDED RELEASE ORAL
Qty: 180 TABLET | Refills: 3 | Status: SHIPPED | OUTPATIENT
Start: 2020-06-08 | End: 2021-01-01 | Stop reason: SDUPTHER

## 2020-06-11 RX ORDER — OMEGA-3-ACID ETHYL ESTERS 1 G/1
CAPSULE, LIQUID FILLED ORAL
Qty: 360 CAPSULE | Refills: 3 | Status: SHIPPED | OUTPATIENT
Start: 2020-06-11 | End: 2020-08-20 | Stop reason: HOSPADM

## 2020-06-12 ENCOUNTER — OFFICE (AMBULATORY)
Dept: URBAN - METROPOLITAN AREA CLINIC 75 | Facility: CLINIC | Age: 65
End: 2020-06-12
Payer: MEDICARE

## 2020-06-12 VITALS — HEIGHT: 69 IN | WEIGHT: 198 LBS

## 2020-06-12 DIAGNOSIS — K44.9 DIAPHRAGMATIC HERNIA WITHOUT OBSTRUCTION OR GANGRENE: ICD-10-CM

## 2020-06-12 DIAGNOSIS — K29.50 UNSPECIFIED CHRONIC GASTRITIS WITHOUT BLEEDING: ICD-10-CM

## 2020-06-12 DIAGNOSIS — D12.6 BENIGN NEOPLASM OF COLON, UNSPECIFIED: ICD-10-CM

## 2020-06-12 DIAGNOSIS — D12.3 BENIGN NEOPLASM OF TRANSVERSE COLON: ICD-10-CM

## 2020-06-12 DIAGNOSIS — R19.4 CHANGE IN BOWEL HABIT: ICD-10-CM

## 2020-06-12 DIAGNOSIS — D12.2 BENIGN NEOPLASM OF ASCENDING COLON: ICD-10-CM

## 2020-06-12 DIAGNOSIS — R19.7 DIARRHEA, UNSPECIFIED: ICD-10-CM

## 2020-06-12 DIAGNOSIS — K31.9 DISEASE OF STOMACH AND DUODENUM, UNSPECIFIED: ICD-10-CM

## 2020-06-12 DIAGNOSIS — R94.5 ABNORMAL RESULTS OF LIVER FUNCTION STUDIES: ICD-10-CM

## 2020-06-12 DIAGNOSIS — K30 FUNCTIONAL DYSPEPSIA: ICD-10-CM

## 2020-06-12 DIAGNOSIS — D12.0 BENIGN NEOPLASM OF CECUM: ICD-10-CM

## 2020-06-12 DIAGNOSIS — R93.3 ABNORMAL FINDINGS ON DIAGNOSTIC IMAGING OF OTHER PARTS OF DI: ICD-10-CM

## 2020-06-12 PROCEDURE — 99214 OFFICE O/P EST MOD 30 MIN: CPT | Mod: 95 | Performed by: INTERNAL MEDICINE

## 2020-06-13 ENCOUNTER — OFFICE (AMBULATORY)
Dept: URBAN - METROPOLITAN AREA LAB 2 | Facility: LAB | Age: 65
End: 2020-06-13
Payer: MEDICARE

## 2020-06-13 DIAGNOSIS — Z11.59 ENCOUNTER FOR SCREENING FOR OTHER VIRAL DISEASES: ICD-10-CM

## 2020-06-13 PROCEDURE — 87633 RESP VIRUS 12-25 TARGETS: CPT | Performed by: INTERNAL MEDICINE

## 2020-06-13 PROCEDURE — U0002 COVID-19 LAB TEST NON-CDC: HCPCS | Performed by: INTERNAL MEDICINE

## 2020-06-15 VITALS
HEART RATE: 77 BPM | HEART RATE: 70 BPM | OXYGEN SATURATION: 98 % | DIASTOLIC BLOOD PRESSURE: 69 MMHG | HEIGHT: 69 IN | RESPIRATION RATE: 17 BRPM | RESPIRATION RATE: 20 BRPM | HEART RATE: 87 BPM | OXYGEN SATURATION: 96 % | SYSTOLIC BLOOD PRESSURE: 104 MMHG | DIASTOLIC BLOOD PRESSURE: 58 MMHG | TEMPERATURE: 97.1 F | SYSTOLIC BLOOD PRESSURE: 119 MMHG | TEMPERATURE: 95.9 F | DIASTOLIC BLOOD PRESSURE: 67 MMHG | OXYGEN SATURATION: 91 % | HEART RATE: 73 BPM | RESPIRATION RATE: 18 BRPM | WEIGHT: 198 LBS | DIASTOLIC BLOOD PRESSURE: 80 MMHG | SYSTOLIC BLOOD PRESSURE: 127 MMHG | HEART RATE: 72 BPM | SYSTOLIC BLOOD PRESSURE: 114 MMHG | RESPIRATION RATE: 16 BRPM | DIASTOLIC BLOOD PRESSURE: 60 MMHG | SYSTOLIC BLOOD PRESSURE: 134 MMHG

## 2020-06-16 ENCOUNTER — AMBULATORY SURGICAL CENTER (AMBULATORY)
Dept: URBAN - METROPOLITAN AREA SURGERY 17 | Facility: SURGERY | Age: 65
End: 2020-06-16
Payer: MEDICARE

## 2020-06-16 ENCOUNTER — OFFICE (AMBULATORY)
Dept: URBAN - METROPOLITAN AREA PATHOLOGY 4 | Facility: PATHOLOGY | Age: 65
End: 2020-06-16
Payer: MEDICARE

## 2020-06-16 DIAGNOSIS — K31.7 POLYP OF STOMACH AND DUODENUM: ICD-10-CM

## 2020-06-16 DIAGNOSIS — K31.89 OTHER DISEASES OF STOMACH AND DUODENUM: ICD-10-CM

## 2020-06-16 DIAGNOSIS — K44.9 DIAPHRAGMATIC HERNIA WITHOUT OBSTRUCTION OR GANGRENE: ICD-10-CM

## 2020-06-16 DIAGNOSIS — R13.10 DYSPHAGIA, UNSPECIFIED: ICD-10-CM

## 2020-06-16 DIAGNOSIS — K29.70 GASTRITIS, UNSPECIFIED, WITHOUT BLEEDING: ICD-10-CM

## 2020-06-16 DIAGNOSIS — K21.9 GASTRO-ESOPHAGEAL REFLUX DISEASE WITHOUT ESOPHAGITIS: ICD-10-CM

## 2020-06-16 LAB
GI HISTOLOGY: A. UNSPECIFIED: (no result)
GI HISTOLOGY: B. UNSPECIFIED: (no result)
GI HISTOLOGY: C. UNSPECIFIED: (no result)
GI HISTOLOGY: PDF REPORT: (no result)

## 2020-06-16 PROCEDURE — 88305 TISSUE EXAM BY PATHOLOGIST: CPT | Performed by: INTERNAL MEDICINE

## 2020-06-16 PROCEDURE — 43239 EGD BIOPSY SINGLE/MULTIPLE: CPT | Mod: 59 | Performed by: INTERNAL MEDICINE

## 2020-06-16 PROCEDURE — 43248 EGD GUIDE WIRE INSERTION: CPT | Performed by: INTERNAL MEDICINE

## 2020-06-16 RX ORDER — FENOFIBRATE 160 MG/1
TABLET ORAL
Qty: 90 TABLET | Refills: 1 | Status: SHIPPED | OUTPATIENT
Start: 2020-06-16 | End: 2020-11-16 | Stop reason: SDUPTHER

## 2020-06-16 NOTE — SERVICEHPINOTES
Due to the Corona virus pandemic the patient was seen via telemedicine through [facility]. Ms. Christine is being evaluated today because she says for the past month she's had trouble swallowing. She points to the base of the neck. She says food sticks and if she bends over comes back up. She says it happens with approximately "every other swallow". She also has trouble with liquids at times. She does have reflux, she's on Zegerid. She also has a hiatal hernia. She's never really had problems swallowing in the past however. She is not a smoker or drinker. Family history is unremarkable from an upper GI standpoint. She is in no distress. She does not look acutely ill.She is up-to-date in terms of colonoscopy. She does have a history of polyps. She'll be due for follow-up colonoscopy in November 2023. She says for the most part her bowels are regular.She also has a history of nonalcoholic steatohepatitis. She's had weight loss. She is diabetic and she says when she was discovered that she had diabetes she lost about 50 pounds. 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 I want update her blood work and schedule her for a fibro-scan. Otherwise there is no change in her past medical or past surgical history. She is in no distress. She does not look acutely ill.

## 2020-06-26 RX ORDER — ROSUVASTATIN CALCIUM 40 MG/1
TABLET, COATED ORAL
Qty: 90 TABLET | Refills: 3 | Status: SHIPPED | OUTPATIENT
Start: 2020-06-26 | End: 2021-01-01

## 2020-06-30 RX ORDER — GLUCOSAM/CHON-MSM1/C/MANG/BOSW 500-416.6
TABLET ORAL
Qty: 100 EACH | Refills: 3 | Status: SHIPPED | OUTPATIENT
Start: 2020-06-30 | End: 2021-02-12

## 2020-06-30 RX ORDER — CALCIUM CITRATE/VITAMIN D3 200MG-6.25
TABLET ORAL
Qty: 100 EACH | Refills: 0 | Status: SHIPPED | OUTPATIENT
Start: 2020-06-30 | End: 2020-09-14

## 2020-07-17 ENCOUNTER — LAB (OUTPATIENT)
Dept: ENDOCRINOLOGY | Age: 65
End: 2020-07-17

## 2020-07-17 DIAGNOSIS — E55.9 VITAMIN D DEFICIENCY: ICD-10-CM

## 2020-07-17 DIAGNOSIS — E78.9 LIPID DISORDER: ICD-10-CM

## 2020-07-17 DIAGNOSIS — Z79.4 TYPE 2 DIABETES MELLITUS WITHOUT COMPLICATION, WITH LONG-TERM CURRENT USE OF INSULIN (HCC): Primary | ICD-10-CM

## 2020-07-17 DIAGNOSIS — Z79.4 TYPE 2 DIABETES MELLITUS WITHOUT COMPLICATION, WITH LONG-TERM CURRENT USE OF INSULIN (HCC): ICD-10-CM

## 2020-07-17 DIAGNOSIS — E11.9 TYPE 2 DIABETES MELLITUS WITHOUT COMPLICATION, WITH LONG-TERM CURRENT USE OF INSULIN (HCC): Primary | ICD-10-CM

## 2020-07-17 DIAGNOSIS — E55.9 VITAMIN D DEFICIENCY: Primary | ICD-10-CM

## 2020-07-17 DIAGNOSIS — E11.9 TYPE 2 DIABETES MELLITUS WITHOUT COMPLICATION, WITH LONG-TERM CURRENT USE OF INSULIN (HCC): ICD-10-CM

## 2020-07-18 LAB
25(OH)D3+25(OH)D2 SERPL-MCNC: 56.4 NG/ML (ref 30–100)
ALBUMIN SERPL-MCNC: 4.8 G/DL (ref 3.5–5.2)
ALBUMIN/GLOB SERPL: 1.8 G/DL
ALP SERPL-CCNC: 51 U/L (ref 39–117)
ALT SERPL-CCNC: 42 U/L (ref 1–33)
AST SERPL-CCNC: 52 U/L (ref 1–32)
BILIRUB SERPL-MCNC: 0.3 MG/DL (ref 0–1.2)
BUN SERPL-MCNC: 15 MG/DL (ref 8–23)
BUN/CREAT SERPL: 22.4 (ref 7–25)
C PEPTIDE SERPL-MCNC: 2.1 NG/ML (ref 1.1–4.4)
CALCIUM SERPL-MCNC: 9.9 MG/DL (ref 8.6–10.5)
CHLORIDE SERPL-SCNC: 103 MMOL/L (ref 98–107)
CHOLEST SERPL-MCNC: 126 MG/DL (ref 0–200)
CHOLEST SERPL-MCNC: 127 MG/DL (ref 100–199)
CO2 SERPL-SCNC: 26.4 MMOL/L (ref 22–29)
CREAT SERPL-MCNC: 0.67 MG/DL (ref 0.57–1)
GLOBULIN SER CALC-MCNC: 2.6 GM/DL
GLUCOSE SERPL-MCNC: 104 MG/DL (ref 65–99)
HBA1C MFR BLD: 6.2 % (ref 4.8–5.6)
HDL SERPL-SCNC: 36 UMOL/L
HDLC SERPL-MCNC: 38 MG/DL
HDLC SERPL-MCNC: 43 MG/DL (ref 40–60)
INTERPRETATION: NORMAL
LDL SERPL QN: 20.4 NM
LDL SERPL-SCNC: 919 NMOL/L
LDL SMALL SERPL-SCNC: 633 NMOL/L
LDLC SERPL CALC-MCNC: 45 MG/DL (ref 0–100)
LDLC SERPL CALC-MCNC: 51 MG/DL (ref 0–99)
Lab: NORMAL
POTASSIUM SERPL-SCNC: 4.7 MMOL/L (ref 3.5–5.2)
PROT SERPL-MCNC: 7.4 G/DL (ref 6–8.5)
SODIUM SERPL-SCNC: 140 MMOL/L (ref 136–145)
T3FREE SERPL-MCNC: 2 PG/ML (ref 2–4.4)
T4 FREE SERPL-MCNC: 1.05 NG/DL (ref 0.93–1.7)
T4 SERPL-MCNC: 5.9 MCG/DL (ref 4.5–11.7)
TRIGL SERPL-MCNC: 189 MG/DL (ref 0–149)
TRIGL SERPL-MCNC: 190 MG/DL (ref 0–150)
TSH SERPL DL<=0.005 MIU/L-ACNC: 2.89 UIU/ML (ref 0.27–4.2)
URATE SERPL-MCNC: 1.8 MG/DL (ref 2.4–5.7)
VLDLC SERPL CALC-MCNC: 38 MG/DL

## 2020-07-21 RX ORDER — OMEPRAZOLE 40 MG/1
40 CAPSULE, DELAYED RELEASE ORAL DAILY
Qty: 90 CAPSULE | Refills: 1 | Status: SHIPPED | OUTPATIENT
Start: 2020-07-21 | End: 2020-12-17

## 2020-07-23 RX ORDER — ZOLPIDEM TARTRATE 10 MG/1
TABLET ORAL
Qty: 90 TABLET | Refills: 0 | Status: SHIPPED | OUTPATIENT
Start: 2020-07-23 | End: 2020-10-26

## 2020-07-24 ENCOUNTER — OFFICE VISIT (OUTPATIENT)
Dept: ENDOCRINOLOGY | Age: 65
End: 2020-07-24

## 2020-07-24 VITALS
BODY MASS INDEX: 29.77 KG/M2 | WEIGHT: 201 LBS | HEIGHT: 69 IN | DIASTOLIC BLOOD PRESSURE: 80 MMHG | SYSTOLIC BLOOD PRESSURE: 120 MMHG

## 2020-07-24 DIAGNOSIS — E78.5 HYPERLIPIDEMIA, UNSPECIFIED HYPERLIPIDEMIA TYPE: ICD-10-CM

## 2020-07-24 DIAGNOSIS — E06.3 HYPOTHYROIDISM DUE TO HASHIMOTO'S THYROIDITIS: ICD-10-CM

## 2020-07-24 DIAGNOSIS — I10 ESSENTIAL HYPERTENSION: ICD-10-CM

## 2020-07-24 DIAGNOSIS — E11.9 TYPE 2 DIABETES MELLITUS WITHOUT COMPLICATION, WITH LONG-TERM CURRENT USE OF INSULIN (HCC): Primary | ICD-10-CM

## 2020-07-24 DIAGNOSIS — Z79.4 TYPE 2 DIABETES MELLITUS WITHOUT COMPLICATION, WITH LONG-TERM CURRENT USE OF INSULIN (HCC): Primary | ICD-10-CM

## 2020-07-24 DIAGNOSIS — E55.9 VITAMIN D DEFICIENCY: ICD-10-CM

## 2020-07-24 DIAGNOSIS — E03.8 HYPOTHYROIDISM DUE TO HASHIMOTO'S THYROIDITIS: ICD-10-CM

## 2020-07-24 PROCEDURE — 99214 OFFICE O/P EST MOD 30 MIN: CPT | Performed by: NURSE PRACTITIONER

## 2020-07-24 NOTE — PROGRESS NOTES
"Kelby Ramos is a 64 y.o. female is here today for follow-up.  Chief Complaint   Patient presents with   • Diabetes     recent labs, raúl report, bg 1x daily, pt has logs   • Hashimoto's Thyroiditis     /80   Ht 175.3 cm (69.02\")   Wt 91.2 kg (201 lb)   BMI 29.67 kg/m²   Current Outpatient Medications on File Prior to Visit   Medication Sig   • aspirin 81 MG EC tablet Take 81 mg by mouth daily.   • Aspirin Buf,CaCarb-MgCarb-MgO, 81 MG tablet    • B Complex Vitamins (VITAMIN B COMPLEX PO) Take 1 tablet by mouth Daily.   • baclofen (LIORESAL) 20 MG tablet Take 20 mg by mouth 2 (Two) Times a Day.   • cholecalciferol (VITAMIN D3) 400 units tablet Take 400 Units by mouth Daily.   • estradiol (MINIVELLE, VIVELLE-DOT) 0.075 MG/24HR patch Place 1 patch on the skin as directed by provider 2 (Two) Times a Week.   • fenofibrate 160 MG tablet TAKE 1 TABLET EVERY DAY   • Ferrous Gluconate (IRON 27 PO) Take 1 tablet by mouth Every Other Day.   • HYDROcodone-acetaminophen (NORCO) 7.5-325 MG per tablet 4 (Four) Times a Day As Needed.   • hydroxychloroquine (PLAQUENIL) 200 MG tablet Take 200 mg by mouth 2 (Two) Times a Day.   • Insulin Pen Needle 31G X 8 MM misc Use every morning for insulin injection   • leflunomide (ARAVA) 20 MG tablet Take 20 mg by mouth Daily.   • levETIRAcetam (KEPPRA) 500 MG tablet Take 2,000 mg by mouth 2 (Two) Times a Day. Take 4 tabs bid    • levothyroxine (SYNTHROID, LEVOTHROID) 137 MCG tablet TAKE 1 TABLET EVERY DAY   • metoprolol tartrate (LOPRESSOR) 50 MG tablet Take 1 tablet by mouth Every 12 (Twelve) Hours.   • Multiple Vitamin (MULTIVITAMIN) tablet Take 1 tablet by mouth Daily.   • omega-3 acid ethyl esters (LOVAZA) 1 g capsule TAKE 2 CAPSULES TWICE DAILY   • omeprazole (priLOSEC) 40 MG capsule Take 1 capsule by mouth Daily.   • OXcarbazepine (TRILEPTAL) 600 MG tablet Take 600 mg by mouth 2 (Two) Times a Day.   • pramipexole (MIRAPEX) 0.5 MG tablet Take 0.5 mg by mouth Every " Night.   • rosuvastatin (CRESTOR) 40 MG tablet TAKE 1 TABLET EVERY DAY   • SOLIQUA 100-33 UNT-MCG/ML solution pen-injector injection Inject 60 Units under the skin into the appropriate area as directed Daily With Breakfast.   • TRUE METRIX BLOOD GLUCOSE TEST test strip TEST  GLUCOSE ONE TIME DAILY   • TRUEplus Lancets 33G misc TEST  GLUCOSE ONE TIME DAILY   • vitamin E 400 UNIT capsule Take 400 Units by mouth Daily.   • XIGDUO XR 5-1000 MG tablet TAKE 2 TABLETS EVERY DAY   • zolpidem (AMBIEN) 10 MG tablet TAKE 1 TABLET BY MOUTH EVERY EVENING AS NEEDED FOR SLEEP   • DULoxetine (CYMBALTA) 60 MG capsule Take 60 mg by mouth Daily.     No current facility-administered medications on file prior to visit.      Family History   Problem Relation Age of Onset   • Diabetes Mother    • Neuropathy Father    • Colon polyps Father    • Diabetes Sister    • No Known Problems Son    • Diabetes Maternal Grandmother    • Cancer Maternal Grandmother    • Diabetes Paternal Grandmother    • Cancer Paternal Grandmother    • Malig Hyperthermia Neg Hx      Social History     Tobacco Use   • Smoking status: Never Smoker   • Smokeless tobacco: Never Used   Substance Use Topics   • Alcohol use: Yes     Comment: social   • Drug use: No     Allergies   Allergen Reactions   • Adhesive Tape Other (See Comments)     SKIN ON HANDS SHED OFF   • Sulfamethoxazole W/Trimethoprim 800-160  [Sulfamethoxazole-Trimethoprim] Hives   • Cefpodoxime    • Cephalosporins Other (See Comments)     C-DIFF   • Metformin And Related Nausea Only     Regular Metformin(NOT EXTENDED RELEASE)   • Sulfa Antibiotics Hives   • Topiramate Hives and Itching   • Latex Dermatitis and Rash         History of Present Illness   Encounter Diagnoses   Name Primary?   • Hyperlipidemia, unspecified hyperlipidemia type    • Essential hypertension    • Hypothyroidism due to Hashimoto's thyroiditis    • Type 2 diabetes mellitus without complication, with long-term current use of insulin  (CMS/[facility]) Yes   • Vitamin D deficiency      64-year-old female patient here today for follow-up visit.  She has been seen for the above-mentioned problems.  She has had recent labs which were reviewed.  She has had significant improvement in her overall glycemic control.  She is taking her medications as prescribed.  Her medication list was reviewed and updated.  She denies any symptoms associated with hypothyroidism.  A foot exam was performed at today's visit.  She has peripheral neuropathy in both feet.  She is also got a wound on her right great toe that is being treated by her podiatrist and she reports it is doing well.  Currently at her visit her toe is covered with a dressing.  She has had some weight gain according to the scales.  She feels significantly better since seeing Dr. Julien for her diabetes management and states she struggled for 10 years with fatigue and finally is feeling better since her diabetes is under better control.    The following portions of the patient's history were reviewed and updated as appropriate: allergies, current medications, past family history, past medical history, past social history, past surgical history and problem list.    Review of Systems   Constitutional: Negative for appetite change and fatigue.   Eyes: Negative for visual disturbance.   Respiratory: Negative for cough.    Cardiovascular: Negative for leg swelling.   Gastrointestinal: Negative for constipation and diarrhea.   Endocrine: Negative for cold intolerance, heat intolerance, polydipsia, polyphagia and polyuria.   Genitourinary: Negative for frequency.   Neurological: Negative for numbness.       Objective   Physical Exam   Constitutional: She is oriented to person, place, and time. She appears well-developed and well-nourished. No distress.   HENT:   Head: Normocephalic and atraumatic.   Right Ear: External ear normal.   Left Ear: External ear normal.   Nose: Nose normal.   Mouth/Throat: Oropharynx is  clear and moist. No oropharyngeal exudate.   Eyes: Pupils are equal, round, and reactive to light. EOM are normal. Right eye exhibits no discharge. Left eye exhibits no discharge.   Neck: Trachea normal, normal range of motion and full passive range of motion without pain. Neck supple. No tracheal tenderness present. Carotid bruit is not present. No tracheal deviation, no edema and no erythema present. No thyroid mass and no thyromegaly present.   Cardiovascular: Normal rate, regular rhythm, normal heart sounds and intact distal pulses. Exam reveals no gallop and no friction rub.   No murmur heard.  Pulmonary/Chest: Effort normal and breath sounds normal. No stridor. No respiratory distress. She has no wheezes. She has no rales.   Abdominal: Soft. Bowel sounds are normal. She exhibits no distension.   Musculoskeletal: Normal range of motion. She exhibits no edema or deformity.   Lymphadenopathy:     She has no cervical adenopathy.   Neurological: She is alert and oriented to person, place, and time.   Skin: Skin is warm and dry. No rash noted. She is not diaphoretic. No erythema. No pallor.   Psychiatric: She has a normal mood and affect. Her behavior is normal. Judgment and thought content normal.   Nursing note and vitals reviewed.    Results for orders placed or performed in visit on 07/17/20   TSH   Result Value Ref Range    TSH 2.890 0.270 - 4.200 uIU/mL   T4   Result Value Ref Range    T4, Total 5.90 4.50 - 11.70 mcg/dL   T4, Free   Result Value Ref Range    Free T4 1.05 0.93 - 1.70 ng/dL   T3, Free   Result Value Ref Range    T3, Free 2.0 2.0 - 4.4 pg/mL   Uric Acid   Result Value Ref Range    Uric Acid 1.8 (L) 2.4 - 5.7 mg/dL   Vitamin D 25 Hydroxy   Result Value Ref Range    25 Hydroxy, Vitamin D 56.4 30.0 - 100.0 ng/ml   Comprehensive Metabolic Panel   Result Value Ref Range    Glucose 104 (H) 65 - 99 mg/dL    BUN 15 8 - 23 mg/dL    Creatinine 0.67 0.57 - 1.00 mg/dL    eGFR Non African Am 88 >60  mL/min/1.73    eGFR African Am 107 >60 mL/min/1.73    BUN/Creatinine Ratio 22.4 7.0 - 25.0    Sodium 140 136 - 145 mmol/L    Potassium 4.7 3.5 - 5.2 mmol/L    Chloride 103 98 - 107 mmol/L    Total CO2 26.4 22.0 - 29.0 mmol/L    Calcium 9.9 8.6 - 10.5 mg/dL    Total Protein 7.4 6.0 - 8.5 g/dL    Albumin 4.80 3.50 - 5.20 g/dL    Globulin 2.6 gm/dL    A/G Ratio 1.8 g/dL    Total Bilirubin 0.3 0.0 - 1.2 mg/dL    Alkaline Phosphatase 51 39 - 117 U/L    AST (SGOT) 52 (H) 1 - 32 U/L    ALT (SGPT) 42 (H) 1 - 33 U/L   C-Peptide   Result Value Ref Range    C-Peptide 2.1 1.1 - 4.4 ng/mL   Hemoglobin A1c   Result Value Ref Range    Hemoglobin A1C 6.20 (H) 4.80 - 5.60 %   NMR LipoProfile   Result Value Ref Range    LDL-P 919 <1,000 nmol/L    LDL-C 51 0 - 99 mg/dL    HDL-C 38 (L) >39 mg/dL    Triglycerides 189 (H) 0 - 149 mg/dL    Total Cholesterol 127 100 - 199 mg/dL    HDL-P (Total) 36.0 >=30.5 umol/L    Small LDL-P 633 (H) <=527 nmol/L    LDL Size 20.4 (L) >20.5 nm   Lipid Panel   Result Value Ref Range    Total Cholesterol 126 0 - 200 mg/dL    Triglycerides 190 (H) 0 - 150 mg/dL    HDL Cholesterol 43 40 - 60 mg/dL    VLDL Cholesterol 38 mg/dL    LDL Cholesterol  45 0 - 100 mg/dL   Cardiovascular Risk Assessment   Result Value Ref Range    Interpretation Note    Diabetes Patient Education   Result Value Ref Range    PDF Image Not applicable          Assessment/Plan   Problems Addressed this Visit        Cardiovascular and Mediastinum    Hypertension    Hyperlipidemia       Digestive    Vitamin D deficiency       Endocrine    Type 2 diabetes mellitus without complication (CMS/HCC) - Primary    Hypothyroidism due to Hashimoto's thyroiditis        In summary patient was seen and examined.  Metabolically and clinically she is stable and has improved significantly since her initial encounter with Dr. Julien.  Her A1c is in satisfactory range.  She reports no hypoglycemic events.  She brought her blood glucose readings with her to  today's visit.  Thyroid hormones are in satisfactory range.  She will follow-up in our office in 6 months with labs prior.  Cholesterol and vitamin D are stable.  Her triglycerides have also improved from greater than 550 down to 190.  No medications have been added or changed.

## 2020-08-03 RX ORDER — METOPROLOL TARTRATE 50 MG/1
50 TABLET, FILM COATED ORAL EVERY 12 HOURS SCHEDULED
Qty: 180 TABLET | Refills: 2 | Status: SHIPPED | OUTPATIENT
Start: 2020-08-03 | End: 2020-08-03 | Stop reason: SDUPTHER

## 2020-08-03 RX ORDER — METOPROLOL TARTRATE 50 MG/1
50 TABLET, FILM COATED ORAL EVERY 12 HOURS SCHEDULED
Qty: 180 TABLET | Refills: 2 | Status: SHIPPED | OUTPATIENT
Start: 2020-08-03 | End: 2021-01-01 | Stop reason: SDUPTHER

## 2020-08-20 ENCOUNTER — OFFICE VISIT (OUTPATIENT)
Dept: FAMILY MEDICINE CLINIC | Facility: CLINIC | Age: 65
End: 2020-08-20

## 2020-08-20 VITALS
BODY MASS INDEX: 30.27 KG/M2 | OXYGEN SATURATION: 97 % | DIASTOLIC BLOOD PRESSURE: 74 MMHG | WEIGHT: 204.4 LBS | SYSTOLIC BLOOD PRESSURE: 142 MMHG | TEMPERATURE: 97.1 F | HEIGHT: 69 IN | HEART RATE: 78 BPM

## 2020-08-20 DIAGNOSIS — G47.00 INSOMNIA, UNSPECIFIED TYPE: ICD-10-CM

## 2020-08-20 DIAGNOSIS — I10 HYPERTENSION, ESSENTIAL: ICD-10-CM

## 2020-08-20 DIAGNOSIS — Z51.81 MEDICATION MONITORING ENCOUNTER: ICD-10-CM

## 2020-08-20 DIAGNOSIS — Z00.00 MEDICARE ANNUAL WELLNESS VISIT, SUBSEQUENT: Primary | ICD-10-CM

## 2020-08-20 DIAGNOSIS — M06.9 RHEUMATOID ARTHRITIS, INVOLVING UNSPECIFIED SITE, UNSPECIFIED RHEUMATOID FACTOR PRESENCE: ICD-10-CM

## 2020-08-20 LAB
ALBUMIN SERPL-MCNC: 4.9 G/DL (ref 3.5–5.2)
ALBUMIN/GLOB SERPL: 2 G/DL
ALP SERPL-CCNC: 51 U/L (ref 39–117)
ALT SERPL W P-5'-P-CCNC: 42 U/L (ref 1–33)
ANION GAP SERPL CALCULATED.3IONS-SCNC: 12.8 MMOL/L (ref 5–15)
AST SERPL-CCNC: 51 U/L (ref 1–32)
BILIRUB SERPL-MCNC: <0.2 MG/DL (ref 0–1.2)
BUN SERPL-MCNC: 16 MG/DL (ref 8–23)
BUN/CREAT SERPL: 25 (ref 7–25)
CALCIUM SPEC-SCNC: 10.2 MG/DL (ref 8.6–10.5)
CHLORIDE SERPL-SCNC: 103 MMOL/L (ref 98–107)
CO2 SERPL-SCNC: 25.2 MMOL/L (ref 22–29)
CREAT SERPL-MCNC: 0.64 MG/DL (ref 0.57–1)
CRP SERPL-MCNC: 0.17 MG/DL (ref 0–0.5)
ERYTHROCYTE [DISTWIDTH] IN BLOOD BY AUTOMATED COUNT: 13.2 % (ref 12.3–15.4)
GFR SERPL CREATININE-BSD FRML MDRD: 93 ML/MIN/1.73
GLOBULIN UR ELPH-MCNC: 2.5 GM/DL
GLUCOSE SERPL-MCNC: 120 MG/DL (ref 65–99)
HCT VFR BLD AUTO: 40.7 % (ref 34–46.6)
HGB BLD-MCNC: 13.2 G/DL (ref 12–15.9)
LYMPHOCYTES # BLD AUTO: 2.1 10*3/MM3 (ref 0.7–3.1)
LYMPHOCYTES NFR BLD AUTO: 32.2 % (ref 19.6–45.3)
MCH RBC QN AUTO: 29.6 PG (ref 26.6–33)
MCHC RBC AUTO-ENTMCNC: 32.3 G/DL (ref 31.5–35.7)
MCV RBC AUTO: 91.5 FL (ref 79–97)
MONOCYTES # BLD AUTO: 0.4 10*3/MM3 (ref 0.1–0.9)
MONOCYTES NFR BLD AUTO: 6.8 % (ref 5–12)
NEUTROPHILS NFR BLD AUTO: 3.9 10*3/MM3 (ref 1.7–7)
NEUTROPHILS NFR BLD AUTO: 61 % (ref 42.7–76)
PLATELET # BLD AUTO: 225 10*3/MM3 (ref 140–450)
PMV BLD AUTO: 7.8 FL (ref 6–12)
POTASSIUM SERPL-SCNC: 4.4 MMOL/L (ref 3.5–5.2)
PROT SERPL-MCNC: 7.4 G/DL (ref 6–8.5)
RBC # BLD AUTO: 4.45 10*6/MM3 (ref 3.77–5.28)
SODIUM SERPL-SCNC: 141 MMOL/L (ref 136–145)
WBC # BLD AUTO: 6.4 10*3/MM3 (ref 3.4–10.8)

## 2020-08-20 PROCEDURE — 36415 COLL VENOUS BLD VENIPUNCTURE: CPT | Performed by: INTERNAL MEDICINE

## 2020-08-20 PROCEDURE — 85025 COMPLETE CBC W/AUTO DIFF WBC: CPT | Performed by: INTERNAL MEDICINE

## 2020-08-20 PROCEDURE — 99213 OFFICE O/P EST LOW 20 MIN: CPT | Performed by: INTERNAL MEDICINE

## 2020-08-20 PROCEDURE — 86140 C-REACTIVE PROTEIN: CPT | Performed by: INTERNAL MEDICINE

## 2020-08-20 PROCEDURE — G0439 PPPS, SUBSEQ VISIT: HCPCS | Performed by: INTERNAL MEDICINE

## 2020-08-20 PROCEDURE — 96160 PT-FOCUSED HLTH RISK ASSMT: CPT | Performed by: INTERNAL MEDICINE

## 2020-08-20 PROCEDURE — 80053 COMPREHEN METABOLIC PANEL: CPT | Performed by: INTERNAL MEDICINE

## 2020-08-20 RX ORDER — LAMOTRIGINE 100 MG/1
150 TABLET ORAL 2 TIMES DAILY
COMMUNITY
Start: 2020-08-10 | End: 2021-01-01

## 2020-08-20 NOTE — PROGRESS NOTES
Subjective   Wendi Ramos is a 64 y.o. female.  Patient here for Medicare wellness visit subsequent doing fairly well with screenings this is rheumatoid arthritis hypertension seizure disorder  Body mass index is 30.18 kg/m².  History of Present Illness   Medicare wellness visit subsequent also follow-up of blood pressure medication for seizure disorder has rheumatoid arthritis does need medication monitoring as well.  Tolerating her medicines for RA fairly well.  History of fatty liver also will get updated lab work on her.  Doing fairly well patient is diabetic followed by endocrinology sugars well controlled by her history.  We will get C-reactive protein first rheumatoid arthritis as well as CBC and CMP for medication monitoring.  Blood pressure does well at home known thyroid disease on Synthroid as well.   drug allergies include adhesive tape, Septra causing hives cephalosporins undefined Metformin nausea sulfa hives topiramate hives and itching latex dermatitis and rash.  Patient is non-smoker.  Family is positive for diabetes neuropathy colon polyps cancers undefined.  Review of Systems   Musculoskeletal: Positive for arthralgias.   All other systems reviewed and are negative.      Objective   Vitals:    08/20/20 0953   BP: 142/74   Pulse: 78   Temp: 97.1 °F (36.2 °C)   SpO2: 97%   Weight: 92.7 kg (204 lb 6.4 oz)     Physical Exam   Constitutional: She appears well-developed and well-nourished.   HENT:   Head: Normocephalic and atraumatic.   Eyes: Pupils are equal, round, and reactive to light. Conjunctivae are normal.   Cardiovascular: Normal rate, regular rhythm and normal heart sounds.   Pulmonary/Chest: Effort normal and breath sounds normal.   Abdominal: Soft. Bowel sounds are normal.   Neurological: She is alert.   Somewhat slow but stable gait and station   Skin: Skin is warm and dry.   Nursing note and vitals reviewed.      No results found for: INR    Procedures    Assessment/Plan   1.  Medicare  wellness visit subsequent    2.  Hypertension tinea present meds recheck 6 months to check HPI: Make sure patient is below 140/90    3.  Rheumatoid arthritis updated lab work including C-reactive protein as well as CMP and CBC    4.  Medication monitoring    5.  Seizure disorder stable followed by neurology    6.  Insomnia managed with current medication continue same    Much of this encounter note is an electronic transcription/translation of spoken language to printed text.  The electronic translation of spoken language may permit erroneous, or at times, nonsensical words or phrases to be inadvertently transcribed.  Although I have reviewed the note for such errors, some may still exist. If there are questions or for further clarification, please contact me.  Plan await pending lab follow-up in 6 months if labs satisfactory         Answers for HPI/ROS submitted by the patient on 8/13/2020   What is the primary reason for your visit?: Other  Please describe your symptoms.: Yearly wellness checkup  Have you had these symptoms before?: Yes  How long have you been having these symptoms?: Greater than 2 weeks

## 2020-08-20 NOTE — PROGRESS NOTES
The ABCs of the Annual Wellness Visit  Subsequent Medicare Wellness Visit    Chief Complaint   Patient presents with   • f/u seizures   • Medicare Wellness-subsequent       Subjective   History of Present Illness:  Wendi Ramos is a 64 y.o. female who presents for a Subsequent Medicare Wellness Visit.    HEALTH RISK ASSESSMENT    Recent Hospitalizations:  No hospitalization(s) within the last year.    Current Medical Providers:  Patient Care Team:  Brice Camarena Jr., MD as PCP - General (Internal Medicine)  Jomar Steve MD as Consulting Physician (Interventional Cardiology)  Donny Gan MD as Consulting Physician (Pain Medicine)    Smoking Status:  Social History     Tobacco Use   Smoking Status Never Smoker   Smokeless Tobacco Never Used       Alcohol Consumption:  Social History     Substance and Sexual Activity   Alcohol Use Yes    Comment: social       Depression Screen:   PHQ-2/PHQ-9 Depression Screening 8/20/2020   Little interest or pleasure in doing things 0   Feeling down, depressed, or hopeless 0   Trouble falling or staying asleep, or sleeping too much 0   Feeling tired or having little energy 0   Poor appetite or overeating 0   Feeling bad about yourself - or that you are a failure or have let yourself or your family down 0   Trouble concentrating on things, such as reading the newspaper or watching television 0   Moving or speaking so slowly that other people could have noticed. Or the opposite - being so fidgety or restless that you have been moving around a lot more than usual 0   Thoughts that you would be better off dead, or of hurting yourself in some way 0   Total Score 0   If you checked off any problems, how difficult have these problems made it for you to do your work, take care of things at home, or get along with other people? -       Fall Risk Screen:  STEADI Fall Risk Assessment has not been completed.    Health Habits and Functional and Cognitive  Screening:  Functional & Cognitive Status 8/20/2020   Do you have difficulty preparing food and eating? No   Do you have difficulty bathing yourself, getting dressed or grooming yourself? No   Do you have difficulty using the toilet? No   Do you have difficulty moving around from place to place? No   Do you have trouble with steps or getting out of a bed or a chair? No   Current Diet Well Balanced Diet   Dental Exam Up to date   Eye Exam Up to date   Exercise (times per week) 0 times per week   Current Exercise Activities Include -   Do you need help using the phone?  No   Are you deaf or do you have serious difficulty hearing?  No   Do you need help with transportation? No   Do you need help shopping? Yes   Do you need help preparing meals?  Yes   Do you need help with housework?  Yes   Do you need help with laundry? Yes   Do you need help taking your medications? No   Do you need help managing money? No   Do you ever drive or ride in a car without wearing a seat belt? No   Have you felt unusual stress, anger or loneliness in the last month? No   Who do you live with? Spouse   If you need help, do you have trouble finding someone available to you? No   Have you been bothered in the last four weeks by sexual problems? No   Do you have difficulty concentrating, remembering or making decisions? Yes         Does the patient have evidence of cognitive impairment? No    Asprin use counseling:Taking ASA appropriately as indicated    Age-appropriate Screening Schedule:  Refer to the list below for future screening recommendations based on patient's age, sex and/or medical conditions. Orders for these recommended tests are listed in the plan section. The patient has been provided with a written plan.    Health Maintenance   Topic Date Due   • TDAP/TD VACCINES (1 - Tdap) 12/16/1966   • MAMMOGRAM  11/28/2018   • DIABETIC EYE EXAM  07/26/2020   • INFLUENZA VACCINE  08/01/2020   • URINE MICROALBUMIN  12/27/2020   • HEMOGLOBIN  A1C  01/17/2021   • LIPID PANEL  07/17/2021   • DIABETIC FOOT EXAM  07/24/2021   • COLONOSCOPY  11/28/2028   • ZOSTER VACCINE  Completed          The following portions of the patient's history were reviewed and updated as appropriate: allergies, current medications, past family history, past medical history, past social history, past surgical history and problem list.    Outpatient Medications Prior to Visit   Medication Sig Dispense Refill   • aspirin 81 MG EC tablet Take 81 mg by mouth daily.     • B Complex Vitamins (VITAMIN B COMPLEX PO) Take 1 tablet by mouth Daily.     • baclofen (LIORESAL) 20 MG tablet Take 20 mg by mouth 2 (Two) Times a Day.     • cholecalciferol (VITAMIN D3) 400 units tablet Take 400 Units by mouth Daily.     • DULoxetine (CYMBALTA) 60 MG capsule Take 60 mg by mouth Daily.     • estradiol (MINIVELLE, VIVELLE-DOT) 0.075 MG/24HR patch Place 1 patch on the skin as directed by provider 2 (Two) Times a Week. 24 patch 3   • fenofibrate 160 MG tablet TAKE 1 TABLET EVERY DAY 90 tablet 1   • Ferrous Gluconate (IRON 27 PO) Take 1 tablet by mouth Every Other Day.     • HYDROcodone-acetaminophen (NORCO) 7.5-325 MG per tablet 4 (Four) Times a Day As Needed.     • hydroxychloroquine (PLAQUENIL) 200 MG tablet Take 200 mg by mouth 2 (Two) Times a Day.     • Insulin Pen Needle 31G X 8 MM misc Use every morning for insulin injection 100 each 3   • lamoTRIgine (LaMICtal) 100 MG tablet      • leflunomide (ARAVA) 20 MG tablet Take 20 mg by mouth Daily.     • levETIRAcetam (KEPPRA) 500 MG tablet Take 2,000 mg by mouth 2 (Two) Times a Day. Take 4 tabs bid      • levothyroxine (SYNTHROID, LEVOTHROID) 137 MCG tablet TAKE 1 TABLET EVERY DAY 90 tablet 0   • metoprolol tartrate (LOPRESSOR) 50 MG tablet Take 1 tablet by mouth Every 12 (Twelve) Hours. 180 tablet 2   • Multiple Vitamin (MULTIVITAMIN) tablet Take 1 tablet by mouth Daily.     • omeprazole (priLOSEC) 40 MG capsule Take 1 capsule by mouth Daily. 90 capsule  1   • OXcarbazepine (TRILEPTAL) 600 MG tablet Take 600 mg by mouth 2 (Two) Times a Day.     • pramipexole (MIRAPEX) 0.5 MG tablet Take 0.5 mg by mouth Every Night.     • rosuvastatin (CRESTOR) 40 MG tablet TAKE 1 TABLET EVERY DAY 90 tablet 3   • SOLIQUA 100-33 UNT-MCG/ML solution pen-injector injection Inject 60 Units under the skin into the appropriate area as directed Daily With Breakfast. 15 pen 3   • TRUE METRIX BLOOD GLUCOSE TEST test strip TEST  GLUCOSE ONE TIME DAILY 100 each 0   • TRUEplus Lancets 33G misc TEST  GLUCOSE ONE TIME DAILY 100 each 3   • vitamin E 400 UNIT capsule Take 400 Units by mouth Daily.     • XIGDUO XR 5-1000 MG tablet TAKE 2 TABLETS EVERY  tablet 3   • zolpidem (AMBIEN) 10 MG tablet TAKE 1 TABLET BY MOUTH EVERY EVENING AS NEEDED FOR SLEEP 90 tablet 0   • Aspirin Buf,CaCarb-MgCarb-MgO, 81 MG tablet      • omega-3 acid ethyl esters (LOVAZA) 1 g capsule TAKE 2 CAPSULES TWICE DAILY 360 capsule 3     No facility-administered medications prior to visit.        Patient Active Problem List   Diagnosis   • Type 2 diabetes mellitus without complication (CMS/HCC)   • Post-operative state   • S/P lumbar fusion   • Insomnia   • Abnormal EKG   • Cellulitis of breast   • Chest pain   • Dyspnea on exertion   • Fibromyalgia   • Hyperhidrosis   • LAFB (left anterior fascicular block)   • Mass of right breast   • Obesity   • PVC (premature ventricular contraction)   • Rheumatoid arthritis (CMS/HCC)   • S/P catheter ablation of slow pathway   • Seizures (CMS/HCC)   • Sinus tachycardia   • Chronic fatigue   • Vitamin D deficiency   • Complex dyslipidemia   • Hypothyroidism due to Hashimoto's thyroiditis   • Hypertension   • Hyperlipidemia       Advanced Care Planning:  ACP discussion was held with the patient during this visit. Patient has an advance directive in EMR which is still valid.     Review of Systems    Compared to one year ago, the patient feels her physical health is worse.  Compared to  "one year ago, the patient feels her mental health is the same.    Reviewed chart for potential of high risk medication in the elderly: yes  Reviewed chart for potential of harmful drug interactions in the elderly:yes    Objective         Vitals:    08/20/20 0953   BP: 142/74   Pulse: 78   Temp: 97.1 °F (36.2 °C)   SpO2: 97%   Weight: 92.7 kg (204 lb 6.4 oz)   Height: 175.3 cm (69\")       Body mass index is 30.18 kg/m².  Discussed the patient's BMI with her. The BMI is above average; BMI management plan is completed.    Physical Exam    Lab Results   Component Value Date     (H) 07/17/2020    CHLPL 127 07/17/2020    CHLPL 126 07/17/2020    TRIG 189 (H) 07/17/2020    TRIG 190 (H) 07/17/2020    HDL 43 07/17/2020    LDL 45 07/17/2020    VLDL 38 07/17/2020    HGBA1C 6.20 (H) 07/17/2020        Assessment/Plan   Medicare Risks and Personalized Health Plan  CMS Preventative Services Quick Reference  Advance Directive Discussion  Dementia/Memory   Depression/Dysphoria  Diabetic Lab Screening   Fall Risk  Hearing Problem    The above risks/problems have been discussed with the patient.  Pertinent information has been shared with the patient in the After Visit Summary.  Follow up plans and orders are seen below in the Assessment/Plan Section.    There are no diagnoses linked to this encounter.  Follow Up:  No follow-ups on file.     An After Visit Summary and PPPS were given to the patient.             "

## 2020-08-24 RX ORDER — LEVOTHYROXINE SODIUM 137 UG/1
TABLET ORAL
Qty: 90 TABLET | Refills: 0 | Status: SHIPPED | OUTPATIENT
Start: 2020-08-24 | End: 2020-11-07

## 2020-09-14 RX ORDER — CALCIUM CITRATE/VITAMIN D3 200MG-6.25
TABLET ORAL
Qty: 100 EACH | Refills: 0 | Status: SHIPPED | OUTPATIENT
Start: 2020-09-14 | End: 2020-11-29

## 2020-09-23 RX ORDER — ESTRADIOL 0.07 MG/D
1 FILM, EXTENDED RELEASE TRANSDERMAL 2 TIMES WEEKLY
Qty: 24 PATCH | Refills: 3 | Status: SHIPPED | OUTPATIENT
Start: 2020-09-24 | End: 2021-01-01 | Stop reason: HOSPADM

## 2020-10-05 RX ORDER — INSULIN GLARGINE AND LIXISENATIDE 100; 33 U/ML; UG/ML
60 INJECTION, SOLUTION SUBCUTANEOUS
Qty: 15 PEN | Refills: 1 | Status: SHIPPED | OUTPATIENT
Start: 2020-10-05 | End: 2021-01-01 | Stop reason: SDUPTHER

## 2020-10-26 RX ORDER — ZOLPIDEM TARTRATE 10 MG/1
TABLET ORAL
Qty: 90 TABLET | Refills: 0 | Status: SHIPPED | OUTPATIENT
Start: 2020-10-26 | End: 2021-01-25 | Stop reason: SDUPTHER

## 2020-11-02 RX ORDER — PEN NEEDLE, DIABETIC 31 GX5/16"
NEEDLE, DISPOSABLE MISCELLANEOUS
Qty: 100 EACH | Refills: 3 | Status: SHIPPED | OUTPATIENT
Start: 2020-11-02 | End: 2021-01-25 | Stop reason: SDUPTHER

## 2020-11-07 RX ORDER — LEVOTHYROXINE SODIUM 137 UG/1
TABLET ORAL
Qty: 90 TABLET | Refills: 0 | Status: SHIPPED | OUTPATIENT
Start: 2020-11-07 | End: 2021-01-25

## 2020-11-13 ENCOUNTER — TELEPHONE (OUTPATIENT)
Dept: NEUROSURGERY | Facility: CLINIC | Age: 65
End: 2020-11-13

## 2020-11-13 NOTE — TELEPHONE ENCOUNTER
PT IS A FORMER DR HANSEN PT AND WANTS TO COME IN AND SEE DR NOVOA.  IS THIS OK?  PT NUMBER [phone]  PLEASE ADVISE  THANK YOU

## 2020-11-16 RX ORDER — FENOFIBRATE 160 MG/1
160 TABLET ORAL DAILY
Qty: 90 TABLET | Refills: 1 | Status: ON HOLD | OUTPATIENT
Start: 2020-11-16 | End: 2021-01-01 | Stop reason: SDUPTHER

## 2020-11-18 ENCOUNTER — TELEPHONE (OUTPATIENT)
Dept: NEUROSURGERY | Facility: CLINIC | Age: 65
End: 2020-11-18

## 2020-11-18 NOTE — TELEPHONE ENCOUNTER
THE PATIENT IS SCHEDULE TO SEE DR. NVOOA ON 12/24, BUT SHE WANTS TO KNOW IF SHE CAN BE SEEN SOONER DUE TO HER HIGH LEVEL OF PAIN. SHE HAD SURGERY WITH DR. HANSEN 2 YEARS AGO AND SAID THE PAIN FEELS SIMILAR TO HOW IT FELT BEFORE SHE HAD THAT EMERGENCY SURGERY. SHE HAS DIFFICULTY SITTING UP STRAIGHT AND CARRYING THINGS. SHE RATES HER PAIN AS 9/10, SHE HAS THROWN UP FROM THE PAIN A FEW TIMES. SHE HAS NO ISSUES WITH BOWEL OR BLADDER CONTROL, BUT SHE LOST BOWEL CONTROL TWO YEARS AGO BEFORE HER EMERGENCY SURGERY AND SHE IS AFRAID IT MIGHT HAPPEN AGAIN.    SHE HAS TRIED TRIGGER POINT INJECTIONS AND A TENS UNIT, SHE IS TAKING HYDROCODONE BUT NONE OF THE TREATMENT OR MEDICATION IS HELPING.     PLEASE ADVISE. THE PATIENT CAN BE REACHED AT: [phone]    THANK YOU!

## 2020-11-19 NOTE — PROGRESS NOTES
Subjective   History of Present Illness: Wendi Ramos is a 64 y.o. female is here today for follow-up.    She was seen last by Dr. Goldberg 10/23/18 with new lumbar XR's. Previous surgery 6/8/18 L3/4 lumbar discectomy fusion for caudal equinia and   L3-L4 decompression and TLIF 7/12/18-Dr. Goldberg.    She called 11/18/20 with difficulty sitting straight up and carrying things.     She states symptoms started 1 month. She is currently in pain management with Dr. Gan. She has recent trigger point injections 11/10/20 that did relieve muscle pain but not help back pain.  She denies any leg pain, n/t or weakness. No bowel or bladder issues or incontinence. She takes Baclofen 20 mg BID, Hydrocodone 7.5/325 prn for pain.  She is left-handed.  Right arm is slightly weaker related to her being left-handed but strength is good and equal in all extremities.  She states that the pain is in the lumbar area where she had the pain before her surgery and June 2018.  She states that the surgery in the past was emergent related to she had some loss of bowel and had enuresis.  Although, the pain she was experiencing before the surgery in 2018 was in her back which she also had left leg weakness and numbness as well as the other issues.  Today she complains of pain on the right side, lumbar area.  Pain is prominent with light touch to the right of midline lumbar area.  Possibly facet overgrowth.  She has pain only in an area approximately 2 inches to the right of the lumbar spine and about 2 inches in circumference.  She states that she has tried heat, ice, epidural injections, physical therapy, creams, and nothing seems to help.  She is not interested in any further physical therapy or epidural injections.    History of Present Illness    The following portions of the patient's history were reviewed and updated as appropriate: allergies, current medications, past family history, past medical history, past social history, past surgical  "history and problem list.    Review of Systems   Constitutional: Positive for activity change ( ).   Gastrointestinal: Positive for nausea ( with pain) and vomiting ( x 1 with pain).   Genitourinary: Negative for difficulty urinating and frequency.   Musculoskeletal: Positive for back pain ( Low back, right side). Negative for neck pain and neck stiffness.   Neurological: Positive for numbness ( peripheral neuroaphy bilateral lower ext). Negative for seizures and weakness.   All other systems reviewed and are negative.      Objective     Vitals:    11/20/20 0920   BP: 144/82   Pulse: 66   Temp: 98 °F (36.7 °C)   Weight: 93.4 kg (206 lb)   Height: 175.3 cm (69\")     Body mass index is 30.42 kg/m².      Physical Exam  Vitals signs and nursing note reviewed.   Constitutional:       Appearance: Normal appearance. She is well-developed and well-groomed. She is obese.   HENT:      Head: Normocephalic.      Mouth/Throat:      Mouth: Mucous membranes are moist.   Neck:      Musculoskeletal: Normal range of motion.   Cardiovascular:      Rate and Rhythm: Normal rate.      Pulses: Normal pulses.   Pulmonary:      Effort: Pulmonary effort is normal.      Breath sounds: Normal breath sounds.   Musculoskeletal:      Lumbar back: She exhibits tenderness ( right sided pain).      Right lower leg: No edema.      Left lower leg: No edema.   Skin:     General: Skin is warm and dry.   Neurological:      General: No focal deficit present.      Mental Status: She is alert and oriented to person, place, and time.      Motor: No weakness.      Gait: Gait is intact.      Deep Tendon Reflexes:      Reflex Scores:       Tricep reflexes are 1+ on the right side and 1+ on the left side.       Bicep reflexes are 1+ on the right side and 1+ on the left side.       Brachioradialis reflexes are 1+ on the right side and 1+ on the left side.       Patellar reflexes are 1+ on the right side and 1+ on the left side.       Achilles reflexes are 1+ on " the right side and 1+ on the left side.     Comments: Pain was illicited in the right lumbar area with AMY, compression, and negative for [place].  Was able to heel and toe walk without problems.   Psychiatric:         Attention and Perception: Attention and perception normal.         Mood and Affect: Mood normal.         Speech: Speech normal.         Behavior: Behavior normal. Behavior is cooperative.         Thought Content: Thought content normal.         Judgment: Judgment normal.       Neurologic Exam     Mental Status   Oriented to person, place, and time.   Speech: speech is normal     Motor Exam   Right arm tone: normal  Left arm tone: normal  Right arm pronator drift: absent  Left arm pronator drift: absent  Right leg tone: normal  Left leg tone: normal    Strength   Right biceps: 4/5  Left biceps: 5/5  Right triceps: 4/5  Left triceps: 5/5  Right interossei: 5/5  Left interossei: 5/5  Right iliopsoas: 5/5  Left iliopsoas: 5/5  Right quadriceps: 5/5  Left quadriceps: 5/5  Right hamstrin/5  Left hamstrin/5  Right glutei: 5/5  Left glutei: 5/5  Right anterior tibial: 5/5  Left anterior tibial: 5/5  Right posterior tibial: 5/5  Left posterior tibial: 5/5  Right peroneal: 5/5  Left peroneal: 5/5  Right gastroc: 5/5  Left gastroc: 5/5    Gait, Coordination, and Reflexes     Gait  Gait: normal    Tremor   Resting tremor: absent  Intention tremor: absent  Action tremor: absent    Reflexes   Right brachioradialis: 1+  Left brachioradialis: 1+  Right biceps: 1+  Left biceps: 1+  Right triceps: 1+  Left triceps: 1+  Right patellar: 1+  Left patellar: 1+  Right achilles: 1+  Left achilles: 1+          Assessment/Plan     Medical Decision Making:    We will order x-rays of the lumbar spine with flexion-extension.  Will set her up for a follow-up appointment in 2 weeks with any APC.  Did not want to try physical therapy or any more epidural injections, as she states they did not help her in the  past.    Diagnoses and all orders for this visit:    1. Chronic right-sided low back pain without sciatica (Primary)  -     XR Spine Lumbar 2 or 3 View; Future    2. S/P lumbar fusion  -     XR Spine Lumbar 2 or 3 View; Future      Return in about 2 weeks (around 12/4/2020) for after xrays with any APC.       Called and gave results of today's xrays (A/P and Lateral lumbar spine).  Which revealed stable appearing posterior paul and screw fusion hardware at L3/4, with intervertebral disc spacer, with slight apparent interval decrease in  disc space height at this level, particularly on the left. There appears to be slight/borderline retrolisthesis of 3 on L4, which was present on the previous xrays.  There is no fracture seen.  Will order flex/extension lumbar xrays.      Offered lidoderm patch, and/or facet injections, and she declined at this time.

## 2020-11-20 ENCOUNTER — OFFICE VISIT (OUTPATIENT)
Dept: NEUROSURGERY | Facility: CLINIC | Age: 65
End: 2020-11-20

## 2020-11-20 ENCOUNTER — HOSPITAL ENCOUNTER (OUTPATIENT)
Dept: GENERAL RADIOLOGY | Facility: HOSPITAL | Age: 65
Discharge: HOME | End: 2020-11-20

## 2020-11-20 VITALS
WEIGHT: 206 LBS | DIASTOLIC BLOOD PRESSURE: 82 MMHG | SYSTOLIC BLOOD PRESSURE: 144 MMHG | HEIGHT: 69 IN | HEART RATE: 66 BPM | TEMPERATURE: 98 F | BODY MASS INDEX: 30.51 KG/M2

## 2020-11-20 DIAGNOSIS — Z98.1 S/P LUMBAR FUSION: ICD-10-CM

## 2020-11-20 DIAGNOSIS — M54.50 CHRONIC RIGHT-SIDED LOW BACK PAIN WITHOUT SCIATICA: ICD-10-CM

## 2020-11-20 DIAGNOSIS — G89.29 CHRONIC RIGHT-SIDED LOW BACK PAIN WITHOUT SCIATICA: Primary | ICD-10-CM

## 2020-11-20 DIAGNOSIS — G89.29 CHRONIC RIGHT-SIDED LOW BACK PAIN WITHOUT SCIATICA: ICD-10-CM

## 2020-11-20 DIAGNOSIS — M54.50 CHRONIC RIGHT-SIDED LOW BACK PAIN WITHOUT SCIATICA: Primary | ICD-10-CM

## 2020-11-20 PROCEDURE — 72100 X-RAY EXAM L-S SPINE 2/3 VWS: CPT

## 2020-11-20 PROCEDURE — 99213 OFFICE O/P EST LOW 20 MIN: CPT | Performed by: NURSE PRACTITIONER

## 2020-11-20 PROCEDURE — 72120 X-RAY BEND ONLY L-S SPINE: CPT

## 2020-11-20 RX ORDER — INFLUENZA A VIRUS A/MICHIGAN/45/2015 X-275 (H1N1) ANTIGEN (FORMALDEHYDE INACTIVATED), INFLUENZA A VIRUS A/SINGAPORE/INFIMH-16-0019/2016 IVR-186 (H3N2) ANTIGEN (FORMALDEHYDE INACTIVATED), INFLUENZA B VIRUS B/PHUKET/3073/2013 ANTIGEN (FORMALDEHYDE INACTIVATED), AND INFLUENZA B VIRUS B/MARYLAND/15/2016 BX-69A ANTIGEN (FORMALDEHYDE INACTIVATED) 60; 60; 60; 60 UG/.7ML; UG/.7ML; UG/.7ML; UG/.7ML
INJECTION, SUSPENSION INTRAMUSCULAR
COMMUNITY
Start: 2020-08-21 | End: 2021-02-12

## 2020-11-20 RX ORDER — OMEGA-3-ACID ETHYL ESTERS 1 G/1
2 CAPSULE, LIQUID FILLED ORAL 2 TIMES DAILY
COMMUNITY
Start: 2020-11-09 | End: 2021-01-01 | Stop reason: SDUPTHER

## 2020-11-24 ENCOUNTER — TELEPHONE (OUTPATIENT)
Dept: NEUROSURGERY | Facility: CLINIC | Age: 65
End: 2020-11-24

## 2020-11-24 DIAGNOSIS — Z98.1 S/P LUMBAR FUSION: Primary | ICD-10-CM

## 2020-11-24 DIAGNOSIS — G89.29 CHRONIC RIGHT-SIDED LOW BACK PAIN WITHOUT SCIATICA: ICD-10-CM

## 2020-11-24 DIAGNOSIS — M54.50 CHRONIC RIGHT-SIDED LOW BACK PAIN WITHOUT SCIATICA: ICD-10-CM

## 2020-11-24 NOTE — TELEPHONE ENCOUNTER
Patient called in severe pain.  Doesn't know if she can last until her next appointment.    Would like a call back.    Thank You

## 2020-11-24 NOTE — TELEPHONE ENCOUNTER
All we order were plain films.  We can add in an MRI with and without contrast to the plain films.  As far as the pain is concerned she will need to talk to her pain management doctor about that.

## 2020-11-24 NOTE — TELEPHONE ENCOUNTER
"Patient just had a visit with Eusebia on 11/20/2020, \"Eusebia Offered lidoderm patch, and/or facet injections, and she declined at this time.\" She is scheduled to see Lainey on 12/4/2020. She is currently in pain mgmt with Dr. Gan, per Eusebia's note She takes Baclofen 20 mg BID, Hydrocodone 7.5/325 prn for pain.  "

## 2020-11-24 NOTE — TELEPHONE ENCOUNTER
Patient is aware, MRI order placed and scheduling will call her to get scheduled. Once her MRI is scheduled her appointment will need to be moved.

## 2020-11-25 ENCOUNTER — DOCUMENTATION (OUTPATIENT)
Dept: NEUROSURGERY | Facility: CLINIC | Age: 65
End: 2020-11-25

## 2020-11-25 NOTE — TELEPHONE ENCOUNTER
Patient called back today and initially spoke with Lay. She was upset about us thinking she was drug seeking, and said that no one had called her back from her call yesterday. I told her that we did not at all think she was  drug seeking. She asked if the person she saw on 11/20/2020 even knew what she was doing by only ordering XR only at her visit, asked if she even knew what Cauda Equina Syndrome was and how serious it is. I told her Eusebia is a nurse practionier and that she does know what she is doing, I told her we often have to get plan films first before jumping to an MRI due to insurance purposes. She was more understanding then.  I asked if she remembered me speaking to her yesterday? She said yes, I told her that the initial message we received was about her pain, read her the telephone note from Lay. And she said yes she is in severe pain, it is the same pain as when she previously had surgery with Dr. Goldberg. I told her we really need to get her MRI scheduled that Dr. Randall ordered as we cannot do anything without further imaging. She said she cannot live with this pain until 12/4/2020 which is her follow up. I verified her pain is still in her back, she denies any bowel or bladder incontinence. I explained to her that if her pain is so severe she can go to the ER as we cannot do anything in our office about her pain we need the MRI done. She then told me the story about her previous surgery, how she waited in the ER for 8 hours then ended up with an emergency surgery and she does not want to do that again. I told her I understand not wanting to wait in the ER, I suggested she go ahead and get her MRI scheduled when we hang up the phone, I did let her know that if there was anything emergent on her XR or MRI that the reading radiologist would call our office and we will get her taken care of sooner than her appointment if necessary. She thanked me for explaining everything and was given the  number to get her MRI scheduled. She will go to the ER if her pain is simply so severe she cannot get it under control.

## 2020-11-29 RX ORDER — CALCIUM CITRATE/VITAMIN D3 200MG-6.25
TABLET ORAL
Qty: 100 EACH | Refills: 1 | Status: SHIPPED | OUTPATIENT
Start: 2020-11-29 | End: 2021-02-12

## 2020-11-30 ENCOUNTER — TELEPHONE (OUTPATIENT)
Dept: NEUROSURGERY | Facility: CLINIC | Age: 65
End: 2020-11-30

## 2020-12-10 ENCOUNTER — HOSPITAL ENCOUNTER (OUTPATIENT)
Dept: MRI IMAGING | Facility: HOSPITAL | Age: 65
Discharge: HOME | End: 2020-12-10
Admitting: NEUROLOGICAL SURGERY

## 2020-12-10 DIAGNOSIS — M54.50 CHRONIC RIGHT-SIDED LOW BACK PAIN WITHOUT SCIATICA: ICD-10-CM

## 2020-12-10 DIAGNOSIS — G89.29 CHRONIC RIGHT-SIDED LOW BACK PAIN WITHOUT SCIATICA: ICD-10-CM

## 2020-12-10 DIAGNOSIS — Z98.1 S/P LUMBAR FUSION: ICD-10-CM

## 2020-12-10 PROCEDURE — A9577 INJ MULTIHANCE: HCPCS | Performed by: NEUROLOGICAL SURGERY

## 2020-12-10 PROCEDURE — 0 GADOBENATE DIMEGLUMINE 529 MG/ML SOLUTION: Performed by: NEUROLOGICAL SURGERY

## 2020-12-10 PROCEDURE — 72158 MRI LUMBAR SPINE W/O & W/DYE: CPT

## 2020-12-10 PROCEDURE — 82565 ASSAY OF CREATININE: CPT

## 2020-12-10 RX ADMIN — GADOBENATE DIMEGLUMINE 20 ML: 529 INJECTION, SOLUTION INTRAVENOUS at 18:21

## 2020-12-11 LAB — CREAT BLDA-MCNC: 0.8 MG/DL (ref 0.6–1.3)

## 2020-12-11 NOTE — PROGRESS NOTES
Subjective   Patient ID: Wendi Ramos is a 64 y.o. female is here today for follow-up with a new lumbar MRI that was ordered at her last visit for back pain with numbness.    Today her symptoms are in her lower lumbar.    Patient is wearing a mask in our office today.      History of Present Illness     This patient continues with pain primarily in her back but she feels that sometimes when her back flares up she loses strength in her legs.  This is very similar to symptoms that she had in June 2018 when she underwent an emergency lumbar laminectomy for cauda equina syndrome.  Subsequent to that surgery she had a recurrent disc and underwent a fusion at that level about 2 or 3 weeks later.    The following portions of the patient's history were reviewed and updated as appropriate: allergies, current medications, past family history, past medical history, past social history, past surgical history and problem list.    Review of Systems   Constitutional: Positive for activity change.   Respiratory: Negative for chest tightness and shortness of breath.    Cardiovascular: Negative for chest pain.   Musculoskeletal: Positive for back pain, gait problem and myalgias.   Neurological: Positive for weakness and numbness.       I have reviewed the review of systems as documented by my MA.      Objective     Vitals:    12/14/20 1508   BP: 145/85   Pulse: 90   Temp: 98.7 °F (37.1 °C)     There is no height or weight on file to calculate BMI.      Physical Exam  Neurological:      Mental Status: She is alert and oriented to person, place, and time.       Neurologic Exam     Mental Status   Oriented to person, place, and time.           Assessment/Plan   Independent Review of Radiographic Studies:      I personally reviewed the images from the following studies.    I reviewed plain films of her lumbar spine done on 20 November.  This does show previous surgery at L3-4.  I think there is a solid fusion there there is certainly  no movement on flexion and extension I reviewed an MRI of her lumbar spine as well done on 10 December.  This shows a widely patent canal at T12-L1.  L1-2 is also open.  L2-3 shows some central disc bulging and some stenosis.  The neuroforamina are fairly open however.  L3-4 is the fused level and looks okay.  L4-5 shows a grade 1 spondylolisthesis with some central bulging to the right but no severe pressure on the nerves and L5-S1 looks okay.  The radiologist felt there was a left-sided disc bulge at T11-T12 that look about like it did in 2018.  He agreed that there was some moderate stenosis at L2-3.  This has gotten a little bit worse since 2018.  I reviewed the myelogram from 2018 as well.  The patient had a very large disc herniation at L3-4 which caused a cauda equina syndrome and provoked emergency surgery.  I would disagree with the radiologist that the narrowing at L2-3 and L4-5 has gotten worse.  I think it looks about the same.    Medical Decision Making:      I told the patient about the imaging.  I certainly do not see anything here that would indicate an impending cauda equina syndrome.  I recommended that rather than extending the fusion up to level and down the level that we consider some epidural block to see if this will calm down.  She already has a pain management doctor and we will ask him to do that.  She would like to do that.  I told her to call me after a block has been done and we can decide then what to do from there.  I told her that before we did surgery we would need to do another myelogram on her but I do not see a reason to do that now.    Diagnoses and all orders for this visit:    1. Acute right-sided low back pain without sciatica (Primary)  -     Epidural Block      Return for Recheck and call after treatment or consultation.

## 2020-12-14 ENCOUNTER — OFFICE VISIT (OUTPATIENT)
Dept: NEUROSURGERY | Facility: CLINIC | Age: 65
End: 2020-12-14

## 2020-12-14 VITALS — TEMPERATURE: 98.7 F | SYSTOLIC BLOOD PRESSURE: 145 MMHG | DIASTOLIC BLOOD PRESSURE: 85 MMHG | HEART RATE: 90 BPM

## 2020-12-14 DIAGNOSIS — M54.50 ACUTE RIGHT-SIDED LOW BACK PAIN WITHOUT SCIATICA: Primary | ICD-10-CM

## 2020-12-14 PROCEDURE — 99213 OFFICE O/P EST LOW 20 MIN: CPT | Performed by: NEUROLOGICAL SURGERY

## 2020-12-15 ENCOUNTER — APPOINTMENT (OUTPATIENT)
Dept: MRI IMAGING | Facility: HOSPITAL | Age: 65
End: 2020-12-15

## 2020-12-17 RX ORDER — OMEPRAZOLE 40 MG/1
CAPSULE, DELAYED RELEASE ORAL
Qty: 90 CAPSULE | Refills: 1 | Status: SHIPPED | OUTPATIENT
Start: 2020-12-17 | End: 2021-01-01 | Stop reason: SDUPTHER

## 2021-01-01 ENCOUNTER — TELEPHONE (OUTPATIENT)
Dept: ORTHOPEDIC SURGERY | Facility: CLINIC | Age: 66
End: 2021-01-01

## 2021-01-01 ENCOUNTER — DOCUMENTATION (OUTPATIENT)
Dept: PHYSICAL THERAPY | Facility: CLINIC | Age: 66
End: 2021-01-01

## 2021-01-01 ENCOUNTER — APPOINTMENT (OUTPATIENT)
Dept: GENERAL RADIOLOGY | Facility: HOSPITAL | Age: 66
End: 2021-01-01

## 2021-01-01 ENCOUNTER — APPOINTMENT (OUTPATIENT)
Dept: PREADMISSION TESTING | Facility: HOSPITAL | Age: 66
End: 2021-01-01

## 2021-01-01 ENCOUNTER — TREATMENT (OUTPATIENT)
Dept: PHYSICAL THERAPY | Facility: CLINIC | Age: 66
End: 2021-01-01

## 2021-01-01 ENCOUNTER — HOSPITAL ENCOUNTER (OUTPATIENT)
Facility: HOSPITAL | Age: 66
Setting detail: SURGERY ADMIT
End: 2021-01-01
Attending: ORTHOPAEDIC SURGERY | Admitting: ORTHOPAEDIC SURGERY

## 2021-01-01 ENCOUNTER — BULK ORDERING (OUTPATIENT)
Dept: CASE MANAGEMENT | Facility: OTHER | Age: 66
End: 2021-01-01

## 2021-01-01 ENCOUNTER — READMISSION MANAGEMENT (OUTPATIENT)
Dept: CALL CENTER | Facility: HOSPITAL | Age: 66
End: 2021-01-01

## 2021-01-01 ENCOUNTER — OFFICE VISIT (OUTPATIENT)
Dept: FAMILY MEDICINE CLINIC | Facility: CLINIC | Age: 66
End: 2021-01-01

## 2021-01-01 ENCOUNTER — APPOINTMENT (OUTPATIENT)
Dept: CT IMAGING | Facility: HOSPITAL | Age: 66
End: 2021-01-01

## 2021-01-01 ENCOUNTER — TELEPHONE (OUTPATIENT)
Dept: ENDOCRINOLOGY | Age: 66
End: 2021-01-01

## 2021-01-01 ENCOUNTER — ANESTHESIA (OUTPATIENT)
Dept: PERIOP | Facility: HOSPITAL | Age: 66
End: 2021-01-01

## 2021-01-01 ENCOUNTER — TELEPHONE (OUTPATIENT)
Dept: FAMILY MEDICINE CLINIC | Facility: CLINIC | Age: 66
End: 2021-01-01

## 2021-01-01 ENCOUNTER — OFFICE VISIT (OUTPATIENT)
Dept: ORTHOPEDIC SURGERY | Facility: CLINIC | Age: 66
End: 2021-01-01

## 2021-01-01 ENCOUNTER — TRANSITIONAL CARE MANAGEMENT TELEPHONE ENCOUNTER (OUTPATIENT)
Dept: CALL CENTER | Facility: HOSPITAL | Age: 66
End: 2021-01-01

## 2021-01-01 ENCOUNTER — ANESTHESIA EVENT (OUTPATIENT)
Dept: PERIOP | Facility: HOSPITAL | Age: 66
End: 2021-01-01

## 2021-01-01 ENCOUNTER — OFFICE VISIT (OUTPATIENT)
Dept: NEUROSURGERY | Facility: CLINIC | Age: 66
End: 2021-01-01

## 2021-01-01 ENCOUNTER — OFFICE VISIT (OUTPATIENT)
Dept: ENDOCRINOLOGY | Age: 66
End: 2021-01-01

## 2021-01-01 ENCOUNTER — HOSPITAL ENCOUNTER (INPATIENT)
Facility: HOSPITAL | Age: 66
LOS: 7 days | Discharge: HOME HEALTH | End: 2021-04-21
Attending: EMERGENCY MEDICINE | Admitting: ORTHOPAEDIC SURGERY

## 2021-01-01 ENCOUNTER — MEDICATION THERAPY MANAGEMENT (OUTPATIENT)
Dept: ENDOCRINOLOGY | Age: 66
End: 2021-01-01

## 2021-01-01 ENCOUNTER — HOSPITAL ENCOUNTER (OUTPATIENT)
Dept: MRI IMAGING | Facility: HOSPITAL | Age: 66
Discharge: HOME | End: 2021-04-10
Admitting: ORTHOPAEDIC SURGERY

## 2021-01-01 ENCOUNTER — APPOINTMENT (OUTPATIENT)
Dept: CARDIOLOGY | Facility: HOSPITAL | Age: 66
End: 2021-01-01

## 2021-01-01 VITALS
SYSTOLIC BLOOD PRESSURE: 120 MMHG | DIASTOLIC BLOOD PRESSURE: 70 MMHG | HEART RATE: 78 BPM | BODY MASS INDEX: 31.71 KG/M2 | WEIGHT: 214.07 LBS | HEIGHT: 69 IN | TEMPERATURE: 97.7 F

## 2021-01-01 VITALS
DIASTOLIC BLOOD PRESSURE: 79 MMHG | TEMPERATURE: 98.4 F | SYSTOLIC BLOOD PRESSURE: 152 MMHG | WEIGHT: 186.51 LBS | RESPIRATION RATE: 16 BRPM | HEIGHT: 69 IN | BODY MASS INDEX: 27.62 KG/M2 | OXYGEN SATURATION: 94 % | HEART RATE: 76 BPM

## 2021-01-01 VITALS — WEIGHT: 197 LBS | BODY MASS INDEX: 29.18 KG/M2 | HEIGHT: 69 IN | TEMPERATURE: 97.7 F

## 2021-01-01 VITALS — WEIGHT: 183 LBS | TEMPERATURE: 97.8 F | BODY MASS INDEX: 27.11 KG/M2 | HEIGHT: 69 IN

## 2021-01-01 VITALS
DIASTOLIC BLOOD PRESSURE: 75 MMHG | WEIGHT: 213.2 LBS | BODY MASS INDEX: 31.58 KG/M2 | HEIGHT: 69 IN | SYSTOLIC BLOOD PRESSURE: 142 MMHG | HEART RATE: 81 BPM | OXYGEN SATURATION: 96 %

## 2021-01-01 VITALS
HEIGHT: 69 IN | TEMPERATURE: 97.1 F | HEART RATE: 80 BPM | DIASTOLIC BLOOD PRESSURE: 72 MMHG | WEIGHT: 183 LBS | OXYGEN SATURATION: 98 % | SYSTOLIC BLOOD PRESSURE: 120 MMHG | BODY MASS INDEX: 27.11 KG/M2

## 2021-01-01 VITALS
WEIGHT: 195 LBS | SYSTOLIC BLOOD PRESSURE: 124 MMHG | DIASTOLIC BLOOD PRESSURE: 76 MMHG | BODY MASS INDEX: 28.88 KG/M2 | HEIGHT: 69 IN

## 2021-01-01 VITALS — TEMPERATURE: 97.1 F | BODY MASS INDEX: 27.62 KG/M2 | WEIGHT: 186.51 LBS | HEIGHT: 69 IN

## 2021-01-01 VITALS — BODY MASS INDEX: 31.71 KG/M2 | TEMPERATURE: 97.3 F | WEIGHT: 214.07 LBS | HEIGHT: 69 IN

## 2021-01-01 VITALS — TEMPERATURE: 96.9 F | BODY MASS INDEX: 31.1 KG/M2 | HEIGHT: 69 IN | WEIGHT: 210 LBS

## 2021-01-01 DIAGNOSIS — E11.9 TYPE 2 DIABETES MELLITUS WITHOUT COMPLICATION, WITH LONG-TERM CURRENT USE OF INSULIN (HCC): ICD-10-CM

## 2021-01-01 DIAGNOSIS — E78.5 HYPERLIPIDEMIA, UNSPECIFIED HYPERLIPIDEMIA TYPE: ICD-10-CM

## 2021-01-01 DIAGNOSIS — G47.00 INSOMNIA, UNSPECIFIED TYPE: ICD-10-CM

## 2021-01-01 DIAGNOSIS — Z79.4 TYPE 2 DIABETES MELLITUS WITHOUT COMPLICATION, WITH LONG-TERM CURRENT USE OF INSULIN (HCC): ICD-10-CM

## 2021-01-01 DIAGNOSIS — E11.65 TYPE 2 DIABETES MELLITUS WITH HYPERGLYCEMIA, WITH LONG-TERM CURRENT USE OF INSULIN (HCC): Primary | ICD-10-CM

## 2021-01-01 DIAGNOSIS — K21.9 GASTROESOPHAGEAL REFLUX DISEASE, UNSPECIFIED WHETHER ESOPHAGITIS PRESENT: ICD-10-CM

## 2021-01-01 DIAGNOSIS — M54.6 THORACOLUMBAR BACK PAIN: Primary | ICD-10-CM

## 2021-01-01 DIAGNOSIS — Z79.4 TYPE 2 DIABETES MELLITUS WITHOUT COMPLICATION, WITH LONG-TERM CURRENT USE OF INSULIN (HCC): Primary | ICD-10-CM

## 2021-01-01 DIAGNOSIS — M48.062 LUMBAR STENOSIS WITH NEUROGENIC CLAUDICATION: Primary | ICD-10-CM

## 2021-01-01 DIAGNOSIS — E06.3 HYPOTHYROIDISM DUE TO HASHIMOTO'S THYROIDITIS: ICD-10-CM

## 2021-01-01 DIAGNOSIS — M48.062 LUMBAR STENOSIS WITH NEUROGENIC CLAUDICATION: ICD-10-CM

## 2021-01-01 DIAGNOSIS — E78.5 COMPLEX DYSLIPIDEMIA: ICD-10-CM

## 2021-01-01 DIAGNOSIS — R52 PAIN: Primary | ICD-10-CM

## 2021-01-01 DIAGNOSIS — M48.062 SPINAL STENOSIS OF LUMBAR REGION WITH NEUROGENIC CLAUDICATION: ICD-10-CM

## 2021-01-01 DIAGNOSIS — E03.8 HYPOTHYROIDISM DUE TO HASHIMOTO'S THYROIDITIS: ICD-10-CM

## 2021-01-01 DIAGNOSIS — N30.90 CYSTITIS: ICD-10-CM

## 2021-01-01 DIAGNOSIS — M40.15 OTHER SECONDARY KYPHOSIS, THORACOLUMBAR REGION: Primary | ICD-10-CM

## 2021-01-01 DIAGNOSIS — E78.5 COMPLEX DYSLIPIDEMIA: Primary | ICD-10-CM

## 2021-01-01 DIAGNOSIS — Z09 FOLLOW-UP EXAMINATION FOLLOWING SURGERY: Primary | ICD-10-CM

## 2021-01-01 DIAGNOSIS — E78.5 HYPERLIPIDEMIA, UNSPECIFIED HYPERLIPIDEMIA TYPE: Primary | ICD-10-CM

## 2021-01-01 DIAGNOSIS — M54.41 RIGHT-SIDED LOW BACK PAIN WITH RIGHT-SIDED SCIATICA, UNSPECIFIED CHRONICITY: ICD-10-CM

## 2021-01-01 DIAGNOSIS — Z51.81 MEDICATION MONITORING ENCOUNTER: ICD-10-CM

## 2021-01-01 DIAGNOSIS — Z79.4 TYPE 2 DIABETES MELLITUS WITH HYPERGLYCEMIA, WITH LONG-TERM CURRENT USE OF INSULIN (HCC): Primary | ICD-10-CM

## 2021-01-01 DIAGNOSIS — M81.0 MENOPAUSAL OSTEOPOROSIS: Primary | ICD-10-CM

## 2021-01-01 DIAGNOSIS — E11.9 TYPE 2 DIABETES MELLITUS WITHOUT COMPLICATION, WITH LONG-TERM CURRENT USE OF INSULIN (HCC): Primary | ICD-10-CM

## 2021-01-01 DIAGNOSIS — M54.50 THORACOLUMBAR BACK PAIN: Primary | ICD-10-CM

## 2021-01-01 DIAGNOSIS — R56.9 SEIZURES (HCC): ICD-10-CM

## 2021-01-01 DIAGNOSIS — M06.9 RHEUMATOID ARTHRITIS, INVOLVING UNSPECIFIED SITE, UNSPECIFIED WHETHER RHEUMATOID FACTOR PRESENT (HCC): ICD-10-CM

## 2021-01-01 DIAGNOSIS — I10 ESSENTIAL HYPERTENSION: ICD-10-CM

## 2021-01-01 DIAGNOSIS — Z98.1 S/P LUMBAR FUSION: Primary | ICD-10-CM

## 2021-01-01 DIAGNOSIS — R53.82 CHRONIC FATIGUE: ICD-10-CM

## 2021-01-01 DIAGNOSIS — Z23 IMMUNIZATION DUE: ICD-10-CM

## 2021-01-01 LAB
ABO GROUP BLD: NORMAL
ALBUMIN SERPL-MCNC: 3.7 G/DL (ref 3.5–5.2)
ALBUMIN SERPL-MCNC: 4.8 G/DL (ref 3.8–4.8)
ALBUMIN SERPL-MCNC: 4.8 G/DL (ref 3.8–4.8)
ALBUMIN/CREAT UR: 28 MG/G CREAT (ref 0–29)
ALBUMIN/GLOB SERPL: 0.9 G/DL
ALBUMIN/GLOB SERPL: 1.6 {RATIO} (ref 1.2–2.2)
ALBUMIN/GLOB SERPL: 1.7 {RATIO} (ref 1.2–2.2)
ALP SERPL-CCNC: 122 U/L (ref 39–117)
ALP SERPL-CCNC: 81 IU/L (ref 44–121)
ALP SERPL-CCNC: 86 IU/L (ref 48–121)
ALT SERPL W P-5'-P-CCNC: 14 U/L (ref 1–33)
ALT SERPL-CCNC: 24 IU/L (ref 0–32)
ALT SERPL-CCNC: 29 IU/L (ref 0–32)
ANION GAP SERPL CALCULATED.3IONS-SCNC: 10.5 MMOL/L (ref 5–15)
ANION GAP SERPL CALCULATED.3IONS-SCNC: 9.1 MMOL/L (ref 5–15)
ANION GAP SERPL CALCULATED.3IONS-SCNC: 9.6 MMOL/L (ref 5–15)
AST SERPL-CCNC: 22 U/L (ref 1–32)
AST SERPL-CCNC: 33 IU/L (ref 0–40)
AST SERPL-CCNC: 37 IU/L (ref 0–40)
BACTERIA SPEC AEROBE CULT: NORMAL
BACTERIA SPEC AEROBE CULT: NORMAL
BACTERIA SPEC ANAEROBE CULT: NORMAL
BACTERIA SPEC ANAEROBE CULT: NORMAL
BACTERIA UR QL AUTO: ABNORMAL /HPF
BASE EXCESS BLDA CALC-SCNC: -1 MMOL/L (ref -5–5)
BASE EXCESS BLDA CALC-SCNC: -2 MMOL/L (ref -5–5)
BASOPHILS # BLD AUTO: 0.03 10*3/MM3 (ref 0–0.2)
BASOPHILS # BLD AUTO: 0.03 10*3/MM3 (ref 0–0.2)
BASOPHILS # BLD AUTO: 0.06 10*3/MM3 (ref 0–0.2)
BASOPHILS NFR BLD AUTO: 0.4 % (ref 0–1.5)
BASOPHILS NFR BLD AUTO: 0.4 % (ref 0–1.5)
BASOPHILS NFR BLD AUTO: 0.6 % (ref 0–1.5)
BH BB BLOOD EXPIRATION DATE: NORMAL
BH BB BLOOD TYPE BARCODE: 600
BH BB DISPENSE STATUS: NORMAL
BH BB PRODUCT CODE: NORMAL
BH BB UNIT NUMBER: NORMAL
BH CV LOWER VASCULAR LEFT COMMON FEMORAL AUGMENT: NORMAL
BH CV LOWER VASCULAR LEFT COMMON FEMORAL COMPETENT: NORMAL
BH CV LOWER VASCULAR LEFT COMMON FEMORAL COMPRESS: NORMAL
BH CV LOWER VASCULAR LEFT COMMON FEMORAL PHASIC: NORMAL
BH CV LOWER VASCULAR LEFT COMMON FEMORAL SPONT: NORMAL
BH CV LOWER VASCULAR LEFT DISTAL FEMORAL COMPRESS: NORMAL
BH CV LOWER VASCULAR LEFT GASTRONEMIUS COMPRESS: NORMAL
BH CV LOWER VASCULAR LEFT GREATER SAPH AK COMPRESS: NORMAL
BH CV LOWER VASCULAR LEFT GREATER SAPH BK COMPRESS: NORMAL
BH CV LOWER VASCULAR LEFT LESSER SAPH COMPRESS: NORMAL
BH CV LOWER VASCULAR LEFT MID FEMORAL AUGMENT: NORMAL
BH CV LOWER VASCULAR LEFT MID FEMORAL COMPETENT: NORMAL
BH CV LOWER VASCULAR LEFT MID FEMORAL COMPRESS: NORMAL
BH CV LOWER VASCULAR LEFT MID FEMORAL PHASIC: NORMAL
BH CV LOWER VASCULAR LEFT MID FEMORAL SPONT: NORMAL
BH CV LOWER VASCULAR LEFT PERONEAL COMPRESS: NORMAL
BH CV LOWER VASCULAR LEFT POPLITEAL AUGMENT: NORMAL
BH CV LOWER VASCULAR LEFT POPLITEAL COMPETENT: NORMAL
BH CV LOWER VASCULAR LEFT POPLITEAL COMPRESS: NORMAL
BH CV LOWER VASCULAR LEFT POPLITEAL PHASIC: NORMAL
BH CV LOWER VASCULAR LEFT POPLITEAL SPONT: NORMAL
BH CV LOWER VASCULAR LEFT POSTERIOR TIBIAL COMPRESS: NORMAL
BH CV LOWER VASCULAR LEFT PROFUNDA FEMORAL COMPRESS: NORMAL
BH CV LOWER VASCULAR LEFT PROXIMAL FEMORAL COMPRESS: NORMAL
BH CV LOWER VASCULAR LEFT SAPHENOFEMORAL JUNCTION COMPRESS: NORMAL
BH CV LOWER VASCULAR RIGHT COMMON FEMORAL AUGMENT: NORMAL
BH CV LOWER VASCULAR RIGHT COMMON FEMORAL COMPETENT: NORMAL
BH CV LOWER VASCULAR RIGHT COMMON FEMORAL COMPRESS: NORMAL
BH CV LOWER VASCULAR RIGHT COMMON FEMORAL PHASIC: NORMAL
BH CV LOWER VASCULAR RIGHT COMMON FEMORAL SPONT: NORMAL
BH CV LOWER VASCULAR RIGHT DISTAL FEMORAL COMPRESS: NORMAL
BH CV LOWER VASCULAR RIGHT GASTRONEMIUS COMPRESS: NORMAL
BH CV LOWER VASCULAR RIGHT GREATER SAPH AK COMPRESS: NORMAL
BH CV LOWER VASCULAR RIGHT GREATER SAPH BK COMPRESS: NORMAL
BH CV LOWER VASCULAR RIGHT LESSER SAPH COMPRESS: NORMAL
BH CV LOWER VASCULAR RIGHT MID FEMORAL AUGMENT: NORMAL
BH CV LOWER VASCULAR RIGHT MID FEMORAL COMPETENT: NORMAL
BH CV LOWER VASCULAR RIGHT MID FEMORAL COMPRESS: NORMAL
BH CV LOWER VASCULAR RIGHT MID FEMORAL PHASIC: NORMAL
BH CV LOWER VASCULAR RIGHT MID FEMORAL SPONT: NORMAL
BH CV LOWER VASCULAR RIGHT PERONEAL COMPRESS: NORMAL
BH CV LOWER VASCULAR RIGHT POPLITEAL AUGMENT: NORMAL
BH CV LOWER VASCULAR RIGHT POPLITEAL COMPETENT: NORMAL
BH CV LOWER VASCULAR RIGHT POPLITEAL COMPRESS: NORMAL
BH CV LOWER VASCULAR RIGHT POPLITEAL PHASIC: NORMAL
BH CV LOWER VASCULAR RIGHT POPLITEAL SPONT: NORMAL
BH CV LOWER VASCULAR RIGHT POSTERIOR TIBIAL COMPRESS: NORMAL
BH CV LOWER VASCULAR RIGHT PROFUNDA FEMORAL COMPRESS: NORMAL
BH CV LOWER VASCULAR RIGHT PROXIMAL FEMORAL COMPRESS: NORMAL
BH CV LOWER VASCULAR RIGHT SAPHENOFEMORAL JUNCTION COMPRESS: NORMAL
BILIRUB SERPL-MCNC: <0.2 MG/DL (ref 0–1.2)
BILIRUB UR QL STRIP: NEGATIVE
BLD GP AB SCN SERPL QL: NEGATIVE
BUN SERPL-MCNC: 11 MG/DL (ref 8–23)
BUN SERPL-MCNC: 14 MG/DL (ref 8–23)
BUN SERPL-MCNC: 17 MG/DL (ref 8–23)
BUN SERPL-MCNC: 17 MG/DL (ref 8–27)
BUN SERPL-MCNC: 20 MG/DL (ref 8–27)
BUN/CREAT SERPL: 20.6 (ref 7–25)
BUN/CREAT SERPL: 25 (ref 12–28)
BUN/CREAT SERPL: 26.2 (ref 7–25)
BUN/CREAT SERPL: 30.9 (ref 7–25)
BUN/CREAT SERPL: 31 (ref 12–28)
CA-I BLDA-SCNC: ABNORMAL MMOL/L
CA-I BLDA-SCNC: ABNORMAL MMOL/L
CALCIUM SERPL-MCNC: 10 MG/DL (ref 8.7–10.3)
CALCIUM SERPL-MCNC: 10.2 MG/DL (ref 8.7–10.3)
CALCIUM SPEC-SCNC: 10 MG/DL (ref 8.6–10.5)
CALCIUM SPEC-SCNC: 8.7 MG/DL (ref 8.6–10.5)
CALCIUM SPEC-SCNC: 9.6 MG/DL (ref 8.6–10.5)
CHLORIDE SERPL-SCNC: 103 MMOL/L (ref 98–107)
CHLORIDE SERPL-SCNC: 104 MMOL/L (ref 96–106)
CHLORIDE SERPL-SCNC: 104 MMOL/L (ref 98–107)
CHLORIDE SERPL-SCNC: 105 MMOL/L (ref 96–106)
CHLORIDE SERPL-SCNC: 106 MMOL/L (ref 98–107)
CHOLEST SERPL-MCNC: 115 MG/DL (ref 100–199)
CHOLEST SERPL-MCNC: 91 MG/DL (ref 100–199)
CLARITY UR: CLEAR
CO2 BLDA-SCNC: 23 MMOL/L (ref 24–29)
CO2 BLDA-SCNC: 24 MMOL/L (ref 24–29)
CO2 SERPL-SCNC: 22 MMOL/L (ref 20–29)
CO2 SERPL-SCNC: 23 MMOL/L (ref 20–29)
CO2 SERPL-SCNC: 23.5 MMOL/L (ref 22–29)
CO2 SERPL-SCNC: 23.9 MMOL/L (ref 22–29)
CO2 SERPL-SCNC: 24.4 MMOL/L (ref 22–29)
COLOR UR: YELLOW
CREAT BLDA-MCNC: 0.6 MG/DL (ref 0.6–1.3)
CREAT SERPL-MCNC: 0.37 MG/DL (ref 0.57–1)
CREAT SERPL-MCNC: 0.42 MG/DL (ref 0.57–1)
CREAT SERPL-MCNC: 0.55 MG/DL (ref 0.57–1)
CREAT SERPL-MCNC: 0.65 MG/DL (ref 0.57–1)
CREAT SERPL-MCNC: 0.68 MG/DL (ref 0.57–1)
CREAT SERPL-MCNC: 0.69 MG/DL (ref 0.57–1)
CREAT UR-MCNC: 43.9 MG/DL
CREAT UR-MCNC: NORMAL MG/DL
CROSSMATCH INTERPRETATION: NORMAL
DEPRECATED RDW RBC AUTO: 40 FL (ref 37–54)
DEPRECATED RDW RBC AUTO: 40.4 FL (ref 37–54)
DEPRECATED RDW RBC AUTO: 40.9 FL (ref 37–54)
DEPRECATED RDW RBC AUTO: 41 FL (ref 37–54)
DEPRECATED RDW RBC AUTO: 42.2 FL (ref 37–54)
DEPRECATED RDW RBC AUTO: 42.6 FL (ref 37–54)
DEPRECATED RDW RBC AUTO: 43.4 FL (ref 37–54)
DEPRECATED RDW RBC AUTO: 45 FL (ref 37–54)
DEPRECATED RDW RBC AUTO: 46 FL (ref 37–54)
DRUGS UR: NORMAL
EOSINOPHIL # BLD AUTO: 0.02 10*3/MM3 (ref 0–0.4)
EOSINOPHIL # BLD AUTO: 0.27 10*3/MM3 (ref 0–0.4)
EOSINOPHIL # BLD AUTO: 0.3 10*3/MM3 (ref 0–0.4)
EOSINOPHIL NFR BLD AUTO: 0.2 % (ref 0.3–6.2)
EOSINOPHIL NFR BLD AUTO: 3 % (ref 0.3–6.2)
EOSINOPHIL NFR BLD AUTO: 3.7 % (ref 0.3–6.2)
ERYTHROCYTE [DISTWIDTH] IN BLOOD BY AUTOMATED COUNT: 12.8 % (ref 12.3–15.4)
ERYTHROCYTE [DISTWIDTH] IN BLOOD BY AUTOMATED COUNT: 12.8 % (ref 12.3–15.4)
ERYTHROCYTE [DISTWIDTH] IN BLOOD BY AUTOMATED COUNT: 13 % (ref 12.3–15.4)
ERYTHROCYTE [DISTWIDTH] IN BLOOD BY AUTOMATED COUNT: 13 % (ref 12.3–15.4)
ERYTHROCYTE [DISTWIDTH] IN BLOOD BY AUTOMATED COUNT: 13.5 % (ref 12.3–15.4)
ERYTHROCYTE [DISTWIDTH] IN BLOOD BY AUTOMATED COUNT: 13.9 % (ref 12.3–15.4)
ERYTHROCYTE [DISTWIDTH] IN BLOOD BY AUTOMATED COUNT: 13.9 % (ref 12.3–15.4)
ERYTHROCYTE [DISTWIDTH] IN BLOOD BY AUTOMATED COUNT: 14 % (ref 12.3–15.4)
ERYTHROCYTE [DISTWIDTH] IN BLOOD BY AUTOMATED COUNT: 14 % (ref 12.3–15.4)
GFR SERPL CREATININE-BSD FRML MDRD: 110 ML/MIN/1.73
GFR SERPL CREATININE-BSD FRML MDRD: 150 ML/MIN/1.73
GFR SERPL CREATININE-BSD FRML MDRD: 86 ML/MIN/1.73
GFR SERPL CREATININE-BSD FRML MDRD: >150 ML/MIN/1.73
GLOBULIN SER CALC-MCNC: 2.8 G/DL (ref 1.5–4.5)
GLOBULIN SER CALC-MCNC: 3 G/DL (ref 1.5–4.5)
GLOBULIN UR ELPH-MCNC: 4 GM/DL
GLUCOSE BLDC GLUCOMTR-MCNC: 104 MG/DL (ref 70–130)
GLUCOSE BLDC GLUCOMTR-MCNC: 110 MG/DL (ref 70–130)
GLUCOSE BLDC GLUCOMTR-MCNC: 114 MG/DL (ref 70–130)
GLUCOSE BLDC GLUCOMTR-MCNC: 118 MG/DL (ref 70–130)
GLUCOSE BLDC GLUCOMTR-MCNC: 120 MG/DL (ref 70–130)
GLUCOSE BLDC GLUCOMTR-MCNC: 120 MG/DL (ref 70–130)
GLUCOSE BLDC GLUCOMTR-MCNC: 129 MG/DL (ref 70–130)
GLUCOSE BLDC GLUCOMTR-MCNC: 130 MG/DL (ref 70–130)
GLUCOSE BLDC GLUCOMTR-MCNC: 132 MG/DL (ref 70–130)
GLUCOSE BLDC GLUCOMTR-MCNC: 137 MG/DL (ref 70–130)
GLUCOSE BLDC GLUCOMTR-MCNC: 140 MG/DL (ref 70–130)
GLUCOSE BLDC GLUCOMTR-MCNC: 140 MG/DL (ref 70–130)
GLUCOSE BLDC GLUCOMTR-MCNC: 141 MG/DL (ref 70–130)
GLUCOSE BLDC GLUCOMTR-MCNC: 142 MG/DL (ref 70–130)
GLUCOSE BLDC GLUCOMTR-MCNC: 142 MG/DL (ref 70–130)
GLUCOSE BLDC GLUCOMTR-MCNC: 145 MG/DL (ref 70–130)
GLUCOSE BLDC GLUCOMTR-MCNC: 145 MG/DL (ref 70–130)
GLUCOSE BLDC GLUCOMTR-MCNC: 147 MG/DL (ref 70–130)
GLUCOSE BLDC GLUCOMTR-MCNC: 150 MG/DL (ref 70–130)
GLUCOSE BLDC GLUCOMTR-MCNC: 152 MG/DL (ref 70–130)
GLUCOSE BLDC GLUCOMTR-MCNC: 152 MG/DL (ref 70–130)
GLUCOSE BLDC GLUCOMTR-MCNC: 153 MG/DL (ref 70–130)
GLUCOSE BLDC GLUCOMTR-MCNC: 154 MG/DL (ref 70–130)
GLUCOSE BLDC GLUCOMTR-MCNC: 155 MG/DL (ref 70–130)
GLUCOSE BLDC GLUCOMTR-MCNC: 156 MG/DL (ref 70–130)
GLUCOSE BLDC GLUCOMTR-MCNC: 156 MG/DL (ref 70–130)
GLUCOSE BLDC GLUCOMTR-MCNC: 158 MG/DL (ref 70–130)
GLUCOSE BLDC GLUCOMTR-MCNC: 158 MG/DL (ref 70–130)
GLUCOSE BLDC GLUCOMTR-MCNC: 167 MG/DL (ref 70–130)
GLUCOSE BLDC GLUCOMTR-MCNC: 169 MG/DL (ref 70–130)
GLUCOSE BLDC GLUCOMTR-MCNC: 201 MG/DL (ref 70–130)
GLUCOSE BLDC GLUCOMTR-MCNC: 80 MG/DL (ref 70–130)
GLUCOSE BLDC GLUCOMTR-MCNC: 96 MG/DL (ref 70–130)
GLUCOSE BLDC GLUCOMTR-MCNC: 97 MG/DL (ref 70–130)
GLUCOSE SERPL-MCNC: 119 MG/DL (ref 65–99)
GLUCOSE SERPL-MCNC: 128 MG/DL (ref 65–99)
GLUCOSE SERPL-MCNC: 137 MG/DL (ref 65–99)
GLUCOSE SERPL-MCNC: 168 MG/DL (ref 65–99)
GLUCOSE SERPL-MCNC: 97 MG/DL (ref 65–99)
GLUCOSE UR STRIP-MCNC: ABNORMAL MG/DL
GRAM STN SPEC: NORMAL
HBA1C MFR BLD: 5.9 % (ref 4.8–5.6)
HBA1C MFR BLD: 7.2 % (ref 4.8–5.6)
HCO3 BLDA-SCNC: 22.1 MMOL/L (ref 22–26)
HCO3 BLDA-SCNC: 23.2 MMOL/L (ref 22–26)
HCT VFR BLD AUTO: 23 % (ref 34–46.6)
HCT VFR BLD AUTO: 27 % (ref 34–46.6)
HCT VFR BLD AUTO: 28.2 % (ref 34–46.6)
HCT VFR BLD AUTO: 28.2 % (ref 34–46.6)
HCT VFR BLD AUTO: 28.7 % (ref 34–46.6)
HCT VFR BLD AUTO: 28.9 % (ref 34–46.6)
HCT VFR BLD AUTO: 29.7 % (ref 34–46.6)
HCT VFR BLD AUTO: 29.7 % (ref 34–46.6)
HCT VFR BLD AUTO: 32.4 % (ref 34–46.6)
HCT VFR BLD AUTO: 32.8 % (ref 34–46.6)
HCT VFR BLDA CALC: 23 % (ref 38–51)
HCT VFR BLDA CALC: 24 % (ref 38–51)
HDLC SERPL-MCNC: 36 MG/DL
HDLC SERPL-MCNC: 57 MG/DL
HGB BLD-MCNC: 10.2 G/DL (ref 12–15.9)
HGB BLD-MCNC: 10.5 G/DL (ref 12–15.9)
HGB BLD-MCNC: 7.3 G/DL (ref 12–15.9)
HGB BLD-MCNC: 8.5 G/DL (ref 12–15.9)
HGB BLD-MCNC: 8.9 G/DL (ref 12–15.9)
HGB BLD-MCNC: 9 G/DL (ref 12–15.9)
HGB BLD-MCNC: 9.3 G/DL (ref 12–15.9)
HGB BLD-MCNC: 9.4 G/DL (ref 12–15.9)
HGB BLD-MCNC: 9.5 G/DL (ref 12–15.9)
HGB BLD-MCNC: 9.7 G/DL (ref 12–15.9)
HGB BLDA-MCNC: 7.8 G/DL (ref 12–17)
HGB BLDA-MCNC: 8.2 G/DL (ref 12–17)
HGB UR QL STRIP.AUTO: NEGATIVE
HYALINE CASTS UR QL AUTO: ABNORMAL /LPF
IMM GRANULOCYTES # BLD AUTO: 0.03 10*3/MM3 (ref 0–0.05)
IMM GRANULOCYTES # BLD AUTO: 0.04 10*3/MM3 (ref 0–0.05)
IMM GRANULOCYTES # BLD AUTO: 0.04 10*3/MM3 (ref 0–0.05)
IMM GRANULOCYTES NFR BLD AUTO: 0.4 % (ref 0–0.5)
IMM GRANULOCYTES NFR BLD AUTO: 0.4 % (ref 0–0.5)
IMM GRANULOCYTES NFR BLD AUTO: 0.6 % (ref 0–0.5)
IMP & REVIEW OF LAB RESULTS: NORMAL
IMP & REVIEW OF LAB RESULTS: NORMAL
INR PPP: 1.19 (ref 0.9–1.1)
KETONES UR QL STRIP: ABNORMAL
LDLC SERPL CALC-MCNC: 35 MG/DL (ref 0–99)
LDLC SERPL CALC-MCNC: 40 MG/DL (ref 0–99)
LEUKOCYTE ESTERASE UR QL STRIP.AUTO: NEGATIVE
LYMPHOCYTES # BLD AUTO: 1.12 10*3/MM3 (ref 0.7–3.1)
LYMPHOCYTES # BLD AUTO: 1.29 10*3/MM3 (ref 0.7–3.1)
LYMPHOCYTES # BLD AUTO: 2.03 10*3/MM3 (ref 0.7–3.1)
LYMPHOCYTES NFR BLD AUTO: 15.1 % (ref 19.6–45.3)
LYMPHOCYTES NFR BLD AUTO: 15.5 % (ref 19.6–45.3)
LYMPHOCYTES NFR BLD AUTO: 20.4 % (ref 19.6–45.3)
MCH RBC QN AUTO: 26.8 PG (ref 26.6–33)
MCH RBC QN AUTO: 27.2 PG (ref 26.6–33)
MCH RBC QN AUTO: 27.4 PG (ref 26.6–33)
MCH RBC QN AUTO: 27.9 PG (ref 26.6–33)
MCH RBC QN AUTO: 28.2 PG (ref 26.6–33)
MCH RBC QN AUTO: 28.3 PG (ref 26.6–33)
MCH RBC QN AUTO: 28.4 PG (ref 26.6–33)
MCH RBC QN AUTO: 28.7 PG (ref 26.6–33)
MCH RBC QN AUTO: 28.8 PG (ref 26.6–33)
MCHC RBC AUTO-ENTMCNC: 31 G/DL (ref 31.5–35.7)
MCHC RBC AUTO-ENTMCNC: 31.3 G/DL (ref 31.5–35.7)
MCHC RBC AUTO-ENTMCNC: 31.5 G/DL (ref 31.5–35.7)
MCHC RBC AUTO-ENTMCNC: 31.7 G/DL (ref 31.5–35.7)
MCHC RBC AUTO-ENTMCNC: 31.9 G/DL (ref 31.5–35.7)
MCHC RBC AUTO-ENTMCNC: 32 G/DL (ref 31.5–35.7)
MCHC RBC AUTO-ENTMCNC: 32.7 G/DL (ref 31.5–35.7)
MCHC RBC AUTO-ENTMCNC: 32.9 G/DL (ref 31.5–35.7)
MCHC RBC AUTO-ENTMCNC: 33.3 G/DL (ref 31.5–35.7)
MCV RBC AUTO: 85.8 FL (ref 79–97)
MCV RBC AUTO: 86 FL (ref 79–97)
MCV RBC AUTO: 86.4 FL (ref 79–97)
MCV RBC AUTO: 86.5 FL (ref 79–97)
MCV RBC AUTO: 86.8 FL (ref 79–97)
MCV RBC AUTO: 86.8 FL (ref 79–97)
MCV RBC AUTO: 87.2 FL (ref 79–97)
MCV RBC AUTO: 89.8 FL (ref 79–97)
MCV RBC AUTO: 90.4 FL (ref 79–97)
MICROALBUMIN UR-MCNC: 12.3 UG/ML
MICROALBUMIN UR-MCNC: NORMAL
MONOCYTES # BLD AUTO: 0.28 10*3/MM3 (ref 0.1–0.9)
MONOCYTES # BLD AUTO: 0.49 10*3/MM3 (ref 0.1–0.9)
MONOCYTES # BLD AUTO: 0.51 10*3/MM3 (ref 0.1–0.9)
MONOCYTES NFR BLD AUTO: 3.3 % (ref 5–12)
MONOCYTES NFR BLD AUTO: 5.1 % (ref 5–12)
MONOCYTES NFR BLD AUTO: 6.8 % (ref 5–12)
NEUTROPHILS NFR BLD AUTO: 5.27 10*3/MM3 (ref 1.7–7)
NEUTROPHILS NFR BLD AUTO: 6.92 10*3/MM3 (ref 1.7–7)
NEUTROPHILS NFR BLD AUTO: 7 10*3/MM3 (ref 1.7–7)
NEUTROPHILS NFR BLD AUTO: 70.5 % (ref 42.7–76)
NEUTROPHILS NFR BLD AUTO: 73 % (ref 42.7–76)
NEUTROPHILS NFR BLD AUTO: 80.6 % (ref 42.7–76)
NITRITE UR QL STRIP: POSITIVE
NRBC BLD AUTO-RTO: 0 /100 WBC (ref 0–0.2)
PCO2 BLDA: 31.9 MM HG (ref 35–45)
PCO2 BLDA: 32 MM HG (ref 35–45)
PH BLDA: 7.45 PH UNITS (ref 7.35–7.6)
PH BLDA: 7.47 PH UNITS (ref 7.35–7.6)
PH UR STRIP.AUTO: <=5 [PH] (ref 5–8)
PLATELET # BLD AUTO: 234 10*3/MM3 (ref 140–450)
PLATELET # BLD AUTO: 235 10*3/MM3 (ref 140–450)
PLATELET # BLD AUTO: 287 10*3/MM3 (ref 140–450)
PLATELET # BLD AUTO: 322 10*3/MM3 (ref 140–450)
PLATELET # BLD AUTO: 339 10*3/MM3 (ref 140–450)
PLATELET # BLD AUTO: 386 10*3/MM3 (ref 140–450)
PLATELET # BLD AUTO: 403 10*3/MM3 (ref 140–450)
PLATELET # BLD AUTO: 427 10*3/MM3 (ref 140–450)
PLATELET # BLD AUTO: 467 10*3/MM3 (ref 140–450)
PMV BLD AUTO: 10.1 FL (ref 6–12)
PMV BLD AUTO: 10.1 FL (ref 6–12)
PMV BLD AUTO: 10.2 FL (ref 6–12)
PMV BLD AUTO: 10.3 FL (ref 6–12)
PMV BLD AUTO: 9.7 FL (ref 6–12)
PMV BLD AUTO: 9.9 FL (ref 6–12)
PMV BLD AUTO: 9.9 FL (ref 6–12)
PO2 BLDA: 269 MMHG (ref 80–105)
PO2 BLDA: 283 MMHG (ref 80–105)
POTASSIUM BLDA-SCNC: 3.9 MMOL/L (ref 3.5–4.9)
POTASSIUM BLDA-SCNC: 4.3 MMOL/L (ref 3.5–4.9)
POTASSIUM SERPL-SCNC: 4 MMOL/L (ref 3.5–5.2)
POTASSIUM SERPL-SCNC: 4.2 MMOL/L (ref 3.5–5.2)
POTASSIUM SERPL-SCNC: 4.3 MMOL/L (ref 3.5–5.2)
POTASSIUM SERPL-SCNC: 5.1 MMOL/L (ref 3.5–5.2)
POTASSIUM SERPL-SCNC: 5.2 MMOL/L (ref 3.5–5.2)
PROT SERPL-MCNC: 7.6 G/DL (ref 6–8.5)
PROT SERPL-MCNC: 7.7 G/DL (ref 6–8.5)
PROT SERPL-MCNC: 7.8 G/DL (ref 6–8.5)
PROT UR QL STRIP: NEGATIVE
PROTHROMBIN TIME: 14.9 SECONDS (ref 11.7–14.2)
RBC # BLD AUTO: 2.66 10*6/MM3 (ref 3.77–5.28)
RBC # BLD AUTO: 3.12 10*6/MM3 (ref 3.77–5.28)
RBC # BLD AUTO: 3.28 10*6/MM3 (ref 3.77–5.28)
RBC # BLD AUTO: 3.32 10*6/MM3 (ref 3.77–5.28)
RBC # BLD AUTO: 3.37 10*6/MM3 (ref 3.77–5.28)
RBC # BLD AUTO: 3.42 10*6/MM3 (ref 3.77–5.28)
RBC # BLD AUTO: 3.42 10*6/MM3 (ref 3.77–5.28)
RBC # BLD AUTO: 3.61 10*6/MM3 (ref 3.77–5.28)
RBC # BLD AUTO: 3.76 10*6/MM3 (ref 3.77–5.28)
RBC # UR: ABNORMAL /HPF
REF LAB TEST METHOD: ABNORMAL
REQUEST PROBLEM: NORMAL
RH BLD: NEGATIVE
SAO2 % BLDA: 100 % (ref 95–98)
SAO2 % BLDA: 100 % (ref 95–98)
SARS-COV-2 ORF1AB RESP QL NAA+PROBE: NOT DETECTED
SODIUM SERPL-SCNC: 137 MMOL/L (ref 136–145)
SODIUM SERPL-SCNC: 138 MMOL/L (ref 136–145)
SODIUM SERPL-SCNC: 139 MMOL/L (ref 136–145)
SODIUM SERPL-SCNC: 141 MMOL/L (ref 134–144)
SODIUM SERPL-SCNC: 145 MMOL/L (ref 134–144)
SP GR UR STRIP: >=1.03 (ref 1–1.03)
SQUAMOUS #/AREA URNS HPF: ABNORMAL /HPF
T&S EXPIRATION DATE: NORMAL
T4 FREE SERPL-MCNC: 1.13 NG/DL (ref 0.82–1.77)
T4 FREE SERPL-MCNC: 1.39 NG/DL (ref 0.82–1.77)
TRIGL SERPL-MCNC: 109 MG/DL (ref 0–149)
TRIGL SERPL-MCNC: 95 MG/DL (ref 0–149)
TSH SERPL DL<=0.005 MIU/L-ACNC: 3.31 UIU/ML (ref 0.45–4.5)
TSH SERPL DL<=0.005 MIU/L-ACNC: 3.37 UIU/ML (ref 0.45–4.5)
UNIT  ABO: NORMAL
UNIT  RH: NORMAL
UROBILINOGEN UR QL STRIP: ABNORMAL
VLDLC SERPL CALC-MCNC: 18 MG/DL (ref 5–40)
VLDLC SERPL CALC-MCNC: 20 MG/DL (ref 5–40)
WBC # BLD AUTO: 11.16 10*3/MM3 (ref 3.4–10.8)
WBC # BLD AUTO: 11.2 10*3/MM3 (ref 3.4–10.8)
WBC # BLD AUTO: 7.22 10*3/MM3 (ref 3.4–10.8)
WBC # BLD AUTO: 7.86 10*3/MM3 (ref 3.4–10.8)
WBC # BLD AUTO: 8.05 10*3/MM3 (ref 3.4–10.8)
WBC # BLD AUTO: 8.31 10*3/MM3 (ref 3.4–10.8)
WBC # BLD AUTO: 8.57 10*3/MM3 (ref 3.4–10.8)
WBC # BLD AUTO: 8.66 10*3/MM3 (ref 3.4–10.8)
WBC # BLD AUTO: 9.94 10*3/MM3 (ref 3.4–10.8)
WBC UR QL AUTO: ABNORMAL /HPF

## 2021-01-01 PROCEDURE — 97110 THERAPEUTIC EXERCISES: CPT | Performed by: PHYSICAL THERAPIST

## 2021-01-01 PROCEDURE — 99024 POSTOP FOLLOW-UP VISIT: CPT | Performed by: NEUROLOGICAL SURGERY

## 2021-01-01 PROCEDURE — 25010000002 FENTANYL CITRATE (PF) 100 MCG/2ML SOLUTION: Performed by: ANESTHESIOLOGY

## 2021-01-01 PROCEDURE — 25010000003 HYDROMORPHONE HCL PF 50 MG/5ML SOLUTION: Performed by: ORTHOPAEDIC SURGERY

## 2021-01-01 PROCEDURE — 25010000002 LEVOFLOXACIN PER 250 MG: Performed by: ORTHOPAEDIC SURGERY

## 2021-01-01 PROCEDURE — 72100 X-RAY EXAM L-S SPINE 2/3 VWS: CPT | Performed by: ORTHOPAEDIC SURGERY

## 2021-01-01 PROCEDURE — 25010000002 HYDROMORPHONE 1 MG/ML SOLUTION: Performed by: NURSE PRACTITIONER

## 2021-01-01 PROCEDURE — 94799 UNLISTED PULMONARY SVC/PX: CPT

## 2021-01-01 PROCEDURE — 81001 URINALYSIS AUTO W/SCOPE: CPT | Performed by: PHYSICIAN ASSISTANT

## 2021-01-01 PROCEDURE — 82962 GLUCOSE BLOOD TEST: CPT

## 2021-01-01 PROCEDURE — 51798 US URINE CAPACITY MEASURE: CPT

## 2021-01-01 PROCEDURE — 85027 COMPLETE CBC AUTOMATED: CPT | Performed by: HOSPITALIST

## 2021-01-01 PROCEDURE — 86900 BLOOD TYPING SEROLOGIC ABO: CPT

## 2021-01-01 PROCEDURE — 85018 HEMOGLOBIN: CPT | Performed by: ORTHOPAEDIC SURGERY

## 2021-01-01 PROCEDURE — 85014 HEMATOCRIT: CPT | Performed by: ORTHOPAEDIC SURGERY

## 2021-01-01 PROCEDURE — C1713 ANCHOR/SCREW BN/BN,TIS/BN: HCPCS | Performed by: ORTHOPAEDIC SURGERY

## 2021-01-01 PROCEDURE — 80053 COMPREHEN METABOLIC PANEL: CPT | Performed by: PHYSICIAN ASSISTANT

## 2021-01-01 PROCEDURE — 0RG7071 FUSION OF 2 TO 7 THORACIC VERTEBRAL JOINTS WITH AUTOLOGOUS TISSUE SUBSTITUTE, POSTERIOR APPROACH, POSTERIOR COLUMN, OPEN APPROACH: ICD-10-PCS | Performed by: ORTHOPAEDIC SURGERY

## 2021-01-01 PROCEDURE — 85025 COMPLETE CBC W/AUTO DIFF WBC: CPT | Performed by: PHYSICIAN ASSISTANT

## 2021-01-01 PROCEDURE — 80048 BASIC METABOLIC PNL TOTAL CA: CPT | Performed by: ORTHOPAEDIC SURGERY

## 2021-01-01 PROCEDURE — 22614 ARTHRD PST TQ 1NTRSPC EA ADD: CPT | Performed by: ORTHOPAEDIC SURGERY

## 2021-01-01 PROCEDURE — 0QP004Z REMOVAL OF INTERNAL FIXATION DEVICE FROM LUMBAR VERTEBRA, OPEN APPROACH: ICD-10-PCS | Performed by: NEUROLOGICAL SURGERY

## 2021-01-01 PROCEDURE — 99024 POSTOP FOLLOW-UP VISIT: CPT | Performed by: ORTHOPAEDIC SURGERY

## 2021-01-01 PROCEDURE — 63710000001 INSULIN LISPRO (HUMAN) PER 5 UNITS: Performed by: ORTHOPAEDIC SURGERY

## 2021-01-01 PROCEDURE — 85027 COMPLETE CBC AUTOMATED: CPT | Performed by: ANESTHESIOLOGY

## 2021-01-01 PROCEDURE — 25010000002 VANCOMYCIN PER 500 MG: Performed by: ORTHOPAEDIC SURGERY

## 2021-01-01 PROCEDURE — 72080 X-RAY EXAM THORACOLMB 2/> VW: CPT | Performed by: ORTHOPAEDIC SURGERY

## 2021-01-01 PROCEDURE — 25010000002 VANCOMYCIN 10 G RECONSTITUTED SOLUTION: Performed by: ORTHOPAEDIC SURGERY

## 2021-01-01 PROCEDURE — P9016 RBC LEUKOCYTES REDUCED: HCPCS

## 2021-01-01 PROCEDURE — 25010000002 MORPHINE PER 10 MG: Performed by: EMERGENCY MEDICINE

## 2021-01-01 PROCEDURE — 85025 COMPLETE CBC W/AUTO DIFF WBC: CPT | Performed by: NEUROLOGICAL SURGERY

## 2021-01-01 PROCEDURE — 25010000002 PHENYLEPHRINE PER 1 ML: Performed by: NURSE ANESTHETIST, CERTIFIED REGISTERED

## 2021-01-01 PROCEDURE — 99214 OFFICE O/P EST MOD 30 MIN: CPT | Performed by: NURSE PRACTITIONER

## 2021-01-01 PROCEDURE — 25010000002 HYDROMORPHONE PER 4 MG: Performed by: NURSE ANESTHETIST, CERTIFIED REGISTERED

## 2021-01-01 PROCEDURE — 25010000002 MIDAZOLAM PER 1 MG: Performed by: ANESTHESIOLOGY

## 2021-01-01 PROCEDURE — 87205 SMEAR GRAM STAIN: CPT | Performed by: ORTHOPAEDIC SURGERY

## 2021-01-01 PROCEDURE — BT201ZZ COMPUTERIZED TOMOGRAPHY (CT SCAN) OF BLADDER USING LOW OSMOLAR CONTRAST: ICD-10-PCS | Performed by: HOSPITALIST

## 2021-01-01 PROCEDURE — 80048 BASIC METABOLIC PNL TOTAL CA: CPT | Performed by: NURSE PRACTITIONER

## 2021-01-01 PROCEDURE — 86923 COMPATIBILITY TEST ELECTRIC: CPT

## 2021-01-01 PROCEDURE — 85027 COMPLETE CBC AUTOMATED: CPT | Performed by: ORTHOPAEDIC SURGERY

## 2021-01-01 PROCEDURE — 25010000002 VANCOMYCIN 1 G RECONSTITUTED SOLUTION 1 EACH VIAL: Performed by: ORTHOPAEDIC SURGERY

## 2021-01-01 PROCEDURE — 25010000002 PROPOFOL 10 MG/ML EMULSION: Performed by: NURSE ANESTHETIST, CERTIFIED REGISTERED

## 2021-01-01 PROCEDURE — 99213 OFFICE O/P EST LOW 20 MIN: CPT | Performed by: ORTHOPAEDIC SURGERY

## 2021-01-01 PROCEDURE — 36430 TRANSFUSION BLD/BLD COMPNT: CPT

## 2021-01-01 PROCEDURE — 01NB0ZZ RELEASE LUMBAR NERVE, OPEN APPROACH: ICD-10-PCS | Performed by: NEUROLOGICAL SURGERY

## 2021-01-01 PROCEDURE — P9041 ALBUMIN (HUMAN),5%, 50ML: HCPCS | Performed by: NURSE ANESTHETIST, CERTIFIED REGISTERED

## 2021-01-01 PROCEDURE — 82565 ASSAY OF CREATININE: CPT

## 2021-01-01 PROCEDURE — 25010000002 ALBUMIN HUMAN 5% PER 50 ML: Performed by: NURSE ANESTHETIST, CERTIFIED REGISTERED

## 2021-01-01 PROCEDURE — 63047 LAM FACETEC & FORAMOT LUMBAR: CPT | Performed by: NEUROLOGICAL SURGERY

## 2021-01-01 PROCEDURE — 25010000002 HEPARIN (PORCINE) PER 1000 UNITS: Performed by: ORTHOPAEDIC SURGERY

## 2021-01-01 PROCEDURE — 25010000002 LEVOFLOXACIN PER 250 MG: Performed by: NURSE PRACTITIONER

## 2021-01-01 PROCEDURE — 0 GADOBENATE DIMEGLUMINE 529 MG/ML SOLUTION: Performed by: ORTHOPAEDIC SURGERY

## 2021-01-01 PROCEDURE — 87070 CULTURE OTHR SPECIMN AEROBIC: CPT | Performed by: ORTHOPAEDIC SURGERY

## 2021-01-01 PROCEDURE — 36415 COLL VENOUS BLD VENIPUNCTURE: CPT | Performed by: NURSE PRACTITIONER

## 2021-01-01 PROCEDURE — 85014 HEMATOCRIT: CPT

## 2021-01-01 PROCEDURE — 25010000002 DEXAMETHASONE PER 1 MG: Performed by: NURSE ANESTHETIST, CERTIFIED REGISTERED

## 2021-01-01 PROCEDURE — 85610 PROTHROMBIN TIME: CPT | Performed by: NURSE PRACTITIONER

## 2021-01-01 PROCEDURE — P9041 ALBUMIN (HUMAN),5%, 50ML: HCPCS | Performed by: ANESTHESIOLOGY

## 2021-01-01 PROCEDURE — 99024 POSTOP FOLLOW-UP VISIT: CPT | Performed by: NURSE PRACTITIONER

## 2021-01-01 PROCEDURE — 99285 EMERGENCY DEPT VISIT HI MDM: CPT

## 2021-01-01 PROCEDURE — 25010000002 DEXAMETHASONE SODIUM PHOSPHATE 10 MG/ML SOLUTION: Performed by: PHYSICIAN ASSISTANT

## 2021-01-01 PROCEDURE — 0SG1071 FUSION OF 2 OR MORE LUMBAR VERTEBRAL JOINTS WITH AUTOLOGOUS TISSUE SUBSTITUTE, POSTERIOR APPROACH, POSTERIOR COLUMN, OPEN APPROACH: ICD-10-PCS | Performed by: ORTHOPAEDIC SURGERY

## 2021-01-01 PROCEDURE — 87075 CULTR BACTERIA EXCEPT BLOOD: CPT | Performed by: NEUROLOGICAL SURGERY

## 2021-01-01 PROCEDURE — U0004 COV-19 TEST NON-CDC HGH THRU: HCPCS | Performed by: PHYSICIAN ASSISTANT

## 2021-01-01 PROCEDURE — 76000 FLUOROSCOPY <1 HR PHYS/QHP: CPT

## 2021-01-01 PROCEDURE — 0RGA071 FUSION OF THORACOLUMBAR VERTEBRAL JOINT WITH AUTOLOGOUS TISSUE SUBSTITUTE, POSTERIOR APPROACH, POSTERIOR COLUMN, OPEN APPROACH: ICD-10-PCS | Performed by: ORTHOPAEDIC SURGERY

## 2021-01-01 PROCEDURE — A9577 INJ MULTIHANCE: HCPCS | Performed by: ORTHOPAEDIC SURGERY

## 2021-01-01 PROCEDURE — 87070 CULTURE OTHR SPECIMN AEROBIC: CPT | Performed by: NEUROLOGICAL SURGERY

## 2021-01-01 PROCEDURE — 63047 LAM FACETEC & FORAMOT LUMBAR: CPT | Performed by: SPECIALIST/TECHNOLOGIST, OTHER

## 2021-01-01 PROCEDURE — C1889 IMPLANT/INSERT DEVICE, NOC: HCPCS | Performed by: ORTHOPAEDIC SURGERY

## 2021-01-01 PROCEDURE — 20939 BONE MARROW ASPIR BONE GRFG: CPT | Performed by: ORTHOPAEDIC SURGERY

## 2021-01-01 PROCEDURE — 99231 SBSQ HOSP IP/OBS SF/LOW 25: CPT | Performed by: NEUROLOGICAL SURGERY

## 2021-01-01 PROCEDURE — P9612 CATHETERIZE FOR URINE SPEC: HCPCS

## 2021-01-01 PROCEDURE — 72100 X-RAY EXAM L-S SPINE 2/3 VWS: CPT

## 2021-01-01 PROCEDURE — 22610 ARTHRD PST TQ 1NTRSPC THRC: CPT | Performed by: ORTHOPAEDIC SURGERY

## 2021-01-01 PROCEDURE — 93970 EXTREMITY STUDY: CPT

## 2021-01-01 PROCEDURE — 85025 COMPLETE CBC W/AUTO DIFF WBC: CPT | Performed by: NURSE PRACTITIONER

## 2021-01-01 PROCEDURE — 97165 OT EVAL LOW COMPLEX 30 MIN: CPT

## 2021-01-01 PROCEDURE — 25010000002 ALBUMIN HUMAN 5% PER 50 ML: Performed by: ANESTHESIOLOGY

## 2021-01-01 PROCEDURE — 97161 PT EVAL LOW COMPLEX 20 MIN: CPT | Performed by: PHYSICAL THERAPIST

## 2021-01-01 PROCEDURE — 99213 OFFICE O/P EST LOW 20 MIN: CPT | Performed by: FAMILY MEDICINE

## 2021-01-01 PROCEDURE — 87075 CULTR BACTERIA EXCEPT BLOOD: CPT | Performed by: ORTHOPAEDIC SURGERY

## 2021-01-01 PROCEDURE — 86850 RBC ANTIBODY SCREEN: CPT | Performed by: ORTHOPAEDIC SURGERY

## 2021-01-01 PROCEDURE — 22843 INSERT SPINE FIXATION DEVICE: CPT | Performed by: ORTHOPAEDIC SURGERY

## 2021-01-01 PROCEDURE — 25010000002 ONDANSETRON PER 1 MG: Performed by: PHYSICIAN ASSISTANT

## 2021-01-01 PROCEDURE — 86901 BLOOD TYPING SEROLOGIC RH(D): CPT | Performed by: ORTHOPAEDIC SURGERY

## 2021-01-01 PROCEDURE — 87205 SMEAR GRAM STAIN: CPT | Performed by: NEUROLOGICAL SURGERY

## 2021-01-01 PROCEDURE — 25010000002 VANCOMYCIN: Performed by: ORTHOPAEDIC SURGERY

## 2021-01-01 PROCEDURE — 25010000002 FENTANYL CITRATE (PF) 100 MCG/2ML SOLUTION: Performed by: NURSE ANESTHETIST, CERTIFIED REGISTERED

## 2021-01-01 PROCEDURE — 72158 MRI LUMBAR SPINE W/O & W/DYE: CPT

## 2021-01-01 PROCEDURE — 82565 ASSAY OF CREATININE: CPT | Performed by: ORTHOPAEDIC SURGERY

## 2021-01-01 PROCEDURE — 86900 BLOOD TYPING SEROLOGIC ABO: CPT | Performed by: ORTHOPAEDIC SURGERY

## 2021-01-01 PROCEDURE — 72131 CT LUMBAR SPINE W/O DYE: CPT

## 2021-01-01 PROCEDURE — 25010000002 HYDROMORPHONE PER 4 MG: Performed by: EMERGENCY MEDICINE

## 2021-01-01 PROCEDURE — 22850 REMOVE SPINE FIXATION DEVICE: CPT | Performed by: NEUROLOGICAL SURGERY

## 2021-01-01 PROCEDURE — 82803 BLOOD GASES ANY COMBINATION: CPT

## 2021-01-01 PROCEDURE — 22850 REMOVE SPINE FIXATION DEVICE: CPT | Performed by: SPECIALIST/TECHNOLOGIST, OTHER

## 2021-01-01 PROCEDURE — 25010000002 PHENYLEPHRINE 10 MG/ML SOLUTION 5 ML VIAL: Performed by: ANESTHESIOLOGY

## 2021-01-01 PROCEDURE — 25010000002 NEOSTIGMINE 5 MG/10ML SOLUTION: Performed by: ANESTHESIOLOGY

## 2021-01-01 PROCEDURE — 82947 ASSAY GLUCOSE BLOOD QUANT: CPT

## 2021-01-01 PROCEDURE — 72080 X-RAY EXAM THORACOLMB 2/> VW: CPT

## 2021-01-01 PROCEDURE — 85018 HEMOGLOBIN: CPT

## 2021-01-01 PROCEDURE — 97535 SELF CARE MNGMENT TRAINING: CPT

## 2021-01-01 DEVICE — SCRW VIPER INNR ST: Type: IMPLANTABLE DEVICE | Site: SPINE LUMBAR | Status: FUNCTIONAL

## 2021-01-01 DEVICE — SCRW EXPEDIUM PA TI 7X45MM: Type: IMPLANTABLE DEVICE | Site: SPINE LUMBAR | Status: FUNCTIONAL

## 2021-01-01 DEVICE — MATRX STRIP PILAFX DBM 50X25X4MM: Type: IMPLANTABLE DEVICE | Site: SPINE LUMBAR | Status: FUNCTIONAL

## 2021-01-01 DEVICE — VIOLET ANTIBACTERIAL POLYDIOXANONE, KNOTLESS TISSUE CONTROL DEVICE
Type: IMPLANTABLE DEVICE | Site: SPINE LUMBAR | Status: FUNCTIONAL
Brand: STRATAFIX

## 2021-01-01 DEVICE — NUT EXPEDIUM MCC HEX TI 3/8IN: Type: IMPLANTABLE DEVICE | Site: SPINE LUMBAR | Status: FUNCTIONAL

## 2021-01-01 DEVICE — PLT EXPEDIUM MCC TI 40TO60: Type: IMPLANTABLE DEVICE | Site: SPINE LUMBAR | Status: FUNCTIONAL

## 2021-01-01 DEVICE — HEMOST ABS SURGIFOAM SZ100 8X12 10MM: Type: IMPLANTABLE DEVICE | Site: SPINE LUMBAR | Status: FUNCTIONAL

## 2021-01-01 DEVICE — JHOOK EXPEDIUM MCC  55 TI  X25: Type: IMPLANTABLE DEVICE | Site: SPINE LUMBAR | Status: FUNCTIONAL

## 2021-01-01 DEVICE — SCRW SET EXPEDIUM MCC TI  X25: Type: IMPLANTABLE DEVICE | Site: SPINE LUMBAR | Status: FUNCTIONAL

## 2021-01-01 DEVICE — SCRW EXPEDIUM PA TI 6X40MM: Type: IMPLANTABLE DEVICE | Site: SPINE LUMBAR | Status: FUNCTIONAL

## 2021-01-01 DEVICE — FLOSEAL HEMOSTATIC MATRIX, 10ML
Type: IMPLANTABLE DEVICE | Site: SPINE LUMBAR | Status: FUNCTIONAL
Brand: FLOSEAL HEMOSTATIC MATRIX

## 2021-01-01 DEVICE — BONE CCCS 20 TO 80 30CC: Type: IMPLANTABLE DEVICE | Site: SPINE LUMBAR | Status: FUNCTIONAL

## 2021-01-01 DEVICE — IMPLANTABLE DEVICE: Type: IMPLANTABLE DEVICE | Site: SPINE LUMBAR | Status: FUNCTIONAL

## 2021-01-01 DEVICE — PLT EXPEDIUM MCC TI 30TO50MM: Type: IMPLANTABLE DEVICE | Site: SPINE LUMBAR | Status: FUNCTIONAL

## 2021-01-01 RX ORDER — ZOLPIDEM TARTRATE 10 MG/1
10 TABLET ORAL NIGHTLY PRN
Qty: 90 TABLET | Refills: 0 | Status: SHIPPED | OUTPATIENT
Start: 2021-01-01 | End: 2022-01-01 | Stop reason: SDUPTHER

## 2021-01-01 RX ORDER — PROMETHAZINE HYDROCHLORIDE 25 MG/1
25 SUPPOSITORY RECTAL ONCE AS NEEDED
Status: DISCONTINUED | OUTPATIENT
Start: 2021-01-01 | End: 2021-01-01 | Stop reason: HOSPADM

## 2021-01-01 RX ORDER — METOPROLOL TARTRATE 50 MG/1
TABLET, FILM COATED ORAL
Qty: 180 TABLET | Refills: 0 | Status: SHIPPED | OUTPATIENT
Start: 2021-01-01 | End: 2021-01-01

## 2021-01-01 RX ORDER — POLYETHYLENE GLYCOL 3350 17 G/17G
17 POWDER, FOR SOLUTION ORAL DAILY
Status: DISCONTINUED | OUTPATIENT
Start: 2021-01-01 | End: 2021-01-01 | Stop reason: HOSPADM

## 2021-01-01 RX ORDER — ZOLPIDEM TARTRATE 10 MG/1
10 TABLET ORAL NIGHTLY PRN
Qty: 90 TABLET | Refills: 0 | Status: SHIPPED | OUTPATIENT
Start: 2021-01-01 | End: 2021-01-01 | Stop reason: SDUPTHER

## 2021-01-01 RX ORDER — ROSUVASTATIN CALCIUM 40 MG/1
40 TABLET, COATED ORAL DAILY
Status: DISCONTINUED | OUTPATIENT
Start: 2021-01-01 | End: 2021-01-01 | Stop reason: HOSPADM

## 2021-01-01 RX ORDER — INSULIN LISPRO 100 [IU]/ML
0-9 INJECTION, SOLUTION INTRAVENOUS; SUBCUTANEOUS
Status: DISCONTINUED | OUTPATIENT
Start: 2021-01-01 | End: 2021-01-01 | Stop reason: HOSPADM

## 2021-01-01 RX ORDER — PROMETHAZINE HYDROCHLORIDE 25 MG/1
25 TABLET ORAL ONCE AS NEEDED
Status: DISCONTINUED | OUTPATIENT
Start: 2021-01-01 | End: 2021-01-01 | Stop reason: HOSPADM

## 2021-01-01 RX ORDER — INSULIN GLARGINE AND LIXISENATIDE 100; 33 U/ML; UG/ML
60 INJECTION, SOLUTION SUBCUTANEOUS
Qty: 60 ML | Refills: 0 | Status: SHIPPED | OUTPATIENT
Start: 2021-01-01 | End: 2021-01-01 | Stop reason: SDUPTHER

## 2021-01-01 RX ORDER — ACETAMINOPHEN 160 MG/5ML
650 SOLUTION ORAL EVERY 4 HOURS PRN
Status: DISCONTINUED | OUTPATIENT
Start: 2021-01-01 | End: 2021-01-01 | Stop reason: HOSPADM

## 2021-01-01 RX ORDER — GLUCOSAM/CHON-MSM1/C/MANG/BOSW 500-416.6
TABLET ORAL
Qty: 100 EACH | Refills: 3 | Status: SHIPPED | OUTPATIENT
Start: 2021-01-01 | End: 2021-01-01 | Stop reason: SDUPTHER

## 2021-01-01 RX ORDER — DEXAMETHASONE SODIUM PHOSPHATE 10 MG/ML
INJECTION INTRAMUSCULAR; INTRAVENOUS AS NEEDED
Status: DISCONTINUED | OUTPATIENT
Start: 2021-01-01 | End: 2021-01-01 | Stop reason: SURG

## 2021-01-01 RX ORDER — PRAMIPEXOLE DIHYDROCHLORIDE 0.5 MG/1
0.5 TABLET ORAL EVERY 12 HOURS SCHEDULED
Status: DISCONTINUED | OUTPATIENT
Start: 2021-01-01 | End: 2021-01-01 | Stop reason: HOSPADM

## 2021-01-01 RX ORDER — SODIUM CHLORIDE 9 MG/ML
INJECTION, SOLUTION INTRAVENOUS AS NEEDED
Status: DISCONTINUED | OUTPATIENT
Start: 2021-01-01 | End: 2021-01-01 | Stop reason: HOSPADM

## 2021-01-01 RX ORDER — LIDOCAINE HYDROCHLORIDE 10 MG/ML
0.5 INJECTION, SOLUTION EPIDURAL; INFILTRATION; INTRACAUDAL; PERINEURAL ONCE AS NEEDED
Status: DISCONTINUED | OUTPATIENT
Start: 2021-01-01 | End: 2021-01-01 | Stop reason: HOSPADM

## 2021-01-01 RX ORDER — ROCURONIUM BROMIDE 10 MG/ML
INJECTION, SOLUTION INTRAVENOUS AS NEEDED
Status: DISCONTINUED | OUTPATIENT
Start: 2021-01-01 | End: 2021-01-01 | Stop reason: SURG

## 2021-01-01 RX ORDER — FENOFIBRATE 160 MG/1
160 TABLET ORAL DAILY
Qty: 90 TABLET | Refills: 1 | Status: SHIPPED | OUTPATIENT
Start: 2021-01-01 | End: 2021-01-01

## 2021-01-01 RX ORDER — HYDROCODONE BITARTRATE AND ACETAMINOPHEN 10; 325 MG/1; MG/1
TABLET ORAL
Qty: 60 TABLET | Refills: 0 | Status: SHIPPED | OUTPATIENT
Start: 2021-01-01 | End: 2021-01-01 | Stop reason: SDUPTHER

## 2021-01-01 RX ORDER — ALBUMIN, HUMAN INJ 5% 5 %
500 SOLUTION INTRAVENOUS ONCE
Status: COMPLETED | OUTPATIENT
Start: 2021-01-01 | End: 2021-01-01

## 2021-01-01 RX ORDER — LIDOCAINE HYDROCHLORIDE 20 MG/ML
INJECTION, SOLUTION INFILTRATION; PERINEURAL AS NEEDED
Status: DISCONTINUED | OUTPATIENT
Start: 2021-01-01 | End: 2021-01-01 | Stop reason: SURG

## 2021-01-01 RX ORDER — CATHETER MALE,EXTERNAL 26 TO 30MM
EACH MISCELLANEOUS
Qty: 30 EACH | Refills: 0 | Status: SHIPPED | OUTPATIENT
Start: 2021-01-01 | End: 2021-01-01

## 2021-01-01 RX ORDER — KETAMINE HYDROCHLORIDE 10 MG/ML
INJECTION INTRAMUSCULAR; INTRAVENOUS AS NEEDED
Status: DISCONTINUED | OUTPATIENT
Start: 2021-01-01 | End: 2021-01-01 | Stop reason: SURG

## 2021-01-01 RX ORDER — NEOSTIGMINE METHYLSULFATE 0.5 MG/ML
INJECTION, SOLUTION INTRAVENOUS AS NEEDED
Status: DISCONTINUED | OUTPATIENT
Start: 2021-01-01 | End: 2021-01-01 | Stop reason: SURG

## 2021-01-01 RX ORDER — SODIUM CHLORIDE 9 MG/ML
100 INJECTION, SOLUTION INTRAVENOUS CONTINUOUS
Status: DISCONTINUED | OUTPATIENT
Start: 2021-01-01 | End: 2021-01-01 | Stop reason: SDUPTHER

## 2021-01-01 RX ORDER — SODIUM CHLORIDE 0.9 % (FLUSH) 0.9 %
3 SYRINGE (ML) INJECTION EVERY 12 HOURS SCHEDULED
Status: DISCONTINUED | OUTPATIENT
Start: 2021-01-01 | End: 2021-01-01 | Stop reason: HOSPADM

## 2021-01-01 RX ORDER — ONDANSETRON 2 MG/ML
4 INJECTION INTRAMUSCULAR; INTRAVENOUS EVERY 6 HOURS PRN
Status: DISCONTINUED | OUTPATIENT
Start: 2021-01-01 | End: 2021-01-01 | Stop reason: SDUPTHER

## 2021-01-01 RX ORDER — OMEPRAZOLE 40 MG/1
40 CAPSULE, DELAYED RELEASE ORAL DAILY
Qty: 90 CAPSULE | Refills: 1 | Status: SHIPPED | OUTPATIENT
Start: 2021-01-01 | End: 2022-01-01

## 2021-01-01 RX ORDER — DEXAMETHASONE SODIUM PHOSPHATE 10 MG/ML
6 INJECTION, SOLUTION INTRAMUSCULAR; INTRAVENOUS ONCE
Status: COMPLETED | OUTPATIENT
Start: 2021-01-01 | End: 2021-01-01

## 2021-01-01 RX ORDER — HYDROMORPHONE HYDROCHLORIDE 1 MG/ML
0.5 INJECTION, SOLUTION INTRAMUSCULAR; INTRAVENOUS; SUBCUTANEOUS ONCE
Status: COMPLETED | OUTPATIENT
Start: 2021-01-01 | End: 2021-01-01

## 2021-01-01 RX ORDER — OXYCODONE AND ACETAMINOPHEN 7.5; 325 MG/1; MG/1
1 TABLET ORAL ONCE AS NEEDED
Status: DISCONTINUED | OUTPATIENT
Start: 2021-01-01 | End: 2021-01-01 | Stop reason: HOSPADM

## 2021-01-01 RX ORDER — HYDROCODONE BITARTRATE AND ACETAMINOPHEN 10; 325 MG/1; MG/1
TABLET ORAL
Qty: 60 TABLET | Refills: 0 | Status: SHIPPED | OUTPATIENT
Start: 2021-01-01 | End: 2021-01-01 | Stop reason: HOSPADM

## 2021-01-01 RX ORDER — LEVETIRACETAM 500 MG/1
1500 TABLET ORAL EVERY 12 HOURS SCHEDULED
Status: DISCONTINUED | OUTPATIENT
Start: 2021-01-01 | End: 2021-01-01 | Stop reason: HOSPADM

## 2021-01-01 RX ORDER — LEVOTHYROXINE SODIUM 137 UG/1
TABLET ORAL
Qty: 90 TABLET | Refills: 0 | Status: SHIPPED | OUTPATIENT
Start: 2021-01-01 | End: 2022-01-01

## 2021-01-01 RX ORDER — ONDANSETRON 2 MG/ML
4 INJECTION INTRAMUSCULAR; INTRAVENOUS ONCE
Status: COMPLETED | OUTPATIENT
Start: 2021-01-01 | End: 2021-01-01

## 2021-01-01 RX ORDER — GLUCOSAM/CHON-MSM1/C/MANG/BOSW 500-416.6
TABLET ORAL
Qty: 100 EACH | Refills: 3 | Status: SHIPPED | OUTPATIENT
Start: 2021-01-01

## 2021-01-01 RX ORDER — LAMOTRIGINE 200 MG/1
TABLET ORAL
COMMUNITY
Start: 2021-01-01 | End: 2021-01-01

## 2021-01-01 RX ORDER — FENOFIBRATE 160 MG/1
TABLET ORAL
Qty: 90 TABLET | Refills: 1 | Status: SHIPPED | OUTPATIENT
Start: 2021-01-01

## 2021-01-01 RX ORDER — DEXTROSE MONOHYDRATE 25 G/50ML
25 INJECTION, SOLUTION INTRAVENOUS
Status: DISCONTINUED | OUTPATIENT
Start: 2021-01-01 | End: 2021-01-01 | Stop reason: HOSPADM

## 2021-01-01 RX ORDER — MIDAZOLAM HYDROCHLORIDE 1 MG/ML
0.5 INJECTION INTRAMUSCULAR; INTRAVENOUS
Status: DISCONTINUED | OUTPATIENT
Start: 2021-01-01 | End: 2021-01-01 | Stop reason: HOSPADM

## 2021-01-01 RX ORDER — DAPAGLIFLOZIN AND METFORMIN HYDROCHLORIDE 5; 1000 MG/1; MG/1
2 TABLET, FILM COATED, EXTENDED RELEASE ORAL DAILY
Qty: 180 TABLET | Refills: 1 | Status: SHIPPED | OUTPATIENT
Start: 2021-01-01 | End: 2022-01-01

## 2021-01-01 RX ORDER — ACETAMINOPHEN 650 MG/1
650 SUPPOSITORY RECTAL EVERY 4 HOURS PRN
Status: DISCONTINUED | OUTPATIENT
Start: 2021-01-01 | End: 2021-01-01 | Stop reason: HOSPADM

## 2021-01-01 RX ORDER — SODIUM CHLORIDE 0.9 % (FLUSH) 0.9 %
10 SYRINGE (ML) INJECTION AS NEEDED
Status: DISCONTINUED | OUTPATIENT
Start: 2021-01-01 | End: 2021-01-01 | Stop reason: HOSPADM

## 2021-01-01 RX ORDER — ROSUVASTATIN CALCIUM 40 MG/1
40 TABLET, COATED ORAL DAILY
Qty: 90 TABLET | Refills: 1 | Status: SHIPPED | OUTPATIENT
Start: 2021-01-01 | End: 2021-01-01

## 2021-01-01 RX ORDER — LEVOTHYROXINE SODIUM 137 UG/1
137 TABLET ORAL
Status: DISCONTINUED | OUTPATIENT
Start: 2021-01-01 | End: 2021-01-01 | Stop reason: HOSPADM

## 2021-01-01 RX ORDER — AMOXICILLIN 250 MG
1 CAPSULE ORAL 2 TIMES DAILY
Status: DISCONTINUED | OUTPATIENT
Start: 2021-01-01 | End: 2021-01-01 | Stop reason: HOSPADM

## 2021-01-01 RX ORDER — LEVOFLOXACIN 5 MG/ML
500 INJECTION, SOLUTION INTRAVENOUS EVERY 24 HOURS
Status: DISCONTINUED | OUTPATIENT
Start: 2021-01-01 | End: 2021-01-01

## 2021-01-01 RX ORDER — NICOTINE POLACRILEX 4 MG
15 LOZENGE BUCCAL
Status: DISCONTINUED | OUTPATIENT
Start: 2021-01-01 | End: 2021-01-01 | Stop reason: HOSPADM

## 2021-01-01 RX ORDER — ONDANSETRON 2 MG/ML
4 INJECTION INTRAMUSCULAR; INTRAVENOUS ONCE
Status: DISCONTINUED | OUTPATIENT
Start: 2021-01-01 | End: 2021-01-01 | Stop reason: HOSPADM

## 2021-01-01 RX ORDER — CEPHALEXIN 500 MG/1
500 CAPSULE ORAL EVERY 6 HOURS SCHEDULED
Status: COMPLETED | OUTPATIENT
Start: 2021-01-01 | End: 2021-01-01

## 2021-01-01 RX ORDER — PROPOFOL 10 MG/ML
VIAL (ML) INTRAVENOUS AS NEEDED
Status: DISCONTINUED | OUTPATIENT
Start: 2021-01-01 | End: 2021-01-01 | Stop reason: SURG

## 2021-01-01 RX ORDER — FENTANYL CITRATE 50 UG/ML
50 INJECTION, SOLUTION INTRAMUSCULAR; INTRAVENOUS
Status: DISCONTINUED | OUTPATIENT
Start: 2021-01-01 | End: 2021-01-01 | Stop reason: HOSPADM

## 2021-01-01 RX ORDER — OMEPRAZOLE 40 MG/1
40 CAPSULE, DELAYED RELEASE ORAL DAILY
Qty: 90 CAPSULE | Refills: 1 | Status: SHIPPED | OUTPATIENT
Start: 2021-01-01 | End: 2021-01-01 | Stop reason: SDUPTHER

## 2021-01-01 RX ORDER — OXYCODONE HYDROCHLORIDE AND ACETAMINOPHEN 5; 325 MG/1; MG/1
TABLET ORAL
Qty: 50 TABLET | Refills: 0
Start: 2021-01-01 | End: 2021-01-01

## 2021-01-01 RX ORDER — SODIUM CHLORIDE, SODIUM LACTATE, POTASSIUM CHLORIDE, CALCIUM CHLORIDE 600; 310; 30; 20 MG/100ML; MG/100ML; MG/100ML; MG/100ML
9 INJECTION, SOLUTION INTRAVENOUS CONTINUOUS
Status: DISCONTINUED | OUTPATIENT
Start: 2021-01-01 | End: 2021-01-01

## 2021-01-01 RX ORDER — DAPAGLIFLOZIN AND METFORMIN HYDROCHLORIDE 5; 1000 MG/1; MG/1
2 TABLET, FILM COATED, EXTENDED RELEASE ORAL DAILY
Qty: 180 TABLET | Refills: 1 | Status: SHIPPED | OUTPATIENT
Start: 2021-01-01 | End: 2021-01-01 | Stop reason: SDUPTHER

## 2021-01-01 RX ORDER — BISACODYL 10 MG
10 SUPPOSITORY, RECTAL RECTAL DAILY PRN
Status: DISCONTINUED | OUTPATIENT
Start: 2021-01-01 | End: 2021-01-01 | Stop reason: HOSPADM

## 2021-01-01 RX ORDER — PEN NEEDLE, DIABETIC 30 GX3/16"
1 NEEDLE, DISPOSABLE MISCELLANEOUS 3 TIMES DAILY
Qty: 100 EACH | Refills: 3 | Status: SHIPPED | OUTPATIENT
Start: 2021-01-01 | End: 2021-01-01

## 2021-01-01 RX ORDER — MIDAZOLAM HYDROCHLORIDE 1 MG/ML
1 INJECTION INTRAMUSCULAR; INTRAVENOUS
Status: DISCONTINUED | OUTPATIENT
Start: 2021-01-01 | End: 2021-01-01 | Stop reason: HOSPADM

## 2021-01-01 RX ORDER — METOPROLOL TARTRATE 50 MG/1
50 TABLET, FILM COATED ORAL EVERY 12 HOURS SCHEDULED
Qty: 180 TABLET | Refills: 0 | Status: SHIPPED | OUTPATIENT
Start: 2021-01-01 | End: 2021-01-01

## 2021-01-01 RX ORDER — PANTOPRAZOLE SODIUM 40 MG/1
40 TABLET, DELAYED RELEASE ORAL EVERY MORNING
Status: DISCONTINUED | OUTPATIENT
Start: 2021-01-01 | End: 2021-01-01 | Stop reason: HOSPADM

## 2021-01-01 RX ORDER — OXCARBAZEPINE 300 MG/1
600 TABLET, FILM COATED ORAL EVERY 12 HOURS SCHEDULED
Status: DISCONTINUED | OUTPATIENT
Start: 2021-01-01 | End: 2021-01-01 | Stop reason: HOSPADM

## 2021-01-01 RX ORDER — ROSUVASTATIN CALCIUM 40 MG/1
40 TABLET, COATED ORAL DAILY
Qty: 90 TABLET | Refills: 1 | Status: SHIPPED | OUTPATIENT
Start: 2021-01-01

## 2021-01-01 RX ORDER — ALBUMIN, HUMAN INJ 5% 5 %
SOLUTION INTRAVENOUS CONTINUOUS PRN
Status: DISCONTINUED | OUTPATIENT
Start: 2021-01-01 | End: 2021-01-01 | Stop reason: SURG

## 2021-01-01 RX ORDER — ONDANSETRON 2 MG/ML
4 INJECTION INTRAMUSCULAR; INTRAVENOUS EVERY 6 HOURS PRN
Status: DISCONTINUED | OUTPATIENT
Start: 2021-01-01 | End: 2021-01-01 | Stop reason: HOSPADM

## 2021-01-01 RX ORDER — NALOXONE HCL 0.4 MG/ML
0.2 VIAL (ML) INJECTION AS NEEDED
Status: DISCONTINUED | OUTPATIENT
Start: 2021-01-01 | End: 2021-01-01 | Stop reason: HOSPADM

## 2021-01-01 RX ORDER — ROSUVASTATIN CALCIUM 40 MG/1
TABLET, COATED ORAL
Qty: 90 TABLET | Refills: 1 | Status: SHIPPED | OUTPATIENT
Start: 2021-01-01 | End: 2021-01-01 | Stop reason: SDUPTHER

## 2021-01-01 RX ORDER — ESTRADIOL 0.07 MG/D
FILM, EXTENDED RELEASE TRANSDERMAL
COMMUNITY
Start: 2021-01-01 | End: 2021-01-01

## 2021-01-01 RX ORDER — OMEPRAZOLE 40 MG/1
40 CAPSULE, DELAYED RELEASE ORAL DAILY
Qty: 90 CAPSULE | Refills: 1 | Status: CANCELLED | OUTPATIENT
Start: 2021-01-01

## 2021-01-01 RX ORDER — UREA 10 %
3 LOTION (ML) TOPICAL NIGHTLY PRN
Status: DISCONTINUED | OUTPATIENT
Start: 2021-01-01 | End: 2021-01-01 | Stop reason: HOSPADM

## 2021-01-01 RX ORDER — INSULIN GLARGINE AND LIXISENATIDE 100; 33 U/ML; UG/ML
60 INJECTION, SOLUTION SUBCUTANEOUS
Qty: 60 ML | Refills: 0 | Status: SHIPPED | OUTPATIENT
Start: 2021-01-01 | End: 2022-01-01

## 2021-01-01 RX ORDER — LABETALOL HYDROCHLORIDE 5 MG/ML
5 INJECTION, SOLUTION INTRAVENOUS
Status: DISCONTINUED | OUTPATIENT
Start: 2021-01-01 | End: 2021-01-01 | Stop reason: HOSPADM

## 2021-01-01 RX ORDER — LEVOTHYROXINE SODIUM 137 UG/1
137 TABLET ORAL DAILY
Qty: 90 TABLET | Refills: 0 | Status: SHIPPED | OUTPATIENT
Start: 2021-01-01 | End: 2021-01-01

## 2021-01-01 RX ORDER — HYDROMORPHONE HYDROCHLORIDE 1 MG/ML
0.5 INJECTION, SOLUTION INTRAMUSCULAR; INTRAVENOUS; SUBCUTANEOUS
Status: DISCONTINUED | OUTPATIENT
Start: 2021-01-01 | End: 2021-01-01 | Stop reason: HOSPADM

## 2021-01-01 RX ORDER — LEVOTHYROXINE SODIUM 137 UG/1
137 TABLET ORAL DAILY
Qty: 90 TABLET | Refills: 0 | Status: SHIPPED | OUTPATIENT
Start: 2021-01-01 | End: 2021-01-01 | Stop reason: SDUPTHER

## 2021-01-01 RX ORDER — SODIUM CHLORIDE 9 MG/ML
100 INJECTION, SOLUTION INTRAVENOUS CONTINUOUS
Status: DISCONTINUED | OUTPATIENT
Start: 2021-01-01 | End: 2021-01-01

## 2021-01-01 RX ORDER — HYDROCODONE BITARTRATE AND ACETAMINOPHEN 7.5; 325 MG/1; MG/1
TABLET ORAL
COMMUNITY
Start: 2021-01-01

## 2021-01-01 RX ORDER — ACETAMINOPHEN 325 MG/1
650 TABLET ORAL EVERY 4 HOURS PRN
Status: DISCONTINUED | OUTPATIENT
Start: 2021-01-01 | End: 2021-01-01 | Stop reason: HOSPADM

## 2021-01-01 RX ORDER — INSULIN GLARGINE AND LIXISENATIDE 100; 33 U/ML; UG/ML
60 INJECTION, SOLUTION SUBCUTANEOUS
Qty: 60 ML | Refills: 0 | Status: SHIPPED | OUTPATIENT
Start: 2021-01-01 | End: 2021-01-01

## 2021-01-01 RX ORDER — ONDANSETRON 2 MG/ML
4 INJECTION INTRAMUSCULAR; INTRAVENOUS ONCE AS NEEDED
Status: DISCONTINUED | OUTPATIENT
Start: 2021-01-01 | End: 2021-01-01 | Stop reason: HOSPADM

## 2021-01-01 RX ORDER — SODIUM CHLORIDE 0.9 % (FLUSH) 0.9 %
10 SYRINGE (ML) INJECTION EVERY 12 HOURS SCHEDULED
Status: DISCONTINUED | OUTPATIENT
Start: 2021-01-01 | End: 2021-01-01 | Stop reason: HOSPADM

## 2021-01-01 RX ORDER — NALOXONE HCL 0.4 MG/ML
0.1 VIAL (ML) INJECTION
Status: DISCONTINUED | OUTPATIENT
Start: 2021-01-01 | End: 2021-01-01

## 2021-01-01 RX ORDER — TEMAZEPAM 15 MG/1
15 CAPSULE ORAL NIGHTLY PRN
Status: DISCONTINUED | OUTPATIENT
Start: 2021-01-01 | End: 2021-01-01 | Stop reason: HOSPADM

## 2021-01-01 RX ORDER — PEN NEEDLE, DIABETIC 32GX 5/32"
NEEDLE, DISPOSABLE MISCELLANEOUS
Qty: 300 EACH | Refills: 3 | Status: SHIPPED | OUTPATIENT
Start: 2021-01-01

## 2021-01-01 RX ORDER — OMEGA-3-ACID ETHYL ESTERS 1 G/1
CAPSULE, LIQUID FILLED ORAL
Qty: 360 CAPSULE | Refills: 0 | Status: SHIPPED | OUTPATIENT
Start: 2021-01-01 | End: 2021-01-01

## 2021-01-01 RX ORDER — OMEGA-3-ACID ETHYL ESTERS 1 G/1
CAPSULE, LIQUID FILLED ORAL
Qty: 360 CAPSULE | Refills: 0 | Status: SHIPPED | OUTPATIENT
Start: 2021-01-01 | End: 2022-01-01 | Stop reason: SDUPTHER

## 2021-01-01 RX ORDER — PHENYLEPHRINE HYDROCHLORIDE 10 MG/ML
INJECTION INTRAVENOUS
Status: DISPENSED
Start: 2021-01-01 | End: 2021-01-01

## 2021-01-01 RX ORDER — VANCOMYCIN HYDROCHLORIDE 1 G/200ML
1000 INJECTION, SOLUTION INTRAVENOUS EVERY 12 HOURS
Status: DISCONTINUED | OUTPATIENT
Start: 2021-01-01 | End: 2021-01-01

## 2021-01-01 RX ORDER — MORPHINE SULFATE 2 MG/ML
4 INJECTION, SOLUTION INTRAMUSCULAR; INTRAVENOUS ONCE
Status: COMPLETED | OUTPATIENT
Start: 2021-01-01 | End: 2021-01-01

## 2021-01-01 RX ORDER — BACLOFEN 10 MG/1
10 TABLET ORAL 2 TIMES DAILY
Status: DISCONTINUED | OUTPATIENT
Start: 2021-01-01 | End: 2021-01-01 | Stop reason: HOSPADM

## 2021-01-01 RX ORDER — CEFTRIAXONE SODIUM 1 G/50ML
1 INJECTION, SOLUTION INTRAVENOUS EVERY 24 HOURS
Status: DISCONTINUED | OUTPATIENT
Start: 2021-01-01 | End: 2021-01-01

## 2021-01-01 RX ORDER — ACETAMINOPHEN 325 MG/1
650 TABLET ORAL EVERY 4 HOURS PRN
Status: DISCONTINUED | OUTPATIENT
Start: 2021-01-01 | End: 2021-01-01 | Stop reason: SDUPTHER

## 2021-01-01 RX ORDER — PEN NEEDLE, DIABETIC 30 GX3/16"
NEEDLE, DISPOSABLE MISCELLANEOUS
COMMUNITY
End: 2021-01-01 | Stop reason: SDUPTHER

## 2021-01-01 RX ORDER — FLUMAZENIL 0.1 MG/ML
0.2 INJECTION INTRAVENOUS AS NEEDED
Status: DISCONTINUED | OUTPATIENT
Start: 2021-01-01 | End: 2021-01-01 | Stop reason: HOSPADM

## 2021-01-01 RX ORDER — GLYCOPYRROLATE 0.2 MG/ML
INJECTION INTRAMUSCULAR; INTRAVENOUS AS NEEDED
Status: DISCONTINUED | OUTPATIENT
Start: 2021-01-01 | End: 2021-01-01 | Stop reason: SURG

## 2021-01-01 RX ORDER — EPHEDRINE SULFATE 50 MG/ML
INJECTION, SOLUTION INTRAVENOUS AS NEEDED
Status: DISCONTINUED | OUTPATIENT
Start: 2021-01-01 | End: 2021-01-01 | Stop reason: SURG

## 2021-01-01 RX ORDER — LEVOFLOXACIN 500 MG/1
500 TABLET, FILM COATED ORAL EVERY 24 HOURS
Status: DISCONTINUED | OUTPATIENT
Start: 2021-01-01 | End: 2021-01-01

## 2021-01-01 RX ORDER — EPHEDRINE SULFATE 50 MG/ML
5 INJECTION, SOLUTION INTRAVENOUS ONCE AS NEEDED
Status: DISCONTINUED | OUTPATIENT
Start: 2021-01-01 | End: 2021-01-01 | Stop reason: HOSPADM

## 2021-01-01 RX ORDER — METOPROLOL TARTRATE 50 MG/1
50 TABLET, FILM COATED ORAL EVERY 12 HOURS SCHEDULED
Status: DISCONTINUED | OUTPATIENT
Start: 2021-01-01 | End: 2021-01-01 | Stop reason: HOSPADM

## 2021-01-01 RX ORDER — POLYETHYLENE GLYCOL 3350 17 G/17G
17 POWDER, FOR SOLUTION ORAL DAILY
Start: 2021-01-01 | End: 2021-01-01

## 2021-01-01 RX ORDER — HYDROCODONE BITARTRATE AND ACETAMINOPHEN 7.5; 325 MG/1; MG/1
1 TABLET ORAL ONCE AS NEEDED
Status: DISCONTINUED | OUTPATIENT
Start: 2021-01-01 | End: 2021-01-01 | Stop reason: HOSPADM

## 2021-01-01 RX ORDER — HYDROMORPHONE HCL IN 0.9% NACL 10 MG/50ML
PATIENT CONTROLLED ANALGESIA SYRINGE INTRAVENOUS CONTINUOUS
Status: DISCONTINUED | OUTPATIENT
Start: 2021-01-01 | End: 2021-01-01

## 2021-01-01 RX ORDER — DULOXETIN HYDROCHLORIDE 60 MG/1
60 CAPSULE, DELAYED RELEASE ORAL 2 TIMES DAILY
Status: DISCONTINUED | OUTPATIENT
Start: 2021-01-01 | End: 2021-01-01 | Stop reason: HOSPADM

## 2021-01-01 RX ORDER — HYDROXYCHLOROQUINE SULFATE 200 MG/1
200 TABLET, FILM COATED ORAL 2 TIMES DAILY
Status: DISCONTINUED | OUTPATIENT
Start: 2021-01-01 | End: 2021-01-01 | Stop reason: HOSPADM

## 2021-01-01 RX ORDER — HYDRALAZINE HYDROCHLORIDE 20 MG/ML
5 INJECTION INTRAMUSCULAR; INTRAVENOUS
Status: DISCONTINUED | OUTPATIENT
Start: 2021-01-01 | End: 2021-01-01 | Stop reason: HOSPADM

## 2021-01-01 RX ORDER — SODIUM CHLORIDE 0.9 % (FLUSH) 0.9 %
3-10 SYRINGE (ML) INJECTION AS NEEDED
Status: DISCONTINUED | OUTPATIENT
Start: 2021-01-01 | End: 2021-01-01 | Stop reason: HOSPADM

## 2021-01-01 RX ORDER — HYDROMORPHONE HCL 110MG/55ML
PATIENT CONTROLLED ANALGESIA SYRINGE INTRAVENOUS AS NEEDED
Status: DISCONTINUED | OUTPATIENT
Start: 2021-01-01 | End: 2021-01-01 | Stop reason: SURG

## 2021-01-01 RX ORDER — ONDANSETRON 4 MG/1
4 TABLET, FILM COATED ORAL EVERY 6 HOURS PRN
Status: DISCONTINUED | OUTPATIENT
Start: 2021-01-01 | End: 2021-01-01 | Stop reason: HOSPADM

## 2021-01-01 RX ORDER — METOPROLOL TARTRATE 50 MG/1
TABLET, FILM COATED ORAL
Qty: 60 TABLET | Refills: 0 | Status: SHIPPED | OUTPATIENT
Start: 2021-01-01 | End: 2022-01-01

## 2021-01-01 RX ORDER — OXYCODONE HYDROCHLORIDE AND ACETAMINOPHEN 5; 325 MG/1; MG/1
2 TABLET ORAL EVERY 4 HOURS PRN
Status: DISCONTINUED | OUTPATIENT
Start: 2021-01-01 | End: 2021-01-01 | Stop reason: HOSPADM

## 2021-01-01 RX ADMIN — SODIUM CHLORIDE, PRESERVATIVE FREE 10 ML: 5 INJECTION INTRAVENOUS at 20:11

## 2021-01-01 RX ADMIN — BACLOFEN 10 MG: 10 TABLET ORAL at 08:18

## 2021-01-01 RX ADMIN — SODIUM CHLORIDE, POTASSIUM CHLORIDE, SODIUM LACTATE AND CALCIUM CHLORIDE 9 ML/HR: 600; 310; 30; 20 INJECTION, SOLUTION INTRAVENOUS at 13:40

## 2021-01-01 RX ADMIN — INSULIN LISPRO 2 UNITS: 100 INJECTION, SOLUTION INTRAVENOUS; SUBCUTANEOUS at 17:23

## 2021-01-01 RX ADMIN — LAMOTRIGINE 150 MG: 100 TABLET ORAL at 20:51

## 2021-01-01 RX ADMIN — HYDROMORPHONE HYDROCHLORIDE 0.5 MG: 1 INJECTION, SOLUTION INTRAMUSCULAR; INTRAVENOUS; SUBCUTANEOUS at 00:16

## 2021-01-01 RX ADMIN — PRAMIPEXOLE DIHYDROCHLORIDE 0.5 MG: 0.5 TABLET ORAL at 08:19

## 2021-01-01 RX ADMIN — BACLOFEN 10 MG: 10 TABLET ORAL at 20:10

## 2021-01-01 RX ADMIN — HYDROXYCHLOROQUINE SULFATE 200 MG: 200 TABLET ORAL at 22:39

## 2021-01-01 RX ADMIN — OXYCODONE HYDROCHLORIDE AND ACETAMINOPHEN 2 TABLET: 5; 325 TABLET ORAL at 09:00

## 2021-01-01 RX ADMIN — PRAMIPEXOLE DIHYDROCHLORIDE 0.5 MG: 0.5 TABLET ORAL at 08:58

## 2021-01-01 RX ADMIN — LEVOTHYROXINE SODIUM 137 MCG: 137 TABLET ORAL at 05:37

## 2021-01-01 RX ADMIN — FENTANYL CITRATE 50 MCG: 50 INJECTION INTRAMUSCULAR; INTRAVENOUS at 13:56

## 2021-01-01 RX ADMIN — SODIUM CHLORIDE, PRESERVATIVE FREE 3 ML: 5 INJECTION INTRAVENOUS at 20:52

## 2021-01-01 RX ADMIN — HYDROMORPHONE HYDROCHLORIDE 0.5 MG: 1 INJECTION, SOLUTION INTRAMUSCULAR; INTRAVENOUS; SUBCUTANEOUS at 19:23

## 2021-01-01 RX ADMIN — OXYCODONE HYDROCHLORIDE AND ACETAMINOPHEN 2 TABLET: 5; 325 TABLET ORAL at 13:07

## 2021-01-01 RX ADMIN — OXCARBAZEPINE 600 MG: 300 TABLET, FILM COATED ORAL at 21:52

## 2021-01-01 RX ADMIN — VANCOMYCIN HYDROCHLORIDE 1250 MG: 10 INJECTION, POWDER, LYOPHILIZED, FOR SOLUTION INTRAVENOUS at 14:10

## 2021-01-01 RX ADMIN — KETAMINE HYDROCHLORIDE 10 MG: 10 INJECTION INTRAMUSCULAR; INTRAVENOUS at 18:43

## 2021-01-01 RX ADMIN — DOCUSATE SODIUM 50MG AND SENNOSIDES 8.6MG 1 TABLET: 8.6; 5 TABLET, FILM COATED ORAL at 08:45

## 2021-01-01 RX ADMIN — BACLOFEN 10 MG: 10 TABLET ORAL at 08:45

## 2021-01-01 RX ADMIN — OXCARBAZEPINE 600 MG: 300 TABLET, FILM COATED ORAL at 08:44

## 2021-01-01 RX ADMIN — OXYCODONE HYDROCHLORIDE AND ACETAMINOPHEN 2 TABLET: 5; 325 TABLET ORAL at 02:21

## 2021-01-01 RX ADMIN — HYDROXYCHLOROQUINE SULFATE 200 MG: 200 TABLET ORAL at 08:06

## 2021-01-01 RX ADMIN — DULOXETINE HYDROCHLORIDE 60 MG: 60 CAPSULE, DELAYED RELEASE ORAL at 08:19

## 2021-01-01 RX ADMIN — ONDANSETRON 4 MG: 2 INJECTION INTRAMUSCULAR; INTRAVENOUS at 02:52

## 2021-01-01 RX ADMIN — OXYCODONE HYDROCHLORIDE AND ACETAMINOPHEN 2 TABLET: 5; 325 TABLET ORAL at 20:18

## 2021-01-01 RX ADMIN — HYDROXYCHLOROQUINE SULFATE 200 MG: 200 TABLET ORAL at 20:51

## 2021-01-01 RX ADMIN — LAMOTRIGINE 150 MG: 100 TABLET ORAL at 21:39

## 2021-01-01 RX ADMIN — SODIUM CHLORIDE, PRESERVATIVE FREE 3 ML: 5 INJECTION INTRAVENOUS at 21:54

## 2021-01-01 RX ADMIN — CEPHALEXIN 500 MG: 500 CAPSULE ORAL at 18:47

## 2021-01-01 RX ADMIN — HYDROXYCHLOROQUINE SULFATE 200 MG: 200 TABLET ORAL at 20:10

## 2021-01-01 RX ADMIN — SODIUM CHLORIDE, PRESERVATIVE FREE 3 ML: 5 INJECTION INTRAVENOUS at 00:01

## 2021-01-01 RX ADMIN — PHENYLEPHRINE HYDROCHLORIDE 200 MCG: 10 INJECTION INTRAVENOUS at 17:33

## 2021-01-01 RX ADMIN — OXYCODONE HYDROCHLORIDE AND ACETAMINOPHEN 2 TABLET: 5; 325 TABLET ORAL at 21:36

## 2021-01-01 RX ADMIN — DOCUSATE SODIUM 50MG AND SENNOSIDES 8.6MG 1 TABLET: 8.6; 5 TABLET, FILM COATED ORAL at 09:01

## 2021-01-01 RX ADMIN — GLYCOPYRROLATE 0.4 MG: 0.2 INJECTION INTRAMUSCULAR; INTRAVENOUS at 18:21

## 2021-01-01 RX ADMIN — ROSUVASTATIN CALCIUM 40 MG: 40 TABLET, FILM COATED ORAL at 09:00

## 2021-01-01 RX ADMIN — HYDROMORPHONE HYDROCHLORIDE 0.5 MG: 2 INJECTION, SOLUTION INTRAMUSCULAR; INTRAVENOUS; SUBCUTANEOUS at 18:25

## 2021-01-01 RX ADMIN — HYDROMORPHONE HYDROCHLORIDE 0.5 MG: 1 INJECTION, SOLUTION INTRAMUSCULAR; INTRAVENOUS; SUBCUTANEOUS at 19:34

## 2021-01-01 RX ADMIN — VANCOMYCIN HYDROCHLORIDE 1250 MG: 10 INJECTION, POWDER, LYOPHILIZED, FOR SOLUTION INTRAVENOUS at 16:10

## 2021-01-01 RX ADMIN — LEVOFLOXACIN 500 MG: 5 INJECTION, SOLUTION INTRAVENOUS at 06:57

## 2021-01-01 RX ADMIN — HYDROXYCHLOROQUINE SULFATE 200 MG: 200 TABLET ORAL at 21:40

## 2021-01-01 RX ADMIN — SODIUM CHLORIDE, PRESERVATIVE FREE 10 ML: 5 INJECTION INTRAVENOUS at 05:32

## 2021-01-01 RX ADMIN — ALBUMIN HUMAN: 0.05 INJECTION, SOLUTION INTRAVENOUS at 15:52

## 2021-01-01 RX ADMIN — HYDROMORPHONE HYDROCHLORIDE 0.5 MG: 1 INJECTION, SOLUTION INTRAMUSCULAR; INTRAVENOUS; SUBCUTANEOUS at 17:36

## 2021-01-01 RX ADMIN — CEPHALEXIN 500 MG: 500 CAPSULE ORAL at 23:03

## 2021-01-01 RX ADMIN — BACLOFEN 10 MG: 10 TABLET ORAL at 08:06

## 2021-01-01 RX ADMIN — LEVETIRACETAM 1500 MG: 500 TABLET, FILM COATED ORAL at 20:51

## 2021-01-01 RX ADMIN — PROPOFOL 170 MG: 10 INJECTION, EMULSION INTRAVENOUS at 13:56

## 2021-01-01 RX ADMIN — VANCOMYCIN HYDROCHLORIDE 1250 MG: 10 INJECTION, POWDER, LYOPHILIZED, FOR SOLUTION INTRAVENOUS at 03:14

## 2021-01-01 RX ADMIN — OXYCODONE HYDROCHLORIDE AND ACETAMINOPHEN 2 TABLET: 5; 325 TABLET ORAL at 14:47

## 2021-01-01 RX ADMIN — LEVOTHYROXINE SODIUM 137 MCG: 137 TABLET ORAL at 05:29

## 2021-01-01 RX ADMIN — HYDROMORPHONE HYDROCHLORIDE 0.5 MG: 1 INJECTION, SOLUTION INTRAMUSCULAR; INTRAVENOUS; SUBCUTANEOUS at 00:01

## 2021-01-01 RX ADMIN — KETAMINE HYDROCHLORIDE 10 MG: 10 INJECTION INTRAMUSCULAR; INTRAVENOUS at 18:32

## 2021-01-01 RX ADMIN — LAMOTRIGINE 150 MG: 100 TABLET ORAL at 23:57

## 2021-01-01 RX ADMIN — OXYCODONE HYDROCHLORIDE AND ACETAMINOPHEN 2 TABLET: 5; 325 TABLET ORAL at 08:52

## 2021-01-01 RX ADMIN — MORPHINE SULFATE 4 MG: 2 INJECTION, SOLUTION INTRAMUSCULAR; INTRAVENOUS at 00:39

## 2021-01-01 RX ADMIN — ROSUVASTATIN CALCIUM 40 MG: 40 TABLET, FILM COATED ORAL at 08:18

## 2021-01-01 RX ADMIN — Medication 3 MG: at 22:47

## 2021-01-01 RX ADMIN — SODIUM CHLORIDE 100 ML/HR: 9 INJECTION, SOLUTION INTRAVENOUS at 23:33

## 2021-01-01 RX ADMIN — DOCUSATE SODIUM 50MG AND SENNOSIDES 8.6MG 1 TABLET: 8.6; 5 TABLET, FILM COATED ORAL at 08:06

## 2021-01-01 RX ADMIN — CEPHALEXIN 500 MG: 500 CAPSULE ORAL at 11:24

## 2021-01-01 RX ADMIN — METOPROLOL TARTRATE 50 MG: 50 TABLET, FILM COATED ORAL at 08:06

## 2021-01-01 RX ADMIN — ROCURONIUM BROMIDE 50 MG: 50 INJECTION INTRAVENOUS at 13:56

## 2021-01-01 RX ADMIN — FENTANYL CITRATE 50 MCG: 50 INJECTION INTRAMUSCULAR; INTRAVENOUS at 14:36

## 2021-01-01 RX ADMIN — OXCARBAZEPINE 600 MG: 300 TABLET, FILM COATED ORAL at 08:59

## 2021-01-01 RX ADMIN — HYDROMORPHONE HYDROCHLORIDE 0.5 MG: 1 INJECTION, SOLUTION INTRAMUSCULAR; INTRAVENOUS; SUBCUTANEOUS at 21:50

## 2021-01-01 RX ADMIN — METOPROLOL TARTRATE 50 MG: 50 TABLET, FILM COATED ORAL at 09:12

## 2021-01-01 RX ADMIN — VANCOMYCIN HYDROCHLORIDE 1500 MG: 10 INJECTION, POWDER, LYOPHILIZED, FOR SOLUTION INTRAVENOUS at 13:40

## 2021-01-01 RX ADMIN — LAMOTRIGINE 150 MG: 100 TABLET ORAL at 20:29

## 2021-01-01 RX ADMIN — DOCUSATE SODIUM 50MG AND SENNOSIDES 8.6MG 1 TABLET: 8.6; 5 TABLET, FILM COATED ORAL at 21:38

## 2021-01-01 RX ADMIN — SODIUM CHLORIDE, PRESERVATIVE FREE 3 ML: 5 INJECTION INTRAVENOUS at 09:02

## 2021-01-01 RX ADMIN — LEVETIRACETAM 1500 MG: 500 TABLET, FILM COATED ORAL at 20:28

## 2021-01-01 RX ADMIN — METOPROLOL TARTRATE 50 MG: 50 TABLET, FILM COATED ORAL at 08:18

## 2021-01-01 RX ADMIN — SODIUM CHLORIDE 100 ML/HR: 9 INJECTION, SOLUTION INTRAVENOUS at 01:55

## 2021-01-01 RX ADMIN — HYDROMORPHONE HYDROCHLORIDE 0.5 MG: 1 INJECTION, SOLUTION INTRAMUSCULAR; INTRAVENOUS; SUBCUTANEOUS at 05:29

## 2021-01-01 RX ADMIN — OXCARBAZEPINE 600 MG: 300 TABLET, FILM COATED ORAL at 08:20

## 2021-01-01 RX ADMIN — DULOXETINE HYDROCHLORIDE 60 MG: 60 CAPSULE, DELAYED RELEASE ORAL at 08:59

## 2021-01-01 RX ADMIN — KETAMINE HYDROCHLORIDE 10 MG: 10 INJECTION INTRAMUSCULAR; INTRAVENOUS at 18:22

## 2021-01-01 RX ADMIN — DOCUSATE SODIUM 50MG AND SENNOSIDES 8.6MG 1 TABLET: 8.6; 5 TABLET, FILM COATED ORAL at 20:51

## 2021-01-01 RX ADMIN — BACLOFEN 10 MG: 10 TABLET ORAL at 09:00

## 2021-01-01 RX ADMIN — METOPROLOL TARTRATE 50 MG: 50 TABLET, FILM COATED ORAL at 20:50

## 2021-01-01 RX ADMIN — SODIUM CHLORIDE, PRESERVATIVE FREE 10 ML: 5 INJECTION INTRAVENOUS at 22:41

## 2021-01-01 RX ADMIN — PHENYLEPHRINE HYDROCHLORIDE 0.5 MCG/KG/MIN: 10 INJECTION INTRAVENOUS at 19:15

## 2021-01-01 RX ADMIN — BACLOFEN 10 MG: 10 TABLET ORAL at 21:38

## 2021-01-01 RX ADMIN — DULOXETINE HYDROCHLORIDE 60 MG: 60 CAPSULE, DELAYED RELEASE ORAL at 08:06

## 2021-01-01 RX ADMIN — HYDROMORPHONE HYDROCHLORIDE 0.5 MG: 2 INJECTION, SOLUTION INTRAMUSCULAR; INTRAVENOUS; SUBCUTANEOUS at 15:20

## 2021-01-01 RX ADMIN — ROSUVASTATIN CALCIUM 40 MG: 40 TABLET, FILM COATED ORAL at 08:45

## 2021-01-01 RX ADMIN — HYDROMORPHONE HYDROCHLORIDE: 10 INJECTION, SOLUTION INTRAMUSCULAR; INTRAVENOUS; SUBCUTANEOUS at 19:49

## 2021-01-01 RX ADMIN — POLYETHYLENE GLYCOL 3350 17 G: 17 POWDER, FOR SOLUTION ORAL at 08:59

## 2021-01-01 RX ADMIN — KETAMINE HYDROCHLORIDE 10 MG: 10 INJECTION INTRAMUSCULAR; INTRAVENOUS at 18:27

## 2021-01-01 RX ADMIN — DEXAMETHASONE SODIUM PHOSPHATE 6 MG: 10 INJECTION, SOLUTION INTRAMUSCULAR; INTRAVENOUS at 01:29

## 2021-01-01 RX ADMIN — APIXABAN 2.5 MG: 2.5 TABLET, FILM COATED ORAL at 20:29

## 2021-01-01 RX ADMIN — LEVETIRACETAM 1500 MG: 500 TABLET, FILM COATED ORAL at 08:58

## 2021-01-01 RX ADMIN — OXCARBAZEPINE 600 MG: 300 TABLET, FILM COATED ORAL at 23:58

## 2021-01-01 RX ADMIN — PANTOPRAZOLE SODIUM 40 MG: 40 TABLET, DELAYED RELEASE ORAL at 05:37

## 2021-01-01 RX ADMIN — SODIUM CHLORIDE, PRESERVATIVE FREE 10 ML: 5 INJECTION INTRAVENOUS at 08:41

## 2021-01-01 RX ADMIN — BACLOFEN 10 MG: 10 TABLET ORAL at 21:51

## 2021-01-01 RX ADMIN — OXCARBAZEPINE 600 MG: 300 TABLET, FILM COATED ORAL at 20:11

## 2021-01-01 RX ADMIN — CEPHALEXIN 500 MG: 500 CAPSULE ORAL at 05:52

## 2021-01-01 RX ADMIN — LAMOTRIGINE 150 MG: 100 TABLET ORAL at 20:10

## 2021-01-01 RX ADMIN — HYDROXYCHLOROQUINE SULFATE 200 MG: 200 TABLET ORAL at 20:29

## 2021-01-01 RX ADMIN — HYDROMORPHONE HYDROCHLORIDE: 10 INJECTION, SOLUTION INTRAMUSCULAR; INTRAVENOUS; SUBCUTANEOUS at 01:31

## 2021-01-01 RX ADMIN — BACLOFEN 10 MG: 10 TABLET ORAL at 20:29

## 2021-01-01 RX ADMIN — SODIUM CHLORIDE, PRESERVATIVE FREE 3 ML: 5 INJECTION INTRAVENOUS at 21:41

## 2021-01-01 RX ADMIN — OXCARBAZEPINE 600 MG: 300 TABLET, FILM COATED ORAL at 21:41

## 2021-01-01 RX ADMIN — METOPROLOL TARTRATE 50 MG: 50 TABLET, FILM COATED ORAL at 08:44

## 2021-01-01 RX ADMIN — SODIUM CHLORIDE, PRESERVATIVE FREE 10 ML: 5 INJECTION INTRAVENOUS at 21:42

## 2021-01-01 RX ADMIN — LEVOTHYROXINE SODIUM 137 MCG: 137 TABLET ORAL at 06:27

## 2021-01-01 RX ADMIN — LAMOTRIGINE 150 MG: 100 TABLET ORAL at 08:06

## 2021-01-01 RX ADMIN — LEVETIRACETAM 1500 MG: 500 TABLET, FILM COATED ORAL at 08:59

## 2021-01-01 RX ADMIN — OXCARBAZEPINE 600 MG: 300 TABLET, FILM COATED ORAL at 08:06

## 2021-01-01 RX ADMIN — HYDROMORPHONE HYDROCHLORIDE 0.5 MG: 1 INJECTION, SOLUTION INTRAMUSCULAR; INTRAVENOUS; SUBCUTANEOUS at 12:58

## 2021-01-01 RX ADMIN — HYDROXYCHLOROQUINE SULFATE 200 MG: 200 TABLET ORAL at 09:00

## 2021-01-01 RX ADMIN — HYDROXYCHLOROQUINE SULFATE 200 MG: 200 TABLET ORAL at 11:25

## 2021-01-01 RX ADMIN — SODIUM CHLORIDE, PRESERVATIVE FREE 10 ML: 5 INJECTION INTRAVENOUS at 09:02

## 2021-01-01 RX ADMIN — OXCARBAZEPINE 600 MG: 300 TABLET, FILM COATED ORAL at 21:40

## 2021-01-01 RX ADMIN — SODIUM CHLORIDE 100 ML/HR: 9 INJECTION, SOLUTION INTRAVENOUS at 00:01

## 2021-01-01 RX ADMIN — DULOXETINE HYDROCHLORIDE 60 MG: 60 CAPSULE, DELAYED RELEASE ORAL at 21:38

## 2021-01-01 RX ADMIN — PHENYLEPHRINE HYDROCHLORIDE 200 MCG: 10 INJECTION INTRAVENOUS at 17:00

## 2021-01-01 RX ADMIN — DEXAMETHASONE SODIUM PHOSPHATE 8 MG: 10 INJECTION INTRAMUSCULAR; INTRAVENOUS at 14:03

## 2021-01-01 RX ADMIN — KETAMINE HYDROCHLORIDE 10 MG: 10 INJECTION INTRAMUSCULAR; INTRAVENOUS at 18:37

## 2021-01-01 RX ADMIN — SODIUM CHLORIDE, PRESERVATIVE FREE 10 ML: 5 INJECTION INTRAVENOUS at 08:20

## 2021-01-01 RX ADMIN — HYDROXYCHLOROQUINE SULFATE 200 MG: 200 TABLET ORAL at 08:19

## 2021-01-01 RX ADMIN — DULOXETINE HYDROCHLORIDE 60 MG: 60 CAPSULE, DELAYED RELEASE ORAL at 09:00

## 2021-01-01 RX ADMIN — PRAMIPEXOLE DIHYDROCHLORIDE 0.5 MG: 0.5 TABLET ORAL at 08:41

## 2021-01-01 RX ADMIN — LEVETIRACETAM 1500 MG: 500 TABLET, FILM COATED ORAL at 21:40

## 2021-01-01 RX ADMIN — DULOXETINE HYDROCHLORIDE 60 MG: 60 CAPSULE, DELAYED RELEASE ORAL at 08:46

## 2021-01-01 RX ADMIN — OXCARBAZEPINE 600 MG: 300 TABLET, FILM COATED ORAL at 09:00

## 2021-01-01 RX ADMIN — LEVOTHYROXINE SODIUM 137 MCG: 137 TABLET ORAL at 05:56

## 2021-01-01 RX ADMIN — METOPROLOL TARTRATE 50 MG: 50 TABLET, FILM COATED ORAL at 09:01

## 2021-01-01 RX ADMIN — HYDROMORPHONE HYDROCHLORIDE 0.5 MG: 2 INJECTION, SOLUTION INTRAMUSCULAR; INTRAVENOUS; SUBCUTANEOUS at 18:32

## 2021-01-01 RX ADMIN — INSULIN LISPRO 2 UNITS: 100 INJECTION, SOLUTION INTRAVENOUS; SUBCUTANEOUS at 17:19

## 2021-01-01 RX ADMIN — PRAMIPEXOLE DIHYDROCHLORIDE 0.5 MG: 0.5 TABLET ORAL at 09:01

## 2021-01-01 RX ADMIN — LEVETIRACETAM 1500 MG: 500 TABLET, FILM COATED ORAL at 08:41

## 2021-01-01 RX ADMIN — DULOXETINE HYDROCHLORIDE 60 MG: 60 CAPSULE, DELAYED RELEASE ORAL at 20:10

## 2021-01-01 RX ADMIN — INSULIN LISPRO 2 UNITS: 100 INJECTION, SOLUTION INTRAVENOUS; SUBCUTANEOUS at 12:17

## 2021-01-01 RX ADMIN — OXYCODONE HYDROCHLORIDE AND ACETAMINOPHEN 2 TABLET: 5; 325 TABLET ORAL at 14:12

## 2021-01-01 RX ADMIN — NEOSTIGMINE METHYLSULFATE 3 MG: 0.5 INJECTION INTRAVENOUS at 18:21

## 2021-01-01 RX ADMIN — OXYCODONE HYDROCHLORIDE AND ACETAMINOPHEN 2 TABLET: 5; 325 TABLET ORAL at 16:39

## 2021-01-01 RX ADMIN — INSULIN LISPRO 2 UNITS: 100 INJECTION, SOLUTION INTRAVENOUS; SUBCUTANEOUS at 17:51

## 2021-01-01 RX ADMIN — SODIUM CHLORIDE 100 ML/HR: 9 INJECTION, SOLUTION INTRAVENOUS at 15:22

## 2021-01-01 RX ADMIN — HYDROMORPHONE HYDROCHLORIDE 0.5 MG: 1 INJECTION, SOLUTION INTRAMUSCULAR; INTRAVENOUS; SUBCUTANEOUS at 06:07

## 2021-01-01 RX ADMIN — HYDROMORPHONE HYDROCHLORIDE: 10 INJECTION, SOLUTION INTRAMUSCULAR; INTRAVENOUS; SUBCUTANEOUS at 10:55

## 2021-01-01 RX ADMIN — PRAMIPEXOLE DIHYDROCHLORIDE 0.5 MG: 0.5 TABLET ORAL at 22:39

## 2021-01-01 RX ADMIN — LAMOTRIGINE 150 MG: 100 TABLET ORAL at 21:51

## 2021-01-01 RX ADMIN — ROSUVASTATIN CALCIUM 40 MG: 40 TABLET, FILM COATED ORAL at 08:06

## 2021-01-01 RX ADMIN — APIXABAN 2.5 MG: 2.5 TABLET, FILM COATED ORAL at 20:51

## 2021-01-01 RX ADMIN — SODIUM CHLORIDE, PRESERVATIVE FREE 3 ML: 5 INJECTION INTRAVENOUS at 20:12

## 2021-01-01 RX ADMIN — LEVETIRACETAM 1500 MG: 500 TABLET, FILM COATED ORAL at 08:19

## 2021-01-01 RX ADMIN — HYDROXYCHLOROQUINE SULFATE 200 MG: 200 TABLET ORAL at 08:41

## 2021-01-01 RX ADMIN — MIDAZOLAM 2 MG: 1 INJECTION INTRAMUSCULAR; INTRAVENOUS at 13:19

## 2021-01-01 RX ADMIN — DOCUSATE SODIUM 50MG AND SENNOSIDES 8.6MG 1 TABLET: 8.6; 5 TABLET, FILM COATED ORAL at 08:59

## 2021-01-01 RX ADMIN — LEVETIRACETAM 1500 MG: 500 TABLET, FILM COATED ORAL at 08:06

## 2021-01-01 RX ADMIN — CEPHALEXIN 500 MG: 500 CAPSULE ORAL at 17:23

## 2021-01-01 RX ADMIN — DOCUSATE SODIUM 50MG AND SENNOSIDES 8.6MG 1 TABLET: 8.6; 5 TABLET, FILM COATED ORAL at 08:40

## 2021-01-01 RX ADMIN — LAMOTRIGINE 150 MG: 100 TABLET ORAL at 08:58

## 2021-01-01 RX ADMIN — ROCURONIUM BROMIDE 50 MG: 50 INJECTION INTRAVENOUS at 14:35

## 2021-01-01 RX ADMIN — APIXABAN 2.5 MG: 2.5 TABLET, FILM COATED ORAL at 08:59

## 2021-01-01 RX ADMIN — HYDROXYCHLOROQUINE SULFATE 200 MG: 200 TABLET ORAL at 21:51

## 2021-01-01 RX ADMIN — PRAMIPEXOLE DIHYDROCHLORIDE 0.5 MG: 0.5 TABLET ORAL at 08:45

## 2021-01-01 RX ADMIN — HYDROMORPHONE HYDROCHLORIDE 0.5 MG: 1 INJECTION, SOLUTION INTRAMUSCULAR; INTRAVENOUS; SUBCUTANEOUS at 03:47

## 2021-01-01 RX ADMIN — BACLOFEN 10 MG: 10 TABLET ORAL at 08:59

## 2021-01-01 RX ADMIN — SODIUM CHLORIDE, PRESERVATIVE FREE 10 ML: 5 INJECTION INTRAVENOUS at 21:53

## 2021-01-01 RX ADMIN — OXCARBAZEPINE 600 MG: 300 TABLET, FILM COATED ORAL at 08:41

## 2021-01-01 RX ADMIN — OXYCODONE HYDROCHLORIDE AND ACETAMINOPHEN 2 TABLET: 5; 325 TABLET ORAL at 08:06

## 2021-01-01 RX ADMIN — ROCURONIUM BROMIDE 20 MG: 50 INJECTION INTRAVENOUS at 16:50

## 2021-01-01 RX ADMIN — DOCUSATE SODIUM 50MG AND SENNOSIDES 8.6MG 1 TABLET: 8.6; 5 TABLET, FILM COATED ORAL at 20:28

## 2021-01-01 RX ADMIN — BACLOFEN 10 MG: 10 TABLET ORAL at 22:40

## 2021-01-01 RX ADMIN — PANTOPRAZOLE SODIUM 40 MG: 40 TABLET, DELAYED RELEASE ORAL at 06:48

## 2021-01-01 RX ADMIN — LAMOTRIGINE 150 MG: 100 TABLET ORAL at 08:45

## 2021-01-01 RX ADMIN — FENTANYL CITRATE 50 MCG: 50 INJECTION INTRAMUSCULAR; INTRAVENOUS at 13:55

## 2021-01-01 RX ADMIN — ROSUVASTATIN CALCIUM 40 MG: 40 TABLET, FILM COATED ORAL at 08:41

## 2021-01-01 RX ADMIN — PRAMIPEXOLE DIHYDROCHLORIDE 0.5 MG: 0.5 TABLET ORAL at 21:51

## 2021-01-01 RX ADMIN — EPHEDRINE SULFATE 10 MG: 50 INJECTION INTRAVENOUS at 14:38

## 2021-01-01 RX ADMIN — LIDOCAINE HYDROCHLORIDE 60 MG: 20 INJECTION, SOLUTION INFILTRATION; PERINEURAL at 13:56

## 2021-01-01 RX ADMIN — HYDROMORPHONE HYDROCHLORIDE 0.5 MG: 1 INJECTION, SOLUTION INTRAMUSCULAR; INTRAVENOUS; SUBCUTANEOUS at 10:38

## 2021-01-01 RX ADMIN — OXYCODONE HYDROCHLORIDE AND ACETAMINOPHEN 2 TABLET: 5; 325 TABLET ORAL at 07:40

## 2021-01-01 RX ADMIN — PRAMIPEXOLE DIHYDROCHLORIDE 0.5 MG: 0.5 TABLET ORAL at 08:06

## 2021-01-01 RX ADMIN — DULOXETINE HYDROCHLORIDE 60 MG: 60 CAPSULE, DELAYED RELEASE ORAL at 21:39

## 2021-01-01 RX ADMIN — LAMOTRIGINE 150 MG: 100 TABLET ORAL at 08:41

## 2021-01-01 RX ADMIN — PRAMIPEXOLE DIHYDROCHLORIDE 0.5 MG: 0.5 TABLET ORAL at 21:39

## 2021-01-01 RX ADMIN — SODIUM CHLORIDE, PRESERVATIVE FREE 10 ML: 5 INJECTION INTRAVENOUS at 08:46

## 2021-01-01 RX ADMIN — SODIUM CHLORIDE, PRESERVATIVE FREE 10 ML: 5 INJECTION INTRAVENOUS at 20:52

## 2021-01-01 RX ADMIN — FENTANYL CITRATE 50 MCG: 50 INJECTION, SOLUTION INTRAMUSCULAR; INTRAVENOUS at 19:18

## 2021-01-01 RX ADMIN — PRAMIPEXOLE DIHYDROCHLORIDE 0.5 MG: 0.5 TABLET ORAL at 20:51

## 2021-01-01 RX ADMIN — LEVETIRACETAM 1500 MG: 500 TABLET, FILM COATED ORAL at 08:45

## 2021-01-01 RX ADMIN — LEVOTHYROXINE SODIUM 137 MCG: 137 TABLET ORAL at 06:22

## 2021-01-01 RX ADMIN — METOPROLOL TARTRATE 50 MG: 50 TABLET, FILM COATED ORAL at 22:40

## 2021-01-01 RX ADMIN — PANTOPRAZOLE SODIUM 40 MG: 40 TABLET, DELAYED RELEASE ORAL at 05:29

## 2021-01-01 RX ADMIN — METOPROLOL TARTRATE 50 MG: 50 TABLET, FILM COATED ORAL at 08:40

## 2021-01-01 RX ADMIN — SODIUM CHLORIDE, PRESERVATIVE FREE 3 ML: 5 INJECTION INTRAVENOUS at 08:46

## 2021-01-01 RX ADMIN — APIXABAN 2.5 MG: 2.5 TABLET, FILM COATED ORAL at 17:35

## 2021-01-01 RX ADMIN — OXCARBAZEPINE 600 MG: 300 TABLET, FILM COATED ORAL at 20:29

## 2021-01-01 RX ADMIN — LEVOFLOXACIN 500 MG: 5 INJECTION, SOLUTION INTRAVENOUS at 03:55

## 2021-01-01 RX ADMIN — HYDROMORPHONE HYDROCHLORIDE 0.5 MG: 1 INJECTION, SOLUTION INTRAMUSCULAR; INTRAVENOUS; SUBCUTANEOUS at 05:51

## 2021-01-01 RX ADMIN — LAMOTRIGINE 150 MG: 100 TABLET ORAL at 08:18

## 2021-01-01 RX ADMIN — LAMOTRIGINE 150 MG: 100 TABLET ORAL at 22:39

## 2021-01-01 RX ADMIN — LEVOTHYROXINE SODIUM 137 MCG: 137 TABLET ORAL at 05:52

## 2021-01-01 RX ADMIN — PRAMIPEXOLE DIHYDROCHLORIDE 0.5 MG: 0.5 TABLET ORAL at 20:10

## 2021-01-01 RX ADMIN — PRAMIPEXOLE DIHYDROCHLORIDE 0.5 MG: 0.5 TABLET ORAL at 20:28

## 2021-01-01 RX ADMIN — OXYCODONE HYDROCHLORIDE AND ACETAMINOPHEN 2 TABLET: 5; 325 TABLET ORAL at 08:30

## 2021-01-01 RX ADMIN — HYDROXYCHLOROQUINE SULFATE 200 MG: 200 TABLET ORAL at 08:59

## 2021-01-01 RX ADMIN — BACLOFEN 10 MG: 10 TABLET ORAL at 08:46

## 2021-01-01 RX ADMIN — BACLOFEN 10 MG: 10 TABLET ORAL at 21:39

## 2021-01-01 RX ADMIN — OXYCODONE HYDROCHLORIDE AND ACETAMINOPHEN 2 TABLET: 5; 325 TABLET ORAL at 15:52

## 2021-01-01 RX ADMIN — LEVETIRACETAM 1500 MG: 500 TABLET, FILM COATED ORAL at 20:10

## 2021-01-01 RX ADMIN — LEVETIRACETAM 1500 MG: 500 TABLET, FILM COATED ORAL at 22:40

## 2021-01-01 RX ADMIN — PANTOPRAZOLE SODIUM 40 MG: 40 TABLET, DELAYED RELEASE ORAL at 06:27

## 2021-01-01 RX ADMIN — VANCOMYCIN HYDROCHLORIDE 1250 MG: 10 INJECTION, POWDER, LYOPHILIZED, FOR SOLUTION INTRAVENOUS at 02:09

## 2021-01-01 RX ADMIN — OXCARBAZEPINE 600 MG: 300 TABLET, FILM COATED ORAL at 22:40

## 2021-01-01 RX ADMIN — OXYCODONE HYDROCHLORIDE AND ACETAMINOPHEN 2 TABLET: 5; 325 TABLET ORAL at 00:35

## 2021-01-01 RX ADMIN — CEPHALEXIN 500 MG: 500 CAPSULE ORAL at 05:56

## 2021-01-01 RX ADMIN — PHENYLEPHRINE HYDROCHLORIDE 0.5 MCG/KG/MIN: 10 INJECTION, SOLUTION INTRAMUSCULAR; INTRAVENOUS; SUBCUTANEOUS at 15:01

## 2021-01-01 RX ADMIN — INSULIN LISPRO 2 UNITS: 100 INJECTION, SOLUTION INTRAVENOUS; SUBCUTANEOUS at 11:56

## 2021-01-01 RX ADMIN — METOPROLOL TARTRATE 50 MG: 50 TABLET, FILM COATED ORAL at 20:10

## 2021-01-01 RX ADMIN — DULOXETINE HYDROCHLORIDE 60 MG: 60 CAPSULE, DELAYED RELEASE ORAL at 08:45

## 2021-01-01 RX ADMIN — OXYCODONE HYDROCHLORIDE AND ACETAMINOPHEN 2 TABLET: 5; 325 TABLET ORAL at 17:51

## 2021-01-01 RX ADMIN — LAMOTRIGINE 150 MG: 100 TABLET ORAL at 08:59

## 2021-01-01 RX ADMIN — FENTANYL CITRATE 50 MCG: 50 INJECTION INTRAMUSCULAR; INTRAVENOUS at 18:33

## 2021-01-01 RX ADMIN — SODIUM CHLORIDE 100 ML/HR: 9 INJECTION, SOLUTION INTRAVENOUS at 11:57

## 2021-01-01 RX ADMIN — METOPROLOL TARTRATE 50 MG: 50 TABLET, FILM COATED ORAL at 21:40

## 2021-01-01 RX ADMIN — PANTOPRAZOLE SODIUM 40 MG: 40 TABLET, DELAYED RELEASE ORAL at 05:52

## 2021-01-01 RX ADMIN — VANCOMYCIN HYDROCHLORIDE 1000 MG: 1 INJECTION, SOLUTION INTRAVENOUS at 00:57

## 2021-01-01 RX ADMIN — OXYCODONE HYDROCHLORIDE AND ACETAMINOPHEN 2 TABLET: 5; 325 TABLET ORAL at 18:52

## 2021-01-01 RX ADMIN — FENTANYL CITRATE 50 MCG: 50 INJECTION INTRAMUSCULAR; INTRAVENOUS at 18:42

## 2021-01-01 RX ADMIN — FENTANYL CITRATE 100 MCG: 50 INJECTION INTRAMUSCULAR; INTRAVENOUS at 13:19

## 2021-01-01 RX ADMIN — SODIUM CHLORIDE, PRESERVATIVE FREE 3 ML: 5 INJECTION INTRAVENOUS at 22:40

## 2021-01-01 RX ADMIN — DULOXETINE HYDROCHLORIDE 60 MG: 60 CAPSULE, DELAYED RELEASE ORAL at 21:51

## 2021-01-01 RX ADMIN — DULOXETINE HYDROCHLORIDE 60 MG: 60 CAPSULE, DELAYED RELEASE ORAL at 20:51

## 2021-01-01 RX ADMIN — METOPROLOL TARTRATE 50 MG: 50 TABLET, FILM COATED ORAL at 21:51

## 2021-01-01 RX ADMIN — POLYETHYLENE GLYCOL 3350 17 G: 17 POWDER, FOR SOLUTION ORAL at 09:01

## 2021-01-01 RX ADMIN — INSULIN LISPRO 4 UNITS: 100 INJECTION, SOLUTION INTRAVENOUS; SUBCUTANEOUS at 21:01

## 2021-01-01 RX ADMIN — CEPHALEXIN 500 MG: 500 CAPSULE ORAL at 01:43

## 2021-01-01 RX ADMIN — SODIUM CHLORIDE, PRESERVATIVE FREE 10 ML: 5 INJECTION INTRAVENOUS at 09:13

## 2021-01-01 RX ADMIN — BACLOFEN 10 MG: 10 TABLET ORAL at 20:51

## 2021-01-01 RX ADMIN — METOPROLOL TARTRATE 50 MG: 50 TABLET, FILM COATED ORAL at 23:59

## 2021-01-01 RX ADMIN — OXCARBAZEPINE 600 MG: 300 TABLET, FILM COATED ORAL at 20:51

## 2021-01-01 RX ADMIN — ALBUMIN HUMAN 500 ML: 0.05 INJECTION, SOLUTION INTRAVENOUS at 19:15

## 2021-01-01 RX ADMIN — LEVETIRACETAM 1500 MG: 500 TABLET, FILM COATED ORAL at 21:51

## 2021-01-01 RX ADMIN — ROSUVASTATIN CALCIUM 40 MG: 40 TABLET, FILM COATED ORAL at 08:59

## 2021-01-01 RX ADMIN — HYDROMORPHONE HYDROCHLORIDE 0.5 MG: 1 INJECTION, SOLUTION INTRAMUSCULAR; INTRAVENOUS; SUBCUTANEOUS at 11:18

## 2021-01-01 RX ADMIN — LAMOTRIGINE 150 MG: 100 TABLET ORAL at 21:40

## 2021-01-01 RX ADMIN — DULOXETINE HYDROCHLORIDE 60 MG: 60 CAPSULE, DELAYED RELEASE ORAL at 22:39

## 2021-01-01 RX ADMIN — OXYCODONE HYDROCHLORIDE AND ACETAMINOPHEN 2 TABLET: 5; 325 TABLET ORAL at 12:31

## 2021-01-01 RX ADMIN — OXYCODONE HYDROCHLORIDE AND ACETAMINOPHEN 2 TABLET: 5; 325 TABLET ORAL at 00:44

## 2021-01-01 RX ADMIN — FENTANYL CITRATE 50 MCG: 50 INJECTION INTRAMUSCULAR; INTRAVENOUS at 16:40

## 2021-01-01 RX ADMIN — DULOXETINE HYDROCHLORIDE 60 MG: 60 CAPSULE, DELAYED RELEASE ORAL at 20:29

## 2021-01-01 RX ADMIN — METOPROLOL TARTRATE 50 MG: 50 TABLET, FILM COATED ORAL at 20:28

## 2021-01-01 RX ADMIN — HYDROMORPHONE HYDROCHLORIDE 0.5 MG: 1 INJECTION, SOLUTION INTRAMUSCULAR; INTRAVENOUS; SUBCUTANEOUS at 02:52

## 2021-01-01 RX ADMIN — SODIUM CHLORIDE 100 ML/HR: 9 INJECTION, SOLUTION INTRAVENOUS at 05:31

## 2021-01-01 RX ADMIN — SODIUM CHLORIDE, PRESERVATIVE FREE 10 ML: 5 INJECTION INTRAVENOUS at 08:07

## 2021-01-01 RX ADMIN — CEPHALEXIN 500 MG: 500 CAPSULE ORAL at 11:49

## 2021-01-01 RX ADMIN — HYDROMORPHONE HYDROCHLORIDE 0.5 MG: 1 INJECTION, SOLUTION INTRAMUSCULAR; INTRAVENOUS; SUBCUTANEOUS at 15:22

## 2021-01-01 RX ADMIN — HYDROMORPHONE HYDROCHLORIDE 0.5 MG: 1 INJECTION, SOLUTION INTRAMUSCULAR; INTRAVENOUS; SUBCUTANEOUS at 16:29

## 2021-01-01 RX ADMIN — OXYCODONE HYDROCHLORIDE AND ACETAMINOPHEN 2 TABLET: 5; 325 TABLET ORAL at 01:36

## 2021-01-01 RX ADMIN — LEVETIRACETAM 1500 MG: 500 TABLET, FILM COATED ORAL at 23:57

## 2021-01-01 RX ADMIN — LEVOFLOXACIN 500 MG: 5 INJECTION, SOLUTION INTRAVENOUS at 03:47

## 2021-01-01 RX ADMIN — LEVETIRACETAM 1500 MG: 500 TABLET, FILM COATED ORAL at 21:39

## 2021-01-01 RX ADMIN — GADOBENATE DIMEGLUMINE 20 ML: 529 INJECTION, SOLUTION INTRAVENOUS at 11:56

## 2021-01-01 RX ADMIN — METOPROLOL TARTRATE 50 MG: 50 TABLET, FILM COATED ORAL at 21:38

## 2021-01-01 RX ADMIN — PANTOPRAZOLE SODIUM 40 MG: 40 TABLET, DELAYED RELEASE ORAL at 05:56

## 2021-01-01 RX ADMIN — INSULIN LISPRO 2 UNITS: 100 INJECTION, SOLUTION INTRAVENOUS; SUBCUTANEOUS at 11:48

## 2021-01-01 RX ADMIN — INSULIN LISPRO 2 UNITS: 100 INJECTION, SOLUTION INTRAVENOUS; SUBCUTANEOUS at 13:04

## 2021-01-06 ENCOUNTER — TELEPHONE (OUTPATIENT)
Dept: NEUROSURGERY | Facility: CLINIC | Age: 66
End: 2021-01-06

## 2021-01-06 NOTE — TELEPHONE ENCOUNTER
"Provider: DR. NOVOA  Caller: PATIENT  Reason for Call: PATIENT/CAREGIVER CALLED REQUESTING MEDICAL APPOINTMENT DUE TO CONTINUOUS SYMPTOMS OCCURRING SINCE LAST VISIT. SYMPTOMS ARE DESCRIBED AS INTENSE PAIN IN LOWER BACK THAT INTENSIFIES WHEN STANDING, LIMITING PATIENT TO COMPLETE DAILY TASKS & REQUIRE ASSISTANCE. PATIENT REPORTS NO IMPROVEMENT WHATSOEVER AFTER INJECTIONS 2 WEEKS AGO. THERE IS NO OTHER RECENT IMAGING IN CHART AT TIME OF CALL. PER LAST OFFICE NOTE, \"I told her that before we did surgery we would need to do another myelogram on her but I do not see a reason to do that now.\". PLEASE ADVISE HOW PROVIDER WOULD LIKE TO PROCEED?    When was the patient last seen: 12/14/20  When did it start: SINCE LAST OFFICE NOTE  Where is it located: LOWER BACK   Characteristics of symptom/severity: INTENSE PAIN  Timing- Is it constant or intermittent:   What makes it worse: STANDING  What makes it better: N/A  What therapies/medications have you tried: TENN UNIT, HEAT/ICE, PAIN MEDS, INJECTIONS    PATIENT CAN BE CONTACTED -[phone] WITH FURTHER INSTRUCTIONS    "

## 2021-01-08 NOTE — TELEPHONE ENCOUNTER
Made her an appointment next week 1/14/2021 at 1100. Called and LVM to call the office if this date or time does not work she can be r/s to next avail.

## 2021-01-08 NOTE — TELEPHONE ENCOUNTER
Provider: DR NOVOA  Caller: YAEL CANNON  Relationship to Patient: SELF  Pharmacy:   Phone Number: [phone]  Reason for Call: PT SAID THAT THE APPOINTMENT FOR NEXT WEEK WILL WORK FOR HER.

## 2021-01-11 ENCOUNTER — TELEPHONE (OUTPATIENT)
Dept: FAMILY MEDICINE CLINIC | Facility: CLINIC | Age: 66
End: 2021-01-11

## 2021-01-11 NOTE — TELEPHONE ENCOUNTER
Caller: Wendi Ramos    Relationship to patient: Self    Best call back number: [phone]    Patient is needing: PATIENT STATES THAT SINCE DR. CARDOSO IS RETIRING SHE WOULD LIKE TO SEE IF SHE COULD NOW SEE DR. CHRISTINA LOVE INSTEAD. SHE SAW HIM 20 YEARS AGO AND IS COMFORTABLE WITH HIM. PLEASE ADVISE.

## 2021-01-12 DIAGNOSIS — R53.82 CHRONIC FATIGUE: ICD-10-CM

## 2021-01-12 DIAGNOSIS — E03.8 HYPOTHYROIDISM DUE TO HASHIMOTO'S THYROIDITIS: ICD-10-CM

## 2021-01-12 DIAGNOSIS — E06.3 HYPOTHYROIDISM DUE TO HASHIMOTO'S THYROIDITIS: ICD-10-CM

## 2021-01-12 DIAGNOSIS — E11.9 TYPE 2 DIABETES MELLITUS WITHOUT COMPLICATION, WITH LONG-TERM CURRENT USE OF INSULIN (HCC): Primary | ICD-10-CM

## 2021-01-12 DIAGNOSIS — E55.9 VITAMIN D DEFICIENCY: ICD-10-CM

## 2021-01-12 DIAGNOSIS — E11.9 TYPE 2 DIABETES MELLITUS WITHOUT COMPLICATION, WITH LONG-TERM CURRENT USE OF INSULIN (HCC): ICD-10-CM

## 2021-01-12 DIAGNOSIS — E78.5 COMPLEX DYSLIPIDEMIA: ICD-10-CM

## 2021-01-12 DIAGNOSIS — Z79.4 TYPE 2 DIABETES MELLITUS WITHOUT COMPLICATION, WITH LONG-TERM CURRENT USE OF INSULIN (HCC): Primary | ICD-10-CM

## 2021-01-12 DIAGNOSIS — Z79.4 TYPE 2 DIABETES MELLITUS WITHOUT COMPLICATION, WITH LONG-TERM CURRENT USE OF INSULIN (HCC): ICD-10-CM

## 2021-01-13 ENCOUNTER — TELEPHONE (OUTPATIENT)
Dept: NEUROSURGERY | Facility: CLINIC | Age: 66
End: 2021-01-13

## 2021-01-13 LAB
25(OH)D3+25(OH)D2 SERPL-MCNC: 43.2 NG/ML (ref 30–100)
ALBUMIN SERPL-MCNC: 4.7 G/DL (ref 3.8–4.8)
ALBUMIN/GLOB SERPL: 1.6 {RATIO} (ref 1.2–2.2)
ALP SERPL-CCNC: 64 IU/L (ref 39–117)
ALT SERPL-CCNC: 42 IU/L (ref 0–32)
AST SERPL-CCNC: 51 IU/L (ref 0–40)
BILIRUB SERPL-MCNC: 0.2 MG/DL (ref 0–1.2)
BUN SERPL-MCNC: 16 MG/DL (ref 8–27)
BUN/CREAT SERPL: 22 (ref 12–28)
C PEPTIDE SERPL-MCNC: 2.7 NG/ML (ref 1.1–4.4)
CALCIUM SERPL-MCNC: 10.3 MG/DL (ref 8.7–10.3)
CHLORIDE SERPL-SCNC: 103 MMOL/L (ref 96–106)
CHOLEST SERPL-MCNC: 114 MG/DL (ref 100–199)
CO2 SERPL-SCNC: 24 MMOL/L (ref 20–29)
CREAT SERPL-MCNC: 0.74 MG/DL (ref 0.57–1)
GLOBULIN SER CALC-MCNC: 2.9 G/DL (ref 1.5–4.5)
GLUCOSE SERPL-MCNC: 120 MG/DL (ref 65–99)
HBA1C MFR BLD: 7 % (ref 4.8–5.6)
HDLC SERPL-MCNC: 47 MG/DL
INTERPRETATION: NORMAL
LDLC SERPL CALC-MCNC: 43 MG/DL (ref 0–99)
Lab: NORMAL
POTASSIUM SERPL-SCNC: 5.2 MMOL/L (ref 3.5–5.2)
PROT SERPL-MCNC: 7.6 G/DL (ref 6–8.5)
SODIUM SERPL-SCNC: 142 MMOL/L (ref 134–144)
T3FREE SERPL-MCNC: 2.2 PG/ML (ref 2–4.4)
T4 FREE SERPL-MCNC: 1.11 NG/DL (ref 0.82–1.77)
TRIGL SERPL-MCNC: 137 MG/DL (ref 0–149)
TSH SERPL DL<=0.005 MIU/L-ACNC: 3.74 UIU/ML (ref 0.45–4.5)
VLDLC SERPL CALC-MCNC: 24 MG/DL (ref 5–40)

## 2021-01-13 NOTE — PROGRESS NOTES
Subjective   Patient ID: Wendipenelope Ramos is a 65 y.o. female is here today for follow-up. Ms. Ramos was last seen 12/14/2020 for back pain with numbness.    Today her symptoms are back pain when standing.     Patient, provider and MA are all wearing a mask in our office today.    History of Present Illness     This patient continues with severe pain in her back.  She says that when she stands for any length of time she loses strength in her legs.  She has had an epidural block since she was here a month ago but it did not help at all.  She has no difficulty with bowel bladder control.    The following portions of the patient's history were reviewed and updated as appropriate: allergies, current medications, past family history, past medical history, past social history, past surgical history and problem list.    Review of Systems   Respiratory: Negative for chest tightness and shortness of breath.    Cardiovascular: Negative for chest pain.   Musculoskeletal: Positive for back pain.   Neurological: Positive for numbness.       I have reviewed the review of systems as documented by my MA.      Objective     Vitals:    01/14/21 1113   BP: 144/81   Pulse: 74   Temp: 97.5 °F (36.4 °C)     There is no height or weight on file to calculate BMI.      Physical Exam  Neurological:      Mental Status: She is alert and oriented to person, place, and time.       Neurologic Exam     Mental Status   Oriented to person, place, and time.           Assessment/Plan   Independent Review of Radiographic Studies:      I personally reviewed the images from the following studies.    I reviewed her lumbar MRI that was done in December of last year.  This shows the previous surgery at L3-4.  I also reviewed plain films which show good alignment at that level and instrumentation with a cage.  On the axial images of the MRI T11-T12 is widely open as is T12-L1.  L1-2 is fairly open but L2-3 looks to be somewhat stenotic.  L3-4 is the fused  level and looks okay.  L4-5 shows some right-sided foraminal stenosis due to foraminal disc bulging and L5-S1 looks okay.    Medical Decision Making:      I told the patient that I see little option at this point but to proceed with a lumbar myelogram.  I told the patient what a myelogram involves.  I explained that there is a 50% chance of developing a bad headache and nausea as a result of the test.  I explained that there is also a very small chance of infection, seizures, and bleeding.  I explained how we would treat a post myelogram headache including bedrest, caffeinated fluids, steroids, and blood patch.  The patient does ask to proceed.    Diagnoses and all orders for this visit:    1. Acute right-sided low back pain without sciatica (Primary)  -     Obtain Informed Consent; Standing  -     IR Myelogram Lumbar Spine; Future  -     CT Lumbar Spine With Intrathecal Contrast; Future  -     XR Spine Lumbar Complete With Flex & Ext; Future  -     No Lab Testing Needed; Standing  -     dexamethasone (DECADRON) 4 MG tablet; Take 2 tablets by mouth Take As Directed. Take both tablets by mouth 2 hours before myelogram  Dispense: 2 tablet; Refill: 0      Return for After radiology test.

## 2021-01-14 ENCOUNTER — OFFICE VISIT (OUTPATIENT)
Dept: NEUROSURGERY | Facility: CLINIC | Age: 66
End: 2021-01-14

## 2021-01-14 VITALS — TEMPERATURE: 97.5 F | DIASTOLIC BLOOD PRESSURE: 81 MMHG | HEART RATE: 74 BPM | SYSTOLIC BLOOD PRESSURE: 144 MMHG

## 2021-01-14 DIAGNOSIS — M54.50 ACUTE RIGHT-SIDED LOW BACK PAIN WITHOUT SCIATICA: Primary | ICD-10-CM

## 2021-01-14 PROCEDURE — 99213 OFFICE O/P EST LOW 20 MIN: CPT | Performed by: NEUROLOGICAL SURGERY

## 2021-01-14 RX ORDER — DEXAMETHASONE 4 MG/1
8 TABLET ORAL TAKE AS DIRECTED
Qty: 2 TABLET | Refills: 0 | Status: SHIPPED | OUTPATIENT
Start: 2021-01-14 | End: 2021-01-26 | Stop reason: HOSPADM

## 2021-01-25 RX ORDER — PEN NEEDLE, DIABETIC 31 GX5/16"
NEEDLE, DISPOSABLE MISCELLANEOUS
Qty: 100 EACH | Refills: 0 | Status: SHIPPED | OUTPATIENT
Start: 2021-01-25 | End: 2021-02-12

## 2021-01-25 RX ORDER — LEVOTHYROXINE SODIUM 137 UG/1
TABLET ORAL
Qty: 90 TABLET | Refills: 0 | Status: SHIPPED | OUTPATIENT
Start: 2021-01-25 | End: 2021-01-01

## 2021-01-25 NOTE — TELEPHONE ENCOUNTER
----- Message from Wendi Ramos sent at 1/25/2021 11:51 AM EST -----  Regarding: Prescription Question  Contact: [phone]  [facility] needs a new prescription for my Pen Needles. Would you please have Chana Hong send a refill prescription to [facility]?    Thanks,  Wendi stout 1/21 thao oquendo 7/20 thao

## 2021-01-26 ENCOUNTER — HOSPITAL ENCOUNTER (OUTPATIENT)
Dept: GENERAL RADIOLOGY | Facility: HOSPITAL | Age: 66
Discharge: HOME | End: 2021-01-26

## 2021-01-26 ENCOUNTER — OFFICE VISIT (OUTPATIENT)
Dept: ENDOCRINOLOGY | Age: 66
End: 2021-01-26

## 2021-01-26 ENCOUNTER — HOSPITAL ENCOUNTER (OUTPATIENT)
Dept: CT IMAGING | Facility: HOSPITAL | Age: 66
Discharge: HOME | End: 2021-01-26

## 2021-01-26 VITALS
TEMPERATURE: 97.7 F | HEIGHT: 69 IN | BODY MASS INDEX: 29.62 KG/M2 | HEART RATE: 80 BPM | WEIGHT: 200 LBS | SYSTOLIC BLOOD PRESSURE: 111 MMHG | RESPIRATION RATE: 18 BRPM | OXYGEN SATURATION: 97 % | DIASTOLIC BLOOD PRESSURE: 61 MMHG

## 2021-01-26 DIAGNOSIS — E11.9 TYPE 2 DIABETES MELLITUS WITHOUT COMPLICATION, WITH LONG-TERM CURRENT USE OF INSULIN (HCC): Primary | ICD-10-CM

## 2021-01-26 DIAGNOSIS — M54.50 ACUTE RIGHT-SIDED LOW BACK PAIN WITHOUT SCIATICA: ICD-10-CM

## 2021-01-26 DIAGNOSIS — E78.5 HYPERLIPIDEMIA, UNSPECIFIED HYPERLIPIDEMIA TYPE: ICD-10-CM

## 2021-01-26 DIAGNOSIS — E06.3 HYPOTHYROIDISM DUE TO HASHIMOTO'S THYROIDITIS: ICD-10-CM

## 2021-01-26 DIAGNOSIS — E55.9 VITAMIN D DEFICIENCY: ICD-10-CM

## 2021-01-26 DIAGNOSIS — E03.8 HYPOTHYROIDISM DUE TO HASHIMOTO'S THYROIDITIS: ICD-10-CM

## 2021-01-26 DIAGNOSIS — Z79.4 TYPE 2 DIABETES MELLITUS WITHOUT COMPLICATION, WITH LONG-TERM CURRENT USE OF INSULIN (HCC): Primary | ICD-10-CM

## 2021-01-26 PROCEDURE — 62284 INJECTION FOR MYELOGRAM: CPT | Performed by: NEUROLOGICAL SURGERY

## 2021-01-26 PROCEDURE — A9270 NON-COVERED ITEM OR SERVICE: HCPCS | Performed by: NEUROLOGICAL SURGERY

## 2021-01-26 PROCEDURE — 25010000003 LIDOCAINE 1 % SOLUTION: Performed by: NEUROLOGICAL SURGERY

## 2021-01-26 PROCEDURE — 72132 CT LUMBAR SPINE W/DYE: CPT

## 2021-01-26 PROCEDURE — 99442 PR PHYS/QHP TELEPHONE EVALUATION 11-20 MIN: CPT | Performed by: NURSE PRACTITIONER

## 2021-01-26 PROCEDURE — 72240 MYELOGRAPHY NECK SPINE: CPT

## 2021-01-26 PROCEDURE — 63710000001 HYDROCODONE-ACETAMINOPHEN 5-325 MG TABLET: Performed by: NEUROLOGICAL SURGERY

## 2021-01-26 PROCEDURE — 62304 MYELOGRAPHY LUMBAR INJECTION: CPT

## 2021-01-26 PROCEDURE — 72114 X-RAY EXAM L-S SPINE BENDING: CPT

## 2021-01-26 PROCEDURE — 0 IOPAMIDOL 41 % SOLUTION: Performed by: NEUROLOGICAL SURGERY

## 2021-01-26 RX ORDER — ZOLPIDEM TARTRATE 10 MG/1
10 TABLET ORAL NIGHTLY PRN
Qty: 90 TABLET | Refills: 0 | Status: SHIPPED | OUTPATIENT
Start: 2021-01-26 | End: 2021-01-01 | Stop reason: SDUPTHER

## 2021-01-26 RX ORDER — LIDOCAINE HYDROCHLORIDE 10 MG/ML
10 INJECTION, SOLUTION INFILTRATION; PERINEURAL ONCE
Status: COMPLETED | OUTPATIENT
Start: 2021-01-26 | End: 2021-01-26

## 2021-01-26 RX ORDER — LIDOCAINE HYDROCHLORIDE 10 MG/ML
10 INJECTION, SOLUTION INFILTRATION; PERINEURAL ONCE
Status: DISCONTINUED | OUTPATIENT
Start: 2021-01-26 | End: 2021-01-27 | Stop reason: HOSPADM

## 2021-01-26 RX ORDER — ACETAMINOPHEN 325 MG/1
650 TABLET ORAL EVERY 4 HOURS PRN
Status: DISCONTINUED | OUTPATIENT
Start: 2021-01-26 | End: 2021-01-27 | Stop reason: HOSPADM

## 2021-01-26 RX ORDER — HYDROCODONE BITARTRATE AND ACETAMINOPHEN 5; 325 MG/1; MG/1
1 TABLET ORAL EVERY 4 HOURS PRN
Status: DISCONTINUED | OUTPATIENT
Start: 2021-01-26 | End: 2021-01-27 | Stop reason: HOSPADM

## 2021-01-26 RX ADMIN — LIDOCAINE HYDROCHLORIDE 5 ML: 10 INJECTION, SOLUTION INFILTRATION; PERINEURAL at 07:14

## 2021-01-26 RX ADMIN — IOPAMIDOL 20 ML: 408 INJECTION, SOLUTION INTRATHECAL at 07:15

## 2021-01-26 RX ADMIN — HYDROCODONE BITARTRATE AND ACETAMINOPHEN 1 TABLET: 5; 325 TABLET ORAL at 08:46

## 2021-01-26 NOTE — DISCHARGE INSTRUCTIONS
EDUCATION /DISCHARGE INSTRUCTIONS:    A myelogram is a special radiology procedure of the spinal cord, spinal nerves and other related structures.  You will be awake during the examination.  An area of your lower back will be cleansed with an antiseptic solution.  The physician will inject a numbing medication in your lower back.  While your back is numb, a needle will be placed in the lower back area.  A small amount of spinal fluid may be withdrawn and sent to the lab if ordered by your physician. While the needle is in the back, an injection of a contrast material (xray dye) will be given through the needle.  The contrast material will allow the physician to see the spinal cord and spinal nerves.  Once injected, the needle will be removed and a band aid will be placed over the injection site.  The table will be tilted during the process to allow the contrast material to flow to particular areas in the spine.  Following the injection and xrays, you will be taken to the CT scan where more pictures will be taken. After the procedure is finished, the contrast material will be absorbed by your body and eliminated through your kidneys.  The radiologist will study and interpret your myelogram and send the results to your physician.  Procedure risks of a myelogram include, but are not limited to:  *  Bleeding   *  seizure  *  Infection   *  Headache, possibly severe requiring  *  Contrast reaction      a blood patch  *  Nerve or cord injury  *  Paralysis and death  Benefits of the procedure:  *  Best examination for delineating pathology related to spinal cord compression from a    disc and/or nerve root compression  Alternatives to the procedure:  MRI - a non invasive procedure requiring intravenous contrast injection.  Cannot be done on patients with certain pacemakers or metal in the body.  MRI risks include possible reaction to the contrast material, movement of metal located in the body.Benefit to MRI:  Non-invasive  and usually painless procedure.  THIS EDUCATION INFORMATION WAS REVIEWED PRIOR TO PROCEDURE AND CONSENT. Patient initials___YM__Time___0626___    24 hour rest period ends tomorrow, January 27th after 1000 am.  Important information following your myelogram:  * ACTIVITY:   *  Lie down with your head elevated no more than 2 pillows high today & tonight  *  Sit up to eat your meals and use the restroom, otherwise, lie down.  *  Remain less active for two to three days.  *  Do not drive for 48 hours following a myelogram  *  You may remove the bandage and shower in the morning  *  Increase your fluids for the next 24 hours.  Caffeinated drinks are encouraged.  Resume taking (Xigduo) in 48 hrs. Your next dose will be: Thurs, Jan 28th.  Resume taking aspirin today.    CALL YOUR PHYSICIAN FOR THE FOLLOWING:  * Pain at the injection site  * Reddness, swelling, bruising or drainage at the injection site.  * A fever by mouth of 101.0 or any new symptoms  Headaches are a common side effect after a myelogram.  If you get a headache, you should stay flat in bed and drink plenty of fluids. If the headache persist and does not go away with rest/medication, CALL Dr. Randall at [phone]

## 2021-01-26 NOTE — NURSING NOTE
Pt arrived to Radiology triage [place] [facility] for Lumbar Myelogram.   Pt wearing a mask as well as this RN for any bedside care.

## 2021-01-26 NOTE — PROGRESS NOTES
Subjective    Wendi Ramos is a 65 y.o. female. she is here today for follow-up.  Chief Complaint   Patient presents with   • Diabetes     type 2 diabetes, recent labs, checks blood sugar daily, has reading, eye exam 2020     There were no vitals taken for this visit.  You have chosen to receive care through a telephone visit. Do you consent to use a telephone visit for your medical care today? Yes  Total time 20 min     History of Present Illness   Encounter Diagnoses   Name Primary?   • Hypothyroidism due to Hashimoto's thyroiditis    • Type 2 diabetes mellitus without complication, with long-term current use of insulin (CMS/[facility]) Yes   • Hyperlipidemia, unspecified hyperlipidemia type    • Vitamin D deficiency    Telephone encounter with patient today for the above diagnoses.  She had recent labs which were reviewed.  She has no complaints or concerns.  She is getting ready to have a myelogram and may have to have back surgery for cauda equina.  Her most recent A1c is 7.  Other than back pain she has no complaints.  Her medication list was reviewed and updated for accuracy.      The following portions of the patient's history were reviewed and updated as appropriate:   Past Medical History:   Diagnosis Date   • Diabetes mellitus (CMS/HCC)    • Fibromyalgia    • GERD (gastroesophageal reflux disease)    • Hiatal hernia    • Hyperlipidemia    • Hypertension    • Hypothyroidism    • Irregular heart beat    • Liver disease     FATTY LIVER   • Peripheral neuropathy    • Rheumatoid arthritis (CMS/HCC)    • Rheumatoid arthritis (CMS/HCC) 2005    DR SMYTH   • Seizures (CMS/HCC)    • Sleep apnea     NO CPAP   • Type 2 diabetes mellitus (CMS/HCC)    • West Nile fever 2002     Past Surgical History:   Procedure Laterality Date   • ABDOMINOPLASTY     • ADENOIDECTOMY     • BILATERAL BREAST REDUCTION     • BRONCHOSCOPY N/A 12/15/2016    Procedure: BRONCHOSCOPY WITH BAL AND ENDOBRONCHIAL ULTRASOUND WITH FNA;  Surgeon: Diego  Yimi Ponce MD;  Location: [facility] ENDOSCOPY;  Service:    • CARDIAC ABLATION     • CATARACT EXTRACTION, BILATERAL     • EYE SURGERY     • HYSTERECTOMY     • LASIK     • LUMBAR DISCECTOMY FUSION INSTRUMENTATION Left 6/8/2018    Procedure: LEFT L3/4 lumbar discectomy;  Surgeon: Miguelangel Goldberg MD;  Location: [facility] OR;  Service: Neurosurgery   • LUMBAR FUSION Left 7/12/2018    Procedure: Left L3 4 lumbar decompression and discectomy with transforaminal lumbar interbody fusion and posterior instrumentation with posterior lateral arthrodesis;  Surgeon: Miguelangel Goldberg MD;  Location: [facility] OR;  Service: Neurosurgery   • MOUTH SURGERY     • REPLACEMENT TOTAL KNEE Left    • TONSILLECTOMY     • TONSILLECTOMY AND ADENOIDECTOMY  1966     Family History   Problem Relation Age of Onset   • Diabetes Mother    • Neuropathy Father    • Colon polyps Father    • Diabetes Sister    • No Known Problems Son    • Diabetes Maternal Grandmother    • Cancer Maternal Grandmother    • Diabetes Paternal Grandmother    • Cancer Paternal Grandmother    • Malig Hyperthermia Neg Hx      OB History    No obstetric history on file.       Current Outpatient Medications   Medication Sig Dispense Refill   • aspirin 81 MG EC tablet Take 81 mg by mouth daily.     • Aspirin Buf,CaCarb-MgCarb-MgO, 81 MG tablet      • B Complex Vitamins (VITAMIN B COMPLEX PO) Take 1 tablet by mouth Daily.     • baclofen (LIORESAL) 20 MG tablet Take 20 mg by mouth 2 (Two) Times a Day.     • cholecalciferol (VITAMIN D3) 400 units tablet Take 400 Units by mouth Daily.     • DULoxetine (CYMBALTA) 60 MG capsule Take 60 mg by mouth Daily.     • estradiol (MINIVELLE, VIVELLE-DOT) 0.075 MG/24HR patch PLACE 1 PATCH ON THE SKIN AS DIRECTED BY PROVIDER 2 (TWO) TIMES A WEEK. 24 patch 3   • fenofibrate 160 MG tablet Take 1 tablet by mouth Daily. 90 tablet 1   • Ferrous Gluconate (IRON 27 PO) Take 1 tablet by mouth Every Other Day.     • Fluzone High-Dose Quadrivalent  0.7 ML suspension prefilled syringe TO BE ADMINISTERED BY PHARMACIST FOR IMMUNIZATION     • HYDROcodone-acetaminophen (NORCO) 7.5-325 MG per tablet 4 (Four) Times a Day As Needed.     • hydroxychloroquine (PLAQUENIL) 200 MG tablet Take 200 mg by mouth 2 (Two) Times a Day.     • Insulin Pen Needle (B-D ULTRAFINE III SHORT PEN) 31G X 8 MM misc USE EVERY MORNING FOR INSULIN INJECTION 100 each 0   • lamoTRIgine (LaMICtal) 100 MG tablet      • leflunomide (ARAVA) 20 MG tablet Take 20 mg by mouth Daily.     • levETIRAcetam (KEPPRA) 500 MG tablet Take 2,000 mg by mouth 2 (Two) Times a Day. Take 4 tabs bid      • levothyroxine (SYNTHROID, LEVOTHROID) 137 MCG tablet TAKE 1 TABLET EVERY DAY 90 tablet 0   • metoprolol tartrate (LOPRESSOR) 50 MG tablet Take 1 tablet by mouth Every 12 (Twelve) Hours. 180 tablet 2   • Multiple Vitamin (MULTIVITAMIN) tablet Take 1 tablet by mouth Daily.     • omega-3 acid ethyl esters (LOVAZA) 1 g capsule      • omeprazole (priLOSEC) 40 MG capsule TAKE 1 CAPSULE EVERY DAY 90 capsule 1   • OXcarbazepine (TRILEPTAL) 600 MG tablet Take 600 mg by mouth 2 (Two) Times a Day.     • pramipexole (MIRAPEX) 0.5 MG tablet Take 0.5 mg by mouth Every Night.     • rosuvastatin (CRESTOR) 40 MG tablet TAKE 1 TABLET EVERY DAY 90 tablet 3   • Soliqua 100-33 UNT-MCG/ML solution pen-injector injection INJECT 60 UNITS UNDER THE SKIN INTO THE APPROPRIATE AREA AS DIRECTED DAILY WITH BREAKFAST. 15 pen 1   • True Metrix Blood Glucose Test test strip TEST  GLUCOSE ONE TIME DAILY 100 each 1   • TRUEplus Lancets 33G misc TEST  GLUCOSE ONE TIME DAILY 100 each 3   • XIGDUO XR 5-1000 MG tablet TAKE 2 TABLETS EVERY  tablet 3   • zolpidem (AMBIEN) 10 MG tablet Take 1 tablet by mouth At Night As Needed for Sleep. 90 tablet 0     No current facility-administered medications for this visit.      Facility-Administered Medications Ordered in Other Visits   Medication Dose Route Frequency Provider Last Rate Last Admin   •  acetaminophen (TYLENOL) tablet 650 mg  650 mg Oral Q4H PRN Jef Randall MD       • HYDROcodone-acetaminophen (NORCO) 5-325 MG per tablet 1 tablet  1 tablet Oral Q4H PRN Jef Randall MD   1 tablet at 01/26/21 0846   • iopamidol (ISOVUE-M 200) injection 41%  20 mL Intrathecal Once in imaging Jef Randall MD       • lidocaine (XYLOCAINE) 1 % injection 10 mL  10 mL Intradermal Once Jef Randall MD       • trimethobenzamide (TIGAN) injection 200 mg  200 mg Intramuscular Q6H PRN Jef Randall MD         Allergies   Allergen Reactions   • Adhesive Tape Other (See Comments)     SKIN ON HANDS SHED OFF   • Sulfamethoxazole W/Trimethoprim 800-160  [Sulfamethoxazole-Trimethoprim] Hives   • Cefpodoxime    • Cephalosporins Other (See Comments)     C-DIFF   • Metformin And Related Nausea Only     Regular Metformin(NOT EXTENDED RELEASE)   • Sulfa Antibiotics Hives   • Topiramate Hives and Itching   • Latex Dermatitis and Rash     Social History     Socioeconomic History   • Marital status:      Spouse name: Not on file   • Number of children: 1   • Years of education: 14   • Highest education level: Not on file   Occupational History   • Occupation: RETIRED     Comment:    Tobacco Use   • Smoking status: Never Smoker   • Smokeless tobacco: Never Used   Substance and Sexual Activity   • Alcohol use: Yes     Comment: social   • Drug use: No   • Sexual activity: Defer   Social History Narrative    LIVES WITH SPOUSE       Review of Systems  Review of Systems   Constitutional: Negative for appetite change and fatigue.   Eyes: Negative for visual disturbance.   Respiratory: Negative for cough.    Cardiovascular: Negative for leg swelling.   Gastrointestinal: Negative for constipation and diarrhea.   Endocrine: Negative for cold intolerance, heat intolerance, polydipsia, polyphagia and polyuria.   Genitourinary: Negative for frequency.   Neurological: Negative for numbness.        Objective    There  were no vitals taken for this visit.  Physical Exam    Lab Review  Glucose (mg/dL)   Date Value   08/20/2020 120 (H)   07/06/2018 210 (H)   06/09/2018 431 (C)     Sodium (mmol/L)   Date Value   01/12/2021 142   08/20/2020 141   07/17/2020 140   12/27/2019 145   07/06/2018 139   06/09/2018 136     Potassium (mmol/L)   Date Value   01/12/2021 5.2   08/20/2020 4.4   07/17/2020 4.7   12/27/2019 4.7   07/06/2018 4.1   06/09/2018 4.4     Chloride (mmol/L)   Date Value   01/12/2021 103   08/20/2020 103   07/17/2020 103   12/27/2019 102   07/06/2018 100   06/09/2018 96 (L)     CO2 (mmol/L)   Date Value   08/20/2020 25.2   07/06/2018 23.7   06/09/2018 23.8     Total CO2 (mmol/L)   Date Value   01/12/2021 24   07/17/2020 26.4   12/27/2019 25.0     BUN (mg/dL)   Date Value   01/12/2021 16   08/20/2020 16   07/17/2020 15   12/27/2019 18   07/06/2018 12   06/09/2018 13     Creatinine (mg/dL)   Date Value   01/12/2021 0.74   12/10/2020 0.80   08/20/2020 0.64   07/17/2020 0.67   12/27/2019 0.80   07/06/2018 0.75   06/25/2018 0.60   06/09/2018 0.76     Hemoglobin A1C (%)   Date Value   01/12/2021 7.0 (H)   07/17/2020 6.20 (H)   12/27/2019 6.90 (H)   07/29/2019 11.58 (H)   06/09/2018 9.00 (H)   02/02/2018 7.51 (H)     Triglycerides (mg/dL)   Date Value   01/12/2021 137   07/17/2020 189 (H)   07/17/2020 190 (H)   07/29/2019 692 (H)   02/27/2018 609 (H)   02/02/2018 1,055 (H)     LDL Cholesterol  (mg/dL)   Date Value   07/17/2020 45   07/29/2019      Comment:     Unable to calculate   07/29/2019 105 (H)   02/27/2018      Comment:     Unable to calculate   02/27/2018 116 (H)   02/02/2018      Comment:     Unable to calculate   02/02/2018 123 (H)     LDL Chol Calc (NIH) (mg/dL)   Date Value   01/12/2021 43       Assessment/Plan    No diagnosis found..    Medications prescribed:  Outpatient Encounter Medications as of 1/26/2021   Medication Sig Dispense Refill   • aspirin 81 MG EC tablet Take 81 mg by mouth daily.     • Aspirin  Buf,CaCarb-MgCarb-MgO, 81 MG tablet      • B Complex Vitamins (VITAMIN B COMPLEX PO) Take 1 tablet by mouth Daily.     • baclofen (LIORESAL) 20 MG tablet Take 20 mg by mouth 2 (Two) Times a Day.     • cholecalciferol (VITAMIN D3) 400 units tablet Take 400 Units by mouth Daily.     • DULoxetine (CYMBALTA) 60 MG capsule Take 60 mg by mouth Daily.     • estradiol (MINIVELLE, VIVELLE-DOT) 0.075 MG/24HR patch PLACE 1 PATCH ON THE SKIN AS DIRECTED BY PROVIDER 2 (TWO) TIMES A WEEK. 24 patch 3   • fenofibrate 160 MG tablet Take 1 tablet by mouth Daily. 90 tablet 1   • Ferrous Gluconate (IRON 27 PO) Take 1 tablet by mouth Every Other Day.     • Fluzone High-Dose Quadrivalent 0.7 ML suspension prefilled syringe TO BE ADMINISTERED BY PHARMACIST FOR IMMUNIZATION     • HYDROcodone-acetaminophen (NORCO) 7.5-325 MG per tablet 4 (Four) Times a Day As Needed.     • hydroxychloroquine (PLAQUENIL) 200 MG tablet Take 200 mg by mouth 2 (Two) Times a Day.     • Insulin Pen Needle (B-D ULTRAFINE III SHORT PEN) 31G X 8 MM misc USE EVERY MORNING FOR INSULIN INJECTION 100 each 0   • lamoTRIgine (LaMICtal) 100 MG tablet      • leflunomide (ARAVA) 20 MG tablet Take 20 mg by mouth Daily.     • levETIRAcetam (KEPPRA) 500 MG tablet Take 2,000 mg by mouth 2 (Two) Times a Day. Take 4 tabs bid      • levothyroxine (SYNTHROID, LEVOTHROID) 137 MCG tablet TAKE 1 TABLET EVERY DAY 90 tablet 0   • metoprolol tartrate (LOPRESSOR) 50 MG tablet Take 1 tablet by mouth Every 12 (Twelve) Hours. 180 tablet 2   • Multiple Vitamin (MULTIVITAMIN) tablet Take 1 tablet by mouth Daily.     • omega-3 acid ethyl esters (LOVAZA) 1 g capsule      • omeprazole (priLOSEC) 40 MG capsule TAKE 1 CAPSULE EVERY DAY 90 capsule 1   • OXcarbazepine (TRILEPTAL) 600 MG tablet Take 600 mg by mouth 2 (Two) Times a Day.     • pramipexole (MIRAPEX) 0.5 MG tablet Take 0.5 mg by mouth Every Night.     • rosuvastatin (CRESTOR) 40 MG tablet TAKE 1 TABLET EVERY DAY 90 tablet 3   • Soliqua  100-33 UNT-MCG/ML solution pen-injector injection INJECT 60 UNITS UNDER THE SKIN INTO THE APPROPRIATE AREA AS DIRECTED DAILY WITH BREAKFAST. 15 pen 1   • True Metrix Blood Glucose Test test strip TEST  GLUCOSE ONE TIME DAILY 100 each 1   • TRUEplus Lancets 33G misc TEST  GLUCOSE ONE TIME DAILY 100 each 3   • XIGDUO XR 5-1000 MG tablet TAKE 2 TABLETS EVERY  tablet 3   • zolpidem (AMBIEN) 10 MG tablet Take 1 tablet by mouth At Night As Needed for Sleep. 90 tablet 0   • [DISCONTINUED] dexamethasone (DECADRON) 4 MG tablet Take 2 tablets by mouth Take As Directed. Take both tablets by mouth 2 hours before myelogram 2 tablet 0     Facility-Administered Encounter Medications as of 1/26/2021   Medication Dose Route Frequency Provider Last Rate Last Admin   • acetaminophen (TYLENOL) tablet 650 mg  650 mg Oral Q4H PRN Jef Randall MD       • HYDROcodone-acetaminophen (NORCO) 5-325 MG per tablet 1 tablet  1 tablet Oral Q4H PRN Jef Randall MD   1 tablet at 01/26/21 0846   • [COMPLETED] iopamidol (ISOVUE-M 200) injection 41%  20 mL Intrathecal Once in imaging Jef Randall MD   20 mL at 01/26/21 0715   • iopamidol (ISOVUE-M 200) injection 41%  20 mL Intrathecal Once in imaging Jef Randall MD       • [COMPLETED] lidocaine (XYLOCAINE) 1 % injection 10 mL  10 mL Intradermal Once Jef Randall MD   5 mL at 01/26/21 0714   • lidocaine (XYLOCAINE) 1 % injection 10 mL  10 mL Intradermal Once Jef Randall MD       • trimethobenzamide (TIGAN) injection 200 mg  200 mg Intramuscular Q6H PRN Jef Randall MD           Orders placed during this encounter include:  No orders of the defined types were placed in this encounter.    Patient was evaluated electronically.  Her medications were reviewed.  No medications have been added or changed.  She will continue all her current therapies as prescribed.  Her thyroid hormone is in satisfactory range.  She will continue her current dose of levothyroxine.   Thyroid hormones are stable.  A1c is 7 and reflects adequate control.  She is on Soliqua and is at risk for hypoglycemia.  She has been less active due to back pain and is possibly can have surgery.  No medications have been added or changed.  Her vitamin D level is stable cholesterol is in good range.  She has a history of fatty liver and liver enzymes are stable but elevated.  She will follow-up in 6 months in the office with labs prior.  She is been encouraged to reach out to the office should she any questions or concerns.

## 2021-01-26 NOTE — NURSING NOTE
Pt dressed then assisted to private vehicle via wheelchair with this RN to leave with her spouse. NAD noted.

## 2021-01-27 ENCOUNTER — TELEPHONE (OUTPATIENT)
Dept: INTERVENTIONAL RADIOLOGY/VASCULAR | Facility: HOSPITAL | Age: 66
End: 2021-01-27

## 2021-02-01 NOTE — H&P (VIEW-ONLY)
"Subjective   Patient ID: Wendipenelope Ramos is a 65 y.o. female is here today with a new Lumbar Myelogram that was ordered at her last visit 01.14.2021.    Today her symptoms are unchanged today. Patient is having low back pain.  Patient denies bowel/bladder incontinence.     Patient, Provider, and MA are all wearing a mask in our office today.     History of Present Illness    This patient returns today.  She continues with severe pain in her lower back primarily.  She does not have much pain in her legs.  The pain is getting worse.    The following portions of the patient's history were reviewed and updated as appropriate: allergies, current medications, past family history, past medical history, past social history, past surgical history and problem list.    Review of Systems   Constitutional: Negative for chills and fever.   HENT: Negative for congestion.    Genitourinary: Positive for difficulty urinating. Negative for dysuria.   Musculoskeletal: Positive for back pain and gait problem.   Neurological: Positive for weakness and numbness.       I have reviewed the review of systems as documented by my MA.      Objective     Vitals:    02/02/21 1100   BP: 168/85   Cuff Size: Adult   Pulse: 79   Temp: 96.9 °F (36.1 °C)   Weight: 90.7 kg (200 lb)   Height: 175.3 cm (69\")     Body mass index is 29.53 kg/m².      Physical Exam  Neurologic Exam        Assessment/Plan   Independent Review of Radiographic Studies:      I personally reviewed the images from the following studies.    I reviewed her plain films, myelogram, and CT scan myself.  The plain films show a fusion at L3-4.  There is good hardware placement although I do not know if there is a solid fusion yet.  It appears to be just an interbody fusion.  I do not see any posterior lateral bone.  On the myelogram itself there is some decreased nerve root filling on the right side at L4-5 which is the level just below the fusion.  The fusion level looks okay as does " L2-3.  On the standing films there is definitely a little pressure on the right L5 nerve root.  There is some narrowing on the left side at that level as well and may be a little narrowing at L2-3.  On the lateral film there is posterior disc bulging at L4-5 and fairly significant stenosis at L2-3.  On the post myelographic CT scan the lower thoracic spine looks okay although at T11-12 there is a central disc herniation that is distorting the lower cord to some extent.  T12-L1 looks okay as does L1 to.  L2-3 shows really severe stenosis.  L3-4 is the previously fused level and is widely open.  On the CT scan I still cannot tell if there is a solid fusion or not.  L4-5 shows some disc bulging on the right side consistent with the myelogram.  L5-S1 mostly looks okay.    Medical Decision Making:      I told the patient about the imaging.  She says that she is known about the herniated disc in her lower thoracic spine for many years.  In the absence of any radiating pain or evidence of spinal cord issues I do not see any reason to go after the thoracic disc herniation.  I did explain that we could consider surgery at the level above and the level below the fusion if she feels that the pain is so bad that she cannot live with it.  I told the patient about the risks, complications and expected outcome of the lumbar surgery.  I explained that there was an 80% chance of getting rid of the pain in the leg.  I explained that there would still be back pain after the surgery.  Initially this will be quite severe but will improve over time.  There is a 2 or 3% chance of infection, bleeding, CSF leak, damage to the nerve as a result of surgery, paralysis, as well as anesthetic risk.  There is a 10% chance of recurrent problems.  There is a 10% chance of nonunion or failure of the instrumentation.  We discussed the postoperative hospital and home course.  The patient does ask proceed.  We will get her into see an orthopedic spine  surgeon for a fusion evaluation.    She will need to be scheduled for a: Lumbar 2 3 and lumbar 4 5 laminectomy with a fusion by orthopedics    Diagnoses and all orders for this visit:    1. Spinal stenosis of lumbar region with neurogenic claudication (Primary)  -     Ambulatory Referral to Orthopedic Surgery      Return for 2-3 week post op.

## 2021-02-02 ENCOUNTER — OFFICE VISIT (OUTPATIENT)
Dept: NEUROSURGERY | Facility: CLINIC | Age: 66
End: 2021-02-02

## 2021-02-02 ENCOUNTER — PREP FOR SURGERY (OUTPATIENT)
Dept: OTHER | Facility: HOSPITAL | Age: 66
End: 2021-02-02

## 2021-02-02 VITALS
BODY MASS INDEX: 29.62 KG/M2 | HEART RATE: 79 BPM | WEIGHT: 200 LBS | TEMPERATURE: 96.9 F | SYSTOLIC BLOOD PRESSURE: 168 MMHG | HEIGHT: 69 IN | DIASTOLIC BLOOD PRESSURE: 85 MMHG

## 2021-02-02 DIAGNOSIS — M48.062 SPINAL STENOSIS OF LUMBAR REGION WITH NEUROGENIC CLAUDICATION: Primary | ICD-10-CM

## 2021-02-02 PROCEDURE — 99213 OFFICE O/P EST LOW 20 MIN: CPT | Performed by: NEUROLOGICAL SURGERY

## 2021-02-10 ENCOUNTER — OFFICE VISIT (OUTPATIENT)
Dept: ORTHOPEDIC SURGERY | Facility: CLINIC | Age: 66
End: 2021-02-10

## 2021-02-10 VITALS — WEIGHT: 199.96 LBS | TEMPERATURE: 97 F | BODY MASS INDEX: 29.62 KG/M2 | HEIGHT: 69 IN

## 2021-02-10 DIAGNOSIS — M48.062 SPINAL STENOSIS OF LUMBAR REGION WITH NEUROGENIC CLAUDICATION: Primary | ICD-10-CM

## 2021-02-10 PROCEDURE — 99205 OFFICE O/P NEW HI 60 MIN: CPT | Performed by: ORTHOPAEDIC SURGERY

## 2021-02-10 NOTE — H&P (VIEW-ONLY)
New patient or new problem visit    CC: She is seen today at request of Dr. Segundo for complaints of back pain which occasionally radiates to the right side.      HPI:  She has been followed in pain management by Dr. Gan who performed trigger points and epidurals which did not help.  Hydrocodone every 4 hours helps for her back pain and pain associated with fibromyalgia and rheumatoid arthritis.  In 2018 Dr. Goldberg performed L3-4 emergent laminectomy and discectomy for cauda equina syndrome, and shortly afterward L3-4 instrumented fusion.  She has done pretty well since then until November of last year.  Her primary pain complaint is in the back but occasionally radiates to the leg.  No numbness or tingling.    PFSH: See attached.  Has a history of cardiac ablation.    ROS: She had some bowel incontinence issues for a while after the prior surgeries but that has improved and no recent bowel or bladder incontinence.    PE: BMI 29.5.  Posture slightly stooped.  Well-healed paraspinal and a small 1 inch midline incision just above the intercrestal line.  Reflexes are absent strength and sensation appear intact.    XRAY: Plain film x-rays show L3-4 fusion with instrumentation and interbody fusion cage.  The hardware is completely intact and no surrounding lucencies.  The cage appears well-placed and there is some bridging bone but no conclusive evidence of fusion that I can see.  I do not see any posterior lateral bone.  She has a right-sided disc bulge at L4-5 below the prior fusion and severe stenosis at L3-4 above on myelogram and post myelogram CT as well as the previous MRI.    Other: n/a    Impression: Adjacent level stenosis/herniated disc.  It is bilateral at 2 3 above the fusion and quite severe and more rightward based and discogenic at L5 4 5.  I think that decompression is going to destabilize L2-3 and that a a fusion will be needed.  At 4 5 she will not be unstable, necessarily but that is a possibility  depending on how much disc needs to be removed.  As she is primary back pain I would have a low threshold for including 4 5 and the fusion but this is complicated by the possibility that there may be a nonunion at L3-4.  I told her we would try to do the least required and at this point that is looking like L2-3, L4-5 laminectomy and then an L2-3 fusion with removal of screws at L3-4.    Plan: L2-3 fusion with instrumentation and screw removal at L4-5 with laminectomies by Dr. Segundo.  I discussed the risks and benefits of posterior spinal fusion, including where applicable, laminectomy.  Risks include adverse anesthetic events such as death, stroke and myocardial infarction.  More specific surgical risks include infection, nonunion, hardware failure, spinal fluid leakage, nerve root injury or paralysis, visceral or vascular injury, persistent pain, deep venous thrombosis, and pulmonary embolism among others. There is a 70 to 90 % chance of success.   Alternatives have been discussed.  After careful consideration the patient wishes to proceed with surgery.

## 2021-02-12 ENCOUNTER — APPOINTMENT (OUTPATIENT)
Dept: PREADMISSION TESTING | Facility: HOSPITAL | Age: 66
End: 2021-02-12

## 2021-02-12 VITALS
TEMPERATURE: 98.1 F | WEIGHT: 212 LBS | DIASTOLIC BLOOD PRESSURE: 88 MMHG | OXYGEN SATURATION: 98 % | RESPIRATION RATE: 20 BRPM | HEART RATE: 92 BPM | SYSTOLIC BLOOD PRESSURE: 150 MMHG | BODY MASS INDEX: 31.4 KG/M2 | HEIGHT: 69 IN

## 2021-02-12 LAB
ANION GAP SERPL CALCULATED.3IONS-SCNC: 10.6 MMOL/L (ref 5–15)
BUN SERPL-MCNC: 20 MG/DL (ref 8–23)
BUN/CREAT SERPL: 23.5 (ref 7–25)
CALCIUM SPEC-SCNC: 10.7 MG/DL (ref 8.6–10.5)
CHLORIDE SERPL-SCNC: 105 MMOL/L (ref 98–107)
CO2 SERPL-SCNC: 25.4 MMOL/L (ref 22–29)
CREAT SERPL-MCNC: 0.85 MG/DL (ref 0.57–1)
DEPRECATED RDW RBC AUTO: 42.8 FL (ref 37–54)
ERYTHROCYTE [DISTWIDTH] IN BLOOD BY AUTOMATED COUNT: 12.4 % (ref 12.3–15.4)
GFR SERPL CREATININE-BSD FRML MDRD: 67 ML/MIN/1.73
GLUCOSE SERPL-MCNC: 161 MG/DL (ref 65–99)
HCT VFR BLD AUTO: 41.8 % (ref 34–46.6)
HGB BLD-MCNC: 13.3 G/DL (ref 12–15.9)
MCH RBC QN AUTO: 29.6 PG (ref 26.6–33)
MCHC RBC AUTO-ENTMCNC: 31.8 G/DL (ref 31.5–35.7)
MCV RBC AUTO: 92.9 FL (ref 79–97)
PLATELET # BLD AUTO: 240 10*3/MM3 (ref 140–450)
PMV BLD AUTO: 11 FL (ref 6–12)
POTASSIUM SERPL-SCNC: 5.3 MMOL/L (ref 3.5–5.2)
QT INTERVAL: 431 MS
RBC # BLD AUTO: 4.5 10*6/MM3 (ref 3.77–5.28)
SODIUM SERPL-SCNC: 141 MMOL/L (ref 136–145)
WBC # BLD AUTO: 6.29 10*3/MM3 (ref 3.4–10.8)

## 2021-02-12 PROCEDURE — 93010 ELECTROCARDIOGRAM REPORT: CPT | Performed by: INTERNAL MEDICINE

## 2021-02-12 PROCEDURE — C9803 HOPD COVID-19 SPEC COLLECT: HCPCS

## 2021-02-12 PROCEDURE — 36415 COLL VENOUS BLD VENIPUNCTURE: CPT

## 2021-02-12 PROCEDURE — 80048 BASIC METABOLIC PNL TOTAL CA: CPT

## 2021-02-12 PROCEDURE — U0004 COV-19 TEST NON-CDC HGH THRU: HCPCS

## 2021-02-12 PROCEDURE — 85027 COMPLETE CBC AUTOMATED: CPT

## 2021-02-12 PROCEDURE — 93005 ELECTROCARDIOGRAM TRACING: CPT

## 2021-02-12 NOTE — DISCHARGE INSTRUCTIONS
Arrive to hospital on your day of surgery at 530 AM 2-15-21    Take the following medications the morning of surgery:  LEVOTHYROXINE, METOPROLOL, PRILOSEC, CYMBALTA, LAMICTAL, KEPPRA, PLAQUENIL, TILEPTAL AND HYDROCODONE IF NEEDED      If you are on prescription narcotic pain medication to control your pain you may also take that medication the morning of surgery.    General Instructions:  • Do not eat solid food after midnight the night before surgery.  • You may drink clear liquids day of surgery but must stop at least one hour before your hospital arrival time.  • It is beneficial for you to have a clear drink that contains carbohydrates the day of surgery.  We suggest a 12 to 20 ounce bottle of Gatorade or Powerade for non-diabetic patients or a 12 to 20 ounce bottle of G2 or Powerade Zero for diabetic patients. (Pediatric patients, are not advised to drink a 12 to 20 ounce carbohydrate drink)    Clear liquids are liquids you can see through.  Nothing red in color.     Plain water                               Sports drinks  Sodas                                   Gelatin (Jell-O)  Fruit juices without pulp such as white grape juice and apple juice  Popsicles that contain no fruit or yogurt  Tea or coffee (no cream or milk added)  Gatorade / Powerade  G2 / Powerade Zero    • Infants may have breast milk up to four hours before surgery.  • Infants drinking formula may drink formula up to six hours before surgery.   • Patients who avoid smoking, chewing tobacco and alcohol for 4 weeks prior to surgery have a reduced risk of post-operative complications.  Quit smoking as many days before surgery as you can.  • Do not smoke, use chewing tobacco or drink alcohol the day of surgery.   • If applicable bring your C-PAP/ BI-PAP machine.  • Bring any papers given to you in the doctor’s office.  • Wear clean comfortable clothes.  • Do not wear contact lenses, false eyelashes or make-up.  Bring a case for your glasses.    • Bring crutches or walker if applicable.  • Remove all piercings.  Leave jewelry and any other valuables at home.  • Hair extensions with metal clips must be removed prior to surgery.  • The Pre-Admission Testing nurse will instruct you to bring medications if unable to obtain an accurate list in Pre-Admission Testing.        If you were given a blood bank ID arm band remember to bring it with you the day of surgery.    Preventing a Surgical Site Infection:  • For 2 to 3 days before surgery, avoid shaving with a razor because the razor can irritate skin and make it easier to develop an infection.    • Any areas of open skin can increase the risk of a post-operative wound infection by allowing bacteria to enter and travel throughout the body.  Notify your surgeon if you have any skin wounds / rashes even if it is not near the expected surgical site.  The area will need assessed to determine if surgery should be delayed until it is healed.  • The night prior to surgery shower using a fresh bar of anti-bacterial soap (such as Dial) and clean washcloth.  Sleep in a clean bed with clean clothing.  Do not allow pets to sleep with you.  • Shower on the morning of surgery using a fresh bar of anti-bacterial soap (such as Dial) and clean washcloth.  Dry with a clean towel and dress in clean clothing.  • Ask your surgeon if you will be receiving antibiotics prior to surgery.  • Make sure you, your family, and all healthcare providers clean their hands with soap and water or an alcohol based hand  before caring for you or your wound.    Day of surgery:  Your arrival time is approximately two hours before your scheduled surgery time.  Upon arrival, a Pre-op nurse and Anesthesiologist will review your health history, obtain vital signs, and answer questions you may have.  The only belongings needed at this time will be a list of your home medications and if applicable your C-PAP/BI-PAP machine.  A Pre-op nurse will  start an IV and you may receive medication in preparation for surgery, including something to help you relax.     Please be aware that surgery does come with discomfort.  We want to make every effort to control your discomfort so please discuss any uncontrolled symptoms with your nurse.   Your doctor will most likely have prescribed pain medications.      If you are going home after surgery you will receive individualized written care instructions before being discharged.  A responsible adult must drive you to and from the hospital on the day of your surgery and stay with you for 24 hours.  Discharge prescriptions can be filled by the hospital pharmacy during regular pharmacy hours.  If you are having surgery late in the day/evening your prescription may be e-prescribed to your pharmacy.  Please verify your pharmacy hours or chose a 24 hour pharmacy to avoid not having access to your prescription because your pharmacy has closed for the day.    If you are staying overnight following surgery, you will be transported to your hospital room following the recovery period.  [facility] has all private rooms.    If you have any questions please call Pre-Admission Testing at [phone].  Deductibles and co-payments are collected on the day of service. Please be prepared to pay the required co-pay, deductible or deposit on the day of service as defined by your plan.    Patient Education for Self-Quarantine Process    Following your COVID testing, we strongly recommend that you do not leave your home after you have been tested for COVID except to get medical care. This includes not going to work, school or to public areas.  If this is not possible for you to do please limit your activities to only required outings.  Be sure to wear a mask when you are with other people, practice social distancing and wash your hands frequently.      The following items provide additional details to keep you safe.  • Wash your  hands with soap and water frequently for at least 20 seconds.   • Avoid touching your eyes, nose and mouth with unwashed hands.  • Do not share anything - utensils, towels, food from the same bowl.   • Have your own utensils, drinking glass, dishes, towels and bedding.   • Do not have visitors.   • Do use FaceTime to stay in touch with family and friends.  • You should stay in a specific room away from others if possible.   • Stay at least 6 feet away from others in the home if you cannot have a dedicated room to yourself.   • Do not snuggle with your pet. While the CDC says there is no evidence that pets can spread COVID-19 or be infected from humans, it is probably best to avoid “petting, snuggling, being kissed or licked and sharing food (during self-quarantine)”, according to the CDC.   • Sanitize household surfaces daily. Include all high touch areas (door handles, light switches, phones, countertops, etc.)  • Do not share a bathroom with others, if possible.   • Wear a mask around others in your home if you are unable to stay in a separate room or 6 feet apart. If  you are unable to wear a mask, have your family member wear a mask if they must be within 6 feet of you.   Call your surgeon immediately if you experience any of the following symptoms:  • Sore Throat  • Shortness of Breath or difficulty breathing  • Cough  • Chills  • Body soreness or muscle pain  • Headache  • Fever  • New loss of taste or smell  • Do not arrive for your surgery ill.  Your procedure will need to be rescheduled to another time.  You will need to call your physician before the day of surgery to avoid any unnecessary exposure to hospital staff as well as other patients.      CHLORHEXIDINE CLOTH INSTRUCTIONS  The morning of surgery follow these instructions using the Chlorhexidine cloths you've been given.  These steps reduce bacteria on the body.  Do not use the cloths near your eyes, ears mouth, genitalia or on open wounds.  Throw  the cloths away after use but do not try to flush them down a toilet.      • Open and remove one cloth at a time from the package.    • Leave the cloth unfolded and begin the bathing.  • Massage the skin with the cloths using gentle pressure to remove bacteria.  Do not scrub harshly.   • Follow the steps below with one 2% CHG cloth per area (6 total cloths).  • One cloth for neck, shoulders and chest.  • One cloth for both arms, hands, fingers and underarms (do underarms last).  • One cloth for the abdomen followed by groin.  • One cloth for right leg and foot including between the toes.  • One cloth for left leg and foot including between the toes.  • The last cloth is to be used for the back of the neck, back and buttocks.    Allow the CHG to air dry 3 minutes on the skin which will give it time to work and decrease the chance of irritation.  The skin may feel sticky until it is dry.  Do not rinse with water or any other liquid or you will lose the beneficial effects of the CHG.  If mild skin irritation occurs, do rinse the skin to remove the CHG.  Report this to the nurse at time of admission.  Do not apply lotions, creams, ointments, deodorants or perfumes after using the clothes. Dress in clean clothes before coming to the hospital.

## 2021-02-13 LAB — SARS-COV-2 ORF1AB RESP QL NAA+PROBE: NOT DETECTED

## 2021-02-15 ENCOUNTER — APPOINTMENT (OUTPATIENT)
Dept: GENERAL RADIOLOGY | Facility: HOSPITAL | Age: 66
End: 2021-02-15

## 2021-02-15 ENCOUNTER — ANESTHESIA EVENT (OUTPATIENT)
Dept: PERIOP | Facility: HOSPITAL | Age: 66
End: 2021-02-15

## 2021-02-15 ENCOUNTER — HOSPITAL ENCOUNTER (INPATIENT)
Facility: HOSPITAL | Age: 66
LOS: 3 days | Discharge: HOME | End: 2021-02-18
Attending: ORTHOPAEDIC SURGERY | Admitting: ORTHOPAEDIC SURGERY

## 2021-02-15 ENCOUNTER — ANESTHESIA (OUTPATIENT)
Dept: PERIOP | Facility: HOSPITAL | Age: 66
End: 2021-02-15

## 2021-02-15 DIAGNOSIS — M48.062 SPINAL STENOSIS OF LUMBAR REGION WITH NEUROGENIC CLAUDICATION: Primary | ICD-10-CM

## 2021-02-15 DIAGNOSIS — M54.50 ACUTE RIGHT-SIDED LOW BACK PAIN WITHOUT SCIATICA: ICD-10-CM

## 2021-02-15 LAB
ABO GROUP BLD: NORMAL
BLD GP AB SCN SERPL QL: NEGATIVE
GLUCOSE BLDC GLUCOMTR-MCNC: 122 MG/DL (ref 70–130)
GLUCOSE BLDC GLUCOMTR-MCNC: 124 MG/DL (ref 70–130)
GLUCOSE BLDC GLUCOMTR-MCNC: 136 MG/DL (ref 70–130)
GLUCOSE BLDC GLUCOMTR-MCNC: 145 MG/DL (ref 70–130)
HCT VFR BLD AUTO: 34.5 % (ref 34–46.6)
HGB BLD-MCNC: 11 G/DL (ref 12–15.9)
RH BLD: NEGATIVE
T&S EXPIRATION DATE: NORMAL

## 2021-02-15 PROCEDURE — C1713 ANCHOR/SCREW BN/BN,TIS/BN: HCPCS | Performed by: ORTHOPAEDIC SURGERY

## 2021-02-15 PROCEDURE — 20939 BONE MARROW ASPIR BONE GRFG: CPT | Performed by: ORTHOPAEDIC SURGERY

## 2021-02-15 PROCEDURE — 22850 REMOVE SPINE FIXATION DEVICE: CPT | Performed by: NEUROLOGICAL SURGERY

## 2021-02-15 PROCEDURE — 22850 REMOVE SPINE FIXATION DEVICE: CPT | Performed by: SPECIALIST/TECHNOLOGIST, OTHER

## 2021-02-15 PROCEDURE — 0SP004Z REMOVAL OF INTERNAL FIXATION DEVICE FROM LUMBAR VERTEBRAL JOINT, OPEN APPROACH: ICD-10-PCS | Performed by: NEUROLOGICAL SURGERY

## 2021-02-15 PROCEDURE — 25010000002 ALBUMIN HUMAN 5% PER 50 ML: Performed by: NURSE ANESTHETIST, CERTIFIED REGISTERED

## 2021-02-15 PROCEDURE — 0ST20ZZ RESECTION OF LUMBAR VERTEBRAL DISC, OPEN APPROACH: ICD-10-PCS | Performed by: ORTHOPAEDIC SURGERY

## 2021-02-15 PROCEDURE — 22853 INSJ BIOMECHANICAL DEVICE: CPT | Performed by: ORTHOPAEDIC SURGERY

## 2021-02-15 PROCEDURE — 63048 LAM FACETEC &FORAMOT EA ADDL: CPT | Performed by: SPECIALIST/TECHNOLOGIST, OTHER

## 2021-02-15 PROCEDURE — 22633 ARTHRD CMBN 1NTRSPC LUMBAR: CPT | Performed by: ORTHOPAEDIC SURGERY

## 2021-02-15 PROCEDURE — 22614 ARTHRD PST TQ 1NTRSPC EA ADD: CPT | Performed by: ORTHOPAEDIC SURGERY

## 2021-02-15 PROCEDURE — 0SG00AJ FUSION OF LUMBAR VERTEBRAL JOINT WITH INTERBODY FUSION DEVICE, POSTERIOR APPROACH, ANTERIOR COLUMN, OPEN APPROACH: ICD-10-PCS | Performed by: ORTHOPAEDIC SURGERY

## 2021-02-15 PROCEDURE — 63047 LAM FACETEC & FORAMOT LUMBAR: CPT | Performed by: SPECIALIST/TECHNOLOGIST, OTHER

## 2021-02-15 PROCEDURE — 85018 HEMOGLOBIN: CPT | Performed by: ORTHOPAEDIC SURGERY

## 2021-02-15 PROCEDURE — 82962 GLUCOSE BLOOD TEST: CPT

## 2021-02-15 PROCEDURE — 25010000003 HYDROMORPHONE HCL PF 50 MG/5ML SOLUTION: Performed by: ORTHOPAEDIC SURGERY

## 2021-02-15 PROCEDURE — 00QT0ZZ REPAIR SPINAL MENINGES, OPEN APPROACH: ICD-10-PCS | Performed by: NEUROLOGICAL SURGERY

## 2021-02-15 PROCEDURE — 86901 BLOOD TYPING SEROLOGIC RH(D): CPT | Performed by: ORTHOPAEDIC SURGERY

## 2021-02-15 PROCEDURE — 01NB0ZZ RELEASE LUMBAR NERVE, OPEN APPROACH: ICD-10-PCS | Performed by: NEUROLOGICAL SURGERY

## 2021-02-15 PROCEDURE — 22842 INSERT SPINE FIXATION DEVICE: CPT | Performed by: ORTHOPAEDIC SURGERY

## 2021-02-15 PROCEDURE — 85014 HEMATOCRIT: CPT | Performed by: ORTHOPAEDIC SURGERY

## 2021-02-15 PROCEDURE — P9041 ALBUMIN (HUMAN),5%, 50ML: HCPCS | Performed by: NURSE ANESTHETIST, CERTIFIED REGISTERED

## 2021-02-15 PROCEDURE — 25010000002 MIDAZOLAM PER 1 MG: Performed by: ANESTHESIOLOGY

## 2021-02-15 PROCEDURE — 25010000002 HEPARIN (PORCINE) PER 1000 UNITS: Performed by: ORTHOPAEDIC SURGERY

## 2021-02-15 PROCEDURE — 07DR3ZZ EXTRACTION OF ILIAC BONE MARROW, PERCUTANEOUS APPROACH: ICD-10-PCS | Performed by: ORTHOPAEDIC SURGERY

## 2021-02-15 PROCEDURE — 25010000002 ONDANSETRON PER 1 MG: Performed by: NURSE ANESTHETIST, CERTIFIED REGISTERED

## 2021-02-15 PROCEDURE — 76000 FLUOROSCOPY <1 HR PHYS/QHP: CPT

## 2021-02-15 PROCEDURE — 25010000002 FENTANYL CITRATE (PF) 100 MCG/2ML SOLUTION: Performed by: NURSE ANESTHETIST, CERTIFIED REGISTERED

## 2021-02-15 PROCEDURE — 72100 X-RAY EXAM L-S SPINE 2/3 VWS: CPT

## 2021-02-15 PROCEDURE — 25010000002 VANCOMYCIN 1 G RECONSTITUTED SOLUTION 1 EACH VIAL: Performed by: ORTHOPAEDIC SURGERY

## 2021-02-15 PROCEDURE — 63048 LAM FACETEC &FORAMOT EA ADDL: CPT | Performed by: NEUROLOGICAL SURGERY

## 2021-02-15 PROCEDURE — 86900 BLOOD TYPING SEROLOGIC ABO: CPT | Performed by: ORTHOPAEDIC SURGERY

## 2021-02-15 PROCEDURE — 25010000002 VANCOMYCIN 10 G RECONSTITUTED SOLUTION: Performed by: ORTHOPAEDIC SURGERY

## 2021-02-15 PROCEDURE — 63710000001 INSULIN GLARGINE PER 5 UNITS: Performed by: HOSPITALIST

## 2021-02-15 PROCEDURE — 00NY0ZZ RELEASE LUMBAR SPINAL CORD, OPEN APPROACH: ICD-10-PCS | Performed by: NEUROLOGICAL SURGERY

## 2021-02-15 PROCEDURE — 25010000002 PROPOFOL 10 MG/ML EMULSION: Performed by: NURSE ANESTHETIST, CERTIFIED REGISTERED

## 2021-02-15 PROCEDURE — 0SG1071 FUSION OF 2 OR MORE LUMBAR VERTEBRAL JOINTS WITH AUTOLOGOUS TISSUE SUBSTITUTE, POSTERIOR APPROACH, POSTERIOR COLUMN, OPEN APPROACH: ICD-10-PCS | Performed by: ORTHOPAEDIC SURGERY

## 2021-02-15 PROCEDURE — 86850 RBC ANTIBODY SCREEN: CPT | Performed by: ORTHOPAEDIC SURGERY

## 2021-02-15 PROCEDURE — 3E0U0GB INTRODUCTION OF RECOMBINANT BONE MORPHOGENETIC PROTEIN INTO JOINTS, OPEN APPROACH: ICD-10-PCS | Performed by: ORTHOPAEDIC SURGERY

## 2021-02-15 PROCEDURE — 25010000002 PHENYLEPHRINE PER 1 ML: Performed by: NURSE ANESTHETIST, CERTIFIED REGISTERED

## 2021-02-15 PROCEDURE — 25010000002 HYDROMORPHONE PER 4 MG: Performed by: NURSE ANESTHETIST, CERTIFIED REGISTERED

## 2021-02-15 PROCEDURE — 63047 LAM FACETEC & FORAMOT LUMBAR: CPT | Performed by: NEUROLOGICAL SURGERY

## 2021-02-15 DEVICE — CAGE CONCORDE BULLET LRD 9X11X21MM: Type: IMPLANTABLE DEVICE | Site: SPINE LUMBAR | Status: FUNCTIONAL

## 2021-02-15 DEVICE — SCRW EXPEDIUM PA TI 6X50MM: Type: IMPLANTABLE DEVICE | Site: SPINE LUMBAR | Status: FUNCTIONAL

## 2021-02-15 DEVICE — BONE GRAFT KIT 7510600 INFUSE LARGE
Type: IMPLANTABLE DEVICE | Site: SPINE LUMBAR | Status: FUNCTIONAL
Brand: INFUSE® BONE GRAFT

## 2021-02-15 DEVICE — SCRW EXPEDIUM PA TI 6X45MM: Type: IMPLANTABLE DEVICE | Site: SPINE LUMBAR | Status: FUNCTIONAL

## 2021-02-15 DEVICE — MATRX STRIP PILAFX DBM 50X25X4MM: Type: IMPLANTABLE DEVICE | Site: SPINE LUMBAR | Status: FUNCTIONAL

## 2021-02-15 DEVICE — ROD PRELRD SPINE EXPEDIUM W/LINE TI 5.5X105MM: Type: IMPLANTABLE DEVICE | Site: SPINE LUMBAR | Status: FUNCTIONAL

## 2021-02-15 DEVICE — SSC BONE WAX
Type: IMPLANTABLE DEVICE | Site: SPINE LUMBAR | Status: FUNCTIONAL
Brand: SSC BONE WAX

## 2021-02-15 DEVICE — SCRW INNR EXPEDIUM: Type: IMPLANTABLE DEVICE | Site: SPINE LUMBAR | Status: FUNCTIONAL

## 2021-02-15 DEVICE — SCRW EXPEDIUM PA TI 6X40MM: Type: IMPLANTABLE DEVICE | Site: SPINE LUMBAR | Status: FUNCTIONAL

## 2021-02-15 DEVICE — SEALANT FIBRIN TISSEEL FZ 4ML: Type: IMPLANTABLE DEVICE | Site: SPINE LUMBAR | Status: FUNCTIONAL

## 2021-02-15 RX ORDER — MIDAZOLAM HYDROCHLORIDE 1 MG/ML
0.5 INJECTION INTRAMUSCULAR; INTRAVENOUS
Status: DISCONTINUED | OUTPATIENT
Start: 2021-02-15 | End: 2021-02-15 | Stop reason: HOSPADM

## 2021-02-15 RX ORDER — SODIUM CHLORIDE 0.9 % (FLUSH) 0.9 %
10 SYRINGE (ML) INJECTION AS NEEDED
Status: DISCONTINUED | OUTPATIENT
Start: 2021-02-15 | End: 2021-02-18 | Stop reason: HOSPADM

## 2021-02-15 RX ORDER — ZOLPIDEM TARTRATE 5 MG/1
10 TABLET ORAL NIGHTLY PRN
Status: DISCONTINUED | OUTPATIENT
Start: 2021-02-15 | End: 2021-02-18 | Stop reason: HOSPADM

## 2021-02-15 RX ORDER — FENTANYL CITRATE 50 UG/ML
50 INJECTION, SOLUTION INTRAMUSCULAR; INTRAVENOUS
Status: DISCONTINUED | OUTPATIENT
Start: 2021-02-15 | End: 2021-02-15 | Stop reason: HOSPADM

## 2021-02-15 RX ORDER — ONDANSETRON 4 MG/1
4 TABLET, FILM COATED ORAL EVERY 6 HOURS PRN
Status: DISCONTINUED | OUTPATIENT
Start: 2021-02-15 | End: 2021-02-18 | Stop reason: HOSPADM

## 2021-02-15 RX ORDER — LEVOTHYROXINE SODIUM 137 UG/1
137 TABLET ORAL DAILY
Status: DISCONTINUED | OUTPATIENT
Start: 2021-02-16 | End: 2021-02-18 | Stop reason: HOSPADM

## 2021-02-15 RX ORDER — SODIUM CHLORIDE 9 MG/ML
100 INJECTION, SOLUTION INTRAVENOUS CONTINUOUS
Status: DISCONTINUED | OUTPATIENT
Start: 2021-02-15 | End: 2021-02-17

## 2021-02-15 RX ORDER — FAMOTIDINE 10 MG/ML
20 INJECTION, SOLUTION INTRAVENOUS ONCE
Status: COMPLETED | OUTPATIENT
Start: 2021-02-15 | End: 2021-02-15

## 2021-02-15 RX ORDER — AMOXICILLIN 250 MG
1 CAPSULE ORAL 2 TIMES DAILY
Status: DISCONTINUED | OUTPATIENT
Start: 2021-02-15 | End: 2021-02-18 | Stop reason: HOSPADM

## 2021-02-15 RX ORDER — ACETAMINOPHEN 325 MG/1
650 TABLET ORAL EVERY 4 HOURS PRN
Status: DISCONTINUED | OUTPATIENT
Start: 2021-02-15 | End: 2021-02-18 | Stop reason: HOSPADM

## 2021-02-15 RX ORDER — OXYCODONE HYDROCHLORIDE AND ACETAMINOPHEN 5; 325 MG/1; MG/1
2 TABLET ORAL EVERY 4 HOURS PRN
Status: DISCONTINUED | OUTPATIENT
Start: 2021-02-15 | End: 2021-02-17

## 2021-02-15 RX ORDER — NALOXONE HCL 0.4 MG/ML
0.2 VIAL (ML) INJECTION AS NEEDED
Status: DISCONTINUED | OUTPATIENT
Start: 2021-02-15 | End: 2021-02-15 | Stop reason: HOSPADM

## 2021-02-15 RX ORDER — PRAMIPEXOLE DIHYDROCHLORIDE 0.5 MG/1
0.5 TABLET ORAL EVERY 12 HOURS SCHEDULED
Status: DISCONTINUED | OUTPATIENT
Start: 2021-02-15 | End: 2021-02-18 | Stop reason: HOSPADM

## 2021-02-15 RX ORDER — LIDOCAINE HYDROCHLORIDE 20 MG/ML
INJECTION, SOLUTION INFILTRATION; PERINEURAL AS NEEDED
Status: DISCONTINUED | OUTPATIENT
Start: 2021-02-15 | End: 2021-02-15 | Stop reason: SURG

## 2021-02-15 RX ORDER — NALOXONE HCL 0.4 MG/ML
0.1 VIAL (ML) INJECTION
Status: DISCONTINUED | OUTPATIENT
Start: 2021-02-15 | End: 2021-02-18 | Stop reason: HOSPADM

## 2021-02-15 RX ORDER — SODIUM CHLORIDE, SODIUM LACTATE, POTASSIUM CHLORIDE, CALCIUM CHLORIDE 600; 310; 30; 20 MG/100ML; MG/100ML; MG/100ML; MG/100ML
9 INJECTION, SOLUTION INTRAVENOUS CONTINUOUS
Status: DISCONTINUED | OUTPATIENT
Start: 2021-02-15 | End: 2021-02-17

## 2021-02-15 RX ORDER — HYDROMORPHONE HYDROCHLORIDE 1 MG/ML
0.5 INJECTION, SOLUTION INTRAMUSCULAR; INTRAVENOUS; SUBCUTANEOUS
Status: DISCONTINUED | OUTPATIENT
Start: 2021-02-15 | End: 2021-02-15 | Stop reason: HOSPADM

## 2021-02-15 RX ORDER — SODIUM CHLORIDE 0.9 % (FLUSH) 0.9 %
3 SYRINGE (ML) INJECTION EVERY 12 HOURS SCHEDULED
Status: DISCONTINUED | OUTPATIENT
Start: 2021-02-15 | End: 2021-02-18 | Stop reason: HOSPADM

## 2021-02-15 RX ORDER — ROCURONIUM BROMIDE 10 MG/ML
INJECTION, SOLUTION INTRAVENOUS AS NEEDED
Status: DISCONTINUED | OUTPATIENT
Start: 2021-02-15 | End: 2021-02-15 | Stop reason: SURG

## 2021-02-15 RX ORDER — EPHEDRINE SULFATE 50 MG/ML
5 INJECTION, SOLUTION INTRAVENOUS ONCE AS NEEDED
Status: DISCONTINUED | OUTPATIENT
Start: 2021-02-15 | End: 2021-02-15 | Stop reason: HOSPADM

## 2021-02-15 RX ORDER — DULOXETIN HYDROCHLORIDE 60 MG/1
60 CAPSULE, DELAYED RELEASE ORAL 2 TIMES DAILY
Status: DISCONTINUED | OUTPATIENT
Start: 2021-02-15 | End: 2021-02-18 | Stop reason: HOSPADM

## 2021-02-15 RX ORDER — ONDANSETRON 2 MG/ML
INJECTION INTRAMUSCULAR; INTRAVENOUS AS NEEDED
Status: DISCONTINUED | OUTPATIENT
Start: 2021-02-15 | End: 2021-02-15 | Stop reason: SURG

## 2021-02-15 RX ORDER — PROMETHAZINE HYDROCHLORIDE 25 MG/1
25 TABLET ORAL ONCE AS NEEDED
Status: DISCONTINUED | OUTPATIENT
Start: 2021-02-15 | End: 2021-02-15 | Stop reason: HOSPADM

## 2021-02-15 RX ORDER — LIDOCAINE HYDROCHLORIDE 10 MG/ML
0.5 INJECTION, SOLUTION EPIDURAL; INFILTRATION; INTRACAUDAL; PERINEURAL ONCE AS NEEDED
Status: DISCONTINUED | OUTPATIENT
Start: 2021-02-15 | End: 2021-02-15 | Stop reason: HOSPADM

## 2021-02-15 RX ORDER — TEMAZEPAM 15 MG/1
15 CAPSULE ORAL NIGHTLY PRN
Status: DISCONTINUED | OUTPATIENT
Start: 2021-02-15 | End: 2021-02-18 | Stop reason: HOSPADM

## 2021-02-15 RX ORDER — NICOTINE POLACRILEX 4 MG
15 LOZENGE BUCCAL
Status: DISCONTINUED | OUTPATIENT
Start: 2021-02-15 | End: 2021-02-18 | Stop reason: HOSPADM

## 2021-02-15 RX ORDER — ONDANSETRON 2 MG/ML
4 INJECTION INTRAMUSCULAR; INTRAVENOUS ONCE AS NEEDED
Status: DISCONTINUED | OUTPATIENT
Start: 2021-02-15 | End: 2021-02-15 | Stop reason: HOSPADM

## 2021-02-15 RX ORDER — POLYETHYLENE GLYCOL 3350 17 G/17G
17 POWDER, FOR SOLUTION ORAL DAILY
Status: DISCONTINUED | OUTPATIENT
Start: 2021-02-15 | End: 2021-02-18 | Stop reason: HOSPADM

## 2021-02-15 RX ORDER — PROPOFOL 10 MG/ML
VIAL (ML) INTRAVENOUS AS NEEDED
Status: DISCONTINUED | OUTPATIENT
Start: 2021-02-15 | End: 2021-02-15 | Stop reason: SURG

## 2021-02-15 RX ORDER — INSULIN GLARGINE 100 [IU]/ML
30 INJECTION, SOLUTION SUBCUTANEOUS NIGHTLY
Status: DISCONTINUED | OUTPATIENT
Start: 2021-02-15 | End: 2021-02-17

## 2021-02-15 RX ORDER — SODIUM CHLORIDE 0.9 % (FLUSH) 0.9 %
3-10 SYRINGE (ML) INJECTION AS NEEDED
Status: DISCONTINUED | OUTPATIENT
Start: 2021-02-15 | End: 2021-02-15 | Stop reason: HOSPADM

## 2021-02-15 RX ORDER — HYDROMORPHONE HCL IN 0.9% NACL 10 MG/50ML
PATIENT CONTROLLED ANALGESIA SYRINGE INTRAVENOUS CONTINUOUS
Status: DISCONTINUED | OUTPATIENT
Start: 2021-02-15 | End: 2021-02-17

## 2021-02-15 RX ORDER — PROMETHAZINE HYDROCHLORIDE 25 MG/1
25 SUPPOSITORY RECTAL ONCE AS NEEDED
Status: DISCONTINUED | OUTPATIENT
Start: 2021-02-15 | End: 2021-02-15 | Stop reason: HOSPADM

## 2021-02-15 RX ORDER — ALBUMIN, HUMAN INJ 5% 5 %
SOLUTION INTRAVENOUS CONTINUOUS PRN
Status: DISCONTINUED | OUTPATIENT
Start: 2021-02-15 | End: 2021-02-15 | Stop reason: SURG

## 2021-02-15 RX ORDER — SODIUM CHLORIDE 0.9 % (FLUSH) 0.9 %
3 SYRINGE (ML) INJECTION EVERY 12 HOURS SCHEDULED
Status: DISCONTINUED | OUTPATIENT
Start: 2021-02-15 | End: 2021-02-15 | Stop reason: HOSPADM

## 2021-02-15 RX ORDER — HEPARIN SODIUM 10000 [USP'U]/ML
INJECTION, SOLUTION INTRAVENOUS; SUBCUTANEOUS AS NEEDED
Status: DISCONTINUED | OUTPATIENT
Start: 2021-02-15 | End: 2021-02-15 | Stop reason: HOSPADM

## 2021-02-15 RX ORDER — DEXTROSE MONOHYDRATE 25 G/50ML
25 INJECTION, SOLUTION INTRAVENOUS
Status: DISCONTINUED | OUTPATIENT
Start: 2021-02-15 | End: 2021-02-18 | Stop reason: HOSPADM

## 2021-02-15 RX ORDER — INSULIN LISPRO 100 [IU]/ML
0-9 INJECTION, SOLUTION INTRAVENOUS; SUBCUTANEOUS
Status: DISCONTINUED | OUTPATIENT
Start: 2021-02-15 | End: 2021-02-18 | Stop reason: HOSPADM

## 2021-02-15 RX ORDER — PANTOPRAZOLE SODIUM 40 MG/1
40 TABLET, DELAYED RELEASE ORAL EVERY MORNING
Status: DISCONTINUED | OUTPATIENT
Start: 2021-02-16 | End: 2021-02-18 | Stop reason: HOSPADM

## 2021-02-15 RX ORDER — FLUMAZENIL 0.1 MG/ML
0.2 INJECTION INTRAVENOUS AS NEEDED
Status: DISCONTINUED | OUTPATIENT
Start: 2021-02-15 | End: 2021-02-15 | Stop reason: HOSPADM

## 2021-02-15 RX ORDER — FENTANYL CITRATE 50 UG/ML
INJECTION, SOLUTION INTRAMUSCULAR; INTRAVENOUS AS NEEDED
Status: DISCONTINUED | OUTPATIENT
Start: 2021-02-15 | End: 2021-02-15 | Stop reason: SURG

## 2021-02-15 RX ORDER — METOPROLOL TARTRATE 50 MG/1
50 TABLET, FILM COATED ORAL EVERY 12 HOURS SCHEDULED
Status: DISCONTINUED | OUTPATIENT
Start: 2021-02-15 | End: 2021-02-18 | Stop reason: HOSPADM

## 2021-02-15 RX ORDER — LABETALOL HYDROCHLORIDE 5 MG/ML
5 INJECTION, SOLUTION INTRAVENOUS
Status: DISCONTINUED | OUTPATIENT
Start: 2021-02-15 | End: 2021-02-15 | Stop reason: HOSPADM

## 2021-02-15 RX ORDER — MIDAZOLAM HYDROCHLORIDE 1 MG/ML
1 INJECTION INTRAMUSCULAR; INTRAVENOUS
Status: DISCONTINUED | OUTPATIENT
Start: 2021-02-15 | End: 2021-02-15 | Stop reason: HOSPADM

## 2021-02-15 RX ORDER — DIPHENHYDRAMINE HCL 25 MG
25 CAPSULE ORAL
Status: DISCONTINUED | OUTPATIENT
Start: 2021-02-15 | End: 2021-02-15 | Stop reason: HOSPADM

## 2021-02-15 RX ORDER — ONDANSETRON 2 MG/ML
4 INJECTION INTRAMUSCULAR; INTRAVENOUS EVERY 6 HOURS PRN
Status: DISCONTINUED | OUTPATIENT
Start: 2021-02-15 | End: 2021-02-18 | Stop reason: HOSPADM

## 2021-02-15 RX ORDER — HYDROCODONE BITARTRATE AND ACETAMINOPHEN 7.5; 325 MG/1; MG/1
1 TABLET ORAL ONCE AS NEEDED
Status: DISCONTINUED | OUTPATIENT
Start: 2021-02-15 | End: 2021-02-15 | Stop reason: HOSPADM

## 2021-02-15 RX ORDER — BACLOFEN 10 MG/1
20 TABLET ORAL 2 TIMES DAILY
Status: DISCONTINUED | OUTPATIENT
Start: 2021-02-15 | End: 2021-02-18 | Stop reason: HOSPADM

## 2021-02-15 RX ORDER — OXCARBAZEPINE 300 MG/1
600 TABLET, FILM COATED ORAL EVERY 12 HOURS SCHEDULED
Status: DISCONTINUED | OUTPATIENT
Start: 2021-02-15 | End: 2021-02-18 | Stop reason: HOSPADM

## 2021-02-15 RX ORDER — BISACODYL 10 MG
10 SUPPOSITORY, RECTAL RECTAL DAILY PRN
Status: DISCONTINUED | OUTPATIENT
Start: 2021-02-15 | End: 2021-02-18 | Stop reason: HOSPADM

## 2021-02-15 RX ORDER — SODIUM CHLORIDE 9 MG/ML
INJECTION, SOLUTION INTRAVENOUS AS NEEDED
Status: DISCONTINUED | OUTPATIENT
Start: 2021-02-15 | End: 2021-02-15 | Stop reason: HOSPADM

## 2021-02-15 RX ORDER — EPHEDRINE SULFATE 50 MG/ML
INJECTION, SOLUTION INTRAVENOUS AS NEEDED
Status: DISCONTINUED | OUTPATIENT
Start: 2021-02-15 | End: 2021-02-15 | Stop reason: SURG

## 2021-02-15 RX ORDER — DIPHENHYDRAMINE HYDROCHLORIDE 50 MG/ML
12.5 INJECTION INTRAMUSCULAR; INTRAVENOUS
Status: DISCONTINUED | OUTPATIENT
Start: 2021-02-15 | End: 2021-02-15 | Stop reason: HOSPADM

## 2021-02-15 RX ORDER — HYDRALAZINE HYDROCHLORIDE 20 MG/ML
5 INJECTION INTRAMUSCULAR; INTRAVENOUS
Status: DISCONTINUED | OUTPATIENT
Start: 2021-02-15 | End: 2021-02-15 | Stop reason: HOSPADM

## 2021-02-15 RX ORDER — FENOFIBRATE 48 MG/1
48 TABLET, COATED ORAL DAILY
Status: DISCONTINUED | OUTPATIENT
Start: 2021-02-15 | End: 2021-02-18 | Stop reason: HOSPADM

## 2021-02-15 RX ORDER — LEVETIRACETAM 500 MG/1
1500 TABLET ORAL DAILY
Status: DISCONTINUED | OUTPATIENT
Start: 2021-02-15 | End: 2021-02-18 | Stop reason: HOSPADM

## 2021-02-15 RX ORDER — OXYCODONE AND ACETAMINOPHEN 7.5; 325 MG/1; MG/1
1 TABLET ORAL ONCE AS NEEDED
Status: DISCONTINUED | OUTPATIENT
Start: 2021-02-15 | End: 2021-02-15 | Stop reason: HOSPADM

## 2021-02-15 RX ADMIN — FENOFIBRATE 48 MG: 48 TABLET ORAL at 16:15

## 2021-02-15 RX ADMIN — VANCOMYCIN HYDROCHLORIDE 1500 MG: 10 INJECTION, POWDER, LYOPHILIZED, FOR SOLUTION INTRAVENOUS at 18:02

## 2021-02-15 RX ADMIN — SODIUM CHLORIDE, POTASSIUM CHLORIDE, SODIUM LACTATE AND CALCIUM CHLORIDE: 600; 310; 30; 20 INJECTION, SOLUTION INTRAVENOUS at 09:18

## 2021-02-15 RX ADMIN — EPHEDRINE SULFATE 10 MG: 50 INJECTION INTRAVENOUS at 08:26

## 2021-02-15 RX ADMIN — INSULIN GLARGINE 30 UNITS: 100 INJECTION, SOLUTION SUBCUTANEOUS at 21:54

## 2021-02-15 RX ADMIN — EPHEDRINE SULFATE 10 MG: 50 INJECTION INTRAVENOUS at 07:46

## 2021-02-15 RX ADMIN — PHENYLEPHRINE HYDROCHLORIDE 200 MCG: 10 INJECTION INTRAVENOUS at 09:00

## 2021-02-15 RX ADMIN — HYDROMORPHONE HYDROCHLORIDE: 10 INJECTION, SOLUTION INTRAMUSCULAR; INTRAVENOUS; SUBCUTANEOUS at 13:32

## 2021-02-15 RX ADMIN — EPHEDRINE SULFATE 10 MG: 50 INJECTION INTRAVENOUS at 08:17

## 2021-02-15 RX ADMIN — PHENYLEPHRINE HYDROCHLORIDE 200 MCG: 10 INJECTION INTRAVENOUS at 09:16

## 2021-02-15 RX ADMIN — FENTANYL CITRATE 50 MCG: 50 INJECTION, SOLUTION INTRAMUSCULAR; INTRAVENOUS at 13:03

## 2021-02-15 RX ADMIN — PHENYLEPHRINE HYDROCHLORIDE 100 MCG: 10 INJECTION INTRAVENOUS at 08:55

## 2021-02-15 RX ADMIN — PHENYLEPHRINE HYDROCHLORIDE 100 MCG: 10 INJECTION INTRAVENOUS at 09:28

## 2021-02-15 RX ADMIN — PROPOFOL 200 MG: 10 INJECTION, EMULSION INTRAVENOUS at 07:39

## 2021-02-15 RX ADMIN — OXYCODONE HYDROCHLORIDE AND ACETAMINOPHEN 2 TABLET: 5; 325 TABLET ORAL at 16:16

## 2021-02-15 RX ADMIN — LIDOCAINE HYDROCHLORIDE 100 MG: 20 INJECTION, SOLUTION INFILTRATION; PERINEURAL at 07:39

## 2021-02-15 RX ADMIN — PHENYLEPHRINE HYDROCHLORIDE 100 MCG: 10 INJECTION INTRAVENOUS at 08:43

## 2021-02-15 RX ADMIN — HYDROMORPHONE HYDROCHLORIDE 0.5 MG: 1 INJECTION, SOLUTION INTRAMUSCULAR; INTRAVENOUS; SUBCUTANEOUS at 13:08

## 2021-02-15 RX ADMIN — ALBUMIN HUMAN: 0.05 INJECTION, SOLUTION INTRAVENOUS at 11:00

## 2021-02-15 RX ADMIN — ROCURONIUM BROMIDE 50 MG: 10 INJECTION INTRAVENOUS at 07:39

## 2021-02-15 RX ADMIN — POLYETHYLENE GLYCOL 3350 17 G: 17 POWDER, FOR SOLUTION ORAL at 16:16

## 2021-02-15 RX ADMIN — SUGAMMADEX 200 MG: 100 INJECTION, SOLUTION INTRAVENOUS at 12:23

## 2021-02-15 RX ADMIN — OXCARBAZEPINE 600 MG: 300 TABLET, FILM COATED ORAL at 21:54

## 2021-02-15 RX ADMIN — PHENYLEPHRINE HYDROCHLORIDE 200 MCG: 10 INJECTION INTRAVENOUS at 09:07

## 2021-02-15 RX ADMIN — VANCOMYCIN HYDROCHLORIDE 1250 MG: 10 INJECTION, POWDER, LYOPHILIZED, FOR SOLUTION INTRAVENOUS at 06:47

## 2021-02-15 RX ADMIN — FENTANYL CITRATE 50 MCG: 50 INJECTION INTRAMUSCULAR; INTRAVENOUS at 08:50

## 2021-02-15 RX ADMIN — ONDANSETRON HYDROCHLORIDE 4 MG: 2 SOLUTION INTRAMUSCULAR; INTRAVENOUS at 12:19

## 2021-02-15 RX ADMIN — PHENYLEPHRINE HYDROCHLORIDE 100 MCG: 10 INJECTION INTRAVENOUS at 08:34

## 2021-02-15 RX ADMIN — SODIUM CHLORIDE, POTASSIUM CHLORIDE, SODIUM LACTATE AND CALCIUM CHLORIDE 9 ML/HR: 600; 310; 30; 20 INJECTION, SOLUTION INTRAVENOUS at 06:47

## 2021-02-15 RX ADMIN — LEVETIRACETAM 1500 MG: 500 TABLET, FILM COATED ORAL at 16:15

## 2021-02-15 RX ADMIN — ROCURONIUM BROMIDE 20 MG: 10 INJECTION INTRAVENOUS at 11:40

## 2021-02-15 RX ADMIN — FENTANYL CITRATE 50 MCG: 50 INJECTION, SOLUTION INTRAMUSCULAR; INTRAVENOUS at 13:15

## 2021-02-15 RX ADMIN — SODIUM CHLORIDE 100 ML/HR: 9 INJECTION, SOLUTION INTRAVENOUS at 16:15

## 2021-02-15 RX ADMIN — FAMOTIDINE 20 MG: 10 INJECTION INTRAVENOUS at 06:47

## 2021-02-15 RX ADMIN — ROCURONIUM BROMIDE 50 MG: 10 INJECTION INTRAVENOUS at 08:48

## 2021-02-15 RX ADMIN — BACLOFEN 20 MG: 10 TABLET ORAL at 21:53

## 2021-02-15 RX ADMIN — FENTANYL CITRATE 50 MCG: 50 INJECTION INTRAMUSCULAR; INTRAVENOUS at 10:09

## 2021-02-15 RX ADMIN — PHENYLEPHRINE HYDROCHLORIDE 100 MCG: 10 INJECTION INTRAVENOUS at 11:53

## 2021-02-15 RX ADMIN — LAMOTRIGINE 150 MG: 100 TABLET ORAL at 21:53

## 2021-02-15 RX ADMIN — PRAMIPEXOLE DIHYDROCHLORIDE 0.5 MG: 0.5 TABLET ORAL at 21:52

## 2021-02-15 RX ADMIN — ROCURONIUM BROMIDE 10 MG: 10 INJECTION INTRAVENOUS at 09:50

## 2021-02-15 RX ADMIN — EPHEDRINE SULFATE 10 MG: 50 INJECTION INTRAVENOUS at 09:31

## 2021-02-15 RX ADMIN — DOCUSATE SODIUM 50MG AND SENNOSIDES 8.6MG 1 TABLET: 8.6; 5 TABLET, FILM COATED ORAL at 21:54

## 2021-02-15 RX ADMIN — EPHEDRINE SULFATE 10 MG: 50 INJECTION INTRAVENOUS at 07:56

## 2021-02-15 RX ADMIN — FENTANYL CITRATE 100 MCG: 50 INJECTION INTRAMUSCULAR; INTRAVENOUS at 07:39

## 2021-02-15 RX ADMIN — PHENYLEPHRINE HYDROCHLORIDE 0.5 MCG/KG/MIN: 10 INJECTION, SOLUTION INTRAMUSCULAR; INTRAVENOUS; SUBCUTANEOUS at 09:29

## 2021-02-15 RX ADMIN — MIDAZOLAM 1 MG: 1 INJECTION INTRAMUSCULAR; INTRAVENOUS at 07:03

## 2021-02-15 RX ADMIN — DULOXETINE HYDROCHLORIDE 60 MG: 60 CAPSULE, DELAYED RELEASE ORAL at 21:53

## 2021-02-15 NOTE — OP NOTE
Preoperative diagnosis: Lumbar stenosis with neurogenic claudication L2-3 and L4-5    Postoperative diagnosis: Same with the addition of L3-4    Procedure performed: A bilateral L2-3 and L4-5 laminectomy with a bilateral L3-4 laminectomy and neural lysis.  All levels had a foraminotomies and medial facetectomy using microsurgical technique microsurgical instrumentation    Surgeon: Jef Randall M.D.    Asst.:  Lainey Yoder CFA who was instrumental in helping with hemostasis, visualization of neural structures and retraction of neural structures.  Her skilled assistance was necessary for the success of this case    Estimated blood loss, crystalloid, colloid, blood: Please see anesthesia record    Material to lab:   None    Drains: None    Complications: None    Indications for the procedure: This is a patient with severe pain in her back.  Been getting steadily worse over the past couple of weeks.  She had had a previous anterior surgery with posterior instrumentation and laminectomy on the left side at L3-4.  There was some question as to whether the fusion was solid.    Operative summary:  The patient was brought into the operating room and placed under general endotracheal anesthesia using intravenous and inhalational agents.  The patient was then positioned on the operating table in the prone position.  All pressure points were padded including peripheral points of entrapment.  The back was prepped with ChloraPrep.  She was then draped with Ioban, towels, half sheets and a split sheet.  An incision was then made in the midline.  This was carried down to the thoracolumbar fascia which was opened in the midline.  Muscles were stripped off spinous processes and lamina arches at L2, L3, L4, and L5.  The L3-4 level was identified by the previous instrumentation and muscles are stripped out to that level at L2 and L5 as well.  Following this the superior spinous process of L5 as well as the entire spinous process  of L3 and L4 and the inferior spinous process of L2 were removed with the Leksell rongeurs.  The respective laminar arches were then thinned down until they were paperthin using the Midas Michael drill and then cracked directly back off the dura.  Following this attention was turned to the right side where the L2 pedicle was isolated as well as the L3 pedicle.  The dural sac and nerve root were mobilized and using a combination of the Kerrison rongeurs and the matchstick bit on the Midas Michael were able to open the lateral recess out to the level of the medial pedicle.  Once this was done attention was turned to L4-5 where the same thing was done.  At that point there was quite a bit of scar tissue at L3-4 but mostly on the left side.  This enabled mobilization on the right side at that level and the lateral recess at L3-4 was opened as well.    Once the whole right side was opened each of the nerve roots was checked with a Lee ball probe to be sure there was no evidence of residual pressure.  There was a small rent in the dura at the level of L4-5 and this was repaired with a 5-0 Prolene with no evidence of further CSF leak.  Following this attention was turned to the left side where the same thing was done.  On the left side there was a lot more scar tissue and we also discovered that the L3 screw had reached the medial wall of the pedicle and was putting pressure on the L3 nerve root.  I also remove the caps and the rods at L3-4 on both sides and testing them with a spreading instrument we are able to prove that the fusion was not at all solid.  Ultimately all IV nerve roots on the left side were thoroughly decompressed.  I did leave some scar tissue just below the L3 nerve root and above the L4 nerve root as there was no evidence of pressure at that level on the myelogram and I did not want to take a chance of another CSF leak.  We did have to remove some scar tissue in order to mobilize both the L3 and L4 nerve  roots however.  This is all done using microsurgical technique microsurgical instrumentation.  Following this the bleeding was controlled with bipolar cautery thrombin Gelfoam and FloSeal and then the operation was turned over to Dr. Rodriguez for fusion.  The sponge, instrument and needle counts were correct at the end of the procedure.

## 2021-02-15 NOTE — BRIEF OP NOTE
LUMBAR LAMINECTOMY DISCECTOMY WITH FUSION  Progress Note    Wendi Ramos  2/15/2021    Pre-op Diagnosis:   Spinal stenosis of lumbar region with neurogenic claudication [M48.062]       Post-Op Diagnosis Codes:     * Spinal stenosis of lumbar region with neurogenic claudication [M48.062]    Procedure/CPT® Codes:        Procedure(s):  Lumbar 2-3 fusion with instrumentation and removal of implants L3-4 with laminectomies by Dr. Segundo  Lumbar 2 3, Lumbar 3 4 and lumbar 4 5 laminectomy with a fusion by orthopedics    Surgeon(s):  Juancarlos Montelongo MD Reiss, Steven J., MD    Anesthesia: General    Staff:   Circulator: Wilfrid Cancino RN; Esther Olivas RN  Perfusionist: Naa Temple  Radiology Technologist: Laurie Sims  Scrub Person: Coleman Valel; Dot Maldonado  Vendor Representative: Marta White  Assistant: Lainey Yoder CSA; Alexia Fernandes RNFA  Assistant: Lainey Yoder CSA; Alexia Fernandes RNFA      Estimated Blood Loss: 200ml    Urine Voided: 450 mL    Specimens:                None          Drains:   Urethral Catheter Silicone 10 Fr. (Active)       Findings: Severe stenosis L2-L5    Complications: None      Jef Randall MD     Date: 2/15/2021  Time: 10:36 EST

## 2021-02-15 NOTE — PLAN OF CARE
Goal Outcome Evaluation:     64 yo female POD 0 L2-3, L4-5 lumbar laminectomy. Dressing CDI. VS stable, 2L 02 NC. Dilaudid PCA and oral Percocet for pain control. Ambulated with tech, up in chair. Jerome catheter in place. Plans to dc home with  later this week.

## 2021-02-15 NOTE — ANESTHESIA PROCEDURE NOTES
Airway  Urgency: elective    Date/Time: 2/15/2021 7:41 AM  Airway not difficult    General Information and Staff    Patient location during procedure: OR  Anesthesiologist: Israel Swartz MD  CRNA: Caroline Cramer CRNA    Indications and Patient Condition  Indications for airway management: airway protection    Preoxygenated: yes  MILS not maintained throughout  Mask difficulty assessment: 1 - vent by mask    Final Airway Details  Final airway type: endotracheal airway      Successful airway: ETT  Cuffed: yes   Successful intubation technique: direct laryngoscopy  Facilitating devices/methods: cricoid pressure  Endotracheal tube insertion site: oral  Blade: Brett  Blade size: 3  ETT size (mm): 7.0  Cormack-Lehane Classification: grade I - full view of glottis  Placement verified by: chest auscultation and capnometry   Measured from: lips  Number of attempts at approach: 1  Assessment: lips, teeth, and gum same as pre-op    Additional Comments  Dentition intact and unchanged. CBEBS.  +ETCO2.

## 2021-02-15 NOTE — OP NOTE
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    Surgeon: Juancarlos Montelongo Jr, M.D.    Asst.: Alexia Fernandes    EBL: 400 cc total    Anesthetic: Gen.    Condition: Satisfactory    Components: Dr. Randall performed the approach and laminectomies L2-5 and removed the rods.    Description of procedure: Dr. Segundo oversaw positioning prep and drape and performed the approach and laminectomy.  See his note for details.  I took over.  A tiny CSF leak was repaired with a single simple suture by Dr. Segundo and it was hermetic I later applied Tisseel.  He removed the screw from the right side at L3 where it was seen to violate the inferomedial cortex of the pedicle but did not appear to impinge the nerve at least on the table.  He also had to decompress the entirety of 4 5 and we felt that it was not stable to leave unfused.  The muscle was stripped subperiosteally to the tips of transverse processes.  Curettes and rongeurs removed adherent soft tissue.  Packs were placed for hemostasis.  I removed the screws at 3 4-the screws at 3 were very slightly loose but the ones of 4 exhibited excellent torque.  There was clear motion at the fusion site.  The graft was decorticated.  A Steffee probe was inserted at the intersection of the anatomic landmarks.  A flexible probe was inserted to check the integrity of the pedicle.  Screws were placed at L2-5 bilaterally. contoured rods were later added, nuts were tightened and torqued.  Biplanar image intensification showed appropriate screw position and anatomic  level and satisfactory posture.  A widened annulotomy was accomplished with a knife and pituitary rongeur after facetectomy on the right, scoliotic concavity at L2-3.  Sequential  scrapers, along with curettage were used to remove disc debris down to bleeding subchondral and plate bone.  The passing root was protected medially with a bayonet nerve root retractor.  The exiting root was protected above.  A self-retaining distractor was placed and engaged.  Disc debris was adequately removed.  Now the appropriate-sized cage was selected, matching the final scraper size.  The sponge, which had been prepared at the back table, was packed into the Concorde bullet carbon fiber cage.  Additional sponge was placed in the anterior aspect of the disc space.  The cage was then tamped into position and finally seated.  The distraction was removed and the cage fit was excellent.  No neurologic structures were impinged or significantly stretched.  Biplanar images showed satisfactory position of the cage and of course appropriate anatomic level.  Bone marrow was obtained from the right iliac crest.  The marrow was applied to the Pliafix sponges.  Laminectomy bone, and bone from decortication of the graft bed was cleaned at the back table throughout the case and available for bone graft.  The morcellized bone was placed in the decorticated lateral gutter bilaterally.  Pliafix sponges were then placed.  I also placed BMP sponges, 1 in the cage and the rest along the lateral gutter.  Additionally Tisseel was applied in layers after wound irrigation and the repair remained hermetic.  Hemostasis was assured.  The wound was then closed with running and interrupted #1 Vicryl for the dorsal fascia, 2-0 Vicryl subcutaneously, then 4-0 Monocryl and Dermabond for the skin.  A sterile dressing was applied.  The patient is about to be recovered.

## 2021-02-15 NOTE — ANESTHESIA POSTPROCEDURE EVALUATION
Patient: Wendi Ramos    Procedure Summary     Date: 02/15/21 Room / Location: [facility] OR  / [facility] MAIN OR    Anesthesia Start: 0733 Anesthesia Stop: 1254    Procedures:       Lumbar 2-3, lumbar 3 4, lumbar 4 5 fusion with instrumentation and BMP,  L2-L3 lift with cage, and removal of implants L3-4 with laminectomies by Dr. Segundo (N/A Spine Lumbar)      Lumbar 2 3, Lumbar 3 4 and lumbar 4 5 laminectomy with a fusion by orthopedics (N/A Spine Lumbar) Diagnosis:       Spinal stenosis of lumbar region with neurogenic claudication      (Spinal stenosis of lumbar region with neurogenic claudication [M48.062])    Surgeon: Juancarlos Montelongo MD; Jef Randall MD Provider: Israel Swartz MD    Anesthesia Type: general ASA Status: 3          Anesthesia Type: general    Vitals  Vitals Value Taken Time   BP 93/70 02/15/21 1330   Temp 37.6 °C (99.7 °F) 02/15/21 1250   Pulse 85 02/15/21 1346   Resp 16 02/15/21 1330   SpO2 98 % 02/15/21 1346   Vitals shown include unvalidated device data.        Anesthesia Post Evaluation

## 2021-02-16 LAB
ANION GAP SERPL CALCULATED.3IONS-SCNC: 10.6 MMOL/L (ref 5–15)
BUN SERPL-MCNC: 16 MG/DL (ref 8–23)
BUN/CREAT SERPL: 23.5 (ref 7–25)
CALCIUM SPEC-SCNC: 8.1 MG/DL (ref 8.6–10.5)
CHLORIDE SERPL-SCNC: 104 MMOL/L (ref 98–107)
CO2 SERPL-SCNC: 23.4 MMOL/L (ref 22–29)
CREAT SERPL-MCNC: 0.68 MG/DL (ref 0.57–1)
GFR SERPL CREATININE-BSD FRML MDRD: 86 ML/MIN/1.73
GLUCOSE BLDC GLUCOMTR-MCNC: 122 MG/DL (ref 70–130)
GLUCOSE BLDC GLUCOMTR-MCNC: 180 MG/DL (ref 70–130)
GLUCOSE BLDC GLUCOMTR-MCNC: 193 MG/DL (ref 70–130)
GLUCOSE BLDC GLUCOMTR-MCNC: 197 MG/DL (ref 70–130)
GLUCOSE SERPL-MCNC: 121 MG/DL (ref 65–99)
HBA1C MFR BLD: 6.8 % (ref 4.8–5.6)
HCT VFR BLD AUTO: 27.7 % (ref 34–46.6)
HGB BLD-MCNC: 9.2 G/DL (ref 12–15.9)
POTASSIUM SERPL-SCNC: 3.8 MMOL/L (ref 3.5–5.2)
SODIUM SERPL-SCNC: 138 MMOL/L (ref 136–145)

## 2021-02-16 PROCEDURE — 83036 HEMOGLOBIN GLYCOSYLATED A1C: CPT | Performed by: HOSPITALIST

## 2021-02-16 PROCEDURE — 25010000002 VANCOMYCIN 10 G RECONSTITUTED SOLUTION: Performed by: ORTHOPAEDIC SURGERY

## 2021-02-16 PROCEDURE — 25010000003 HYDROMORPHONE HCL PF 50 MG/5ML SOLUTION: Performed by: ORTHOPAEDIC SURGERY

## 2021-02-16 PROCEDURE — 85014 HEMATOCRIT: CPT | Performed by: ORTHOPAEDIC SURGERY

## 2021-02-16 PROCEDURE — 99024 POSTOP FOLLOW-UP VISIT: CPT | Performed by: ORTHOPAEDIC SURGERY

## 2021-02-16 PROCEDURE — 82962 GLUCOSE BLOOD TEST: CPT

## 2021-02-16 PROCEDURE — 80048 BASIC METABOLIC PNL TOTAL CA: CPT | Performed by: ORTHOPAEDIC SURGERY

## 2021-02-16 PROCEDURE — 97110 THERAPEUTIC EXERCISES: CPT

## 2021-02-16 PROCEDURE — 85018 HEMOGLOBIN: CPT | Performed by: ORTHOPAEDIC SURGERY

## 2021-02-16 PROCEDURE — 63710000001 INSULIN LISPRO (HUMAN) PER 5 UNITS: Performed by: HOSPITALIST

## 2021-02-16 PROCEDURE — 97162 PT EVAL MOD COMPLEX 30 MIN: CPT

## 2021-02-16 PROCEDURE — 99024 POSTOP FOLLOW-UP VISIT: CPT | Performed by: NURSE PRACTITIONER

## 2021-02-16 PROCEDURE — 63710000001 INSULIN GLARGINE PER 5 UNITS: Performed by: HOSPITALIST

## 2021-02-16 RX ORDER — INSULIN LISPRO 100 [IU]/ML
5 INJECTION, SOLUTION INTRAVENOUS; SUBCUTANEOUS
Status: DISCONTINUED | OUTPATIENT
Start: 2021-02-16 | End: 2021-02-17

## 2021-02-16 RX ADMIN — BACLOFEN 20 MG: 10 TABLET ORAL at 20:47

## 2021-02-16 RX ADMIN — HYDROMORPHONE HYDROCHLORIDE: 10 INJECTION, SOLUTION INTRAMUSCULAR; INTRAVENOUS; SUBCUTANEOUS at 23:25

## 2021-02-16 RX ADMIN — Medication 140 MG: at 08:43

## 2021-02-16 RX ADMIN — INSULIN LISPRO 2 UNITS: 100 INJECTION, SOLUTION INTRAVENOUS; SUBCUTANEOUS at 16:38

## 2021-02-16 RX ADMIN — LEVETIRACETAM 1500 MG: 500 TABLET, FILM COATED ORAL at 08:43

## 2021-02-16 RX ADMIN — PANTOPRAZOLE SODIUM 40 MG: 40 TABLET, DELAYED RELEASE ORAL at 06:08

## 2021-02-16 RX ADMIN — LAMOTRIGINE 150 MG: 100 TABLET ORAL at 08:43

## 2021-02-16 RX ADMIN — OXYCODONE HYDROCHLORIDE AND ACETAMINOPHEN 2 TABLET: 5; 325 TABLET ORAL at 16:37

## 2021-02-16 RX ADMIN — PRAMIPEXOLE DIHYDROCHLORIDE 0.5 MG: 0.5 TABLET ORAL at 20:47

## 2021-02-16 RX ADMIN — METOPROLOL TARTRATE 50 MG: 50 TABLET, FILM COATED ORAL at 08:43

## 2021-02-16 RX ADMIN — OXCARBAZEPINE 600 MG: 300 TABLET, FILM COATED ORAL at 08:43

## 2021-02-16 RX ADMIN — BACLOFEN 20 MG: 10 TABLET ORAL at 08:42

## 2021-02-16 RX ADMIN — INSULIN LISPRO 5 UNITS: 100 INJECTION, SOLUTION INTRAVENOUS; SUBCUTANEOUS at 16:37

## 2021-02-16 RX ADMIN — DOCUSATE SODIUM 50MG AND SENNOSIDES 8.6MG 1 TABLET: 8.6; 5 TABLET, FILM COATED ORAL at 08:43

## 2021-02-16 RX ADMIN — DOCUSATE SODIUM 50MG AND SENNOSIDES 8.6MG 1 TABLET: 8.6; 5 TABLET, FILM COATED ORAL at 20:47

## 2021-02-16 RX ADMIN — POLYETHYLENE GLYCOL 3350 17 G: 17 POWDER, FOR SOLUTION ORAL at 08:43

## 2021-02-16 RX ADMIN — VANCOMYCIN HYDROCHLORIDE 1500 MG: 10 INJECTION, POWDER, LYOPHILIZED, FOR SOLUTION INTRAVENOUS at 06:04

## 2021-02-16 RX ADMIN — FENOFIBRATE 48 MG: 48 TABLET ORAL at 08:42

## 2021-02-16 RX ADMIN — OXCARBAZEPINE 600 MG: 300 TABLET, FILM COATED ORAL at 20:47

## 2021-02-16 RX ADMIN — SODIUM CHLORIDE 100 ML/HR: 9 INJECTION, SOLUTION INTRAVENOUS at 18:03

## 2021-02-16 RX ADMIN — INSULIN LISPRO 2 UNITS: 100 INJECTION, SOLUTION INTRAVENOUS; SUBCUTANEOUS at 11:36

## 2021-02-16 RX ADMIN — LAMOTRIGINE 150 MG: 100 TABLET ORAL at 20:47

## 2021-02-16 RX ADMIN — METOPROLOL TARTRATE 50 MG: 50 TABLET, FILM COATED ORAL at 20:47

## 2021-02-16 RX ADMIN — DULOXETINE HYDROCHLORIDE 60 MG: 60 CAPSULE, DELAYED RELEASE ORAL at 20:48

## 2021-02-16 RX ADMIN — SODIUM CHLORIDE 100 ML/HR: 9 INJECTION, SOLUTION INTRAVENOUS at 04:46

## 2021-02-16 RX ADMIN — DULOXETINE HYDROCHLORIDE 60 MG: 60 CAPSULE, DELAYED RELEASE ORAL at 08:42

## 2021-02-16 RX ADMIN — OXYCODONE HYDROCHLORIDE AND ACETAMINOPHEN 2 TABLET: 5; 325 TABLET ORAL at 20:47

## 2021-02-16 RX ADMIN — VANCOMYCIN HYDROCHLORIDE 1500 MG: 10 INJECTION, POWDER, LYOPHILIZED, FOR SOLUTION INTRAVENOUS at 18:03

## 2021-02-16 RX ADMIN — LEVOTHYROXINE SODIUM 137 MCG: 137 TABLET ORAL at 08:43

## 2021-02-16 RX ADMIN — PRAMIPEXOLE DIHYDROCHLORIDE 0.5 MG: 0.5 TABLET ORAL at 08:43

## 2021-02-16 RX ADMIN — OXYCODONE HYDROCHLORIDE AND ACETAMINOPHEN 2 TABLET: 5; 325 TABLET ORAL at 11:49

## 2021-02-16 RX ADMIN — INSULIN GLARGINE 30 UNITS: 100 INJECTION, SOLUTION SUBCUTANEOUS at 21:45

## 2021-02-16 NOTE — PLAN OF CARE
Goal Outcome Evaluation:  Plan of Care Reviewed With: patient  Progress: improving  Outcome Summary: Ms. Ramos is a 65 y.o. female s/p lumbar surgery on 2/16/21. She presents today with expected post op impairments in strength and functional mobility. She requires CGA with cues for log roll, min A for supine to sit, SV for transfers, and CGA for amb for 45 ft with FWW. Prior to admission she lives with her  in a one level home with 3 MIRELLA and B hand rails. Has SPC at home. Plan to DC home with assist once medically appropriate.    Patient was intermittently wearing a face mask during this therapy encounter. Therapist used appropriate personal protective equipment including eye protection, mask, and gloves.  Mask used was standard procedure mask. Appropriate PPE was worn during the entire therapy session. Hand hygiene was completed before and after therapy session. Patient is not in enhanced droplet precautions.

## 2021-02-16 NOTE — THERAPY EVALUATION
Patient Name: Wendi Ramos  : 1955    MRN: [id number]                              Today's Date: 2021       Admit Date: 2/15/2021    Visit Dx:     ICD-10-CM ICD-9-CM   1. Spinal stenosis of lumbar region with neurogenic claudication  M48.062 724.03     Patient Active Problem List   Diagnosis   • Type 2 diabetes mellitus without complication (CMS/HCC)   • Post-operative state   • S/P lumbar fusion   • Insomnia   • Abnormal EKG   • Cellulitis of breast   • Chest pain   • Dyspnea on exertion   • Fibromyalgia   • Hyperhidrosis   • LAFB (left anterior fascicular block)   • Mass of right breast   • Obesity   • PVC (premature ventricular contraction)   • Rheumatoid arthritis (CMS/HCC)   • S/P catheter ablation of slow pathway   • Seizures (CMS/HCC)   • Sinus tachycardia   • Chronic fatigue   • Vitamin D deficiency   • Complex dyslipidemia   • Hypothyroidism due to Hashimoto's thyroiditis   • Hypertension   • Hyperlipidemia   • Acute right-sided low back pain without sciatica   • Spinal stenosis of lumbar region with neurogenic claudication   • Sleep apnea     Past Medical History:   Diagnosis Date   • Chronic fatigue    • Depression    • Diabetes mellitus (CMS/HCC)    • Fibromyalgia    • GERD (gastroesophageal reflux disease)    • Hiatal hernia    • History of Clostridioides difficile colitis    • Hyperlipidemia    • Hypertension    • Hypothyroidism    • Irregular heart beat    • Liver disease     FATTY LIVER, NON ALCOHOLIC   • Low back pain    • Peripheral neuropathy    • Rheumatoid arthritis (CMS/HCC)    • Rheumatoid arthritis (CMS/HCC)     DR SMYTH   • Seizures (CMS/HCC)    • Sleep apnea     NO CPAP   • Type 2 diabetes mellitus (CMS/HCC)    • West Nile fever      Past Surgical History:   Procedure Laterality Date   • ABDOMINOPLASTY     • ADENOIDECTOMY     • BILATERAL BREAST REDUCTION     • BRONCHOSCOPY N/A 12/15/2016    Procedure: BRONCHOSCOPY WITH BAL AND ENDOBRONCHIAL ULTRASOUND WITH FNA;   Surgeon: Diego Ponce MD;  Location: [facility] ENDOSCOPY;  Service:    • CARDIAC ABLATION     • CATARACT EXTRACTION, BILATERAL     • COLONOSCOPY     • ENDOSCOPY     • EYE SURGERY     • HYSTERECTOMY     • LASIK     • LUMBAR DISCECTOMY FUSION INSTRUMENTATION Left 6/8/2018    Procedure: LEFT L3/4 lumbar discectomy;  Surgeon: Miguelangel Goldberg MD;  Location: [facility] OR;  Service: Neurosurgery   • LUMBAR FUSION Left 7/12/2018    Procedure: Left L3 4 lumbar decompression and discectomy with transforaminal lumbar interbody fusion and posterior instrumentation with posterior lateral arthrodesis;  Surgeon: Miguelangel Goldberg MD;  Location: [facility] OR;  Service: Neurosurgery   • LUMBAR LAMINECTOMY WITH FUSION N/A 2/15/2021    Procedure: Lumbar 2-3, lumbar 3 4, lumbar 4 5 fusion with instrumentation and BMP,  L2-L3 lift with cage, and removal of implants L3-4 with laminectomies by Dr. Segundo;  Surgeon: Juancarlos Montelongo MD;  Location: [facility] OR;  Service: Orthopedic Spine;  Laterality: N/A;   • LUMBAR LAMINECTOMY WITH FUSION N/A 2/15/2021    Procedure: Lumbar 2 3, Lumbar 3 4 and lumbar 4 5 laminectomy with a fusion by orthopedics;  Surgeon: Jef Randall MD;  Location: [facility] OR;  Service: Neurosurgery;  Laterality: N/A;   • MOUTH SURGERY     • REPLACEMENT TOTAL KNEE Left    • TONSILLECTOMY     • TONSILLECTOMY AND ADENOIDECTOMY  1966     General Information     Row Name 02/16/21 0827          Physical Therapy Time and Intention    Document Type  evaluation  -CA     Mode of Treatment  individual therapy;physical therapy  -CA     Row Name 02/16/21 0827          General Information    Patient Profile Reviewed  yes  -CA     Existing Precautions/Restrictions  spinal;brace worn when out of bed per orders, still able to ambulate with physical therapy until brace arrives.  -CA     Barriers to Rehab  none identified  -CA     Row Name 02/16/21 0827          Living Environment    Lives With  spouse  -CA     Row  Name 02/16/21 0827          Home Main Entrance    Number of Stairs, Main Entrance  three  -CA     Stair Railings, Main Entrance  railings on both sides of stairs  -CA     Row Name 02/16/21 0827          Stairs Within Home, Primary    Number of Stairs, Within Home, Primary  none  -CA     Stair Railings, Within Home, Primary  none  -CA     Row Name 02/16/21 0827          Cognition    Orientation Status (Cognition)  oriented x 4  -CA     Row Name 02/16/21 0827          Safety Issues, Functional Mobility    Impairments Affecting Function (Mobility)  balance;strength;endurance/activity tolerance  -CA       User Key  (r) = Recorded By, (t) = Taken By, (c) = Cosigned By    Initials Name Provider Type    Marj Haley PT Physical Therapist        Mobility     Row Name 02/16/21 0829          Bed Mobility    Bed Mobility  rolling right;supine-sit  -CA     Rolling Right [place] (Bed Mobility)  verbal cues;nonverbal cues (demo/gesture);contact guard  -CA     Supine-Sit [place] (Bed Mobility)  minimum assist (75% patient effort)  -CA     Assistive Device (Bed Mobility)  bed rails  -CA     Comment (Bed Mobility)  increased verbal cues requires to perform log roll technique  -CA     Row Name 02/16/21 0829          Sit-Stand Transfer    Sit-Stand [place] (Transfers)  supervision;verbal cues  -CA     Assistive Device (Sit-Stand Transfers)  walker, front-wheeled  -CA     Row Name 02/16/21 0829          Gait/Stairs (Locomotion)    [place] Level (Gait)  contact guard  -CA     Assistive Device (Gait)  walker, front-wheeled  -CA     Distance in Feet (Gait)  45  -CA     Deviations/Abnormal Patterns (Gait)  ashley decreased;gait speed decreased  -CA     Bilateral Gait Deviations  forward flexed posture  -CA       User Key  (r) = Recorded By, (t) = Taken By, (c) = Cosigned By    Initials Name Provider Type    Marj Haley PT Physical Therapist        Obj/Interventions     Row Name 02/16/21 0843           Range of Motion Comprehensive    General Range of Motion  no range of motion deficits identified  -CA     Row Name 02/16/21 0831          Strength Comprehensive (MMT)    Comment, General Manual Muscle Testing (MMT) Assessment  B LE functionally at least 3/5  -CA     Row Name 02/16/21 0831          Motor Skills    Therapeutic Exercise  other (see comments) B AP x 20; B QS and GS x 10 ea  -CA     Row Name 02/16/21 0831          Balance    Balance Assessment  standing dynamic balance  -CA     Dynamic Standing Balance  mild impairment;supported  -[facility] Name 02/16/21 0831          Sensory Assessment (Somatosensory)    Sensory Assessment (Somatosensory)  sensation intact  -CA       User Key  (r) = Recorded By, (t) = Taken By, (c) = Cosigned By    Initials Name Provider Type    CA Marj Foster, PT Physical Therapist        Goals/Plan     [facility] Name 02/16/21 0834          Bed Mobility Goal 1 (PT)    Activity/Assistive Device (Bed Mobility Goal 1, PT)  bed mobility activities, all  -CA     [place] Level/Cues Needed (Bed Mobility Goal 1, PT)  supervision required  -CA     Time Frame (Bed Mobility Goal 1, PT)  by discharge  -[facility] Name 02/16/21 0834          Transfer Goal 1 (PT)    Activity/Assistive Device (Transfer Goal 1, PT)  transfers, all  -CA     [place] Level/Cues Needed (Transfer Goal 1, PT)  supervision required  -CA     Time Frame (Transfer Goal 1, PT)  by discharge  -[facility] Name 02/16/21 0834          Gait Training Goal 1 (PT)    Activity/Assistive Device (Gait Training Goal 1, PT)  gait (walking locomotion);assistive device use LRAD  -CA     [place] Level (Gait Training Goal 1, PT)  supervision required  -CA     Distance (Gait Training Goal 1, PT)  100 ft  -CA     Time Frame (Gait Training Goal 1, PT)  by discharge  -[facility] Name 02/16/21 0834          Stairs Goal 1 (PT)    Number of Stairs (Stairs Goal 1, PT)  3  -CA     Time Frame (Stairs Goal 1, PT)  by discharge  -CA        User Key  (r) = Recorded By, (t) = Taken By, (c) = Cosigned By    Initials Name Provider Type    Marj Haley, RONEL Physical Therapist        Clinical Impression     Row Name 02/16/21 0832          Plan of Care Review    Plan of Care Reviewed With  patient  -CA     Progress  improving  -CA     Outcome Summary  Ms. Ramos is a 65 y.o. female s/p lumbar surgery on 2/16/21. She presents today with expected post op impairments in strength and functional mobility. She requires CGA with cues for log roll, min A for supine to sit, SV for transfers, and CGA for amb for 45 ft with FWW. Prior to admission she lives with her  in a one level home with 3 MIRELLA and B hand rails. Has SPC at home. Plan to DC home with assist once medically appropriate.  -CA     Row Name 02/16/21 0832          Therapy Assessment/Plan (PT)    Rehab Potential (PT)  good, to achieve stated therapy goals  -CA     Criteria for Skilled Interventions Met (PT)  yes;skilled treatment is necessary  -CA     Row Name 02/16/21 0832          Positioning and Restraints    Pre-Treatment Position  in bed  -CA     Post Treatment Position  chair  -CA     In Chair  reclined;call light within reach;encouraged to call for assist;exit alarm on  -CA       User Key  (r) = Recorded By, (t) = Taken By, (c) = Cosigned By    Initials Name Provider Type    Marj Haley PT Physical Therapist        Outcome Measures     Row Name 02/16/21 0836          How much help from another person do you currently need...    Turning from your back to your side while in flat bed without using bedrails?  3  -CA     Moving from lying on back to sitting on the side of a flat bed without bedrails?  3  -CA     Moving to and from a bed to a chair (including a wheelchair)?  3  -CA     Standing up from a chair using your arms (e.g., wheelchair, bedside chair)?  4  -CA     Climbing 3-5 steps with a railing?  2  -CA     To walk in hospital room?  3  -CA     AM-PAC 6 Clicks Score (PT)   18  -CA     Row Name 02/16/21 0836          Functional Assessment    Outcome Measure Options  AM-PAC 6 Clicks Basic Mobility (PT)  -CA       User Key  (r) = Recorded By, (t) = Taken By, (c) = Cosigned By    Initials Name Provider Type    CA Marj Foster, RONEL Physical Therapist        Physical Therapy Education                 Title: PT OT SLP Therapies (Done)     Topic: Physical Therapy (Done)     Point: Mobility training (Done)     Learning Progress Summary           Patient Acceptance, E,TB, VU by CA at 2/16/2021 0836                   Point: Home exercise program (Done)     Learning Progress Summary           Patient Acceptance, E,TB, VU by CA at 2/16/2021 0836                   Point: Body mechanics (Done)     Learning Progress Summary           Patient Acceptance, E,TB, VU by CA at 2/16/2021 0836                   Point: Precautions (Done)     Learning Progress Summary           Patient Acceptance, E,TB, VU by CA at 2/16/2021 0836                               User Key     Initials Effective Dates Name Provider Type Discipline    CA 10/09/20 -  Marj Foster, PT Physical Therapist PT              PT Recommendation and Plan     Plan of Care Reviewed With: patient  Progress: improving  Outcome Summary: Ms. Ramos is a 65 y.o. female s/p lumbar surgery on 2/16/21. She presents today with expected post op impairments in strength and functional mobility. She requires CGA with cues for log roll, min A for supine to sit, SV for transfers, and CGA for amb for 45 ft with FWW. Prior to admission she lives with her  in a one level home with 3 MIRELLA and B hand rails. Has SPC at home. Plan to DC home with assist once medically appropriate.     Time Calculation:   PT Charges     Row Name 02/16/21 0837             Time Calculation    Start Time  0806  -CA      Stop Time  0827  -CA      Time Calculation (min)  21 min  -CA      PT Received On  02/16/21  -CA      PT - Next Appointment  02/17/21  -CA      PT Goal  Re-Cert Due Date  02/23/21  -CA         Time Calculation- PT    Total Timed Code Minutes- PT  10 minute(s)  -CA        User Key  (r) = Recorded By, (t) = Taken By, (c) = Cosigned By    Initials Name Provider Type    Marj Haley, PT Physical Therapist        Therapy Charges for Today     Code Description Service Date Service Provider Modifiers Qty    89792131240 HC PT EVAL MOD COMPLEXITY 1 2/16/2021 Marj Foster, PT GP 1    [id number]  PT THER PROC EA 15 MIN 2/16/2021 Marj Foster, PT GP 1          PT G-Codes  Outcome Measure Options: AM-PAC 6 Clicks Basic Mobility (PT)  AM-PAC 6 Clicks Score (PT): 18    Marj Foster, PT  2/16/2021

## 2021-02-16 NOTE — PLAN OF CARE
Goal Outcome Evaluation:  Plan of Care Reviewed With: patient  Progress: no change       Patient Aox4/ remain on 2 LPM via NC / Dilaudid PCA pump well controlling the  pain / dressing intact dry no any drainage noted / Jerome in place / IVF tolerated well / VS WNL / all care as per MD orders continue / No voiced complaints in night tour /

## 2021-02-16 NOTE — PROGRESS NOTES
NEUROSURGERY PROGRESS NOTE     LOS: 1 day   Patient Care Team:  Flo Temple MD as PCP - General (Family Medicine)  Jomar Steve MD as Consulting Physician (Interventional Cardiology)  Donny Gan MD as Consulting Physician (Pain Medicine)    Chief Complaint: Worsening back pain.     Subjective     Interval History:   Patient eating lunching up in chair and denies pain.  Reports that she was able to stand up straight for the first time in a long time.  Right groin feels sore and hurts when she engages her quadricep.  Denies nausea or vomiting.     While in the room and during my examination of the patient I wore a mask and eye protection.  I washed my hands before and after this patient encounter.      History taken from: patient chart    Objective      Vital Signs  Temp:  [96.8 °F (36 °C)-98.5 °F (36.9 °C)] 98.5 °F (36.9 °C)  Heart Rate:  [] 73  Resp:  [16] 16  BP: ()/(52-69) 90/52  Body mass index is 31.61 kg/m².    Intake/Output last 3 shifts:  I/O last 3 completed shifts:  In: 5075 [P.O.:680; I.V.:3300; Blood:345; IV Piggyback:750]  Out: 2300 [Urine:1600; Blood:700]    Intake/Output this shift:  I/O this shift:  In: 500 [P.O.:250; Other:250]  Out: 400 [Urine:400]    Physical    Physical Exam   Constitutional: She is oriented to person, place, and time and well-developed, well-nourished, and in no distress. No distress.   Cardiovascular: Normal rate.   Pulmonary/Chest: No respiratory distress.   Musculoskeletal:         General: No edema.      Lumbar back: She exhibits no pain ( negative straight leg raise ).   Neurological: She is alert and oriented to person, place, and time. She displays no weakness and normal reflexes. No sensory deficit. She exhibits normal muscle tone. Coordination normal.   R/L Upper and lower ext strength 5/5     Skin: Skin is warm and dry.   Bulky pressure drsg CDI to midline back.    Psychiatric: Affect and judgment normal. Her mood appears not anxious.  She is not agitated.   Vitals reviewed.      Results Review:     I reviewed the patient's new clinical results.    Labs:    Lab Results (last 24 hours)     Procedure Component Value Units Date/Time    POC Glucose Once [[id number]]  (Normal) Collected: 02/15/21 1609    Specimen: Blood Updated: 02/15/21 1611     Glucose 124 mg/dL     POC Glucose Once [[id number]]  (Abnormal) Collected: 02/15/21 2100    Specimen: Blood Updated: 02/15/21 2102     Glucose 145 mg/dL     Hemoglobin & Hematocrit, Blood [[id number]]  (Abnormal) Collected: 02/16/21 0456    Specimen: Blood Updated: 02/16/21 0524     Hemoglobin 9.2 g/dL      Hematocrit 27.7 %     Basic Metabolic Panel [[id number]]  (Abnormal) Collected: 02/16/21 0456    Specimen: Blood Updated: 02/16/21 0549     Glucose 121 mg/dL      BUN 16 mg/dL      Creatinine 0.68 mg/dL      Sodium 138 mmol/L      Potassium 3.8 mmol/L      Chloride 104 mmol/L      CO2 23.4 mmol/L      Calcium 8.1 mg/dL      eGFR Non African Amer 86 mL/min/1.73      BUN/Creatinine Ratio 23.5     Anion Gap 10.6 mmol/L     Narrative:      GFR Normal >60  Chronic Kidney Disease <60  Kidney Failure <15      Hemoglobin A1c [[id number]]  (Abnormal) Collected: 02/16/21 0456    Specimen: Blood Updated: 02/16/21 0533     Hemoglobin A1C 6.80 %     Narrative:      Hemoglobin A1C Ranges:    Increased Risk for Diabetes  5.7% to 6.4%  Diabetes                     >= 6.5%  Diabetic Goal                < 7.0%    POC Glucose Once [[id number]]  (Normal) Collected: 02/16/21 0603    Specimen: Blood Updated: 02/16/21 0604     Glucose 122 mg/dL     POC Glucose Once [[id number]]  (Abnormal) Collected: 02/16/21 1106    Specimen: Blood Updated: 02/16/21 1108     Glucose 193 mg/dL           Imaging:  No new imaging.       Current Medications:   Scheduled Meds:baclofen, 20 mg, Oral, BID  DULoxetine, 60 mg, Oral, BID  fenofibrate, 48 mg, Oral, Daily  ferrous fulfate dried ER, 140 mg, Oral, Daily  insulin glargine, 30 Units, Subcutaneous,  Nightly  insulin lispro, 5 Units, Subcutaneous, TID With Meals  insulin lispro, 0-9 Units, Subcutaneous, TID With Meals  lamoTRIgine, 150 mg, Oral, BID  levETIRAcetam, 1,500 mg, Oral, Daily  levothyroxine, 137 mcg, Oral, Daily  metoprolol tartrate, 50 mg, Oral, Q12H  OXcarbazepine, 600 mg, Oral, Q12H  pantoprazole, 40 mg, Oral, QAM  polyethylene glycol, 17 g, Oral, Daily  pramipexole, 0.5 mg, Oral, Q12H  senna-docusate sodium, 1 tablet, Oral, BID  sodium chloride, 3 mL, Intravenous, Q12H  vancomycin, 15 mg/kg, Intravenous, Q12H      Continuous Infusions:HYDROmorphone HCl-NaCl,   lactated ringers, 9 mL/hr, Last Rate: 9 mL/hr (02/15/21 6812)  sodium chloride, 100 mL/hr, Last Rate: 100 mL/hr (02/16/21 3838)        Assessment/Plan      Vital signs stable, afebrile.  No evidence of R groin injury, no bruising or swelling, positive pedal pulse.  RN removed F/C due to void. Reports improvement in back pain. Up ambulating with PT and able to stand more straight than prior to surgery.  Dr Montelongo to manage activity, brace, pain medication and incision.          Spinal stenosis of lumbar region with neurogenic claudication    Type 2 diabetes mellitus without complication (CMS/HCC)    Rheumatoid arthritis (CMS/HCC)    Hypertension    Hyperlipidemia    Sleep apnea      Plan:    CBC in AM  Brace at bedside  Will follow along with Dr Reginald Boykin, APRN  02/16/21  14:50 EST

## 2021-02-16 NOTE — PLAN OF CARE
See below.    Problem: Adult Inpatient Plan of Care  Goal: Plan of Care Review  Outcome: Ongoing, Progressing  Flowsheets (Taken 2/16/2021 9946)  Progress: improving  Plan of Care Reviewed With: patient  Outcome Summary: 65/F POD#1 lumbar spinal surgery.  ALOx4, RA, lungs clear,but diminished, BS hypoactive but improving, voiding independently.  Up x1 BRP with walker/gait belt.  2+ pedal pulses noted bilaterally, no c/o numbness/tingling in operative extremity, dressing CDI.  Pain well-controlled with PO pain meds PRN with Dilaudid PCA 0.2/8/2.  PIV infusing NS @ 100 cc/hr per MD orders with intermittent IV ABX.  D/C planning in progress, will CTM.

## 2021-02-16 NOTE — PROGRESS NOTES
Orthopedic Progress Note      Patient: Wendi Ramos    YOB: 1955    Medical Record Number: [id number]    Attending Physician: Juancarlos Montelongo MD    Date of Admission: 2/15/2021  5:26 AM    Admitting Dx:  Spinal stenosis of lumbar region with neurogenic claudication [M48.062]  Spinal stenosis of lumbar region with neurogenic claudication [M48.062]    Status Post: NV ARTHDSIS POST/POSTEROLATRL/POSTINTERBODY LUMBAR [83516] (Lumbar 2-3 fusion with instrumentation and removal of implants L3-4 with laminectomies by Dr. Segundo)    Post Operative Day Number: 1    Current Problem List:   Patient Active Problem List   Diagnosis   • Type 2 diabetes mellitus without complication (CMS/HCC)   • Post-operative state   • S/P lumbar fusion   • Insomnia   • Abnormal EKG   • Cellulitis of breast   • Chest pain   • Dyspnea on exertion   • Fibromyalgia   • Hyperhidrosis   • LAFB (left anterior fascicular block)   • Mass of right breast   • Obesity   • PVC (premature ventricular contraction)   • Rheumatoid arthritis (CMS/HCC)   • S/P catheter ablation of slow pathway   • Seizures (CMS/HCC)   • Sinus tachycardia   • Chronic fatigue   • Vitamin D deficiency   • Complex dyslipidemia   • Hypothyroidism due to Hashimoto's thyroiditis   • Hypertension   • Hyperlipidemia   • Acute right-sided low back pain without sciatica   • Spinal stenosis of lumbar region with neurogenic claudication   • Sleep apnea         Past Medical History:   Diagnosis Date   • Chronic fatigue    • Depression    • Diabetes mellitus (CMS/HCC)    • Fibromyalgia    • GERD (gastroesophageal reflux disease)    • Hiatal hernia    • History of Clostridioides difficile colitis    • Hyperlipidemia    • Hypertension    • Hypothyroidism    • Irregular heart beat    • Liver disease     FATTY LIVER, NON ALCOHOLIC   • Low back pain    • Peripheral neuropathy    • Rheumatoid arthritis (CMS/HCC)    • Rheumatoid arthritis (CMS/HCC) 2005    DR SMYTH   • Seizures  (CMS/[facility])    • Sleep apnea     NO CPAP   • Type 2 diabetes mellitus (CMS/[facility])    • West Nile fever 2002       Current Medications:  Scheduled Meds:baclofen, 20 mg, Oral, BID  DULoxetine, 60 mg, Oral, BID  fenofibrate, 48 mg, Oral, Daily  ferrous fulfate dried ER, 140 mg, Oral, Daily  insulin glargine, 30 Units, Subcutaneous, Nightly  insulin lispro, 0-9 Units, Subcutaneous, TID With Meals  lamoTRIgine, 150 mg, Oral, BID  levETIRAcetam, 1,500 mg, Oral, Daily  levothyroxine, 137 mcg, Oral, Daily  metoprolol tartrate, 50 mg, Oral, Q12H  OXcarbazepine, 600 mg, Oral, Q12H  pantoprazole, 40 mg, Oral, QAM  polyethylene glycol, 17 g, Oral, Daily  pramipexole, 0.5 mg, Oral, Q12H  senna-docusate sodium, 1 tablet, Oral, BID  sodium chloride, 3 mL, Intravenous, Q12H  vancomycin, 15 mg/kg, Intravenous, Q12H      PRN Meds:.•  acetaminophen  •  bisacodyl  •  dextrose  •  dextrose  •  glucagon (human recombinant)  •  magnesium hydroxide  •  naloxone  •  ondansetron **OR** ondansetron  •  oxyCODONE-acetaminophen  •  sodium chloride  •  temazepam  •  zolpidem    SUBJECTIVE: 65 y.o.  female.  Awake and alert.      OBJECTIVE:   Vitals:    02/15/21 2300 02/16/21 0300 02/16/21 0741 02/16/21 1135   BP: 118/65 120/57 130/65 90/52   BP Location: Left arm Left arm Left arm Left arm   Patient Position: Lying Lying Lying Lying   Pulse: 82 110 101 73   Resp: 16 16 16 16   Temp: 97.5 °F (36.4 °C) 97.5 °F (36.4 °C) 97.8 °F (36.6 °C) 98.5 °F (36.9 °C)   TempSrc: Skin Skin Skin Skin   SpO2: 99% 96% 99% 95%   Weight:       Height:         I/O last 3 completed shifts:  In: 5075 [P.O.:680; I.V.:3300; Blood:345; IV Piggyback:750]  Out: 2300 [Urine:1600; Blood:700]    Diagnostic Tests:   Lab Results (last 24 hours)     Procedure Component Value Units Date/Time    POC Glucose Once [[id number]]  (Abnormal) Collected: 02/16/21 1106    Specimen: Blood Updated: 02/16/21 1108     Glucose 193 mg/dL     POC Glucose Once [[id number]]  (Normal) Collected: 02/16/21  0603    Specimen: Blood Updated: 02/16/21 0604     Glucose 122 mg/dL     Basic Metabolic Panel [[id number]]  (Abnormal) Collected: 02/16/21 0456    Specimen: Blood Updated: 02/16/21 0549     Glucose 121 mg/dL      BUN 16 mg/dL      Creatinine 0.68 mg/dL      Sodium 138 mmol/L      Potassium 3.8 mmol/L      Chloride 104 mmol/L      CO2 23.4 mmol/L      Calcium 8.1 mg/dL      eGFR Non African Amer 86 mL/min/1.73      BUN/Creatinine Ratio 23.5     Anion Gap 10.6 mmol/L     Narrative:      GFR Normal >60  Chronic Kidney Disease <60  Kidney Failure <15      Hemoglobin A1c [[id number]]  (Abnormal) Collected: 02/16/21 0456    Specimen: Blood Updated: 02/16/21 0533     Hemoglobin A1C 6.80 %     Narrative:      Hemoglobin A1C Ranges:    Increased Risk for Diabetes  5.7% to 6.4%  Diabetes                     >= 6.5%  Diabetic Goal                < 7.0%    Hemoglobin & Hematocrit, Blood [[id number]]  (Abnormal) Collected: 02/16/21 0456    Specimen: Blood Updated: 02/16/21 0524     Hemoglobin 9.2 g/dL      Hematocrit 27.7 %     POC Glucose Once [[id number]]  (Abnormal) Collected: 02/15/21 2100    Specimen: Blood Updated: 02/15/21 2102     Glucose 145 mg/dL     POC Glucose Once [[id number]]  (Normal) Collected: 02/15/21 1609    Specimen: Blood Updated: 02/15/21 1611     Glucose 124 mg/dL     POC Glucose Once [[id number]]  (Normal) Collected: 02/15/21 1434    Specimen: Blood Updated: 02/15/21 1436     Glucose 122 mg/dL     Hemoglobin & Hematocrit, Blood [[id number]]  (Abnormal) Collected: 02/15/21 1322    Specimen: Blood Updated: 02/15/21 1345     Hemoglobin 11.0 g/dL      Hematocrit 34.5 %           PHYSICAL EXAM: Back dressing remains dry and intact.  Moving legs well.  Is complaining of some right groin pain that is worse with hip flexion.  Most likely muscular in nature.  Strength is equal bilaterally.  Denies any headache.  Abdomen is soft with bowel sounds.  Tolerating p.o. fluids and p.o. food well.  Not passing any gas at  present.      Using incentive spirometer.     ASSESSMENT & PLAN:    Continue to mobilize with PT.    Jerome removed this morning but has not voided.  Patient is planning to go home with family postoperatively.    Date: 2/16/2021    Sena Dunbra RN

## 2021-02-16 NOTE — CONSULTS
Patient Name:  Wendi Ramos  YOB: 1955  MRN:  [id number]  Date of Admission:  2/15/2021  Date of Consult:  2/16/2021  Patient Care Team:  Flo Temple MD as PCP - General (Family Medicine)  Jomar Steve MD as Consulting Physician (Interventional Cardiology)  Donny Gan MD as Consulting Physician (Pain Medicine)    Inpatient Hospitalist Consult  Consult performed by: Miguelangel Pinzon MD  Consult ordered by: Juancarlos Montelongo MD  Reason for consult: Evaluate status and make recommendations regarding treatment for diabetes and hypertension.        Subjective   History of Present Illness  Ms. Ramos is a 65 y.o. female that has been admitted to [facility] following elective Lumbar 2-3, lumbar 3 4, lumbar 4 5 fusion with instrumentation and BMP,  L2-L3 lift with cage, and removal of implants L3-4 with laminectomies by Dr. Segundo.  She has been admitted to an orthopedic floor following surgery and we were asked to see and assist with her medical problems, specifically relating to her blood sugar and blood pressure.  At the time of my visit she denies any chest pain, SOA, nausea, vomiting or diarrhea.  She has tolerated a diet.  She does complain of expected postoperative discomfort.  She reports being in a normal state of health leading up to surgery.      Past Medical History:   Diagnosis Date   • Chronic fatigue    • Depression    • Diabetes mellitus (CMS/HCC)    • Fibromyalgia    • GERD (gastroesophageal reflux disease)    • Hiatal hernia    • History of Clostridioides difficile colitis    • Hyperlipidemia    • Hypertension    • Hypothyroidism    • Irregular heart beat    • Liver disease     FATTY LIVER, NON ALCOHOLIC   • Low back pain    • Peripheral neuropathy    • Rheumatoid arthritis (CMS/HCC)    • Rheumatoid arthritis (CMS/HCC) 2005    DR SMYTH   • Seizures (CMS/HCC)    • Sleep apnea     NO CPAP   • Type 2 diabetes mellitus (CMS/HCC)    • West Nile fever 2002      Past Surgical History:   Procedure Laterality Date   • ABDOMINOPLASTY     • ADENOIDECTOMY     • BILATERAL BREAST REDUCTION     • BRONCHOSCOPY N/A 12/15/2016    Procedure: BRONCHOSCOPY WITH BAL AND ENDOBRONCHIAL ULTRASOUND WITH FNA;  Surgeon: Diego Ponce MD;  Location: [facility] ENDOSCOPY;  Service:    • CARDIAC ABLATION     • CATARACT EXTRACTION, BILATERAL     • COLONOSCOPY     • ENDOSCOPY     • EYE SURGERY     • HYSTERECTOMY     • LASIK     • LUMBAR DISCECTOMY FUSION INSTRUMENTATION Left 6/8/2018    Procedure: LEFT L3/4 lumbar discectomy;  Surgeon: Miguelangel Goldberg MD;  Location: [facility] MAIN OR;  Service: Neurosurgery   • LUMBAR FUSION Left 7/12/2018    Procedure: Left L3 4 lumbar decompression and discectomy with transforaminal lumbar interbody fusion and posterior instrumentation with posterior lateral arthrodesis;  Surgeon: Miguelangel Goldberg MD;  Location: [facility] OR;  Service: Neurosurgery   • MOUTH SURGERY     • REPLACEMENT TOTAL KNEE Left    • TONSILLECTOMY     • TONSILLECTOMY AND ADENOIDECTOMY  1966     Family History   Problem Relation Age of Onset   • Diabetes Mother    • Neuropathy Father    • Colon polyps Father    • Diabetes Sister    • No Known Problems Son    • Diabetes Maternal Grandmother    • Cancer Maternal Grandmother    • Diabetes Paternal Grandmother    • Cancer Paternal Grandmother    • Malig Hyperthermia Neg Hx      Social History     Tobacco Use   • Smoking status: Never Smoker   • Smokeless tobacco: Never Used   Substance Use Topics   • Alcohol use: Yes     Comment: social   • Drug use: No     Medications Prior to Admission   Medication Sig Dispense Refill Last Dose   • B Complex Vitamins (VITAMIN B COMPLEX PO) Take 1 tablet by mouth Daily.   2/14/2021 at 0800   • baclofen (LIORESAL) 20 MG tablet Take 20 mg by mouth 2 (Two) Times a Day.   2/14/2021 at 1800   • DULoxetine (CYMBALTA) 60 MG capsule Take 60 mg by mouth 2 (Two) Times a Day.   2/14/2021 at 1800   • fenofibrate 160  MG tablet Take 1 tablet by mouth Daily. 90 tablet 1 2/14/2021 at 1800   • hydroxychloroquine (PLAQUENIL) 200 MG tablet Take 200 mg by mouth 2 (Two) Times a Day.   2/14/2021 at 1800   • lamoTRIgine (LaMICtal) 100 MG tablet Take 150 mg by mouth 2 (Two) Times a Day. 1.5 pills BId   2/14/2021 at 1800   • levETIRAcetam (KEPPRA) 500 MG tablet Take 1,500 mg by mouth 2 (Two) Times a Day. Take 3 tabs bid   2/14/2021 at 1800   • levothyroxine (SYNTHROID, LEVOTHROID) 137 MCG tablet TAKE 1 TABLET EVERY DAY (Patient taking differently: Take 137 mcg by mouth Daily.) 90 tablet 0 2/15/2021 at 0400   • metoprolol tartrate (LOPRESSOR) 50 MG tablet Take 1 tablet by mouth Every 12 (Twelve) Hours. 180 tablet 2 2/15/2021 at 0400   • omeprazole (priLOSEC) 40 MG capsule TAKE 1 CAPSULE EVERY DAY (Patient taking differently: Take 40 mg by mouth Daily.) 90 capsule 1 2/15/2021 at 0400   • OXcarbazepine (TRILEPTAL) 600 MG tablet Take 600 mg by mouth 2 (Two) Times a Day.   2/14/2021 at 1800   • pramipexole (MIRAPEX) 0.5 MG tablet Take 0.5 mg by mouth 2 (two) times a day.   2/14/2021 at 1800   • rosuvastatin (CRESTOR) 40 MG tablet TAKE 1 TABLET EVERY DAY (Patient taking differently: Take 40 mg by mouth Daily.) 90 tablet 3 2/14/2021 at 1800   • Soliqua 100-33 UNT-MCG/ML solution pen-injector injection INJECT 60 UNITS UNDER THE SKIN INTO THE APPROPRIATE AREA AS DIRECTED DAILY WITH BREAKFAST. 15 pen 1 2/14/2021 at 0800   • vitamin D3 125 MCG (5000 UT) capsule capsule Take 5,000 Units by mouth Daily.   2/14/2021 at 0800   • XIGDUO XR 5-1000 MG tablet TAKE 2 TABLETS EVERY DAY (Patient taking differently: Take 2 tablets by mouth Daily.) 180 tablet 3 2/12/2021   • zolpidem (AMBIEN) 10 MG tablet Take 1 tablet by mouth At Night As Needed for Sleep. (Patient taking differently: Take 10 mg by mouth Every Night.) 90 tablet 0 2/14/2021 at 1800   • aspirin 81 MG EC tablet Take 81 mg by mouth Daily. HOLDING FOR SURGERY   2/11/2021   • estradiol (MINIVELLE,  VIVELLE-DOT) 0.075 MG/24HR patch PLACE 1 PATCH ON THE SKIN AS DIRECTED BY PROVIDER 2 (TWO) TIMES A WEEK. (Patient taking differently: Place 1 patch on the skin as directed by provider 2 (Two) Times a Week. TUES AND FRI) 24 patch 3 2/12/2021   • Ferrous Sulfate Dried (High Potency Iron) 65 MG tablet Take 65 mg by mouth Every Other Day.   2/13/2021   • HYDROcodone-acetaminophen (NORCO) 7.5-325 MG per tablet Take 1 tablet by mouth Every 4 (Four) Hours As Needed.   2/13/2021   • omega-3 acid ethyl esters (LOVAZA) 1 g capsule Take 2 g by mouth 2 (Two) Times a Day.   2/11/2021     Allergies:  Adhesive tape, Sulfamethoxazole w/trimethoprim 800-160  [sulfamethoxazole-trimethoprim], Cefpodoxime, Cephalosporins, Metformin and related, Sulfa antibiotics, Topiramate, and Latex    Review of Systems   Constitutional: Negative.    HENT: Negative.    Respiratory: Negative.    Cardiovascular: Negative.    Gastrointestinal: Negative.    Endocrine: Negative.    Genitourinary: Negative.    Musculoskeletal: Negative.    Skin: Negative.    Neurological: Negative.    Hematological: Negative.    Psychiatric/Behavioral: Negative.        Objective      Vital Signs  Temp:  [96.8 °F (36 °C)-99.7 °F (37.6 °C)] 97.8 °F (36.6 °C)  Heart Rate:  [] 101  Resp:  [16] 16  BP: ()/(54-72) 130/65  Body mass index is 31.61 kg/m².    Physical Exam  Vitals signs and nursing note reviewed.   Constitutional:       General: She is not in acute distress.     Appearance: She is well-developed.   HENT:      Head: Normocephalic and atraumatic.   Eyes:      General: No scleral icterus.     Conjunctiva/sclera: Conjunctivae normal.   Neck:      Musculoskeletal: Normal range of motion.      Vascular: No JVD.   Cardiovascular:      Rate and Rhythm: Normal rate and regular rhythm.   Pulmonary:      Effort: Pulmonary effort is normal.      Breath sounds: Normal breath sounds.   Abdominal:      General: Bowel sounds are normal. There is no distension.       Palpations: Abdomen is soft.      Tenderness: There is no abdominal tenderness.   Musculoskeletal: Normal range of motion.   Skin:     General: Skin is warm and dry.   Neurological:      Mental Status: She is alert and oriented to person, place, and time.   Psychiatric:         Behavior: Behavior normal.         Results Review:   I reviewed the patient's new clinical results.  I reviewed the patient's new imaging results and agree with the interpretation.  I reviewed the patient's other test results and agree with the interpretation  I personally viewed and interpreted the patient's EKG/Telemetry data         Assessment/Plan     Active Hospital Problems    Diagnosis POA   • **Spinal stenosis of lumbar region with neurogenic claudication [M48.062] Yes   • Sleep apnea [G47.30] Unknown     NO CPAP     • Hypertension [I10] Yes   • Hyperlipidemia [E78.5] Yes   • Type 2 diabetes mellitus without complication (CMS/HCC) [E11.9] Yes   • Rheumatoid arthritis (CMS/HCC) [M06.9] Yes       Ms. Ramos is a 65 y.o. female who is s/p Lumbar 2-3, lumbar 3 4, lumbar 4 5 fusion with instrumentation and BMP,  L2-L3 lift with cage, and removal of implants L3-4 with laminectomies by Dr. Segundo.    · She seems to be doing well thus far postoperatively.   · Hold home oral diabetic medications.  · Continue home long acting insulin - LANTUS 30 units SC HS.  She takes similar insulin at home 60 units in AM.  · HUMALOG - will schedule 5 units with each meal in addition and to moderate dose correctional factor as needed.  · Monitor glucose TID AC. For any BG less than 70 mg/dL, treat per hospital protocol  · HgbA1C 6.8%.  Continue same preadmit regimen at discharge.    · Renal function stable.   · NCS diet.  · Continue IVFs as ordered.    · Monitor renal function.      Thank you very much for asking [facility] to be involved in this patient's care. We will follow along with you.      Miguelangel Pinzon MD  [facility]  02/16/21  07:54  EST

## 2021-02-17 LAB
DEPRECATED RDW RBC AUTO: 40.9 FL (ref 37–54)
ERYTHROCYTE [DISTWIDTH] IN BLOOD BY AUTOMATED COUNT: 12 % (ref 12.3–15.4)
GLUCOSE BLDC GLUCOMTR-MCNC: 151 MG/DL (ref 70–130)
GLUCOSE BLDC GLUCOMTR-MCNC: 160 MG/DL (ref 70–130)
GLUCOSE BLDC GLUCOMTR-MCNC: 163 MG/DL (ref 70–130)
GLUCOSE BLDC GLUCOMTR-MCNC: 209 MG/DL (ref 70–130)
HCT VFR BLD AUTO: 24.4 % (ref 34–46.6)
HGB BLD-MCNC: 8.1 G/DL (ref 12–15.9)
MCH RBC QN AUTO: 30.9 PG (ref 26.6–33)
MCHC RBC AUTO-ENTMCNC: 33.2 G/DL (ref 31.5–35.7)
MCV RBC AUTO: 93.1 FL (ref 79–97)
PLATELET # BLD AUTO: 129 10*3/MM3 (ref 140–450)
PMV BLD AUTO: 11.4 FL (ref 6–12)
RBC # BLD AUTO: 2.62 10*6/MM3 (ref 3.77–5.28)
WBC # BLD AUTO: 13.02 10*3/MM3 (ref 3.4–10.8)

## 2021-02-17 PROCEDURE — 63710000001 INSULIN GLARGINE PER 5 UNITS: Performed by: HOSPITALIST

## 2021-02-17 PROCEDURE — 85027 COMPLETE CBC AUTOMATED: CPT | Performed by: NURSE PRACTITIONER

## 2021-02-17 PROCEDURE — 99024 POSTOP FOLLOW-UP VISIT: CPT | Performed by: ORTHOPAEDIC SURGERY

## 2021-02-17 PROCEDURE — 99024 POSTOP FOLLOW-UP VISIT: CPT | Performed by: NURSE PRACTITIONER

## 2021-02-17 PROCEDURE — 25010000002 HYDROMORPHONE PER 4 MG: Performed by: ORTHOPAEDIC SURGERY

## 2021-02-17 PROCEDURE — 63710000001 INSULIN LISPRO (HUMAN) PER 5 UNITS: Performed by: HOSPITALIST

## 2021-02-17 PROCEDURE — 25010000002 VANCOMYCIN 10 G RECONSTITUTED SOLUTION: Performed by: ORTHOPAEDIC SURGERY

## 2021-02-17 PROCEDURE — 82962 GLUCOSE BLOOD TEST: CPT

## 2021-02-17 PROCEDURE — 25010000002 DEXAMETHASONE PER 1 MG: Performed by: ORTHOPAEDIC SURGERY

## 2021-02-17 PROCEDURE — 97110 THERAPEUTIC EXERCISES: CPT

## 2021-02-17 RX ORDER — OXYCODONE AND ACETAMINOPHEN 7.5; 325 MG/1; MG/1
1 TABLET ORAL EVERY 4 HOURS PRN
Status: DISCONTINUED | OUTPATIENT
Start: 2021-02-17 | End: 2021-02-18 | Stop reason: HOSPADM

## 2021-02-17 RX ORDER — HYDROMORPHONE HYDROCHLORIDE 1 MG/ML
0.5 INJECTION, SOLUTION INTRAMUSCULAR; INTRAVENOUS; SUBCUTANEOUS ONCE
Status: COMPLETED | OUTPATIENT
Start: 2021-02-17 | End: 2021-02-17

## 2021-02-17 RX ORDER — INSULIN GLARGINE 100 [IU]/ML
40 INJECTION, SOLUTION SUBCUTANEOUS NIGHTLY
Status: DISCONTINUED | OUTPATIENT
Start: 2021-02-17 | End: 2021-02-18 | Stop reason: HOSPADM

## 2021-02-17 RX ORDER — INSULIN LISPRO 100 [IU]/ML
7 INJECTION, SOLUTION INTRAVENOUS; SUBCUTANEOUS
Status: DISCONTINUED | OUTPATIENT
Start: 2021-02-17 | End: 2021-02-18 | Stop reason: HOSPADM

## 2021-02-17 RX ORDER — DEXAMETHASONE SODIUM PHOSPHATE 4 MG/ML
4 INJECTION, SOLUTION INTRA-ARTICULAR; INTRALESIONAL; INTRAMUSCULAR; INTRAVENOUS; SOFT TISSUE EVERY 6 HOURS
Status: DISCONTINUED | OUTPATIENT
Start: 2021-02-17 | End: 2021-02-18 | Stop reason: HOSPADM

## 2021-02-17 RX ORDER — OXYCODONE AND ACETAMINOPHEN 7.5; 325 MG/1; MG/1
2 TABLET ORAL EVERY 4 HOURS PRN
Status: DISCONTINUED | OUTPATIENT
Start: 2021-02-17 | End: 2021-02-18 | Stop reason: HOSPADM

## 2021-02-17 RX ADMIN — INSULIN LISPRO 4 UNITS: 100 INJECTION, SOLUTION INTRAVENOUS; SUBCUTANEOUS at 16:07

## 2021-02-17 RX ADMIN — PRAMIPEXOLE DIHYDROCHLORIDE 0.5 MG: 0.5 TABLET ORAL at 20:37

## 2021-02-17 RX ADMIN — DULOXETINE HYDROCHLORIDE 60 MG: 60 CAPSULE, DELAYED RELEASE ORAL at 07:44

## 2021-02-17 RX ADMIN — METOPROLOL TARTRATE 50 MG: 50 TABLET, FILM COATED ORAL at 20:37

## 2021-02-17 RX ADMIN — INSULIN LISPRO 2 UNITS: 100 INJECTION, SOLUTION INTRAVENOUS; SUBCUTANEOUS at 10:55

## 2021-02-17 RX ADMIN — METOPROLOL TARTRATE 50 MG: 50 TABLET, FILM COATED ORAL at 07:46

## 2021-02-17 RX ADMIN — OXYCODONE HYDROCHLORIDE AND ACETAMINOPHEN 2 TABLET: 7.5; 325 TABLET ORAL at 14:57

## 2021-02-17 RX ADMIN — Medication 140 MG: at 07:45

## 2021-02-17 RX ADMIN — INSULIN LISPRO 2 UNITS: 100 INJECTION, SOLUTION INTRAVENOUS; SUBCUTANEOUS at 07:46

## 2021-02-17 RX ADMIN — BACLOFEN 20 MG: 10 TABLET ORAL at 07:45

## 2021-02-17 RX ADMIN — POLYETHYLENE GLYCOL 3350 17 G: 17 POWDER, FOR SOLUTION ORAL at 07:46

## 2021-02-17 RX ADMIN — OXYCODONE HYDROCHLORIDE AND ACETAMINOPHEN 2 TABLET: 5; 325 TABLET ORAL at 02:15

## 2021-02-17 RX ADMIN — VANCOMYCIN HYDROCHLORIDE 1500 MG: 10 INJECTION, POWDER, LYOPHILIZED, FOR SOLUTION INTRAVENOUS at 05:04

## 2021-02-17 RX ADMIN — INSULIN LISPRO 5 UNITS: 100 INJECTION, SOLUTION INTRAVENOUS; SUBCUTANEOUS at 07:48

## 2021-02-17 RX ADMIN — OXYCODONE HYDROCHLORIDE AND ACETAMINOPHEN 2 TABLET: 7.5; 325 TABLET ORAL at 22:22

## 2021-02-17 RX ADMIN — DEXAMETHASONE SODIUM PHOSPHATE 4 MG: 4 INJECTION, SOLUTION INTRAMUSCULAR; INTRAVENOUS at 18:39

## 2021-02-17 RX ADMIN — LEVETIRACETAM 1500 MG: 500 TABLET, FILM COATED ORAL at 07:44

## 2021-02-17 RX ADMIN — SODIUM CHLORIDE, PRESERVATIVE FREE 3 ML: 5 INJECTION INTRAVENOUS at 20:41

## 2021-02-17 RX ADMIN — OXCARBAZEPINE 600 MG: 300 TABLET, FILM COATED ORAL at 07:44

## 2021-02-17 RX ADMIN — INSULIN LISPRO 7 UNITS: 100 INJECTION, SOLUTION INTRAVENOUS; SUBCUTANEOUS at 16:07

## 2021-02-17 RX ADMIN — DOCUSATE SODIUM 50MG AND SENNOSIDES 8.6MG 1 TABLET: 8.6; 5 TABLET, FILM COATED ORAL at 07:46

## 2021-02-17 RX ADMIN — HYDROMORPHONE HYDROCHLORIDE 0.5 MG: 1 INJECTION, SOLUTION INTRAMUSCULAR; INTRAVENOUS; SUBCUTANEOUS at 17:29

## 2021-02-17 RX ADMIN — INSULIN GLARGINE 40 UNITS: 100 INJECTION, SOLUTION SUBCUTANEOUS at 22:21

## 2021-02-17 RX ADMIN — SODIUM CHLORIDE 100 ML/HR: 9 INJECTION, SOLUTION INTRAVENOUS at 05:04

## 2021-02-17 RX ADMIN — FENOFIBRATE 48 MG: 48 TABLET ORAL at 07:44

## 2021-02-17 RX ADMIN — DULOXETINE HYDROCHLORIDE 60 MG: 60 CAPSULE, DELAYED RELEASE ORAL at 20:37

## 2021-02-17 RX ADMIN — DEXAMETHASONE SODIUM PHOSPHATE 4 MG: 4 INJECTION, SOLUTION INTRAMUSCULAR; INTRAVENOUS at 23:05

## 2021-02-17 RX ADMIN — PANTOPRAZOLE SODIUM 40 MG: 40 TABLET, DELAYED RELEASE ORAL at 06:25

## 2021-02-17 RX ADMIN — DOCUSATE SODIUM 50MG AND SENNOSIDES 8.6MG 1 TABLET: 8.6; 5 TABLET, FILM COATED ORAL at 20:37

## 2021-02-17 RX ADMIN — LAMOTRIGINE 150 MG: 100 TABLET ORAL at 07:45

## 2021-02-17 RX ADMIN — OXYCODONE HYDROCHLORIDE AND ACETAMINOPHEN 2 TABLET: 5; 325 TABLET ORAL at 06:25

## 2021-02-17 RX ADMIN — OXYCODONE HYDROCHLORIDE AND ACETAMINOPHEN 2 TABLET: 7.5; 325 TABLET ORAL at 18:39

## 2021-02-17 RX ADMIN — PRAMIPEXOLE DIHYDROCHLORIDE 0.5 MG: 0.5 TABLET ORAL at 07:45

## 2021-02-17 RX ADMIN — SODIUM CHLORIDE, PRESERVATIVE FREE 3 ML: 5 INJECTION INTRAVENOUS at 07:57

## 2021-02-17 RX ADMIN — OXCARBAZEPINE 600 MG: 300 TABLET, FILM COATED ORAL at 20:37

## 2021-02-17 RX ADMIN — BACLOFEN 20 MG: 10 TABLET ORAL at 20:37

## 2021-02-17 RX ADMIN — INSULIN LISPRO 5 UNITS: 100 INJECTION, SOLUTION INTRAVENOUS; SUBCUTANEOUS at 10:55

## 2021-02-17 RX ADMIN — OXYCODONE HYDROCHLORIDE AND ACETAMINOPHEN 2 TABLET: 7.5; 325 TABLET ORAL at 10:49

## 2021-02-17 RX ADMIN — LEVOTHYROXINE SODIUM 137 MCG: 137 TABLET ORAL at 06:25

## 2021-02-17 RX ADMIN — LAMOTRIGINE 150 MG: 100 TABLET ORAL at 20:37

## 2021-02-17 NOTE — PROGRESS NOTES
Orthopedic Progress Note      Patient: Wendi Ramos    YOB: 1955    Medical Record Number: [id number]    Attending Physician: Juancarlos Montelongo MD    Date of Admission: 2/15/2021  5:26 AM    Admitting Dx:  Spinal stenosis of lumbar region with neurogenic claudication [M48.062]  Spinal stenosis of lumbar region with neurogenic claudication [M48.062]    Status Post: [place] ARTHDSIS POST/POSTEROLATRL/POSTINTERBODY LUMBAR [[id number]] (Lumbar 2-3 fusion with instrumentation and removal of implants L3-4 with laminectomies by Dr. Segundo)    Post Operative Day Number: 2    Current Problem List:   Patient Active Problem List   Diagnosis   • Type 2 diabetes mellitus without complication (CMS/HCC)   • Post-operative state   • S/P lumbar fusion   • Insomnia   • Abnormal EKG   • Cellulitis of breast   • Chest pain   • Dyspnea on exertion   • Fibromyalgia   • Hyperhidrosis   • LAFB (left anterior fascicular block)   • Mass of right breast   • Obesity   • PVC (premature ventricular contraction)   • Rheumatoid arthritis (CMS/HCC)   • S/P catheter ablation of slow pathway   • Seizures (CMS/HCC)   • Sinus tachycardia   • Chronic fatigue   • Vitamin D deficiency   • Complex dyslipidemia   • Hypothyroidism due to Hashimoto's thyroiditis   • Hypertension   • Hyperlipidemia   • Acute right-sided low back pain without sciatica   • Spinal stenosis of lumbar region with neurogenic claudication   • Sleep apnea         Past Medical History:   Diagnosis Date   • Chronic fatigue    • Depression    • Diabetes mellitus (CMS/HCC)    • Fibromyalgia    • GERD (gastroesophageal reflux disease)    • Hiatal hernia    • History of Clostridioides difficile colitis    • Hyperlipidemia    • Hypertension    • Hypothyroidism    • Irregular heart beat    • Liver disease     FATTY LIVER, NON ALCOHOLIC   • Low back pain    • Peripheral neuropathy    • Rheumatoid arthritis (CMS/HCC)    • Rheumatoid arthritis (CMS/HCC) 2005    DR SMYTH   • Seizures  (CMS/[facility])    • Sleep apnea     NO CPAP   • Type 2 diabetes mellitus (CMS/[facility])    • West Nile fever 2002       Current Medications:  Scheduled Meds:baclofen, 20 mg, Oral, BID  DULoxetine, 60 mg, Oral, BID  fenofibrate, 48 mg, Oral, Daily  ferrous fulfate dried ER, 140 mg, Oral, Daily  insulin glargine, 30 Units, Subcutaneous, Nightly  insulin lispro, 5 Units, Subcutaneous, TID With Meals  insulin lispro, 0-9 Units, Subcutaneous, TID With Meals  lamoTRIgine, 150 mg, Oral, BID  levETIRAcetam, 1,500 mg, Oral, Daily  levothyroxine, 137 mcg, Oral, Daily  metoprolol tartrate, 50 mg, Oral, Q12H  OXcarbazepine, 600 mg, Oral, Q12H  pantoprazole, 40 mg, Oral, QAM  polyethylene glycol, 17 g, Oral, Daily  pramipexole, 0.5 mg, Oral, Q12H  senna-docusate sodium, 1 tablet, Oral, BID  sodium chloride, 3 mL, Intravenous, Q12H      PRN Meds:.•  acetaminophen  •  bisacodyl  •  dextrose  •  dextrose  •  glucagon (human recombinant)  •  magnesium hydroxide  •  naloxone  •  ondansetron **OR** ondansetron  •  oxyCODONE-acetaminophen **OR** oxyCODONE-acetaminophen  •  sodium chloride  •  temazepam  •  zolpidem    SUBJECTIVE: 65 y.o.  female. Awake and alert.      OBJECTIVE:   Vitals:    02/16/21 1900 02/16/21 2328 02/17/21 0300 02/17/21 0741   BP: 109/61 112/61 119/60 114/58   BP Location: Right arm Left arm Left arm Left arm   Patient Position: Lying Lying Lying Lying   Pulse: 91 75 88 75   Resp: 16 16 16 16   Temp: 98.5 °F (36.9 °C) 97.4 °F (36.3 °C) 97.3 °F (36.3 °C) 98.9 °F (37.2 °C)   TempSrc: Skin Skin Skin Skin   SpO2: 94% 93% 90% 94%   Weight:       Height:         I/O last 3 completed shifts:  In: 3718.3 [P.O.:860; I.V.:2108.3; Other:250; IV Piggyback:500]  Out: 2000 [Urine:2000]    Diagnostic Tests:   Lab Results (last 24 hours)     Procedure Component Value Units Date/Time    POC Glucose Once [[id number]]  (Abnormal) Collected: 02/17/21 0617    Specimen: Blood Updated: 02/17/21 0619     Glucose 160 mg/dL     CBC (No Diff)  [[id number]]  (Abnormal) Collected: 02/17/21 0504    Specimen: Blood Updated: 02/17/21 0521     WBC 13.02 10*3/mm3      RBC 2.62 10*6/mm3      Hemoglobin 8.1 g/dL      Hematocrit 24.4 %      MCV 93.1 fL      MCH 30.9 pg      MCHC 33.2 g/dL      RDW 12.0 %      RDW-SD 40.9 fl      MPV 11.4 fL      Platelets 129 10*3/mm3     POC Glucose Once [[id number]]  (Abnormal) Collected: 02/16/21 2102    Specimen: Blood Updated: 02/16/21 2104     Glucose 180 mg/dL     POC Glucose Once [[id number]]  (Abnormal) Collected: 02/16/21 1512    Specimen: Blood Updated: 02/16/21 1514     Glucose 197 mg/dL     POC Glucose Once [[id number]]  (Abnormal) Collected: 02/16/21 1106    Specimen: Blood Updated: 02/16/21 1108     Glucose 193 mg/dL           PHYSICAL EXAM:  Back dressing remains dry and intact.  Moving legs well.  Denies any leg pain or numbness and tingling.  Strength is equal bilaterally.  She is progressing well with therapy.  Does complain of moderate incisional pain but continues to use PCA as well as oral pain medicine.  Voiding well.  Lungs are clear with decreased breath sounds in the bases.  Encouraged use of incentive spirometer.  Abdomen remains soft and nontender with bowel sounds.  Not passing much gas.  Denies any nausea.  Hemoglobin is 8.1 hematocrit is 24.4.  She does complain of some lightheadedness when she first stands up however resolves within a few seconds.  Will continue to monitor her hemoglobin.  She understands that she may require transfusion if she becomes more symptomatic or her hemoglobin continues to drop.     ASSESSMENT & PLAN:  Continue to monitor hemoglobin.  May require transfusion if becomes more symptomatic or hemoglobin continues to drop.  DC PCA.  Plan Home tomorrow if medically stable      Date: 2/17/2021    Sena Dunbar RN

## 2021-02-17 NOTE — PROGRESS NOTES
[facility]     LOS: 2 days     Subjective:    CC:No chief complaint on file.    DIET:  Diet Order   Procedures   • Diet Regular; Consistent Carbohydrate     No chest pain, no shortness of air, no nausea vomiting or diarrhea  Back pain is controlled    Objective:    Vital Signs:  Temp:  [97.3 °F (36.3 °C)-98.9 °F (37.2 °C)] 98.2 °F (36.8 °C)  Heart Rate:  [73-91] 85  Resp:  [16] 16  BP: (109-126)/(54-63) 126/62    SpO2:  [90 %-100 %] 100 %  on  Flow (L/min):  [2] 2;   Device (Oxygen Therapy): room air  Body mass index is 31.61 kg/m².    Physical Exam  Constitutional:       Appearance: She is well-developed.   HENT:      Head: Normocephalic and atraumatic.   Cardiovascular:      Rate and Rhythm: Normal rate and regular rhythm.      Heart sounds: No murmur. No friction rub.   Pulmonary:      Effort: Pulmonary effort is normal.      Breath sounds: Normal breath sounds.   Abdominal:      General: Bowel sounds are normal. There is no distension.      Palpations: Abdomen is soft.      Tenderness: There is no abdominal tenderness.   Skin:     General: Skin is warm and dry.   Neurological:      Mental Status: She is alert.   Psychiatric:         Mood and Affect: Mood normal.         Behavior: Behavior normal.         Results Review:    Glucose   Date Value Ref Range Status   02/16/2021 121 (H) 65 - 99 mg/dL Final     Results from last 7 days   Lab Units 02/17/21  0504   WBC 10*3/mm3 13.02*   HEMOGLOBIN g/dL 8.1*   HEMATOCRIT % 24.4*   PLATELETS 10*3/mm3 129*     Results from last 7 days   Lab Units 02/16/21  0456   SODIUM mmol/L 138   POTASSIUM mmol/L 3.8   CHLORIDE mmol/L 104   CO2 mmol/L 23.4   BUN mg/dL 16   CREATININE mg/dL 0.68   CALCIUM mg/dL 8.1*   GLUCOSE mg/dL 121*                 Cultures:  No results found for: BLOODCX, URINECX, WOUNDCX, MRSACX, RESPCX, STOOLCX    I have reviewed daily medications and changes in CPOE    Scheduled meds  baclofen, 20 mg, Oral, BID  DULoxetine, 60 mg,  Oral, BID  fenofibrate, 48 mg, Oral, Daily  ferrous fulfate dried ER, 140 mg, Oral, Daily  insulin glargine, 40 Units, Subcutaneous, Nightly  insulin lispro, 7 Units, Subcutaneous, TID With Meals  insulin lispro, 0-9 Units, Subcutaneous, TID With Meals  lamoTRIgine, 150 mg, Oral, BID  levETIRAcetam, 1,500 mg, Oral, Daily  levothyroxine, 137 mcg, Oral, Daily  metoprolol tartrate, 50 mg, Oral, Q12H  OXcarbazepine, 600 mg, Oral, Q12H  pantoprazole, 40 mg, Oral, QAM  polyethylene glycol, 17 g, Oral, Daily  pramipexole, 0.5 mg, Oral, Q12H  senna-docusate sodium, 1 tablet, Oral, BID  sodium chloride, 3 mL, Intravenous, Q12H           PRN meds  •  acetaminophen  •  bisacodyl  •  dextrose  •  dextrose  •  glucagon (human recombinant)  •  magnesium hydroxide  •  naloxone  •  ondansetron **OR** ondansetron  •  oxyCODONE-acetaminophen **OR** oxyCODONE-acetaminophen  •  sodium chloride  •  temazepam  •  zolpidem        Spinal stenosis of lumbar region with neurogenic claudication    Type 2 diabetes mellitus without complication (CMS/[facility])    Rheumatoid arthritis (CMS/[facility])    Hypertension    Hyperlipidemia    Sleep apnea    Assessment/Plan:  Ms. Ramos is a 65 y.o. female who is s/p Lumbar 2-3, lumbar 3 4, lumbar 4 5 fusion with instrumentation and BMP,  L2-L3 lift with cage, and removal of implants L3-4 with laminectomies by Dr. Segundo and Dr Montelongo.     · She seems to be doing well thus far postoperatively.   · Hold home oral diabetic medications.  · increase home long acting insulin - LANTUS 40 units SC HS.  She takes similar insulin at home 60 units in AM.  · HUMALOG - will increase the scheduled to 7 units with each meal in addition and to moderate dose correctional factor as needed.  · Monitor glucose TID AC. For any BG less than 70 mg/dL, treat per hospital protocol  · HgbA1C 6.8%.  Continue same preadmit regimen at discharge.    · Renal function stable.   · NCS diet.  · Continue IVFs as ordered.    · Monitor renal  function.          Tyrone Erickson MD  02/17/21  16:05 EST

## 2021-02-17 NOTE — THERAPY TREATMENT NOTE
Patient Name: Wendi Ramos  : 1955    MRN: [id number]                              Today's Date: 2021       Admit Date: 2/15/2021    Visit Dx:     ICD-10-CM ICD-9-CM   1. Acute right-sided low back pain without sciatica  M54.5 724.2   2. Spinal stenosis of lumbar region with neurogenic claudication  M48.062 724.03     Patient Active Problem List   Diagnosis   • Type 2 diabetes mellitus without complication (CMS/HCC)   • Post-operative state   • S/P lumbar fusion   • Insomnia   • Abnormal EKG   • Cellulitis of breast   • Chest pain   • Dyspnea on exertion   • Fibromyalgia   • Hyperhidrosis   • LAFB (left anterior fascicular block)   • Mass of right breast   • Obesity   • PVC (premature ventricular contraction)   • Rheumatoid arthritis (CMS/HCC)   • S/P catheter ablation of slow pathway   • Seizures (CMS/HCC)   • Sinus tachycardia   • Chronic fatigue   • Vitamin D deficiency   • Complex dyslipidemia   • Hypothyroidism due to Hashimoto's thyroiditis   • Hypertension   • Hyperlipidemia   • Acute right-sided low back pain without sciatica   • Spinal stenosis of lumbar region with neurogenic claudication   • Sleep apnea     Past Medical History:   Diagnosis Date   • Chronic fatigue    • Depression    • Diabetes mellitus (CMS/HCC)    • Fibromyalgia    • GERD (gastroesophageal reflux disease)    • Hiatal hernia    • History of Clostridioides difficile colitis    • Hyperlipidemia    • Hypertension    • Hypothyroidism    • Irregular heart beat    • Liver disease     FATTY LIVER, NON ALCOHOLIC   • Low back pain    • Peripheral neuropathy    • Rheumatoid arthritis (CMS/HCC)    • Rheumatoid arthritis (CMS/HCC)     DR SMYTH   • Seizures (CMS/HCC)    • Sleep apnea     NO CPAP   • Type 2 diabetes mellitus (CMS/HCC)    • West Nile fever      Past Surgical History:   Procedure Laterality Date   • ABDOMINOPLASTY     • ADENOIDECTOMY     • BILATERAL BREAST REDUCTION     • BRONCHOSCOPY N/A 12/15/2016    Procedure:  BRONCHOSCOPY WITH BAL AND ENDOBRONCHIAL ULTRASOUND WITH FNA;  Surgeon: Diego Ponce MD;  Location: [facility] ENDOSCOPY;  Service:    • CARDIAC ABLATION     • CATARACT EXTRACTION, BILATERAL     • COLONOSCOPY     • ENDOSCOPY     • EYE SURGERY     • HYSTERECTOMY     • LASIK     • LUMBAR DISCECTOMY FUSION INSTRUMENTATION Left 6/8/2018    Procedure: LEFT L3/4 lumbar discectomy;  Surgeon: Miguelangel Goldberg MD;  Location: [facility];  Service: Neurosurgery   • LUMBAR FUSION Left 7/12/2018    Procedure: Left L3 4 lumbar decompression and discectomy with transforaminal lumbar interbody fusion and posterior instrumentation with posterior lateral arthrodesis;  Surgeon: Miguelangel Goldberg MD;  Location: [facility];  Service: Neurosurgery   • LUMBAR LAMINECTOMY WITH FUSION N/A 2/15/2021    Procedure: Lumbar 2-3, lumbar 3 4, lumbar 4 5 fusion with instrumentation and BMP,  L2-L3 lift with cage, and removal of implants L3-4 with laminectomies by Dr. Segundo;  Surgeon: Juancarlos Montelongo MD;  Location: [facility];  Service: Orthopedic Spine;  Laterality: N/A;   • LUMBAR LAMINECTOMY WITH FUSION N/A 2/15/2021    Procedure: Lumbar 2 3, Lumbar 3 4 and lumbar 4 5 laminectomy with a fusion by orthopedics;  Surgeon: Jef Randall MD;  Location: [facility];  Service: Neurosurgery;  Laterality: N/A;   • MOUTH SURGERY     • REPLACEMENT TOTAL KNEE Left    • TONSILLECTOMY     • TONSILLECTOMY AND ADENOIDECTOMY  1966     General Information     Row Name 02/17/21 1546          Physical Therapy Time and Intention    Document Type  therapy note (daily note)  -     Mode of Treatment  individual therapy;physical therapy  -     Row Name 02/17/21 1546          General Information    Existing Precautions/Restrictions  spinal;brace worn when out of bed  -     Row Name 02/17/21 1546          Cognition    Orientation Status (Cognition)  oriented x 4  -     Row Name 02/17/21 1546          Safety Issues, Functional Mobility     Impairments Affecting Function (Mobility)  endurance/activity tolerance;strength  -       User Key  (r) = Recorded By, (t) = Taken By, (c) = Cosigned By    Initials Name Provider Type     Uzma Gibbons PTA Physical Therapy Assistant        Mobility     Row Name 02/17/21 1546          Bed Mobility    Comment (Bed Mobility)  NT-UIC  -     Row Name 02/17/21 1546          Sit-Stand Transfer    Sit-Stand [place] (Transfers)  supervision  -     Assistive Device (Sit-Stand Transfers)  walker, front-wheeled  -     Row Name 02/17/21 1546          Gait/Stairs (Locomotion)    [place] Level (Gait)  contact guard  -     Assistive Device (Gait)  walker, front-wheeled  -     Distance in Feet (Gait)  60  -     Deviations/Abnormal Patterns (Gait)  ashley decreased;gait speed decreased;stride length decreased  -     Bilateral Gait Deviations  forward flexed posture  -     Comment (Gait/Stairs)  cues for posture correction. Pt fatigued after ambulation.  -       User Key  (r) = Recorded By, (t) = Taken By, (c) = Cosigned By    Initials Name Provider Type     Uzma Gibbons PTA Physical Therapy Assistant        Obj/Interventions    No documentation.       Goals/Plan    No documentation.       Clinical Impression     Row Name 02/17/21 1547          Plan of Care Review    Plan of Care Reviewed With  patient  -     Progress  improving  -     Outcome Summary  Pt tolerated treatment with minimal c/o back pain. Pt educated when to wear brace. Pt is supervision with sit<->stand transfers. Pt ambulated 60ft with rwx, CGA. Pt demos forward flexed posture. VCs for pt to correct posture. Will continue to progress pt as tolerated.  -     Row Name 02/17/21 1547          Positioning and Restraints    Pre-Treatment Position  sitting in chair/recliner  -     Post Treatment Position  chair  -     In Chair  reclined;call light within reach;encouraged to call for assist;exit alarm on  -       User Key  (r)  = Recorded By, (t) = Taken By, (c) = Cosigned By    Initials Name Provider Type     Uzma Gibbons PTA Physical Therapy Assistant        Outcome Measures     Row Name 02/17/21 1549          How much help from another person do you currently need...    Turning from your back to your side while in flat bed without using bedrails?  3  -EH     Moving from lying on back to sitting on the side of a flat bed without bedrails?  3  -EH     Moving to and from a bed to a chair (including a wheelchair)?  3  -EH     Standing up from a chair using your arms (e.g., wheelchair, bedside chair)?  4  -EH     Climbing 3-5 steps with a railing?  2  -EH     To walk in hospital room?  3  -EH     AM-PAC 6 Clicks Score (PT)  18  -       User Key  (r) = Recorded By, (t) = Taken By, (c) = Cosigned By    Initials Name Provider Type     Uzma Gibbons PTA Physical Therapy Assistant        Physical Therapy Education                 Title: PT OT SLP Therapies (Done)     Topic: Physical Therapy (Done)     Point: Mobility training (Done)     Learning Progress Summary           Patient Acceptance, E,D, VU by  at 2/17/2021 1549    Acceptance, E,TB, VU by CA at 2/16/2021 0836                   Point: Home exercise program (Done)     Learning Progress Summary           Patient Acceptance, E,D, VU by  at 2/17/2021 1549    Acceptance, E,TB, VU by CA at 2/16/2021 0836                   Point: Body mechanics (Done)     Learning Progress Summary           Patient Acceptance, E,D, VU by  at 2/17/2021 1549    Acceptance, E,TB, VU by CA at 2/16/2021 0836                   Point: Precautions (Done)     Learning Progress Summary           Patient Acceptance, E,D, VU by  at 2/17/2021 1549    Acceptance, E,TB, VU by CA at 2/16/2021 0836                               User Key     Initials Effective Dates Name Provider Type [facility] 08/19/18 -  Uzma Gibbons PTA Physical Therapy Assistant PT    CA 10/09/20 -  Marj Foster PT Physical  Therapist PT              PT Recommendation and Plan     Plan of Care Reviewed With: patient  Progress: improving  Outcome Summary: Pt tolerated treatment with minimal c/o back pain. Pt educated when to wear brace. Pt is supervision with sit<->stand transfers. Pt ambulated 60ft with rwx, CGA. Pt demos forward flexed posture. VCs for pt to correct posture. Will continue to progress pt as tolerated.     Time Calculation:   PT Charges     Row Name 02/17/21 1432             Time Calculation    Start Time  1341  -      Stop Time  1357  -      Time Calculation (min)  16 min  -      PT Received On  02/17/21  -      PT - Next Appointment  02/18/21  -         Time Calculation- PT    Total Timed Code Minutes- PT  16 minute(s)  -        User Key  (r) = Recorded By, (t) = Taken By, (c) = Cosigned By    Initials Name Provider Type     Uzma Gibbons PTA Physical Therapy Assistant        Therapy Charges for Today     Code Description Service Date Service Provider Modifiers Qty    03568330784 HC PT THER PROC EA 15 MIN 2/17/2021 Uzma Gibbons PTA GP 1          PT G-Codes  Outcome Measure Options: AM-PAC 6 Clicks Basic Mobility (PT)  AM-PAC 6 Clicks Score (PT): 18    Uzma Gibbons PTA  2/17/2021

## 2021-02-17 NOTE — PROGRESS NOTES
NEUROSURGERY POST OP PROGRESS NOTE     LOS: 2 days   Patient Care Team:  Flo Temple MD as PCP - General (Family Medicine)  Jomar Steve MD as Consulting Physician (Interventional Cardiology)  Donny Gan MD as Consulting Physician (Pain Medicine)      Subjective     Interval History:  Reports improvement in pre-operative leg pain. Managing expected back pain with oral narcotics. Dilaudid PCA pump has been discontinued by Ortho-Spine. Walked with walker and PT - 45 ft this am. Has lumbar brace.    History taken from: patient chart    Objective      Vital Signs  Temp:  [97.3 °F (36.3 °C)-98.9 °F (37.2 °C)] 97.8 °F (36.6 °C)  Heart Rate:  [73-91] 73  Resp:  [16] 16  BP: ()/(52-63) 109/54     Physical Exam    AA&O x 3.   Reaching nearly 1500 on IS.   Lumbar incision okay. Dressing dry and intact.   No calf pain, warmth to bilateral palpation.  Jerome out - voiding       Results Review:     I reviewed the patient's new clinical results.     .  Results from last 7 days   Lab Units 02/17/21  0504 02/16/21  0456 02/15/21  1322 02/12/21  0845   WBC 10*3/mm3 13.02*  --   --  6.29   HEMOGLOBIN g/dL 8.1* 9.2* 11.0* 13.3   HEMATOCRIT % 24.4* 27.7* 34.5 41.8   PLATELETS 10*3/mm3 129*  --   --  240     .  Results from last 7 days   Lab Units 02/16/21  0456 02/12/21  0845   SODIUM mmol/L 138 141   POTASSIUM mmol/L 3.8 5.3*   CHLORIDE mmol/L 104 105   CO2 mmol/L 23.4 25.4   BUN mg/dL 16 20   CREATININE mg/dL 0.68 0.85   GLUCOSE mg/dL 121* 161*   CALCIUM mg/dL 8.1* 10.7*       Assessment/Plan       Spinal stenosis of lumbar region with neurogenic claudication    Type 2 diabetes mellitus without complication (CMS/HCC)    Rheumatoid arthritis (CMS/HCC)    Hypertension    Hyperlipidemia    Sleep apnea    POD 2 bilateral L2/3, L4/5 laminectomy with bilateral L3/4 laminectomy and neural lysis by Dr. Randall for lumbar stenosis and neurogenic claudication. Lumbar fusion performed by Dr. Montelongo.   Acute blood loss  anemia - continue to observe; currently asymptomatic    CBC in am; consider PRBC's if patient becomes symptomatic from anemia or anemia worsens  Mobilize    Lexi Overton, ALESSANDRA  02/17/21  11:08 EST

## 2021-02-17 NOTE — PLAN OF CARE
Goal Outcome Evaluation:  Plan of Care Reviewed With: patient  Progress: improving  Outcome Summary: Pt tolerated treatment with minimal c/o back pain. Pt educated when to wear brace. Pt is supervision with sit<->stand transfers. Pt ambulated 60ft with rwx, CGA. Pt demos forward flexed posture. VCs for pt to correct posture. Will continue to progress pt as tolerated.  ..Patient was wearing a face mask during this therapy encounter. Therapist used appropriate personal protective equipment including eye protection, mask, and gloves.  Mask used was standard procedure mask. Appropriate PPE was worn during the entire therapy session. Hand hygiene was completed before and after therapy session. Patient is not in enhanced droplet precautions.

## 2021-02-17 NOTE — PLAN OF CARE
Goal Outcome Evaluation:  Plan of Care Reviewed With: patient  Progress: improving  Outcome Summary: VSS, RA, dilaudid PCA in place, up with assist of 1, brace when OOB, voiding per BRP, educated on BP and glucose monitoring, home today

## 2021-02-18 ENCOUNTER — READMISSION MANAGEMENT (OUTPATIENT)
Dept: CALL CENTER | Facility: HOSPITAL | Age: 66
End: 2021-02-18

## 2021-02-18 ENCOUNTER — APPOINTMENT (OUTPATIENT)
Dept: CT IMAGING | Facility: HOSPITAL | Age: 66
End: 2021-02-18

## 2021-02-18 VITALS
HEART RATE: 81 BPM | TEMPERATURE: 98.9 F | HEIGHT: 69 IN | WEIGHT: 214.07 LBS | RESPIRATION RATE: 16 BRPM | SYSTOLIC BLOOD PRESSURE: 154 MMHG | OXYGEN SATURATION: 93 % | BODY MASS INDEX: 31.71 KG/M2 | DIASTOLIC BLOOD PRESSURE: 81 MMHG

## 2021-02-18 LAB
BASOPHILS # BLD AUTO: 0.03 10*3/MM3 (ref 0–0.2)
BASOPHILS NFR BLD AUTO: 0.2 % (ref 0–1.5)
DEPRECATED RDW RBC AUTO: 42.3 FL (ref 37–54)
EOSINOPHIL # BLD AUTO: 0 10*3/MM3 (ref 0–0.4)
EOSINOPHIL NFR BLD AUTO: 0 % (ref 0.3–6.2)
ERYTHROCYTE [DISTWIDTH] IN BLOOD BY AUTOMATED COUNT: 12 % (ref 12.3–15.4)
GLUCOSE BLDC GLUCOMTR-MCNC: 158 MG/DL (ref 70–130)
GLUCOSE BLDC GLUCOMTR-MCNC: 186 MG/DL (ref 70–130)
GLUCOSE BLDC GLUCOMTR-MCNC: 207 MG/DL (ref 70–130)
HCT VFR BLD AUTO: 26.4 % (ref 34–46.6)
HGB BLD-MCNC: 8.4 G/DL (ref 12–15.9)
IMM GRANULOCYTES # BLD AUTO: 0.12 10*3/MM3 (ref 0–0.05)
IMM GRANULOCYTES NFR BLD AUTO: 1 % (ref 0–0.5)
LYMPHOCYTES # BLD AUTO: 0.71 10*3/MM3 (ref 0.7–3.1)
LYMPHOCYTES NFR BLD AUTO: 5.7 % (ref 19.6–45.3)
MCH RBC QN AUTO: 30 PG (ref 26.6–33)
MCHC RBC AUTO-ENTMCNC: 31.8 G/DL (ref 31.5–35.7)
MCV RBC AUTO: 94.3 FL (ref 79–97)
MONOCYTES # BLD AUTO: 0.37 10*3/MM3 (ref 0.1–0.9)
MONOCYTES NFR BLD AUTO: 3 % (ref 5–12)
NEUTROPHILS NFR BLD AUTO: 11.15 10*3/MM3 (ref 1.7–7)
NEUTROPHILS NFR BLD AUTO: 90.1 % (ref 42.7–76)
NRBC BLD AUTO-RTO: 0 /100 WBC (ref 0–0.2)
PLATELET # BLD AUTO: 162 10*3/MM3 (ref 140–450)
PMV BLD AUTO: 11.4 FL (ref 6–12)
RBC # BLD AUTO: 2.8 10*6/MM3 (ref 3.77–5.28)
WBC # BLD AUTO: 12.38 10*3/MM3 (ref 3.4–10.8)

## 2021-02-18 PROCEDURE — 63710000001 INSULIN LISPRO (HUMAN) PER 5 UNITS: Performed by: HOSPITALIST

## 2021-02-18 PROCEDURE — 82962 GLUCOSE BLOOD TEST: CPT

## 2021-02-18 PROCEDURE — 99024 POSTOP FOLLOW-UP VISIT: CPT | Performed by: ORTHOPAEDIC SURGERY

## 2021-02-18 PROCEDURE — 99024 POSTOP FOLLOW-UP VISIT: CPT | Performed by: NURSE PRACTITIONER

## 2021-02-18 PROCEDURE — 85025 COMPLETE CBC W/AUTO DIFF WBC: CPT | Performed by: NURSE PRACTITIONER

## 2021-02-18 PROCEDURE — 70450 CT HEAD/BRAIN W/O DYE: CPT

## 2021-02-18 PROCEDURE — 25010000002 DEXAMETHASONE PER 1 MG: Performed by: ORTHOPAEDIC SURGERY

## 2021-02-18 RX ORDER — ASPIRIN 81 MG/1
81 TABLET ORAL DAILY
Start: 2021-02-18 | End: 2022-01-01 | Stop reason: HOSPADM

## 2021-02-18 RX ORDER — AMOXICILLIN 250 MG
1 CAPSULE ORAL 2 TIMES DAILY
Qty: 60 TABLET | Refills: 0 | Status: SHIPPED | OUTPATIENT
Start: 2021-02-18 | End: 2021-01-01

## 2021-02-18 RX ORDER — OXYCODONE AND ACETAMINOPHEN 7.5; 325 MG/1; MG/1
2 TABLET ORAL EVERY 4 HOURS PRN
Qty: 50 TABLET | Refills: 0 | Status: SHIPPED | OUTPATIENT
Start: 2021-02-18 | End: 2021-02-24

## 2021-02-18 RX ORDER — METHYLPREDNISOLONE 4 MG/1
TABLET ORAL
Qty: 21 TABLET | Refills: 0 | Status: SHIPPED | OUTPATIENT
Start: 2021-02-18 | End: 2021-01-01 | Stop reason: HOSPADM

## 2021-02-18 RX ORDER — POLYETHYLENE GLYCOL 3350 17 G/17G
17 POWDER, FOR SOLUTION ORAL DAILY
Qty: 238 G | Refills: 0 | Status: SHIPPED | OUTPATIENT
Start: 2021-02-18 | End: 2021-03-04

## 2021-02-18 RX ADMIN — INSULIN LISPRO 7 UNITS: 100 INJECTION, SOLUTION INTRAVENOUS; SUBCUTANEOUS at 07:51

## 2021-02-18 RX ADMIN — LAMOTRIGINE 150 MG: 100 TABLET ORAL at 07:48

## 2021-02-18 RX ADMIN — DEXAMETHASONE SODIUM PHOSPHATE 4 MG: 4 INJECTION, SOLUTION INTRAMUSCULAR; INTRAVENOUS at 05:39

## 2021-02-18 RX ADMIN — INSULIN LISPRO 2 UNITS: 100 INJECTION, SOLUTION INTRAVENOUS; SUBCUTANEOUS at 07:49

## 2021-02-18 RX ADMIN — PRAMIPEXOLE DIHYDROCHLORIDE 0.5 MG: 0.5 TABLET ORAL at 07:47

## 2021-02-18 RX ADMIN — METOPROLOL TARTRATE 50 MG: 50 TABLET, FILM COATED ORAL at 07:49

## 2021-02-18 RX ADMIN — POLYETHYLENE GLYCOL 3350 17 G: 17 POWDER, FOR SOLUTION ORAL at 07:49

## 2021-02-18 RX ADMIN — FENOFIBRATE 48 MG: 48 TABLET ORAL at 07:48

## 2021-02-18 RX ADMIN — OXYCODONE HYDROCHLORIDE AND ACETAMINOPHEN 2 TABLET: 7.5; 325 TABLET ORAL at 15:24

## 2021-02-18 RX ADMIN — DULOXETINE HYDROCHLORIDE 60 MG: 60 CAPSULE, DELAYED RELEASE ORAL at 07:49

## 2021-02-18 RX ADMIN — LEVETIRACETAM 1500 MG: 500 TABLET, FILM COATED ORAL at 07:48

## 2021-02-18 RX ADMIN — OXYCODONE HYDROCHLORIDE AND ACETAMINOPHEN 2 TABLET: 7.5; 325 TABLET ORAL at 02:49

## 2021-02-18 RX ADMIN — INSULIN LISPRO 4 UNITS: 100 INJECTION, SOLUTION INTRAVENOUS; SUBCUTANEOUS at 11:26

## 2021-02-18 RX ADMIN — OXYCODONE HYDROCHLORIDE AND ACETAMINOPHEN 2 TABLET: 7.5; 325 TABLET ORAL at 11:26

## 2021-02-18 RX ADMIN — SODIUM CHLORIDE, PRESERVATIVE FREE 3 ML: 5 INJECTION INTRAVENOUS at 07:57

## 2021-02-18 RX ADMIN — DOCUSATE SODIUM 50MG AND SENNOSIDES 8.6MG 1 TABLET: 8.6; 5 TABLET, FILM COATED ORAL at 07:49

## 2021-02-18 RX ADMIN — INSULIN LISPRO 7 UNITS: 100 INJECTION, SOLUTION INTRAVENOUS; SUBCUTANEOUS at 11:26

## 2021-02-18 RX ADMIN — BACLOFEN 20 MG: 10 TABLET ORAL at 07:49

## 2021-02-18 RX ADMIN — PANTOPRAZOLE SODIUM 40 MG: 40 TABLET, DELAYED RELEASE ORAL at 05:39

## 2021-02-18 RX ADMIN — LEVOTHYROXINE SODIUM 137 MCG: 137 TABLET ORAL at 05:39

## 2021-02-18 RX ADMIN — DEXAMETHASONE SODIUM PHOSPHATE 4 MG: 4 INJECTION, SOLUTION INTRAMUSCULAR; INTRAVENOUS at 11:26

## 2021-02-18 RX ADMIN — OXYCODONE HYDROCHLORIDE AND ACETAMINOPHEN 2 TABLET: 7.5; 325 TABLET ORAL at 06:49

## 2021-02-18 RX ADMIN — OXCARBAZEPINE 600 MG: 300 TABLET, FILM COATED ORAL at 07:47

## 2021-02-18 NOTE — PROGRESS NOTES
[facility] NEUROSURGERY PROGRESS NOTE      CC: Minor incisional pain      Subjective     Interval History: POD#3 bilateral L2/3, L4/5 laminectomy with bilateral L3/4 laminectomy and neurolysis by Dr. Randall for lumbar stenosis and neurogenic claudication. Lumbar fusion performed by Dr. Montelongo. Working with PT, pain in right groin much better since.      ROS:  Constitutional: No fever, chills  GI: No nausea, vomiting  MS: Mild back pain  Neuro: No numbness, tingling, or weakness,  no balance difficulties  : No difficulty voiding, no incontinence    Objective     Vital signs in last 24 hours:  Temp:  [96.9 °F (36.1 °C)-98.4 °F (36.9 °C)] 98.3 °F (36.8 °C)  Heart Rate:  [69-90] 69  Resp:  [16] 16  BP: (109-126)/(51-69) 118/69    Intake/Output this shift:  I/O this shift:  In: 350 [P.O.:350]  Out: -     LABS:  Results from last 7 days   Lab Units 02/18/21  0843 02/17/21  0504 02/16/21  0456  02/12/21  0845   WBC 10*3/mm3 12.38* 13.02*  --   --  6.29   HEMOGLOBIN g/dL 8.4* 8.1* 9.2*   < > 13.3   HEMATOCRIT % 26.4* 24.4* 27.7*   < > 41.8   PLATELETS 10*3/mm3 162 129*  --   --  240    < > = values in this interval not displayed.     Results from last 7 days   Lab Units 02/16/21  0456 02/12/21  0845   SODIUM mmol/L 138 141   POTASSIUM mmol/L 3.8 5.3*   CHLORIDE mmol/L 104 105   CO2 mmol/L 23.4 25.4   BUN mg/dL 16 20   CREATININE mg/dL 0.68 0.85   CALCIUM mg/dL 8.1* 10.7*   GLUCOSE mg/dL 121* 161*       IMAGING STUDIES:  No new imaging      Meds reviewed/changed: Yes    Current Facility-Administered Medications:   •  acetaminophen (TYLENOL) tablet 650 mg, 650 mg, Oral, Q4H PRN, Juancarlos Montelongo MD  •  baclofen (LIORESAL) tablet 20 mg, 20 mg, Oral, BID, Juancarlos Montelongo MD, 20 mg at 02/18/21 0749  •  bisacodyl (DULCOLAX) suppository 10 mg, 10 mg, Rectal, Daily PRN, Juancarlos Montelongo MD  •  dexamethasone (DECADRON) injection 4 mg, 4 mg, Intravenous, Q6H, Juancarlos Montelongo MD, 4 mg at 02/18/21 0539  •  dextrose (D50W) 25 g/  50mL Intravenous Solution 25 g, 25 g, Intravenous, Q15 Min PRN, Miguelangel Pinzon MD  •  dextrose (GLUTOSE) oral gel 15 g, 15 g, Oral, Q15 Min PRN, Miguelangel Pinzon MD  •  DULoxetine (CYMBALTA) DR capsule 60 mg, 60 mg, Oral, BID, Juancarlos Montelongo MD, 60 mg at 02/18/21 0749  •  fenofibrate (TRICOR) tablet 48 mg, 48 mg, Oral, Daily, Juancarlos Montelongo MD, 48 mg at 02/18/21 0748  •  ferrous fulfate dried ER tablet tablet controlled-release 140 mg, 140 mg, Oral, Daily, Juancarlos Montelongo MD, 140 mg at 02/17/21 0745  •  glucagon (human recombinant) (GLUCAGEN DIAGNOSTIC) injection 1 mg, 1 mg, Subcutaneous, Q15 Min PRN, Miguelangel Pinzon MD  •  insulin glargine (LANTUS, SEMGLEE) injection 40 Units, 40 Units, Subcutaneous, Nightly, Tyrone Erickson MD, 40 Units at 02/17/21 2221  •  insulin lispro (ADMELOG) injection 7 Units, 7 Units, Subcutaneous, TID With Meals, Tyrone Erickson MD, 7 Units at 02/18/21 0751  •  insulin lispro (humaLOG, ADMELOG) injection 0-9 Units, 0-9 Units, Subcutaneous, TID With Meals, Miguelangel Pinzon MD, 2 Units at 02/18/21 0749  •  lamoTRIgine (LaMICtal) tablet 150 mg, 150 mg, Oral, BID, Juancarlos Montelongo MD, 150 mg at 02/18/21 0748  •  levETIRAcetam (KEPPRA) tablet 1,500 mg, 1,500 mg, Oral, Daily, Juancarlos Montelongo MD, 1,500 mg at 02/18/21 0748  •  levothyroxine (SYNTHROID, LEVOTHROID) tablet 137 mcg, 137 mcg, Oral, Daily, Juancarlos Montelongo MD, 137 mcg at 02/18/21 0539  •  magnesium hydroxide (MILK OF MAGNESIA) suspension 2400 mg/10mL 10 mL, 10 mL, Oral, Daily PRN, Juancarlos Montelongo MD  •  metoprolol tartrate (LOPRESSOR) tablet 50 mg, 50 mg, Oral, Q12H, Miguelangel Pinzon MD, 50 mg at 02/18/21 0749  •  naloxone (NARCAN) injection 0.1 mg, 0.1 mg, Intravenous, Q5 Min PRN, Juancarlos Montelongo MD  •  ondansetron (ZOFRAN) tablet 4 mg, 4 mg, Oral, Q6H PRN **OR** ondansetron (ZOFRAN) injection 4 mg, 4 mg, Intravenous, Q6H PRN, Juancarlos Montelongo MD  •  OXcarbazepine (TRILEPTAL) tablet 600 mg, 600 mg, Oral, Q12H, Reginald  Juancarlos FLORES MD, 600 mg at 02/18/21 0747  •  oxyCODONE-acetaminophen (PERCOCET) 7.5-325 MG per tablet 1 tablet, 1 tablet, Oral, Q4H PRN **OR** oxyCODONE-acetaminophen (PERCOCET) 7.5-325 MG per tablet 2 tablet, 2 tablet, Oral, Q4H PRN, Juancarlos Montelongo MD, 2 tablet at 02/18/21 0649  •  pantoprazole (PROTONIX) EC tablet 40 mg, 40 mg, Oral, QAM, Juancarlos Montelongo MD, 40 mg at 02/18/21 0539  •  polyethylene glycol (MIRALAX) packet 17 g, 17 g, Oral, Daily, Juancarlos Montelongo MD, 17 g at 02/18/21 0749  •  pramipexole (MIRAPEX) tablet 0.5 mg, 0.5 mg, Oral, Q12H, Juancarlos Montelongo MD, 0.5 mg at 02/18/21 0747  •  sennosides-docusate (PERICOLACE) 8.6-50 MG per tablet 1 tablet, 1 tablet, Oral, BID, Juancarlos Montelongo MD, 1 tablet at 02/18/21 0749  •  sodium chloride 0.9 % flush 10 mL, 10 mL, Intravenous, PRN, Juancarlos Montelongo MD  •  sodium chloride 0.9 % flush 3 mL, 3 mL, Intravenous, Q12H, Juancarlos Montelongo MD, 3 mL at 02/18/21 0757  •  temazepam (RESTORIL) capsule 15 mg, 15 mg, Oral, Nightly PRN, Juancarlos Montelongo MD  •  zolpidem (AMBIEN) tablet 10 mg, 10 mg, Oral, Nightly PRN, Juancarlos Montelongo MD      Physical Exam:    General:   Awake, alert, oriented x3. Speech clear with no aphasia  Back:    LSO brace Off . Incision middle lumbar no erythema, no edema, no drainage, Dermabond  Motor: Normal muscle strength, bulk and tone in upper and lower extremities.  No fasciculations, rigidity, spasticity, or abnormal movements.  Sensation: Normal to light touch  Station and Gait:             Walking with a rolling walker approximately 60 feet with contact-guard assistance, per PT notes Extremities:   Wearing SCDs bed      Assessment/Plan     ASSESSMENT:  Sitting up in bed, head of bed approximately 30 degrees, moving all extremities well, states when she bends her knees that causes some incisional pain, but states the groin pain, to the right, is gone.  States that she will be using a walker at home.        Spinal stenosis of lumbar  "region with neurogenic claudication    Type 2 diabetes mellitus without complication (CMS/[facility])    Rheumatoid arthritis (CMS/[facility])    Hypertension    Hyperlipidemia    Sleep apnea      PLAN:   Hgb this AM stable at 8.4, asymptomatic  Home this afternoon, per Dr. Montelongo/Sena Dunbar, CIELO  Office f/up with Dr. Montelongo scheduled on 3/2/21 and Dr. Randall on 3/16/21  Encouraged to ambulate, use I-S    I discussed the patient's findings and my recommendations with patient, nursing staff and Dr. Randall       LOS: 3 days       Eusebia Cancino, APRN  2/18/2021  09:43 EST    \"Dictated utilizing Dragon dictation\".      "

## 2021-02-18 NOTE — PROGRESS NOTES
Orthopedic Progress Note      Patient: Wendi Ramos    YOB: 1955    Medical Record Number: [id number]    Attending Physician: Juancarlos Montelongo MD    Date of Admission: 2/15/2021  5:26 AM    Admitting Dx:  Spinal stenosis of lumbar region with neurogenic claudication [M48.062]  Spinal stenosis of lumbar region with neurogenic claudication [M48.062]    Status Post: [place] ARTHDSIS POST/POSTEROLATRL/POSTINTERBODY LUMBAR [52579] (Lumbar 2-3 fusion with instrumentation and removal of implants L3-4 with laminectomies by Dr. Segundo)    Post Operative Day Number: 3    Current Problem List:   Patient Active Problem List   Diagnosis   • Type 2 diabetes mellitus without complication (CMS/HCC)   • Post-operative state   • S/P lumbar fusion   • Insomnia   • Abnormal EKG   • Cellulitis of breast   • Chest pain   • Dyspnea on exertion   • Fibromyalgia   • Hyperhidrosis   • LAFB (left anterior fascicular block)   • Mass of right breast   • Obesity   • PVC (premature ventricular contraction)   • Rheumatoid arthritis (CMS/HCC)   • S/P catheter ablation of slow pathway   • Seizures (CMS/HCC)   • Sinus tachycardia   • Chronic fatigue   • Vitamin D deficiency   • Complex dyslipidemia   • Hypothyroidism due to Hashimoto's thyroiditis   • Hypertension   • Hyperlipidemia   • Acute right-sided low back pain without sciatica   • Spinal stenosis of lumbar region with neurogenic claudication   • Sleep apnea         Past Medical History:   Diagnosis Date   • Chronic fatigue    • Depression    • Diabetes mellitus (CMS/HCC)    • Fibromyalgia    • GERD (gastroesophageal reflux disease)    • Hiatal hernia    • History of Clostridioides difficile colitis    • Hyperlipidemia    • Hypertension    • Hypothyroidism    • Irregular heart beat    • Liver disease     FATTY LIVER, NON ALCOHOLIC   • Low back pain    • Peripheral neuropathy    • Rheumatoid arthritis (CMS/HCC)    • Rheumatoid arthritis (CMS/HCC) 2005    DR SMYTH   • Seizures  (CMS/[facility])    • Sleep apnea     NO CPAP   • Type 2 diabetes mellitus (CMS/[facility])    • West Nile fever 2002       Current Medications:  Scheduled Meds:baclofen, 20 mg, Oral, BID  dexamethasone, 4 mg, Intravenous, Q6H  DULoxetine, 60 mg, Oral, BID  fenofibrate, 48 mg, Oral, Daily  ferrous fulfate dried ER, 140 mg, Oral, Daily  insulin glargine, 40 Units, Subcutaneous, Nightly  insulin lispro, 7 Units, Subcutaneous, TID With Meals  insulin lispro, 0-9 Units, Subcutaneous, TID With Meals  lamoTRIgine, 150 mg, Oral, BID  levETIRAcetam, 1,500 mg, Oral, Daily  levothyroxine, 137 mcg, Oral, Daily  metoprolol tartrate, 50 mg, Oral, Q12H  OXcarbazepine, 600 mg, Oral, Q12H  pantoprazole, 40 mg, Oral, QAM  polyethylene glycol, 17 g, Oral, Daily  pramipexole, 0.5 mg, Oral, Q12H  senna-docusate sodium, 1 tablet, Oral, BID  sodium chloride, 3 mL, Intravenous, Q12H      PRN Meds:.•  acetaminophen  •  bisacodyl  •  dextrose  •  dextrose  •  glucagon (human recombinant)  •  magnesium hydroxide  •  naloxone  •  ondansetron **OR** ondansetron  •  oxyCODONE-acetaminophen **OR** oxyCODONE-acetaminophen  •  sodium chloride  •  temazepam  •  zolpidem    SUBJECTIVE: 65 y.o.  female awake and alert.  No complaints.    OBJECTIVE:   Vitals:    02/17/21 2037 02/17/21 2306 02/18/21 0300 02/18/21 0751   BP: 119/51 109/60 111/62 118/69   BP Location:  Left arm Left arm Right arm   Patient Position:  Lying Lying Lying   Pulse: 90 76 69 69   Resp:  16 16 16   Temp:  97.5 °F (36.4 °C) 96.9 °F (36.1 °C) 98.3 °F (36.8 °C)   TempSrc:  Oral Skin Skin   SpO2:  91% 92% 93%   Weight:       Height:         I/O last 3 completed shifts:  In: 2468.3 [P.O.:1360; I.V.:1108.3]  Out: 800 [Urine:800]    Diagnostic Tests:   Lab Results (last 24 hours)     Procedure Component Value Units Date/Time    POC Glucose Once [[id number]]  (Abnormal) Collected: 02/18/21 1118    Specimen: Blood Updated: 02/18/21 1120     Glucose 207 mg/dL     CBC & Differential [[id number]]   (Abnormal) Collected: 02/18/21 0843    Specimen: Blood Updated: 02/18/21 0904    Narrative:      The following orders were created for panel order CBC & Differential.  Procedure                               Abnormality         Status                     ---------                               -----------         ------                     CBC Auto Differential[[id number]]        Abnormal            Final result                 Please view results for these tests on the individual orders.    CBC Auto Differential [[id number]]  (Abnormal) Collected: 02/18/21 0843    Specimen: Blood Updated: 02/18/21 0904     WBC 12.38 10*3/mm3      RBC 2.80 10*6/mm3      Hemoglobin 8.4 g/dL      Hematocrit 26.4 %      MCV 94.3 fL      MCH 30.0 pg      MCHC 31.8 g/dL      RDW 12.0 %      RDW-SD 42.3 fl      MPV 11.4 fL      Platelets 162 10*3/mm3      Neutrophil % 90.1 %      Lymphocyte % 5.7 %      Monocyte % 3.0 %      Eosinophil % 0.0 %      Basophil % 0.2 %      Immature Grans % 1.0 %      Neutrophils, Absolute 11.15 10*3/mm3      Lymphocytes, Absolute 0.71 10*3/mm3      Monocytes, Absolute 0.37 10*3/mm3      Eosinophils, Absolute 0.00 10*3/mm3      Basophils, Absolute 0.03 10*3/mm3      Immature Grans, Absolute 0.12 10*3/mm3      nRBC 0.0 /100 WBC     POC Glucose Once [[id number]]  (Abnormal) Collected: 02/18/21 0623    Specimen: Blood Updated: 02/18/21 0625     Glucose 158 mg/dL     POC Glucose Once [[id number]]  (Abnormal) Collected: 02/17/21 2106    Specimen: Blood Updated: 02/17/21 2107     Glucose 151 mg/dL     POC Glucose Once [[id number]]  (Abnormal) Collected: 02/17/21 1510    Specimen: Blood Updated: 02/17/21 1514     Glucose 209 mg/dL           PHYSICAL EXAM: Back incision is intact without any erythema or drainage.  She was complaining of some pain in both lower extremities yesterday after discontinue the PCA pump.  Pain is much better today after a few doses of IV steroids.  Strength is equal bilaterally.  She has no pain  in her legs or numbness and tingling today.  She is ambulating well with PT.  Voiding well.  Abdomen is soft with bowel sounds and passing gas however no BM.  Hemoglobin is 8.4 and vital signs remained stable.  Patient is asymptomatic when out of bed.     ASSESSMENT & PLAN:    Plan Home later today.  Will plan to send her home on a Medrol Dosepak.  She is to return to the office in 2 weeks to see Dr. Montelongo      Date: 2/18/2021    Sena Dunbar RN

## 2021-02-18 NOTE — DISCHARGE INSTRUCTIONS
[facility]    [address], [address]    [place] [address]    Phone: [phone]    Fax: [phone]    Jef Randall M.D., F.A.C.S.        CARE AND INSTRUCTIONS AFTER LUMBAR FUSION    1. Sitting is allowed but not for an extended amount of time. You may lie on a firm couch BUT no waterbed or in a recliner. Either lie on your side or stand at a counter for meals. Do not lie on your stomach. You may use one pillow under your head when laying down and you may use pillows under or in between your knees for comfort.    2. No driving. You can ride short distances in a car in the front passenger’s seat that reclines, or you may lie down in the back seat.    3. Don’t lift anything heavier than a coffee cup or paperback book.    4. Gradually increase your activity each day. You should be out of bed every hour during the day. Walk outside as soon as you feel up to doing so. Walk short distances frequently rather than making a long trip. Your goal is to be walking 1 to 3 miles per day when you return for your post-operative office visit. (NEVER DO THIS IN ONE TRIP)    5. You may climb stairs BUT take them slowly.    6. Keep your incision clean and dry. If you notice any redness, swelling or drainage call the office @ [phone]. There will be glue over your incision. This will peel off on its own and should not be pulled off.    7. You may shower five (5) days after surgery. Do not let the water hit directly on the incision, gently pat dry.    8. You should have a post-operative appointment scheduled for 2 to 2 ½ weeks after surgery with our Physician Assistant or Nurse Practitioner. If you do not, please call the office when you return home from the hospital to schedule this appointment.    9. Your prescription for pain medication may be refilled for only half the original amount prior to your return office visit. In order to have this medication refilled you must contact the office four days  prior to the due date.    10. Don’t be alarmed if you continue to experience some of your pre-operative symptoms after going home. This is not un-common and usually improves within a few days but could last longer. If you have any questions please call our office at [phone].

## 2021-02-18 NOTE — DISCHARGE SUMMARY
Orthopedic Discharge Summary      Patient: Wendi Ramos  YOB: 1955  Medical Record Number: [id number]    Attending Physician: Juancarlos Montelongo MD  Consulting Physician(s):   Consults     Date and Time Order Name Status Description    2/15/2021 1452 Inpatient Hospitalist Consult Completed           Date of Admission: 2/15/2021  5:26 AM  Date of Discharge:2/18/2021    Discharge Diagnosis: [place] ARTHDSIS POST/POSTEROLATRL/POSTINTERBODY LUMBAR [32631] (Lumbar 2-3 fusion with instrumentation and removal of implants L3-4 with laminectomies by Dr. Segundo),   Acute Blood Loss Anemia      Presenting Problem/History of Present Illness: Spinal stenosis of lumbar region with neurogenic claudication [M48.062]  Spinal stenosis of lumbar region with neurogenic claudication [M48.062]      Allergies:   Allergies   Allergen Reactions   • Adhesive Tape Other (See Comments)     SKIN ON HANDS SHED OFF   • Sulfamethoxazole W/Trimethoprim 800-160  [Sulfamethoxazole-Trimethoprim] Hives   • Cefpodoxime    • Cephalosporins Other (See Comments)     C-DIFF   • Metformin And Related Nausea Only     Regular Metformin(NOT EXTENDED RELEASE)   • Sulfa Antibiotics Hives   • Topiramate Hives and Itching   • Latex Dermatitis and Rash       Discharge Medications     Discharge Medications      New Medications      Instructions Start Date   ClearLax 17 GM/SCOOP powder  Generic drug: polyethylene glycol   Dissolve 17 g in 4 to 6 ounces of liquid and drink Daily for 10 days. Indications: Constipation      methylPREDNISolone 4 MG dose pack  Commonly known as: MEDROL   Use as directed by package instructions      oxyCODONE-acetaminophen 7.5-325 MG per tablet  Commonly known as: PERCOCET   2 tablets, Oral, Every 4 Hours PRN      Senexon-S 8.6-50 MG per tablet  Generic drug: sennosides-docusate   1 tablet, Oral, 2 Times Daily         Changes to Medications      Instructions Start Date   aspirin 81 MG EC tablet  What changed: additional  instructions   81 mg, Oral, Daily      estradiol 0.075 MG/24HR patch  Commonly known as: ILYA HORNER  What changed: additional instructions   1 patch, Transdermal, 2 Times Weekly      zolpidem 10 MG tablet  Commonly known as: AMBIEN  What changed: when to take this   10 mg, Oral, Nightly PRN         Continue These Medications      Instructions Start Date   baclofen 20 MG tablet  Commonly known as: LIORESAL   20 mg, Oral, 2 Times Daily      DULoxetine 60 MG capsule  Commonly known as: CYMBALTA   60 mg, Oral, 2 Times Daily      fenofibrate 160 MG tablet   160 mg, Oral, Daily      High Potency Iron 65 MG tablet   65 mg, Oral, Every Other Day      hydroxychloroquine 200 MG tablet  Commonly known as: PLAQUENIL   200 mg, Oral, 2 Times Daily      lamoTRIgine 100 MG tablet  Commonly known as: LaMICtal   150 mg, Oral, 2 Times Daily, 1.5 pills BId      levETIRAcetam 500 MG tablet  Commonly known as: KEPPRA   1,500 mg, Oral, 2 Times Daily, Take 3 tabs bid      levothyroxine 137 MCG tablet  Commonly known as: SYNTHROID, LEVOTHROID   TAKE 1 TABLET EVERY DAY      metoprolol tartrate 50 MG tablet  Commonly known as: LOPRESSOR   50 mg, Oral, Every 12 Hours Scheduled      omega-3 acid ethyl esters 1 g capsule  Commonly known as: LOVAZA   2 g, Oral, 2 Times Daily      omeprazole 40 MG capsule  Commonly known as: priLOSEC   TAKE 1 CAPSULE EVERY DAY      OXcarbazepine 600 MG tablet  Commonly known as: TRILEPTAL   600 mg, Oral, 2 Times Daily      pramipexole 0.5 MG tablet  Commonly known as: MIRAPEX   0.5 mg, Oral, 2 times daily      rosuvastatin 40 MG tablet  Commonly known as: CRESTOR   TAKE 1 TABLET EVERY DAY      Soliqua 100-33 UNT-MCG/ML solution pen-injector injection  Generic drug: Insulin Glargine-Lixisenatide   60 Units, Subcutaneous, Daily With Breakfast      VITAMIN B COMPLEX PO   1 tablet, Oral, Daily      vitamin D3 125 MCG (5000 UT) capsule capsule   5,000 Units, Oral, Daily      Xigduo XR 5-1000 MG  tablet  Generic drug: dapagliflozin-metformin HCl ER   TAKE 2 TABLETS EVERY DAY         Stop These Medications    HYDROcodone-acetaminophen 7.5-325 MG per tablet  Commonly known as: NORCO              Past Medical History:   Diagnosis Date   • Chronic fatigue    • Depression    • Diabetes mellitus (CMS/HCC)    • Fibromyalgia    • GERD (gastroesophageal reflux disease)    • Hiatal hernia    • History of Clostridioides difficile colitis    • Hyperlipidemia    • Hypertension    • Hypothyroidism    • Irregular heart beat    • Liver disease     FATTY LIVER, NON ALCOHOLIC   • Low back pain    • Peripheral neuropathy    • Rheumatoid arthritis (CMS/HCC)    • Rheumatoid arthritis (CMS/HCC) 2005    DR SMYTH   • Seizures (CMS/HCC)    • Sleep apnea     NO CPAP   • Type 2 diabetes mellitus (CMS/HCC)    • West Nile fever 2002        Past Surgical History:   Procedure Laterality Date   • ABDOMINOPLASTY     • ADENOIDECTOMY     • BILATERAL BREAST REDUCTION     • BRONCHOSCOPY N/A 12/15/2016    Procedure: BRONCHOSCOPY WITH BAL AND ENDOBRONCHIAL ULTRASOUND WITH FNA;  Surgeon: Diego Ponce MD;  Location: [facility] ENDOSCOPY;  Service:    • CARDIAC ABLATION     • CATARACT EXTRACTION, BILATERAL     • COLONOSCOPY     • ENDOSCOPY     • EYE SURGERY     • HYSTERECTOMY     • LASIK     • LUMBAR DISCECTOMY FUSION INSTRUMENTATION Left 6/8/2018    Procedure: LEFT L3/4 lumbar discectomy;  Surgeon: Miguelangel Goldberg MD;  Location: [facility] OR;  Service: Neurosurgery   • LUMBAR FUSION Left 7/12/2018    Procedure: Left L3 4 lumbar decompression and discectomy with transforaminal lumbar interbody fusion and posterior instrumentation with posterior lateral arthrodesis;  Surgeon: Miguelangel Goldberg MD;  Location: [facility];  Service: Neurosurgery   • LUMBAR LAMINECTOMY WITH FUSION N/A 2/15/2021    Procedure: Lumbar 2-3, lumbar 3 4, lumbar 4 5 fusion with instrumentation and BMP,  L2-L3 lift with cage, and removal of implants L3-4 with  laminectomies by Dr. Segundo;  Surgeon: Juancarlos Montelongo MD;  Location: [facility];  Service: Orthopedic Spine;  Laterality: N/A;   • LUMBAR LAMINECTOMY WITH FUSION N/A 2/15/2021    Procedure: Lumbar 2 3, Lumbar 3 4 and lumbar 4 5 laminectomy with a fusion by orthopedics;  Surgeon: Jef Randall MD;  Location: [facility];  Service: Neurosurgery;  Laterality: N/A;   • MOUTH SURGERY     • REPLACEMENT TOTAL KNEE Left    • TONSILLECTOMY     • TONSILLECTOMY AND ADENOIDECTOMY  1966        Social History     Occupational History   • Occupation: RETIRED     Comment:    Tobacco Use   • Smoking status: Never Smoker   • Smokeless tobacco: Never Used   Substance and Sexual Activity   • Alcohol use: Yes     Comment: social   • Drug use: No   • Sexual activity: Defer      Social History     Social History Narrative    LIVES WITH SPOUSE        Family History   Problem Relation Age of Onset   • Diabetes Mother    • Neuropathy Father    • Colon polyps Father    • Diabetes Sister    • No Known Problems Son    • Diabetes Maternal Grandmother    • Cancer Maternal Grandmother    • Diabetes Paternal Grandmother    • Cancer Paternal Grandmother    • Malig Hyperthermia Neg Hx          Physical Exam: 65 y.o. female  General Appearance:    Alert, cooperative, in no acute distress                      Vitals:    02/17/21 2306 02/18/21 0300 02/18/21 0751 02/18/21 1203   BP: 109/60 111/62 118/69 106/64   BP Location: Left arm Left arm Right arm Right arm   Patient Position: Lying Lying Lying Lying   Pulse: 76 69 69 63   Resp: 16 16 16 16   Temp: 97.5 °F (36.4 °C) 96.9 °F (36.1 °C) 98.3 °F (36.8 °C) 98.9 °F (37.2 °C)   TempSrc: Oral Skin Skin Skin   SpO2: 91% 92% 93% 93%   Weight:       Height:            DIAGNOSTIC TESTS:   Admission on 02/15/2021   Component Date Value Ref Range Status   • ABO Type 02/15/2021 A   Final   • RH type 02/15/2021 Negative   Final   • Antibody Screen 02/15/2021 Negative   Final   • T&S Expiration  Date 02/15/2021 2/18/2021 11:59:59 PM   Final   • Glucose 02/15/2021 136* 70 - 130 mg/dL Final   • Hemoglobin 02/15/2021 11.0* 12.0 - 15.9 g/dL Final   • Hematocrit 02/15/2021 34.5  34.0 - 46.6 % Final   • Glucose 02/15/2021 122  70 - 130 mg/dL Final   • Glucose 02/15/2021 124  70 - 130 mg/dL Final   • Glucose 02/15/2021 145* 70 - 130 mg/dL Final   • Hemoglobin 02/16/2021 9.2* 12.0 - 15.9 g/dL Final   • Hematocrit 02/16/2021 27.7* 34.0 - 46.6 % Final   • Glucose 02/16/2021 121* 65 - 99 mg/dL Final   • BUN 02/16/2021 16  8 - 23 mg/dL Final   • Creatinine 02/16/2021 0.68  0.57 - 1.00 mg/dL Final   • Sodium 02/16/2021 138  136 - 145 mmol/L Final   • Potassium 02/16/2021 3.8  3.5 - 5.2 mmol/L Final   • Chloride 02/16/2021 104  98 - 107 mmol/L Final   • CO2 02/16/2021 23.4  22.0 - 29.0 mmol/L Final   • Calcium 02/16/2021 8.1* 8.6 - 10.5 mg/dL Final   • eGFR Non  Amer 02/16/2021 86  >60 mL/min/1.73 Final   • BUN/Creatinine Ratio 02/16/2021 23.5  7.0 - 25.0 Final   • Anion Gap 02/16/2021 10.6  5.0 - 15.0 mmol/L Final   • Hemoglobin A1C 02/16/2021 6.80* 4.80 - 5.60 % Final   • Glucose 02/16/2021 122  70 - 130 mg/dL Final   • Glucose 02/16/2021 193* 70 - 130 mg/dL Final   • Glucose 02/16/2021 197* 70 - 130 mg/dL Final   • Glucose 02/16/2021 180* 70 - 130 mg/dL Final   • WBC 02/17/2021 13.02* 3.40 - 10.80 10*3/mm3 Final   • RBC 02/17/2021 2.62* 3.77 - 5.28 10*6/mm3 Final   • Hemoglobin 02/17/2021 8.1* 12.0 - 15.9 g/dL Final   • Hematocrit 02/17/2021 24.4* 34.0 - 46.6 % Final   • MCV 02/17/2021 93.1  79.0 - 97.0 fL Final   • MCH 02/17/2021 30.9  26.6 - 33.0 pg Final   • MCHC 02/17/2021 33.2  31.5 - 35.7 g/dL Final   • RDW 02/17/2021 12.0* 12.3 - 15.4 % Final   • RDW-SD 02/17/2021 40.9  37.0 - 54.0 fl Final   • MPV 02/17/2021 11.4  6.0 - 12.0 fL Final   • Platelets 02/17/2021 129* 140 - 450 10*3/mm3 Final   • Glucose 02/17/2021 160* 70 - 130 mg/dL Final   • Glucose 02/17/2021 163* 70 - 130 mg/dL Final   • Glucose  02/17/2021 209* 70 - 130 mg/dL Final   • Glucose 02/17/2021 151* 70 - 130 mg/dL Final   • Glucose 02/18/2021 158* 70 - 130 mg/dL Final   • WBC 02/18/2021 12.38* 3.40 - 10.80 10*3/mm3 Final   • RBC 02/18/2021 2.80* 3.77 - 5.28 10*6/mm3 Final   • Hemoglobin 02/18/2021 8.4* 12.0 - 15.9 g/dL Final   • Hematocrit 02/18/2021 26.4* 34.0 - 46.6 % Final   • MCV 02/18/2021 94.3  79.0 - 97.0 fL Final   • MCH 02/18/2021 30.0  26.6 - 33.0 pg Final   • MCHC 02/18/2021 31.8  31.5 - 35.7 g/dL Final   • RDW 02/18/2021 12.0* 12.3 - 15.4 % Final   • RDW-SD 02/18/2021 42.3  37.0 - 54.0 fl Final   • MPV 02/18/2021 11.4  6.0 - 12.0 fL Final   • Platelets 02/18/2021 162  140 - 450 10*3/mm3 Final   • Neutrophil % 02/18/2021 90.1* 42.7 - 76.0 % Final   • Lymphocyte % 02/18/2021 5.7* 19.6 - 45.3 % Final   • Monocyte % 02/18/2021 3.0* 5.0 - 12.0 % Final   • Eosinophil % 02/18/2021 0.0* 0.3 - 6.2 % Final   • Basophil % 02/18/2021 0.2  0.0 - 1.5 % Final   • Immature Grans % 02/18/2021 1.0* 0.0 - 0.5 % Final   • Neutrophils, Absolute 02/18/2021 11.15* 1.70 - 7.00 10*3/mm3 Final   • Lymphocytes, Absolute 02/18/2021 0.71  0.70 - 3.10 10*3/mm3 Final   • Monocytes, Absolute 02/18/2021 0.37  0.10 - 0.90 10*3/mm3 Final   • Eosinophils, Absolute 02/18/2021 0.00  0.00 - 0.40 10*3/mm3 Final   • Basophils, Absolute 02/18/2021 0.03  0.00 - 0.20 10*3/mm3 Final   • Immature Grans, Absolute 02/18/2021 0.12* 0.00 - 0.05 10*3/mm3 Final   • nRBC 02/18/2021 0.0  0.0 - 0.2 /100 WBC Final   • Glucose 02/18/2021 207* 70 - 130 mg/dL Final       No results found.    Hospital Course:  65 y.o. female admitted to [facility] to services of Juancarlos Montelongo MD with Spinal stenosis of lumbar region with neurogenic claudication [M48.062]  Spinal stenosis of lumbar region with neurogenic claudication [M48.062] on 2/15/2021 and underwent [place] ARTHDSIS POST/POSTEROLATRL/POSTINTERBODY LUMBAR [03034] (Lumbar 2-3 fusion with instrumentation and removal of implants L3-4 with  laminectomies by Dr. Segundo)  Per Juancarlos Montelongo MD. Antibiotic and VTE prophylaxis were per [facility] protocols.  The patient was admitted to the floor where IV and/or oral pain medications were administered for postoperative pain.  At discharge the incisional pain was tolerable and preop neurologic function was intact.  The dressing was dry and the wound was clean.    Condition on Discharge:  Stable    Discharge Instructions:   1.  Patient may weight bear as tolerated unless otherwise specified.   2.  May use ice to back incision as needed.   3.  Patient also instructed on incentive spirometer during hospitalization and encouraged to continue to use at home regularly.   4.  Dressing is waterproof and should remain on and intact until seen in the office at 1st PO visit.  Patient has been instructed to contact the office if dressing becomes loose or saturated.   5.  Patient may shower on POD #3 if and when all wound drainage has stopped.    6.  Back brace should be worn when up and about.  It need not be worn to the bathroom and certainly not in the shower.   7.  Patient is to avoid forward bending and twisting at the waist.  No lifting greater than 5 pounds.         A detailed list of instructions specific to the operation was given to the patient at the time of discharge.    Follow up Instructions:  Follow up in the office with Dr. Juancarlos Montelongo Jr. in 2-3 weeks - patient to call the office at [phone] to schedule. Prescriptions were given for pain medication.    Follow-up Appointments  Future Appointments   Date Time Provider Department Center   3/2/2021 10:10 AM Juancarlos Montelongo MD [facility]   3/16/2021 11:15 AM Jef Randall MD [facility]   7/19/2021 10:00 AM [facility] None   8/2/2021  2:00 PM Chana Hong APRN [facility] None     Additional Instructions for the Follow-ups that You Need to Schedule     Discharge Follow-up with Specialty: Orthopedics; 2 Weeks   As  directed      Specialty: Orthopedics    Follow Up: 2 Weeks    Follow Up Details: Return to the office to see Dr. Juancarlos Montelongo               Discharge Disposition Plan:today to home    Date: 2/18/2021    Juancarlos Montelongo MD  02/18/21  12:29 EST

## 2021-02-18 NOTE — PROGRESS NOTES
[phone]   LOS: 3 days     Name: Wendi Ramos  Age/Sex: 65 y.o. female  :  1955        PCP: Flo Temple MD  No chief complaint on file.     Subjective   Complaining about dressing being uncomfortable but eager to get out of the hospital, had some issues with pain overngith but better this AM.  Denies low or high sugar presently  General: No Fever or Chills, Cardiac: No Chest Pain or Palpitations, Resp: No Cough or SOA, GI: No Nausea, Vomiting, or Diarrhea and Other: No bleeding    baclofen, 20 mg, Oral, BID  dexamethasone, 4 mg, Intravenous, Q6H  DULoxetine, 60 mg, Oral, BID  fenofibrate, 48 mg, Oral, Daily  ferrous fulfate dried ER, 140 mg, Oral, Daily  insulin glargine, 40 Units, Subcutaneous, Nightly  insulin lispro, 7 Units, Subcutaneous, TID With Meals  insulin lispro, 0-9 Units, Subcutaneous, TID With Meals  lamoTRIgine, 150 mg, Oral, BID  levETIRAcetam, 1,500 mg, Oral, Daily  levothyroxine, 137 mcg, Oral, Daily  metoprolol tartrate, 50 mg, Oral, Q12H  OXcarbazepine, 600 mg, Oral, Q12H  pantoprazole, 40 mg, Oral, QAM  polyethylene glycol, 17 g, Oral, Daily  pramipexole, 0.5 mg, Oral, Q12H  senna-docusate sodium, 1 tablet, Oral, BID  sodium chloride, 3 mL, Intravenous, Q12H           Objective   Vital Signs  Temp:  [96.9 °F (36.1 °C)-98.9 °F (37.2 °C)] 96.9 °F (36.1 °C)  Heart Rate:  [69-90] 69  Resp:  [16] 16  BP: (109-126)/(51-65) 111/62  Body mass index is 31.61 kg/m².    Intake/Output Summary (Last 24 hours) at 2021 0614  Last data filed at 2021 0540  Gross per 24 hour   Intake 1240 ml   Output 100 ml   Net 1140 ml       Physical Exam  Vitals signs and nursing note reviewed.   Constitutional:       Appearance: Normal appearance.   HENT:      Head: Normocephalic and atraumatic.   Pulmonary:      Effort: Pulmonary effort is normal. No respiratory distress.   Neurological:      General: No focal deficit present.      Mental Status: She is alert and oriented to person, place, and  time.   Psychiatric:         Mood and Affect: Mood normal.         Behavior: Behavior normal.           Results Review:       I reviewed the patient's new clinical results.  Results from last 7 days   Lab Units 02/17/21  0504 02/16/21  0456 02/15/21  1322 02/12/21  0845   WBC 10*3/mm3 13.02*  --   --  6.29   HEMOGLOBIN g/dL 8.1* 9.2* 11.0* 13.3   PLATELETS 10*3/mm3 129*  --   --  240     Results from last 7 days   Lab Units 02/16/21  0456 02/12/21  0845   SODIUM mmol/L 138 141   POTASSIUM mmol/L 3.8 5.3*   CHLORIDE mmol/L 104 105   CO2 mmol/L 23.4 25.4   BUN mg/dL 16 20   CREATININE mg/dL 0.68 0.85   CALCIUM mg/dL 8.1* 10.7*   Estimated Creatinine Clearance: 84.7 mL/min (by C-G formula based on SCr of 0.68 mg/dL).      Assessment/Plan   Active Hospital Problems    Diagnosis  POA   • **Spinal stenosis of lumbar region with neurogenic claudication [M48.062]  Yes   • Sleep apnea [G47.30]  Unknown   • Hypertension [I10]  Yes   • Hyperlipidemia [E78.5]  Yes   • Type 2 diabetes mellitus without complication (CMS/HCC) [E11.9]  Yes   • Rheumatoid arthritis (CMS/HCC) [M06.9]  Yes      Resolved Hospital Problems   No resolved problems to display.       PLAN    Type 2 diabetes mellitus without complication- continue home meds on DC blood sugar stable presently, home meds and records reviewed, A1C with adequate outpatient control      Spinal stenosis of lumbar region with neurogenic claudication- care per [facility] and ortho spine    Rheumatoid arthritis- stable    Hypertension- BP stable continue home meds at DC    Hyperlipidemia-stable    Sleep apnea- stable      Disposition  No medical contraindication with discharge today      Tyrone Ramirez MD  [facility]  02/18/21  06:14 EST

## 2021-02-18 NOTE — PLAN OF CARE
Goal Outcome Evaluation:  Plan of Care Reviewed With: patient  Progress: improving  Outcome Summary: VSS, RA, SL, up with assist of 1, voiding per BRP, brace when OOB, pain tolerable with increase in PO meds, spasms less this AM with IV steroid, educated on BP and glucose monitoring, home with HH today

## 2021-02-18 NOTE — NURSING NOTE
Pt suffered a fall and hit the back of her head. Area of swelling noted upon examination, pt reports no pain. Pt is A&Ox4. Pt states she had hit call button and not received a response. She got tired of waiting, got up on her own and bed alarm did not sound. She states she dropped her phone, bent down to pick it up, legs gave out, and she fell back and hit her head. Spinal surgery and [facility] notified. CT head ordered STAT. If negative, pt OK for discharge per Dr. Montelongo.

## 2021-02-19 ENCOUNTER — TRANSITIONAL CARE MANAGEMENT TELEPHONE ENCOUNTER (OUTPATIENT)
Dept: CALL CENTER | Facility: HOSPITAL | Age: 66
End: 2021-02-19

## 2021-02-19 NOTE — OUTREACH NOTE
Call Center TCM Note      Responses   [facility] patient discharged from?  [place]   Does the patient have one of the following disease processes/diagnoses(primary or secondary)?  General Surgery   TCM attempt successful?  No   Unsuccessful attempts  Attempt 1          Neha Gilbert RN    2/19/2021, 11:53 EST

## 2021-02-19 NOTE — OUTREACH NOTE
Prep Survey      Responses   [facility] discharged from?  [place]   Is LACE score < 7 ?  No   Emergency Room discharge w/ pulse ox?  No   Eligibility  [facility]   Date of Admission  02/15/21   Date of Discharge  02/18/21   Discharge Disposition  Home or Self Care   Discharge diagnosis  LUMBAR LAMINECTOMY WITH FUSION   Does the patient have one of the following disease processes/diagnoses(primary or secondary)?  General Surgery   Does the patient have Home health ordered?  No   Is there a DME ordered?  No   Prep survey completed?  Yes          Sara Thurman RN

## 2021-02-19 NOTE — OUTREACH NOTE
Call Center TCM Note      Responses   [facility] patient discharged from?  [place]   Does the patient have one of the following disease processes/diagnoses(primary or secondary)?  General Surgery   TCM attempt successful?  Yes   Call start time  1339   Call end time  1341   Discharge diagnosis  LUMBAR LAMINECTOMY WITH FUSION   Does the patient have all medications related to this admission filled (includes all antibiotics, pain medications, etc.)  Yes   Is the patient taking all medications as directed (includes completed medication regime)?  Yes   Does the patient have a follow up appointment scheduled with their surgeon?  Yes   Comments  f/u with surgeon on 3/2/21   Psychosocial issues?  No   Did the patient receive a copy of their discharge instructions?  Yes   Nursing interventions  Reviewed instructions with patient   What is the patient's perception of their health status since discharge?  Improving   Nursing interventions  Nurse provided patient education   Is the patient /caregiver able to teach back basic post-op care?  Continue use of incentive spirometry at least 1 week post discharge, Drive as instructed by MD in discharge instructions, Practice 'cough and deep breath', Take showers only when approved by MD-sponge bathe until then, Keep incision areas clean,dry and protected, Lifting as instructed by MD in discharge instructions, Do not remove steri-strips, No tub bath, swimming, or hot tub until instructed by MD   Is the patient/caregiver able to teach back signs and symptoms of incisional infection?  Fever   Is the patient/caregiver able to teach back steps to recovery at home?  Set small, achievable goals for return to baseline health, Rest and rebuild strength, gradually increase activity   Is the patient/caregiver able to teach back the hierarchy of who to call/visit for symptoms/problems? PCP, Specialist, Home health nurse, Urgent Care, ED, 911  Yes   TCM call completed?  Yes   Wrap up  additional comments  States she is doing well, no questions or concerns at this time, she will be following up with her surgeon on 3/2/21.          Neha Gilbert RN    2/19/2021, 13:41 EST

## 2021-02-22 ENCOUNTER — TELEPHONE (OUTPATIENT)
Dept: ORTHOPEDIC SURGERY | Facility: CLINIC | Age: 66
End: 2021-02-22

## 2021-02-22 DIAGNOSIS — M48.062 SPINAL STENOSIS OF LUMBAR REGION WITH NEUROGENIC CLAUDICATION: Primary | ICD-10-CM

## 2021-02-22 NOTE — TELEPHONE ENCOUNTER
Call returned to the patient.  She is complaining of severe back pain that is not relieved with Percocet 7.5 mg tablets.  Remains on her muscle relaxer.  She does have a history of chronic pain and fibromyalgia and was on hydrocodone prior to her surgery.  Denies any leg pain.  Patient states that she is wearing her brace when she is out of bed.  She is trying to walk some but probably not as often as she should because of the pain.  States the dressing remains dry and intact.  I have concerns whether or not the patient is maintaining her back mechanics.  While in the hospital I found her bending over to  something off the floor after being told she was not said to do any forward bending.  Patient states that her  is home with her.  Have asked her to go ahead and stop the Percocet for now will go ahead and try the hydrocodone that she has at home to see if that helps.  Have recommended that she lay around today and take it easy and would use some ice to the incision.  Will order home PT to help with mobilization and back mechanics.  Will also arrange for her to have an elevated commode seat.  She will ask her  to pick it up at [facility] later today.  Have advised her that if her hydrocodone does help the pain she is to notify the office to let us know and Dr. Montelongo can call in a new prescription.  If the pain is not improved with hydrocodone she also has been instructed to contact the office

## 2021-02-22 NOTE — TELEPHONE ENCOUNTER
SX 02/15/2021 Lumbar 2-3, lumbar 3 4, lumbar 4 5 fusion with instrumentation and BMP,  L2-L3 lift with cage, and removal of implants L3-4 with laminectomies by Dr. Alonso    Patient called in severe pain 10/10 entire back when moving or rolling over.   patient taking pain medication Percocet 7.5/325 2 tablets Q4H

## 2021-02-23 ENCOUNTER — TELEPHONE (OUTPATIENT)
Dept: ORTHOPEDIC SURGERY | Facility: CLINIC | Age: 66
End: 2021-02-23

## 2021-02-23 ENCOUNTER — TELEPHONE (OUTPATIENT)
Dept: FAMILY MEDICINE CLINIC | Facility: CLINIC | Age: 66
End: 2021-02-23

## 2021-02-23 NOTE — TELEPHONE ENCOUNTER
Patient called saying she wanted to talk to you about getting more pain medication.  Please advise.

## 2021-02-23 NOTE — TELEPHONE ENCOUNTER
[facility] HOME HEALTH PHYSICAL THERAPIST WORKING WITH PT CALLED BECAUSE WHILE PUTTING PT'S MEDS INTO HIS SYSTEM, THE FOLLOWING WARNINGS CAME UP AND HE NEEDED TO MAKE SURE DR. LOVE IS AWARE OF THEM      LEVEL 2 INTERACTION WITH 2 MEDICATIONS-    -estradiol (MINIVELLE, VIVELLE-DOT) 0.075 MG/24HR patch    -lamoTRIgine (LaMICtal) 100 MG tablet      THESE 3 MEDS CAME UP AS DUPLICATE THERAPIES-    -lamoTRIgine (LaMICtal) 100 MG tablet    -levETIRAcetam (KEPPRA) 500 MG tablet    -OXcarbazepine (TRILEPTAL) 600 MG tablet

## 2021-02-24 NOTE — TELEPHONE ENCOUNTER
Call returned to the patient.  She still having considerable complaints of back pain.  She is not getting any relief with the hydrocodone that she had been on prior to surgery.  She is now gone back to the Percocet 7.5 and is taken 2 tablets every 4 hours pretty much around-the-clock.  She has been using ice to her incision.  States that the dressing is dry and intact without any drainage and she remains afebrile.  Denies any leg pain.  She states that she is maintaining her back precautions.  Patient has been on long-term narcotics for years that is usually managed by pain management.  We discussed that being on pain medicine for long periods of time does make it hard to find a narcotic that can give her some relief.  I also reiterated that we cannot give her any medication that will make the pain completely go away.  I will discuss with Dr. Montelongo for further suggestions.  Patient is asking to increase her Percocet to 10 mg tablets

## 2021-02-24 NOTE — TELEPHONE ENCOUNTER
Called rick told him thanks for up date we will let Maverick know but patient needs to contract prescribing doctors, Rick says He contacted Dr. Edu ISSA

## 2021-02-25 NOTE — TELEPHONE ENCOUNTER
We are getting lots of calls from her.  Just have her come see me tomorrow and we will see what is up.

## 2021-02-26 ENCOUNTER — TELEPHONE (OUTPATIENT)
Dept: NEUROSURGERY | Facility: CLINIC | Age: 66
End: 2021-02-26

## 2021-02-26 ENCOUNTER — OFFICE VISIT (OUTPATIENT)
Dept: ORTHOPEDIC SURGERY | Facility: CLINIC | Age: 66
End: 2021-02-26

## 2021-02-26 VITALS — HEIGHT: 69 IN | WEIGHT: 214.07 LBS | BODY MASS INDEX: 31.71 KG/M2 | TEMPERATURE: 98.3 F

## 2021-02-26 DIAGNOSIS — R52 PAIN: Primary | ICD-10-CM

## 2021-02-26 DIAGNOSIS — M48.062 LUMBAR STENOSIS WITH NEUROGENIC CLAUDICATION: ICD-10-CM

## 2021-02-26 PROCEDURE — 99024 POSTOP FOLLOW-UP VISIT: CPT | Performed by: ORTHOPAEDIC SURGERY

## 2021-02-26 PROCEDURE — 72100 X-RAY EXAM L-S SPINE 2/3 VWS: CPT | Performed by: ORTHOPAEDIC SURGERY

## 2021-02-26 RX ORDER — HYDROCODONE BITARTRATE AND ACETAMINOPHEN 10; 325 MG/1; MG/1
TABLET ORAL
Qty: 60 TABLET | Refills: 0 | Status: SHIPPED | OUTPATIENT
Start: 2021-02-26 | End: 2021-03-04 | Stop reason: SDUPTHER

## 2021-02-26 NOTE — TELEPHONE ENCOUNTER
Talked to the APC's, they recommend she keep her 1 month appointment with our office and not move it. She is aware and will keep the appointment.

## 2021-02-26 NOTE — TELEPHONE ENCOUNTER
Pt is scheduled on 3/16/21 for a  p/o appt s/p L2/3 L4/5 lami/fusion( 2/15/21) Pt saw Dr Montelongo today and was told to move out that appt x one month.  [phone]

## 2021-02-27 ENCOUNTER — READMISSION MANAGEMENT (OUTPATIENT)
Dept: CALL CENTER | Facility: HOSPITAL | Age: 66
End: 2021-02-27

## 2021-02-27 NOTE — OUTREACH NOTE
General Surgery Week 2 Survey      Responses   [facility] patient discharged from?  [place]   Does the patient have one of the following disease processes/diagnoses(primary or secondary)?  General Surgery   Week 2 attempt successful?  Yes   Call start time  1757   Rescheduled  Rescheduled-pt requested   Discharge diagnosis  LUMBAR LAMINECTOMY WITH FUSION          Flakita Castro RN

## 2021-03-01 ENCOUNTER — READMISSION MANAGEMENT (OUTPATIENT)
Dept: CALL CENTER | Facility: HOSPITAL | Age: 66
End: 2021-03-01

## 2021-03-01 NOTE — OUTREACH NOTE
General Surgery Week 2 Survey      Responses   [facility] patient discharged from?  [place]   Does the patient have one of the following disease processes/diagnoses(primary or secondary)?  General Surgery   Week 2 attempt successful?  Yes   Call start time  1658   Call end time  1700   Discharge diagnosis  LUMBAR LAMINECTOMY WITH FUSION   Meds reviewed with patient/caregiver?  Yes   Is the patient having any side effects they believe may be caused by any medication additions or changes?  No   Does the patient have all medications related to this admission filled (includes all antibiotics, pain medications, etc.)  Yes   Is the patient taking all medications as directed (includes completed medication regime)?  Yes   Does the patient have a follow up appointment scheduled with their surgeon?  Yes   Has the patient kept scheduled appointments due by today?  Yes   Comments  f/u with surgeon on 3/2/21   Psychosocial issues?  No   Did the patient receive a copy of their discharge instructions?  Yes   Nursing interventions  Reviewed instructions with patient   What is the patient's perception of their health status since discharge?  Improving   Nursing interventions  Nurse provided patient education   Is the patient /caregiver able to teach back basic post-op care?  Continue use of incentive spirometry at least 1 week post discharge, Drive as instructed by MD in discharge instructions, Practice 'cough and deep breath', Take showers only when approved by MD-sponge bathe until then, Keep incision areas clean,dry and protected, Lifting as instructed by MD in discharge instructions, Do not remove steri-strips, No tub bath, swimming, or hot tub until instructed by MD   Is the patient/caregiver able to teach back signs and symptoms of incisional infection?  Fever, Incisional warmth, Increased drainage or bleeding, Increased redness, swelling or pain at the incisonal site   Is the patient/caregiver able to teach back  steps to recovery at home?  Set small, achievable goals for return to baseline health, Rest and rebuild strength, gradually increase activity   If the patient is a current smoker, are they able to teach back resources for cessation?  Not a smoker   Is the patient/caregiver able to teach back the hierarchy of who to call/visit for symptoms/problems? PCP, Specialist, Home health nurse, Urgent Care, ED, 911  Yes   Week 2 call completed?  Yes   Wrap up additional comments  States she is doing well, no questions or concerns at this time, she will be following up with her surgeon on 3/2/21.          Lilli Schafer, CIELO

## 2021-03-04 DIAGNOSIS — M48.062 LUMBAR STENOSIS WITH NEUROGENIC CLAUDICATION: ICD-10-CM

## 2021-03-04 RX ORDER — HYDROCODONE BITARTRATE AND ACETAMINOPHEN 10; 325 MG/1; MG/1
TABLET ORAL
Qty: 60 TABLET | Refills: 0 | Status: SHIPPED | OUTPATIENT
Start: 2021-03-04 | End: 2021-03-05 | Stop reason: SDUPTHER

## 2021-03-05 RX ORDER — HYDROCODONE BITARTRATE AND ACETAMINOPHEN 10; 325 MG/1; MG/1
TABLET ORAL
Qty: 60 TABLET | Refills: 0 | Status: SHIPPED | OUTPATIENT
Start: 2021-03-05 | End: 2021-01-01 | Stop reason: SDUPTHER

## 2021-03-10 NOTE — PROGRESS NOTES
"Subjective   Patient ID: Wendipenelope Ramos is a 65 y.o. female is here today for 4 week post follow-up with a new XR Lumbar. Patient had L2/3 and L4/5 laminectomy with a fusion by orthopedics on 02.15.2021.    Today patient is having severe pain in her low back. Patient denies bowel/bladder incontinence.Patient incision looks healthy, no redness, no drainage, no swelling.     Patient, Provider, and MA are all wearing a mask in our office today.     History of Present Illness     This patient returns today.  She is doing well overall.  She has a lot of pain in her back.  She did have to increase her pain medications but she has been on Norco for 10 years through pain management.  Her incision looks great.    The following portions of the patient's history were reviewed and updated as appropriate: allergies, current medications, past family history, past medical history, past social history, past surgical history and problem list.    Review of Systems   Constitutional: Negative for chills and fever.   HENT: Negative for congestion.    Genitourinary: Negative for difficulty urinating and dysuria.   Musculoskeletal: Positive for back pain and gait problem. Negative for neck pain and neck stiffness.   Neurological: Positive for weakness.       I have reviewed the review of systems as documented by my MA.      Objective     Vitals:    03/16/21 1119   BP: 120/70   Cuff Size: Adult   Pulse: 78   Temp: 97.7 °F (36.5 °C)   Weight: 97.1 kg (214 lb 1.1 oz)   Height: 175.3 cm (69.02\")     Body mass index is 31.59 kg/m².      Physical Exam  Neurological:      Mental Status: She is alert and oriented to person, place, and time.       Neurologic Exam     Mental Status   Oriented to person, place, and time.           Assessment/Plan   Independent Review of Radiographic Studies:      I personally reviewed the images from the following studies.    There are no new x-rays to review    Medical Decision Making:      I told the patient and " her friend that from my point of view there is nothing further I need to do.  I would encourage her to follow-up with Dr. Rodriguez as scheduled.  She is to call if any problems develop.    Diagnoses and all orders for this visit:    1. Follow-up examination following surgery (Primary)      Return if symptoms worsen or fail to improve.         Answers for HPI/ROS submitted by the patient on 3/9/2021  Please describe your symptoms.: Followup after surgery  Have you had these symptoms before?: Yes  How long have you been having these symptoms?: Greater than 2 weeks  What is the primary reason for your visit?: Other

## 2021-03-12 NOTE — TELEPHONE ENCOUNTER
Caller: SHA CANNON    Relationship: SELF    Best call back number: [phone]  Medication needed:   Requested Prescriptions      No prescriptions requested or ordered in this encounter   HYDROcodone-acetaminophen (NORCO)  MG per tablet  TAKES 2 EVERY 4 HOURS    When do you need the refill by: ASAP    What details did the patient provide when requesting the medication: HAS APPROXIMATELY 18 LEFT, DOESN'T WANT TO RUN OUT DURING WEEKEND    Does the patient have less than a 3 day supply:  [x] Yes  [] No    What is the patient's preferred pharmacy:    DON'T USE MAIL ORDER, PLEASE USE  [facility] [address] [place] [address] [phone]  [phone]

## 2021-04-09 PROBLEM — M48.062 LUMBAR STENOSIS WITH NEUROGENIC CLAUDICATION: Status: ACTIVE | Noted: 2021-01-01

## 2021-04-09 PROBLEM — R52 PAIN: Status: ACTIVE | Noted: 2021-01-01

## 2021-04-09 NOTE — PROGRESS NOTES
New patient or new problem visit    CC: Thoracolumbar back pain    HPI: She is 8 weeks out from L2-L5 laminectomy and fusion with instrumentation which I performed in concert with Dr. Randall.  She did well initially postoperatively and at 2 weeks look great.  Pain has been slowly worsening and she is required persistent pain medication to the extent that concern was aroused.  She denies leg pain numbness tingling weakness bowel or bladder complaints    PFSH: See attached    ROS: See attached    PE: On exam the wound is healed nicely she has some thoracolumbar tenderness and her posture is slightly stooped.  Good strength in the legs.  Sensation appears intact.  She is moving slowly but steadily.    XRAY: Plain film x-rays show that she has become kyphotic where the L2 screws have cut out into the L1-2 disc space and there is been collapse through the L2-3 disc space where the cage previously appropriately resided.  It looks like the collapses occurred through two three rather than above the construct but in any event it is kyphotic.  Prior myelogram CT showed a disc protrusion at T10-11.    Other: n/a    Impression: She is going to need reconstruction and correction of kyphosis with proximal extension of the fusion.  I am going to obtain an MRI scan with and without contrast of the lumbar spine.  I have spoken with Dr. Segundo who will see her or at least review films and see if we need to add any decompression.  Probably going to be looking at T10-L2 fusion with revision instrumentation.    Plan: MRI scan with an eye toward T10-L2 revision fusion with instrumentation possible laminectomy by Dr. Segundo.  Risk and benefits were reiterated.  There are no good alternatives which will preserve her posture, and neurologic function could become an peril with worsening kyphosis.  Answers for HPI/ROS submitted by the patient on 4/7/2021  What is the primary reason for your visit?: Back Pain  Onset: more than 1 month  ago  Progression since onset: gradually worsening  Pain location: sacro-iliac  Pain quality: stabbing  Pain - numeric: 10/10  Pain is: the same all the time  Aggravated by: bending, coughing, position, sitting, standing, twisting  Stiffness is present: all day  tingling: Yes  weakness: Yes

## 2021-04-12 NOTE — TELEPHONE ENCOUNTER
Call returned to the patient.  She is continuing to complain of severe pain and it seems to be getting worse each day.  She is having difficulty getting in and out of bed and is having more pain with using her walker.  Is taking hydrocodone 10 mg tablets 2 every 4 hours with only minimal relief.  Patient denies any numbness or tingling or leg weakness.  Explained to the patient that really the only option she has is to try to move her surgery up earlier however I will need to have ART PRINGLE look at his schedule to see when we can do the surgery.  For now would recommend she continue with just bedrest and to use her bedside commode to go to the bathroom otherwise I would recommend she get out of bed only when she has to

## 2021-04-12 NOTE — TELEPHONE ENCOUNTER
Patient called and states she can no longer get in or out of bed. Even while taking the pain medication every 4 hours. She would like to know if there is anything you can do to get her some relief.

## 2021-04-13 NOTE — TELEPHONE ENCOUNTER
Have discussed with Dr. Montelongo.  She is having more pain and now having difficulty using her legs because of the pain.  Will go ahead and reschedule her surgery for this Wednesday.  Have advised the patient that she will need to arrive at the hospital on Thursday at 1230 and should be n.p.o. after midnight.  Preadmission testing including her Covid test will be tomorrow at 11 AM

## 2021-04-14 PROBLEM — N30.90 CYSTITIS: Status: ACTIVE | Noted: 2021-01-01

## 2021-04-14 NOTE — NURSING NOTE
"   04/14/21 1045   Wound 04/14/21 0500 Bilateral coccyx Pressure Injury   Placement Date/Time: 04/14/21 0500   Present on Hospital Admission: Yes  Side: Bilateral  Location: coccyx  Primary Wound Type: Pressure Injury   Dressing Appearance no drainage;open to air   Base blanchable;red   Periwound intact   Edges irregular   Wound Length (cm) 7 cm   Wound Width (cm) 4 cm   Drainage Amount none   Care, Wound   (zinc barrier applied)   CWOCN consult for bilateral gluteal skin.  Skin is currently blanchable but red.  Mild excoriation to right inner gluteal area.  Patient reports \"sitting a lot\" because of her back issues.  Zinc barrier applied and off load.  "

## 2021-04-14 NOTE — CONSULTS
Patient requested information regarding the [place] [employer] document.  I provided the patient a copy of the MOST and explained how it works.  She will take it her physician to have it completed.

## 2021-04-14 NOTE — H&P
Patient Name:  eWndi Ramos  YOB: 1955  MRN:  [id number]  Admit Date:  4/13/2021  Patient Care Team:  Flo Temple MD as PCP - General (Family Medicine)  Jomar Steve MD as Consulting Physician (Interventional Cardiology)  Donny Gan MD as Consulting Physician (Pain Medicine)      Subjective   History Present Illness     Chief Complaint   Patient presents with   • Back Pain   • Urinary Retention     History of Present Illness   Ms. Ramos is a 65-year-old female with history of type 2 diabetes, status post lumbar fusion, fibromyalgia, rheumatoid arthritis, seizures, spinal stenosis of the lumbar region who presents to the emergency room with urinary retention and back pain.  Patient states that she been having some increased back pain over the last several weeks, was seen by orthopedic surgery and is actually supposed to have surgery on 4/15/2021, however she started to have some increasing back pain with right leg weakness as well as some urinary retention.  She states she does have a history of cauda equina syndrome 2 years ago when she had surgery.  She denies any significant weakness in her legs, however she does have some difficulty walking due to the pain.  She denies any fever, nausea, vomiting, headache, no exposure to COVID-19 that she is aware of.  Recent MRI done as an outpatient showed some edema surrounding her hardware and some loosening of screws which is why she is scheduled for surgery.  She is currently not taking any anticoagulation.  She denies any blood in her urine.  In the emergency room white blood cell count 9.9, hemoglobin 10.5, hematocrit 32.8, platelets 467.  Glucose 137, sodium 138, potassium 4.0, creatinine 0.68, BUN 14.  Urinalysis was positive for nitrites, 4+ bacteria, 0-2 squamous epithelial cells, a culture was sent.  Patient was started on Levaquin due to her allergy to cephalosporins.    Review of Systems   Constitutional: Negative for  appetite change and fever.   HENT: Negative for nosebleeds and trouble swallowing.    Eyes: Negative for photophobia, redness and visual disturbance.   Respiratory: Negative for cough, chest tightness, shortness of breath and wheezing.    Cardiovascular: Negative for chest pain, palpitations and leg swelling.   Gastrointestinal: Negative for abdominal distention, abdominal pain, nausea and vomiting.   Endocrine: Negative.    Genitourinary: Positive for difficulty urinating. Negative for flank pain, frequency and hematuria.   Musculoskeletal: Positive for back pain (radiating down right leg started 2 days ago with difficulty walking). Negative for gait problem and joint swelling.   Skin: Negative.    Neurological: Positive for weakness. Negative for dizziness, seizures, speech difficulty, light-headedness and headaches.   Hematological: Negative.    Psychiatric/Behavioral: Negative for behavioral problems and confusion.        Personal History     Past Medical History:   Diagnosis Date   • Chronic fatigue    • Depression    • Diabetes mellitus (CMS/HCC)    • Fibromyalgia    • GERD (gastroesophageal reflux disease)    • Hiatal hernia    • History of Clostridioides difficile colitis    • Hyperlipidemia    • Hypertension    • Hypothyroidism    • Irregular heart beat    • Liver disease     FATTY LIVER, NON ALCOHOLIC   • Low back pain    • Peripheral neuropathy    • Rheumatoid arthritis (CMS/HCC)    • Rheumatoid arthritis (CMS/HCC) 2005    DR SMYTH   • Seizures (CMS/HCC)    • Sleep apnea     NO CPAP   • Type 2 diabetes mellitus (CMS/HCC)    • West Nile fever 2002     Past Surgical History:   Procedure Laterality Date   • ABDOMINOPLASTY     • ADENOIDECTOMY     • BILATERAL BREAST REDUCTION     • BRONCHOSCOPY N/A 12/15/2016    Procedure: BRONCHOSCOPY WITH BAL AND ENDOBRONCHIAL ULTRASOUND WITH FNA;  Surgeon: Diego Ponce MD;  Location: [facility] ENDOSCOPY;  Service:    • CARDIAC ABLATION     • CATARACT EXTRACTION,  BILATERAL     • COLONOSCOPY     • ENDOSCOPY     • EYE SURGERY     • HYSTERECTOMY     • LASIK     • LUMBAR DISCECTOMY FUSION INSTRUMENTATION Left 6/8/2018    Procedure: LEFT L3/4 lumbar discectomy;  Surgeon: Miguelangel Goldberg MD;  Location: [facility];  Service: Neurosurgery   • LUMBAR FUSION Left 7/12/2018    Procedure: Left L3 4 lumbar decompression and discectomy with transforaminal lumbar interbody fusion and posterior instrumentation with posterior lateral arthrodesis;  Surgeon: Miguelangel Goldberg MD;  Location: [facility] OR;  Service: Neurosurgery   • LUMBAR LAMINECTOMY WITH FUSION N/A 2/15/2021    Procedure: Lumbar 2-3, lumbar 3 4, lumbar 4 5 fusion with instrumentation and BMP,  L2-L3 lift with cage, and removal of implants L3-4 with laminectomies by Dr. Segundo;  Surgeon: Juancarlos Montelongo MD;  Location: [facility];  Service: Orthopedic Spine;  Laterality: N/A;   • LUMBAR LAMINECTOMY WITH FUSION N/A 2/15/2021    Procedure: Lumbar 2 3, Lumbar 3 4 and lumbar 4 5 laminectomy with a fusion by orthopedics;  Surgeon: Jef Randall MD;  Location: [facility];  Service: Neurosurgery;  Laterality: N/A;   • MOUTH SURGERY     • REPLACEMENT TOTAL KNEE Left    • TONSILLECTOMY     • TONSILLECTOMY AND ADENOIDECTOMY  1966     Family History   Problem Relation Age of Onset   • Diabetes Mother    • Neuropathy Father    • Colon polyps Father    • Diabetes Sister    • No Known Problems Son    • Diabetes Maternal Grandmother    • Cancer Maternal Grandmother    • Diabetes Paternal Grandmother    • Cancer Paternal Grandmother    • Malig Hyperthermia Neg Hx      Social History     Tobacco Use   • Smoking status: Never Smoker   • Smokeless tobacco: Never Used   Vaping Use   • Vaping Use: Never used   Substance Use Topics   • Alcohol use: Yes     Comment: social   • Drug use: No     No current facility-administered medications on file prior to encounter.     Current Outpatient Medications on File Prior to Encounter    Medication Sig Dispense Refill   • aspirin 81 MG EC tablet Take 1 tablet by mouth Daily.     • B Complex Vitamins (VITAMIN B COMPLEX PO) Take 1 tablet by mouth Daily.     • baclofen (LIORESAL) 20 MG tablet Take 20 mg by mouth 2 (Two) Times a Day.     • dapagliflozin-metformin HCl ER (Xigduo XR) 5-1000 MG tablet Take 2 tablets by mouth Daily. 180 tablet 1   • DULoxetine (CYMBALTA) 60 MG capsule Take 60 mg by mouth 2 (Two) Times a Day.     • estradiol (MINIVELLE, VIVELLE-DOT) 0.075 MG/24HR patch PLACE 1 PATCH ON THE SKIN AS DIRECTED BY PROVIDER 2 (TWO) TIMES A WEEK. (Patient taking differently: Place 1 patch on the skin as directed by provider 2 (Two) Times a Week. TUES AND FRI) 24 patch 3   • fenofibrate 160 MG tablet Take 1 tablet by mouth Daily. 90 tablet 1   • Ferrous Sulfate Dried (High Potency Iron) 65 MG tablet Take 65 mg by mouth Every Other Day.     • HYDROcodone-acetaminophen (NORCO)  MG per tablet Take 1-2 tabs PRN every 4 hours for moderate to severe pain 60 tablet 0   • hydroxychloroquine (PLAQUENIL) 200 MG tablet Take 200 mg by mouth 2 (Two) Times a Day.     • lamoTRIgine (LaMICtal) 100 MG tablet Take 150 mg by mouth 2 (Two) Times a Day. 1.5 pills BId     • levETIRAcetam (KEPPRA) 500 MG tablet Take 1,500 mg by mouth 2 (Two) Times a Day. Take 3 tabs bid     • levothyroxine (SYNTHROID, LEVOTHROID) 137 MCG tablet Take 1 tablet by mouth Daily. 90 tablet 0   • methylPREDNISolone (MEDROL) 4 MG dose pack Use as directed by package instructions 21 tablet 0   • metoprolol tartrate (LOPRESSOR) 50 MG tablet Take 1 tablet by mouth Every 12 (Twelve) Hours. 180 tablet 0   • omega-3 acid ethyl esters (LOVAZA) 1 g capsule TAKE 2 CAPSULES TWICE DAILY 360 capsule 0   • omeprazole (priLOSEC) 40 MG capsule TAKE 1 CAPSULE EVERY DAY (Patient taking differently: Take 40 mg by mouth Daily.) 90 capsule 1   • OXcarbazepine (TRILEPTAL) 600 MG tablet Take 600 mg by mouth 2 (Two) Times a Day.     • pramipexole (MIRAPEX) 0.5  MG tablet Take 0.5 mg by mouth 2 (two) times a day.     • rosuvastatin (CRESTOR) 40 MG tablet TAKE 1 TABLET EVERY DAY (Patient taking differently: Take 40 mg by mouth Daily.) 90 tablet 3   • sennosides-docusate (PERICOLACE) 8.6-50 MG per tablet Take 1 tablet by mouth 2 (Two) Times a Day. 60 tablet 0   • Soliqua 100-33 UNT-MCG/ML solution pen-injector injection Inject 60 Units under the skin into the appropriate area as directed Daily With Breakfast. 60 mL 0   • vitamin D3 125 MCG (5000 UT) capsule capsule Take 5,000 Units by mouth Daily.     • zolpidem (AMBIEN) 10 MG tablet Take 1 tablet by mouth At Night As Needed for Sleep. 90 tablet 0     Allergies   Allergen Reactions   • Adhesive Tape Other (See Comments)     SKIN ON HANDS SHED OFF   • Sulfamethoxazole W/Trimethoprim 800-160  [Sulfamethoxazole-Trimethoprim] Hives   • Cefpodoxime    • Cephalosporins Other (See Comments)     C-DIFF   • Metformin And Related Nausea Only     Regular Metformin(NOT EXTENDED RELEASE)   • Sulfa Antibiotics Hives   • Topiramate Hives and Itching   • Latex Dermatitis and Rash       Objective    Objective     Vital Signs  Temp:  [95.9 °F (35.5 °C)] 95.9 °F (35.5 °C)  Heart Rate:  [88] 88  Resp:  [18-20] 18  BP: (128-137)/(61-72) 128/62  SpO2:  [95 %] 95 %  on   ;   Device (Oxygen Therapy): room air  Body mass index is 31.61 kg/m².    Physical Exam  Vitals and nursing note reviewed.   Constitutional:       General: She is not in acute distress.     Appearance: She is well-developed.   HENT:      Head: Normocephalic.   Neck:      Vascular: No JVD.   Cardiovascular:      Rate and Rhythm: Normal rate and regular rhythm.      Heart sounds: Normal heart sounds.      Comments: Normal sinus rhythm on the monitor with heart rate 72 during my exam.  Pulmonary:      Effort: Pulmonary effort is normal.      Breath sounds: Normal breath sounds.      Comments: Lung sounds clear, sats 97% on room air during my exam  Abdominal:      General: There is no  distension.      Palpations: Abdomen is soft.      Tenderness: There is no abdominal tenderness.   Musculoskeletal:         General: Normal range of motion.      Cervical back: Normal range of motion.      Right lower leg: No edema.      Left lower leg: No edema.      Comments: Patient complaining of mid back pain and radiating pain down her right leg.   Skin:     General: Skin is warm and dry.      Capillary Refill: Capillary refill takes less than 2 seconds.   Neurological:      Mental Status: She is alert and oriented to person, place, and time.      Comments: Bilateral leg strength 5 out of 5.  Patient does not appear to have any weakness in bed, did not attempt to ambulate her, she states pain is worse when trying to ambulate, mostly in her right leg.  She denies any numbness and tingling in her legs.  She denies any incontinence of urine   Psychiatric:         Attention and Perception: Attention normal.         Mood and Affect: Mood normal.         Behavior: Behavior normal.         Thought Content: Thought content normal.         Cognition and Memory: Cognition normal.         Judgment: Judgment normal.         Results Review:  I reviewed the patient's new clinical results.  I reviewed the patient's new imaging results and agree with the interpretation.  I reviewed the patient's other test results and agree with the interpretation  I personally viewed and interpreted the patient's EKG/Telemetry data  Discussed with ED provider.    Lab Results (last 24 hours)     Procedure Component Value Units Date/Time    CBC & Differential [[id number]]  (Abnormal) Collected: 04/13/21 2331    Specimen: Blood Updated: 04/13/21 2341    Narrative:      The following orders were created for panel order CBC & Differential.  Procedure                               Abnormality         Status                     ---------                               -----------         ------                     CBC Auto Differential[[id number]]         Abnormal            Final result                 Please view results for these tests on the individual orders.    Comprehensive Metabolic Panel [[id number]]  (Abnormal) Collected: 04/13/21 2331    Specimen: Blood Updated: 04/14/21 0000     Glucose 137 mg/dL      BUN 14 mg/dL      Creatinine 0.68 mg/dL      Sodium 138 mmol/L      Potassium 4.0 mmol/L      Chloride 104 mmol/L      CO2 23.5 mmol/L      Calcium 10.0 mg/dL      Total Protein 7.7 g/dL      Albumin 3.70 g/dL      ALT (SGPT) 14 U/L      AST (SGOT) 22 U/L      Alkaline Phosphatase 122 U/L      Total Bilirubin <0.2 mg/dL      eGFR Non African Amer 86 mL/min/1.73      Globulin 4.0 gm/dL      A/G Ratio 0.9 g/dL      BUN/Creatinine Ratio 20.6     Anion Gap 10.5 mmol/L     Narrative:      GFR Normal >60  Chronic Kidney Disease <60  Kidney Failure <15      CBC Auto Differential [[id number]]  (Abnormal) Collected: 04/13/21 2331    Specimen: Blood Updated: 04/13/21 2341     WBC 9.94 10*3/mm3      RBC 3.76 10*6/mm3      Hemoglobin 10.5 g/dL      Hematocrit 32.8 %      MCV 87.2 fL      MCH 27.9 pg      MCHC 32.0 g/dL      RDW 12.8 %      RDW-SD 40.0 fl      MPV 9.9 fL      Platelets 467 10*3/mm3      Neutrophil % 70.5 %      Lymphocyte % 20.4 %      Monocyte % 5.1 %      Eosinophil % 3.0 %      Basophil % 0.6 %      Immature Grans % 0.4 %      Neutrophils, Absolute 7.00 10*3/mm3      Lymphocytes, Absolute 2.03 10*3/mm3      Monocytes, Absolute 0.51 10*3/mm3      Eosinophils, Absolute 0.30 10*3/mm3      Basophils, Absolute 0.06 10*3/mm3      Immature Grans, Absolute 0.04 10*3/mm3      nRBC 0.0 /100 WBC     Urinalysis With Microscopic If Indicated (No Culture) - Urine, Catheter [[id number]]  (Abnormal) Collected: 04/14/21 0118    Specimen: Urine, Catheter Updated: 04/14/21 0133     Color, UA Yellow     Appearance, UA Clear     pH, UA <=5.0     Specific Gravity, UA >=1.030     Glucose, UA >=1000 mg/dL (3+)     Ketones, UA Trace     Bilirubin, UA Negative     Blood, UA  Negative     Protein, UA Negative     Leuk Esterase, UA Negative     Nitrite, UA Positive     Urobilinogen, UA 0.2 E.U./dL    Urinalysis, Microscopic Only - Urine, Catheter [[id number]]  (Abnormal) Collected: 04/14/21 0118    Specimen: Urine, Catheter Updated: 04/14/21 0133     RBC, UA 0-2 /HPF      WBC, UA 3-5 /HPF      Bacteria, UA 4+ /HPF      Squamous Epithelial Cells, UA 0-2 /HPF      Hyaline Casts, UA 0-2 /LPF      Methodology Automated Microscopy    COVID PRE-OP / PRE-PROCEDURE SCREENING ORDER (NO ISOLATION) - Swab, Nasopharynx [[id number]] Collected: 04/14/21 0130    Specimen: Swab from Nasopharynx Updated: 04/14/21 0141    Narrative:      The following orders were created for panel order COVID PRE-OP / PRE-PROCEDURE SCREENING ORDER (NO ISOLATION) - Swab, Nasopharynx.  Procedure                               Abnormality         Status                     ---------                               -----------         ------                     COVID-19,APTIMA PANTHER,...[[id number]]                      In process                   Please view results for these tests on the individual orders.    COVID-19,APTIMA PANTHER,[facility] IN-HOUSE, NP/OP SWAB IN UTM/VTM/[facility]ALINE TRANSPORT MEDIA,24 HR TAT - Swab, Nasopharynx [[id number]] Collected: 04/14/21 0130    Specimen: Swab from Nasopharynx Updated: 04/14/21 0141          Imaging Results (Last 24 Hours)     ** No results found for the last 24 hours. **              No orders to display        Assessment/Plan     Active Hospital Problems    Diagnosis  POA   • **Cystitis [N30.90]  Yes   • Spinal stenosis of lumbar region with neurogenic claudication [M48.062]  Yes   • Acute bilateral low back pain without sciatica [M54.5]  Yes   • S/P lumbar fusion [Z98.1]  Not Applicable   • Type 2 diabetes mellitus without complication (CMS/HCC) [E11.9]  Yes   • Seizures (CMS/HCC) [R56.9]  Yes   • Fibromyalgia [M79.7]  Yes   • Rheumatoid arthritis (CMS/HCC) [M06.9]  Yes     Ms. Ramos is a  65-year-old female with history of type 2 diabetes, status post lumbar fusion, fibromyalgia, rheumatoid arthritis, seizures, spinal stenosis of the lumbar region who presents to the emergency room with urinary retention and back pain.    Cystitis  -Urine culture is pending  -Started on Levaquin IV given allergy to cephalosporin  -Post void residuals every shift    Acute low back pain/spinal stenosis/status post lumbar fusion  -Consult Dr. Montelongo, patient to have surgery on 4/15/2021  -Dilaudid for pain control overnight  -Neuro checks every 4 hours    Seizure disorder/fibromyalgia/rheumatoid arthritis  -Chronic conditions stable at this time, will continue to monitor  -Continue home medications    Type 2 diabetes  -Accu-Cheks before meals and at bedtime with correctional dose insulin    · I discussed the patient's findings and my recommendations with patient and ED provider.    VTE Prophylaxis - SCDs.  Code Status - Full code.       ALESSANDRA Roger  [facility]  04/14/21  02:49 EDT

## 2021-04-14 NOTE — PLAN OF CARE
Problem: Adult Inpatient Plan of Care  Goal: Plan of Care Review  Outcome: Ongoing, Progressing  Flowsheets (Taken 4/14/2021 0633)  Progress: no change  Plan of Care Reviewed With: patient  Outcome Summary: pt arrived from ER with pain in back and right leg, she is due to have surgery on her spine to fix the pins from a previous surgery. In ER pt recieved IV antibotics, morphine 4 mg x1, dialudid 0.5 mg x1. and zofran x1. when arriving to floor picture of coccyx taken, IV fluids NS @ 100 started and dilaudid 0.5 mg given x1. purewick placed. reg c/c diet. q2 turns. will continue to monitor and treat per MD     Problem: Adult Inpatient Plan of Care  Goal: Patient-Specific Goal (Individualized)  Outcome: Ongoing, Progressing  Flowsheets (Taken 4/14/2021 0633)  Patient-Specific Goals (Include Timeframe): have surgery on 04/15/21  Individualized Care Needs: PRN meds, Scheduled meds, Q2 turns. accuchecks. IV fluids and antibotics, reg c/c diet

## 2021-04-14 NOTE — ED PROVIDER NOTES
EMERGENCY DEPARTMENT ENCOUNTER    Room Number:  P479/1  Date of encounter:  4/14/2021  PCP: Flo Temple MD  Historian: Patient      HPI:  Chief Complaint: Urinary retention and burning, worsening lower back pain  A complete HPI/ROS/PMH/PSH/SH/FH are unobtainable due to: Nothing    Context: Wendi Ramos is a pleasant 65 y.o. female who presents to the ED c/o urinary retention and burning.  Patient also complains of worsening her chronic lower back pain.  Per the patient pain over the past few days has been more intense than normal described as a 8-9 out of 10 on the pain scale worse with movement and slightly better when she remains still.  Her right sided sciatica is also stated to be far more tense over the past 24 hours.  She denies lower extremity weakness, fever, chills, nausea, vomiting, headache associated with her pain.  She does inform me she has a past medical history of cauda equina and has had multiple lower back surgeries in the past performed by Dr. Montelongo and Dr. Randall.  Patient recent MRI showed some edema surrounding her hardware and some loosening of screws.  She states she is scheduled for surgery in a little over 48 hours.    Viewing the patient's chart her preadmission is day and a T10 L2 fusion and revision is to be performed tomorrow.  Patient is diabetic but is not currently on any anticoagulant antiplatelet therapy.    Patient's 4/9/2021 MRI:    IMPRESSION:  1. Since the previous CT myelogram 01/26/2021 there has been advancement  of posterolateral instrumented fusion and fusion rods and pedicle screws  now extending from L2 to L5. Interbody spacer is present at L3-4. There  is also a spacer on the left at L2-3 and the position of this spacer is  more cephalad within the L2 body as compared to the intraoperative exam  and this may be related to compression of the L2 vertebral body and  recommend further evaluation with x-rays to compare hardware position  with intraoperative x-rays.  Multilevel posterior decompression extending  from L2 to L5. Evaluation for recurrent canal narrowing at L2-3 is not  possible due to the extent of metallic susceptibility artifact. There is  no evidence for recurrent canal narrowing at L3-4. At L4-5, there is a  right posterior disc extrusion effacing the anterior right aspect of the  thecal sac and mild right lateral recess and foraminal narrowing.  2. Soft tissue edema and enhancement within the paraspinal musculature  and extending along the laminectomy sites as well as into the psoas  musculature bilaterally. This may represent postoperative edema though  postoperative infection is not possible to exclude. There is a fluid  collection at the laminectomy site extending 6.5 cm craniocaudal  dimension.  3. At T11-12, there is a central posterior disc extrusion contacting and  flatten the anterior thoracic cord with moderate canal narrowing,  similar to the prior CT myelogram.     This report was finalized on 4/12/2021 11:00 AM by Dr. Uziel Self M.D.           PAST MEDICAL HISTORY  Active Ambulatory Problems     Diagnosis Date Noted   • Type 2 diabetes mellitus without complication (CMS/[facility]) 09/20/2017   • Post-operative state 09/17/2018   • S/P lumbar fusion 09/17/2018   • Insomnia 02/26/2019   • Abnormal EKG 03/21/2013   • Cellulitis of breast 03/12/2013   • Chest pain 03/21/2013   • Dyspnea on exertion 03/21/2013   • Fibromyalgia 12/01/2005   • Hyperhidrosis 10/15/2015   • LAFB (left anterior fascicular block) 10/15/2015   • Mass of right breast 03/12/2013   • Obesity 03/21/2013   • PVC (premature ventricular contraction) 01/01/1999   • Rheumatoid arthritis (CMS/[facility]) 10/01/2005   • S/P catheter ablation of slow pathway 03/21/2013   • Seizures (CMS/[facility]) 01/01/2014   • Sinus tachycardia 09/30/2013   • Chronic fatigue 09/05/2019   • Vitamin D deficiency 09/05/2019   • Complex dyslipidemia 09/05/2019   • Hypothyroidism due to Hashimoto's thyroiditis  09/05/2019   • Hypertension 01/08/2020   • Hyperlipidemia 01/08/2020   • Acute bilateral low back pain without sciatica 11/20/2020   • Spinal stenosis of lumbar region with neurogenic claudication 02/02/2021   • Sleep apnea    • Pain 04/09/2021   • Lumbar stenosis with neurogenic claudication 04/09/2021     Resolved Ambulatory Problems     Diagnosis Date Noted   • Lumbar radiculopathy 05/31/2018   • Cauda equina syndrome (CMS/HCC) 06/08/2018   • L3/4 Lumbar disc herniation 06/08/2018   • Incontinence of feces 06/08/2018     Past Medical History:   Diagnosis Date   • Depression    • Diabetes mellitus (CMS/HCC)    • GERD (gastroesophageal reflux disease)    • Hiatal hernia    • History of Clostridioides difficile colitis    • Hypothyroidism    • Irregular heart beat    • Liver disease    • Low back pain    • Peripheral neuropathy    • Type 2 diabetes mellitus (CMS/HCC)    • West Nile fever 2002         PAST SURGICAL HISTORY  Past Surgical History:   Procedure Laterality Date   • ABDOMINOPLASTY     • ADENOIDECTOMY     • BILATERAL BREAST REDUCTION     • BRONCHOSCOPY N/A 12/15/2016    Procedure: BRONCHOSCOPY WITH BAL AND ENDOBRONCHIAL ULTRASOUND WITH FNA;  Surgeon: Diego Ponce MD;  Location: [facility] ENDOSCOPY;  Service:    • CARDIAC ABLATION     • CATARACT EXTRACTION, BILATERAL     • COLONOSCOPY     • ENDOSCOPY     • EYE SURGERY     • HYSTERECTOMY     • LASIK     • LUMBAR DISCECTOMY FUSION INSTRUMENTATION Left 6/8/2018    Procedure: LEFT L3/4 lumbar discectomy;  Surgeon: Miguelangel Goldberg MD;  Location: [facility] OR;  Service: Neurosurgery   • LUMBAR FUSION Left 7/12/2018    Procedure: Left L3 4 lumbar decompression and discectomy with transforaminal lumbar interbody fusion and posterior instrumentation with posterior lateral arthrodesis;  Surgeon: Miguelangel Goldberg MD;  Location: [facility] OR;  Service: Neurosurgery   • LUMBAR LAMINECTOMY WITH FUSION N/A 2/15/2021    Procedure: Lumbar 2-3, lumbar 3 4, lumbar  4 5 fusion with instrumentation and BMP,  L2-L3 lift with cage, and removal of implants L3-4 with laminectomies by Dr. Segundo;  Surgeon: Juancarlos Montelongo MD;  Location: [facility];  Service: Orthopedic Spine;  Laterality: N/A;   • LUMBAR LAMINECTOMY WITH FUSION N/A 2/15/2021    Procedure: Lumbar 2 3, Lumbar 3 4 and lumbar 4 5 laminectomy with a fusion by orthopedics;  Surgeon: Jef Randall MD;  Location: [facility];  Service: Neurosurgery;  Laterality: N/A;   • MOUTH SURGERY     • REPLACEMENT TOTAL KNEE Left    • TONSILLECTOMY     • TONSILLECTOMY AND ADENOIDECTOMY  1966         FAMILY HISTORY  Family History   Problem Relation Age of Onset   • Diabetes Mother    • Neuropathy Father    • Colon polyps Father    • Diabetes Sister    • No Known Problems Son    • Diabetes Maternal Grandmother    • Cancer Maternal Grandmother    • Diabetes Paternal Grandmother    • Cancer Paternal Grandmother    • Malig Hyperthermia Neg Hx          SOCIAL HISTORY  Social History     Socioeconomic History   • Marital status:      Spouse name: Not on file   • Number of children: 1   • Years of education: 14   • Highest education level: Not on file   Tobacco Use   • Smoking status: Never Smoker   • Smokeless tobacco: Never Used   Vaping Use   • Vaping Use: Never used   Substance and Sexual Activity   • Alcohol use: Yes     Comment: social   • Drug use: No   • Sexual activity: Defer         ALLERGIES  Adhesive tape, Sulfamethoxazole w/trimethoprim 800-160  [sulfamethoxazole-trimethoprim], Cefpodoxime, Cephalosporins, Metformin and related, Sulfa antibiotics, Topiramate, and Latex        REVIEW OF SYSTEMS  Review of Systems   Constitutional: Negative.    Respiratory: Negative for cough and shortness of breath.    Cardiovascular: Negative for chest pain, palpitations and leg swelling.   Gastrointestinal: Negative for abdominal pain.   Genitourinary: Positive for difficulty urinating.   Musculoskeletal: Positive for back pain.    Skin: Negative.    Neurological:        Right-sided sciatica        All systems reviewed and negative except for those discussed in HPI.       PHYSICAL EXAM    I have reviewed the triage vital signs and nursing notes.    ED Triage Vitals [04/13/21 2257]   Temp Heart Rate Resp BP SpO2   95.9 °F (35.5 °C) 88 20 137/72 95 %      Temp src Heart Rate Source Patient Position BP Location FiO2 (%)   Tympanic -- Sitting Right arm --       Physical Exam  GENERAL: Appears uncomfortable, nontoxic  HENT: nares patent  EYES: no scleral icterus  CV: regular rhythm, regular rate, no obvious murmur rubs or gallops, palpable pedal pulses bilaterally, no unilateral leg swelling  RESPIRATORY: normal effort, lungs CTA B  ABDOMEN: soft, nontender  MUSCULOSKELETAL: Strength 5 of 5 bilateral lower extremities.  TTP along the vicinity of the lumbar spine.    NEURO: alert, moves all extremities, follows commands, SLR positive right lower extremity approximately 35 degrees.  SKIN: warm, dry        LAB RESULTS  Recent Results (from the past 24 hour(s))   Comprehensive Metabolic Panel    Collection Time: 04/13/21 11:31 PM    Specimen: Blood   Result Value Ref Range    Glucose 137 (H) 65 - 99 mg/dL    BUN 14 8 - 23 mg/dL    Creatinine 0.68 0.57 - 1.00 mg/dL    Sodium 138 136 - 145 mmol/L    Potassium 4.0 3.5 - 5.2 mmol/L    Chloride 104 98 - 107 mmol/L    CO2 23.5 22.0 - 29.0 mmol/L    Calcium 10.0 8.6 - 10.5 mg/dL    Total Protein 7.7 6.0 - 8.5 g/dL    Albumin 3.70 3.50 - 5.20 g/dL    ALT (SGPT) 14 1 - 33 U/L    AST (SGOT) 22 1 - 32 U/L    Alkaline Phosphatase 122 (H) 39 - 117 U/L    Total Bilirubin <0.2 0.0 - 1.2 mg/dL    eGFR Non African Amer 86 >60 mL/min/1.73    Globulin 4.0 gm/dL    A/G Ratio 0.9 g/dL    BUN/Creatinine Ratio 20.6 7.0 - 25.0    Anion Gap 10.5 5.0 - 15.0 mmol/L   CBC Auto Differential    Collection Time: 04/13/21 11:31 PM    Specimen: Blood   Result Value Ref Range    WBC 9.94 3.40 - 10.80 10*3/mm3    RBC 3.76 (L) 3.77 -  5.28 10*6/mm3    Hemoglobin 10.5 (L) 12.0 - 15.9 g/dL    Hematocrit 32.8 (L) 34.0 - 46.6 %    MCV 87.2 79.0 - 97.0 fL    MCH 27.9 26.6 - 33.0 pg    MCHC 32.0 31.5 - 35.7 g/dL    RDW 12.8 12.3 - 15.4 %    RDW-SD 40.0 37.0 - 54.0 fl    MPV 9.9 6.0 - 12.0 fL    Platelets 467 (H) 140 - 450 10*3/mm3    Neutrophil % 70.5 42.7 - 76.0 %    Lymphocyte % 20.4 19.6 - 45.3 %    Monocyte % 5.1 5.0 - 12.0 %    Eosinophil % 3.0 0.3 - 6.2 %    Basophil % 0.6 0.0 - 1.5 %    Immature Grans % 0.4 0.0 - 0.5 %    Neutrophils, Absolute 7.00 1.70 - 7.00 10*3/mm3    Lymphocytes, Absolute 2.03 0.70 - 3.10 10*3/mm3    Monocytes, Absolute 0.51 0.10 - 0.90 10*3/mm3    Eosinophils, Absolute 0.30 0.00 - 0.40 10*3/mm3    Basophils, Absolute 0.06 0.00 - 0.20 10*3/mm3    Immature Grans, Absolute 0.04 0.00 - 0.05 10*3/mm3    nRBC 0.0 0.0 - 0.2 /100 WBC   Urinalysis With Microscopic If Indicated (No Culture) - Urine, Catheter    Collection Time: 04/14/21  1:18 AM    Specimen: Urine, Catheter   Result Value Ref Range    Color, UA Yellow Yellow, Straw    Appearance, UA Clear Clear    pH, UA <=5.0 5.0 - 8.0    Specific Gravity, UA >=1.030 1.005 - 1.030    Glucose, UA >=1000 mg/dL (3+) (A) Negative    Ketones, UA Trace (A) Negative    Bilirubin, UA Negative Negative    Blood, UA Negative Negative    Protein, UA Negative Negative    Leuk Esterase, UA Negative Negative    Nitrite, UA Positive (A) Negative    Urobilinogen, UA 0.2 E.U./dL 0.2 - 1.0 E.U./dL   Urinalysis, Microscopic Only - Urine, Catheter    Collection Time: 04/14/21  1:18 AM    Specimen: Urine, Catheter   Result Value Ref Range    RBC, UA 0-2 None Seen, 0-2 /HPF    WBC, UA 3-5 (A) None Seen, 0-2 /HPF    Bacteria, UA 4+ (A) None Seen /HPF    Squamous Epithelial Cells, UA 0-2 None Seen, 0-2 /HPF    Hyaline Casts, UA 0-2 None Seen /LPF    Methodology Automated Microscopy    POC Glucose Once    Collection Time: 04/14/21  6:43 AM    Specimen: Blood   Result Value Ref Range    Glucose 152 (H) 70 -  130 mg/dL       Ordered the above labs and independently reviewed the results.        RADIOLOGY  No Radiology Exams Resulted Within Past 24 Hours    I ordered the above noted radiological studies. Reviewed by me and discussed with radiologist.  See dictation for official radiology interpretation.      PROCEDURES    Procedures      MEDICATIONS GIVEN IN ER    Medications   HYDROmorphone (DILAUDID) injection 0.5 mg (0.5 mg Intravenous Given 4/14/21 0607)   morphine injection 4 mg (4 mg Intravenous Given 4/14/21 0039)   dexamethasone sodium phosphate injection 6 mg (6 mg Intravenous Given 4/14/21 0129)   HYDROmorphone (DILAUDID) injection 0.5 mg (0.5 mg Intravenous Given 4/14/21 0252)   ondansetron (ZOFRAN) injection 4 mg (4 mg Intravenous Given 4/14/21 0252)         PROGRESS, DATA ANALYSIS, CONSULTS, AND MEDICAL DECISION MAKING    All labs have been independently reviewed by me.  All radiology studies have been reviewed by me and discussed with radiologist dictating the report.   EKG's independently viewed and interpreted by me.  Discussion below represents my analysis of pertinent findings related to patient's condition, differential diagnosis, treatment plan and final disposition.    Working diagnosis for this patient will be cystitis, lower back pain with sciatica.  Please see below for course of care    ED Course as of Apr 14 0653   Wed Apr 14, 2021   0102 Urinalysis With Microscopic If Indicated (No Culture) - Urine, Clean Catch [RC]   0233 WBC: 9.94 [RC]   0233 RBC(!): 3.76 [RC]   0233 Hemoglobin(!): 10.5 [RC]   0233 Hematocrit(!): 32.8 [RC]   0233 Platelets(!): 467 [RC]   0233 Glucose(!): 137 [RC]   0233 BUN: 14 [RC]   0233 Creatinine: 0.68 [RC]   0233 Sodium: 138 [RC]   0233 Potassium: 4.0 [RC]   0233 CO2: 23.5 [RC]   0234 Anion Gap: 10.5 [RC]   0234 Bacteria, UA(!): 4+ [RC]   0234 WBC, UA(!): 3-5 [RC]   0236 Given the patient's pain level, scheduled surgery in 24 hours, and cystitis it is felt the patient  would be better admitted at this time where she can be preoped, her pain can be managed, further evaluated, and her cystitis treated.    [RC]   0236 Discussed patient's case with ALESSANDRA Daniel with [facility].  To place the patient in a MedSurg bed under Dr. Ashley iniguez.    [RC]      ED Course User Index  [RC] Dain Elizabeth III, PA     Patient was placed in face mask in first look. Patient was wearing facemask when I entered the room and throughout our encounter. I wore full protective equipment throughout this patient encounter including a face mask, eye shield, gown and gloves. Hand hygiene was performed before donning protective equipment and after removal when leaving the room.    AS OF 06:53 EDT VITALS:    BP - 145/68  HR - 82  TEMP - 98 °F (36.7 °C) (Oral)  O2 SATS - 97%        DIAGNOSIS  Final diagnoses:   Cystitis   Acute on chronic right-sided low back pain with right-sided sciatica, unspecified chronicity         DISPOSITION  ADMISSION    Discussed treatment plan and reason for admission with pt/family and admitting physician.  Pt/family voiced understanding of the plan for admission for further testing/treatment as needed.              Dain Elizabeth III, PA  04/14/21 0662

## 2021-04-14 NOTE — ED TRIAGE NOTES
"Pt to ED from home with c/o back pain.  Pt reports a failed spinal fusion performed 2/16, MRI done this week.  Pt states, \" i'm scheduled for surgery on Thursday, but i'm not going to make it.\"  Pt reports urinary retention today and R leg pain/numbness.  Pt reports taking Norco 10/325 at 8 with no improvements.    Pt wearing mask, staff wearing appropriate PPE.  "

## 2021-04-14 NOTE — PLAN OF CARE
Goal Outcome Evaluation:     Progress: no change  Outcome Summary: PRN meds for pain control, iv fluids, f/c placed, wound care follwoing patient, surgery 4/15

## 2021-04-14 NOTE — SIGNIFICANT NOTE
04/14/21 1316   OTHER   Discipline occupational therapist   Rehab Time/Intention   Session Not Performed unable to evaluate, medical status change;other (see comments)  (Per nursing pt with active strict bedrest orders. OT to evaluate pt at later date.)   Recommendation   OT - Next Appointment 04/15/21

## 2021-04-14 NOTE — ED NOTES
Nursing report ED to floor  Wendi Ramos  65 y.o.  female    HPI (triage note):   Chief Complaint   Patient presents with   • Back Pain   • Urinary Retention       Admitting doctor:   Tyrone Ramirez MD    Admitting diagnosis:   The primary encounter diagnosis was Cystitis. A diagnosis of Acute on chronic right-sided low back pain with right-sided sciatica, unspecified chronicity was also pertinent to this visit.    Code status:   Current Code Status     Date Active Code Status Order ID Comments User Context       4/14/2021 0247 CPR [id number]  Lay Magallon, APRN ED     Advance Care Planning Activity      Questions for Current Code Status     Question Answer Comment    Code Status CPR     Medical Interventions (Level of Support Prior to Arrest) Full           Allergies:   Adhesive tape, Sulfamethoxazole w/trimethoprim 800-160  [sulfamethoxazole-trimethoprim], Cefpodoxime, Cephalosporins, Metformin and related, Sulfa antibiotics, Topiramate, and Latex    Weight:   There were no vitals filed for this visit.    Most recent vitals:   Vitals:    04/14/21 0200 04/14/21 0255 04/14/21 0300 04/14/21 0330   BP: 128/62 149/64 118/72 124/66   Pulse:  80 77 78   Resp:  19 18 18   Temp:       TempSrc:       SpO2:  94% 97% 95%   Height:           Active LDAs/IV Access:   Lines, Drains & Airways    Active LDAs     Name:   Placement date:   Placement time:   Site:   Days:    Peripheral IV 04/14/21 0039 Posterior;Right Hand   04/14/21 0039    Hand   less than 1                Labs (abnormal labs have a star):   Labs Reviewed   COMPREHENSIVE METABOLIC PANEL - Abnormal; Notable for the following components:       Result Value    Glucose 137 (*)     Alkaline Phosphatase 122 (*)     All other components within normal limits    Narrative:     GFR Normal >60  Chronic Kidney Disease <60  Kidney Failure <15     CBC WITH AUTO DIFFERENTIAL - Abnormal; Notable for the following components:    RBC 3.76 (*)     Hemoglobin 10.5 (*)      Hematocrit 32.8 (*)     Platelets 467 (*)     All other components within normal limits   URINALYSIS W/ MICROSCOPIC IF INDICATED (NO CULTURE) - Abnormal; Notable for the following components:    Glucose, UA >=1000 mg/dL (3+) (*)     Ketones, UA Trace (*)     Nitrite, UA Positive (*)     All other components within normal limits   URINALYSIS, MICROSCOPIC ONLY - Abnormal; Notable for the following components:    WBC, UA 3-5 (*)     Bacteria, UA 4+ (*)     All other components within normal limits   COVID PRE-OP / PRE-PROCEDURE SCREENING ORDER (NO ISOLATION)    Narrative:     The following orders were created for panel order COVID PRE-OP / PRE-PROCEDURE SCREENING ORDER (NO ISOLATION) - Swab, Nasopharynx.  Procedure                               Abnormality         Status                     ---------                               -----------         ------                     COVID-19,APTIMA PANTHER,...[[id number]]                      In process                   Please view results for these tests on the individual orders.   COVID-19,APTIMA MERYL GONZALEZ IN-HOUSE,NP/OP SWAB IN UTM/VTM/SALINE TRANSPORT MEDIA,24 HR TAT   URINE CULTURE   BASIC METABOLIC PANEL   CBC WITH AUTO DIFFERENTIAL   PROTIME-INR   POCT GLUCOSE FINGERSTICK   POCT GLUCOSE FINGERSTICK   POCT GLUCOSE FINGERSTICK   POCT GLUCOSE FINGERSTICK   CBC AND DIFFERENTIAL    Narrative:     The following orders were created for panel order CBC & Differential.  Procedure                               Abnormality         Status                     ---------                               -----------         ------                     CBC Auto Differential[[id number]]        Abnormal            Final result                 Please view results for these tests on the individual orders.       EKG:   No orders to display       Meds given in ED:   Medications   ondansetron (ZOFRAN) injection 4 mg (4 mg Intravenous Not Given 4/14/21 0246)   sodium chloride 0.9 % flush 10 mL  (has no administration in time range)   sodium chloride 0.9 % flush 10 mL (has no administration in time range)   dextrose (GLUTOSE) oral gel 15 g (has no administration in time range)   dextrose (D50W) 25 g/ 50mL Intravenous Solution 25 g (has no administration in time range)   glucagon (human recombinant) (GLUCAGEN DIAGNOSTIC) injection 1 mg (has no administration in time range)   sodium chloride 0.9 % infusion (has no administration in time range)   insulin lispro (ADMELOG) injection 0-9 Units (has no administration in time range)   acetaminophen (TYLENOL) tablet 650 mg (has no administration in time range)     Or   acetaminophen (TYLENOL) 160 MG/5ML solution 650 mg (has no administration in time range)     Or   acetaminophen (TYLENOL) suppository 650 mg (has no administration in time range)   ondansetron (ZOFRAN) injection 4 mg (has no administration in time range)   levoFLOXacin (LEVAQUIN) 500 mg/100 mL D5W (premix) (LEVAQUIN) 500 mg (has no administration in time range)   morphine injection 4 mg (4 mg Intravenous Given 4/14/21 0039)   dexamethasone sodium phosphate injection 6 mg (6 mg Intravenous Given 4/14/21 0129)   HYDROmorphone (DILAUDID) injection 0.5 mg (0.5 mg Intravenous Given 4/14/21 0252)   ondansetron (ZOFRAN) injection 4 mg (4 mg Intravenous Given 4/14/21 0252)       Imaging results:  No radiology results for the last day    Ambulatory status:   -     Social issues:   Social History     Socioeconomic History   • Marital status:      Spouse name: Not on file   • Number of children: 1   • Years of education: 14   • Highest education level: Not on file   Tobacco Use   • Smoking status: Never Smoker   • Smokeless tobacco: Never Used   Vaping Use   • Vaping Use: Never used   Substance and Sexual Activity   • Alcohol use: Yes     Comment: social   • Drug use: No   • Sexual activity: Defer    Nursing report ED to floor       Melina Nair RN  04/14/21 5580

## 2021-04-14 NOTE — CONSULTS
[address] [facility] 
[phone] Patient: Faith Carl MRN: [id number] EUR:[id number] A check iggy indicates which time of day the medication should be taken. My Medications START taking these medications Instructions Each Dose to Equal  
 Morning Noon Evening Bedtime  
 naloxone 4 mg/actuation nasal spray Commonly known as:  [employer] Your last dose was: Your next dose is:    
   
   
 1 [place] by IntraNASal route as needed (respiratory depression). Give single spray into one nostril. Call 911. Give additional doses every 2 to 3 minutes alternating nostrils until assistance arrives using a new nasal spray with each dose, if patient does not respond or responds and then relapses. 1 Spray  
    
   
   
   
  
 ondansetron 4 mg disintegrating tablet Commonly known as:  ZOFRAN ODT Your last dose was: Your next dose is: Take 1 Tab by mouth every eight (8) hours as needed for Nausea. 4 mg  
    
   
   
   
  
 oxyCODONE IR 5 mg immediate release tablet Commonly known as:  Megan Chen Your last dose was: Your next dose is: Take 1-2 Tabs by mouth every four (4) hours as needed. Max Daily Amount: 60 mg. If insurance prior auth./quantity restrictions apply, refer to and look up diagnosis code one prescription. Partial fill as needed is permitted. Please do not contact prescriber. 5-10 mg  
    
   
   
   
  
 polyethylene glycol 17 gram packet Commonly known as:  Clementina Jaimes Your last dose was: Your next dose is: Take 1 Packet by mouth daily as needed (constipation) for up to 15 days. 17 g  
    
   
   
   
  
 senna-docusate 8.6-50 mg per tablet Commonly known as:  Miguel Tavera Your last dose was: Your next dose is: Take 1 Tab by mouth daily. 1 Tab New patient or new problem visit    CC: Back pain, right leg pain, urinary retention    HPI: She was admitted last night to the medicine service for the above complaints.  I saw her in my office just last week.  She is 8 weeks out from L2-5 laminectomy and fusion with instrumentation.  She was found to have a fracture of the L3 vertebra and subsidence of the L2-3 cage with cut out of the L2 screws.  She was presumed to have L to radiculopathy and MRI scan was scheduled.  She has a previously noted T11-12 disc protrusion with moderate associated stenosis which we felt, probably correctly, that this lesion was not causing symptoms.  Indeed she has had no long track signs throughout this endeavor.  In any event she was being worked up and scheduled for elective extension of the fusion with possible decompression by Dr. Segundo when she was admitted last night.  She has had no bowel incontinence indeed had functional bowel movement about 36 hours ago and has been urinating although with some hesitancy and urge.  She complains of right thigh pain and some relative weakness in the right thigh when she attempts to walk.  She complains of back pain as well    PFSH: See attached    ROS: No fever chills or weight loss.    PE: On exam AVSS awake alert oriented breathing comfortably color good.  The wound is clean and dry and healing nicely there is no fluctuance induration or redness.  No palpable deformity as she is rolled into the lateral position.  In the lower extremities she has 4/5 strength in all tested groups and a week 4/5 in hip flexion and knee extension.  No evidence of clonus.  Reflexes are absent in the patellae.  Sensation appears grossly intact.    XRAY: Plain film x-rays in my office showed cut out of the L2 screws fracture of the L3 vertebrae into which the cages subsided and kyphosis over top of the construct.  Previous imaging shows a T11-12 disc protrusion.  Repeat MRI obtained April 12 is notable for  CONTINUE taking these medications Instructions Each Dose to Equal  
 Morning Noon Evening Bedtime ALPRAZolam 0.5 mg tablet Commonly known as:  Miguel Jamil Your last dose was: Your next dose is:    
   
   
 take 1 tablet by mouth twice a day if needed. Must last 30 days. azelastine 137 mcg (0.1 %) nasal spray Commonly known as:  ASTELIN Your last dose was: Your next dose is:    
   
   
 1 Spray as needed for Rhinitis. Use in each nostril as directed 1 Spray  
    
   
   
   
  
 baclofen 10 mg tablet Commonly known as:  LIORESAL Your last dose was: Your next dose is: Take 1 Tab by mouth three (3) times daily as needed. 10 mg  
    
   
   
   
  
 BC HEADACHE POWDER PO Your last dose was: Your next dose is: Take 1 Package by mouth. 1 Package buPROPion  mg tablet Commonly known as:  Gabriel Herrera Your last dose was: Your next dose is: Take 1 Tab by mouth every morning. 150 mg  
    
   
   
   
  
 cromolyn 4 % ophthalmic solution Commonly known as:  OPTICROM Your last dose was: Your next dose is:    
   
   
 Administer 1 Drop to both eyes four (4) times daily. Use in affected eye(s) 1 Drop  
    
   
   
   
  
 gabapentin 300 mg capsule Commonly known as:  NEURONTIN Your last dose was: Your next dose is: Take 1 Cap by mouth two (2) times a day. 300 mg  
    
   
   
   
  
 lisinopril-hydroCHLOROthiazide 20-12.5 mg per tablet Commonly known as:  Purnima Zepeda Your last dose was: Your next dose is:    
   
   
 take 1 tablet by mouth once daily  
     
   
   
   
  
 pantoprazole 40 mg tablet Commonly known as:  PROTONIX Your last dose was: Your next dose is: Take 1 Tab by mouth daily.   
 40 mg  
    
   
   
   
  
  
 interference artifact from the metallic cage at 4 5 but shows no demonstrable stenosis below that level 2 3 is somewhat occluded because of the artifact and above that at the top of the picture we can see the disc protrusion which is unchanged from prior films.  I cannot tell how much is going on around L2 but the entire construct appears nicely reduced on this MRI supine image.  I reviewed the radiologist report with which I agree.    Other: n/a    Impression: She has upper lumbar radiculopathy from collapse of the superior aspect of her recently operated L2-5 laminectomy and fusion with instrumentation.  I am going to need to extend the fusion cephalad and because of the existing lesion at T11-12 I think T10 would be a good stopping point.  I will asked Dr. Segundo if he feels that 1112 needs to be decompressed or simply stabilized, and I will see what he thinks about the upper lumbar radiculopathy.  I were not going to get a better image on MRI.  It would be possible to get a myelogram and CT scan meantime but I think exploration of the fracture area would be just as reasonable.  I reviewed all the risk and benefits with the patient and she is very familiar with these from before and wants to proceed, hopefully today if we can find coverage for the operating room.    Plan: [6]   STOP taking these medications   
 diclofenac EC 75 mg EC tablet Commonly known as:  VOLTAREN  
   
  
 ibuprofen 800 mg tablet Commonly known as:  MOTRIN Where to Get Your Medications Information on where to get these meds will be given to you by the nurse or doctor. ! Ask your nurse or doctor about these medications  
  naloxone 4 mg/actuation nasal spray  
 ondansetron 4 mg disintegrating tablet  
 oxyCODONE IR 5 mg immediate release tablet  
 polyethylene glycol 17 gram packet  
 senna-docusate 8.6-50 mg per tablet

## 2021-04-14 NOTE — ED PROVIDER NOTES
The REJI and I have discussed this patients history, physical exam, and treatment plan. I have reviewed the documentation and personally had a face to face interaction with the patient. I affirm the documentation and agree with the treatment and plan.  The following note describes my personal findings    This patient is a 65-year-old female presenting to the emergency room tonight secondary to urinary retention with burning.  The patient also complains of worsening of her chronic low back pain with radiation into her right leg.  She denies any numbness to her groin or fecal incontinence.    Exam: This patient is mildly uncomfortable but nontoxic in appearance.  She is without gross neurological deficit.  She is afebrile with stable vital signs  Straight leg raises are negative.  Abdomen is soft and nontender.    Plan: We will obtain labs as well as a urinalysis.  Her MRI that was quite recently performed will be evaluated.  She will be given IV steroids and admitted to the hospital.      The patient was wearing a facemask upon entrance into the room and remained in such throughout their visit.  I was wearing PPE including a facemask, eye protection, as well as gloves at any point entering the room and throughout the visit     Fawad Sherwood MD  04/15/21 0593

## 2021-04-14 NOTE — ED NOTES
Patient had a spinal fusion surgery performed 2/15/21. She is scheduled for another spine surgery 4/15/21 due to pins coming apart. Back pain has increased, she in unable to get out of bed, and today pain began shooting down right leg. She also has tingling in right arm and was incontinent of urine once today. ABCs intact. Alert and oriented x 4.     Patient was placed in face mask in first look. Patient was wearing facemask when I entered the room and throughout our encounter. I wore full protective equipment throughout this patient encounter including a face mask, eye protection, and gloves. Hand hygiene was performed before donning protective equipment and after removal when leaving the room.       Iwona Tejada RN  04/13/21 [id number]

## 2021-04-14 NOTE — H&P (VIEW-ONLY)
New patient or new problem visit    CC: Back pain, right leg pain, urinary retention    HPI: She was admitted last night to the medicine service for the above complaints.  I saw her in my office just last week.  She is 8 weeks out from L2-5 laminectomy and fusion with instrumentation.  She was found to have a fracture of the L3 vertebra and subsidence of the L2-3 cage with cut out of the L2 screws.  She was presumed to have L to radiculopathy and MRI scan was scheduled.  She has a previously noted T11-12 disc protrusion with moderate associated stenosis which we felt, probably correctly, that this lesion was not causing symptoms.  Indeed she has had no long track signs throughout this endeavor.  In any event she was being worked up and scheduled for elective extension of the fusion with possible decompression by Dr. Segundo when she was admitted last night.  She has had no bowel incontinence indeed had functional bowel movement about 36 hours ago and has been urinating although with some hesitancy and urge.  She complains of right thigh pain and some relative weakness in the right thigh when she attempts to walk.  She complains of back pain as well    PFSH: See attached    ROS: No fever chills or weight loss.    PE: On exam AVSS awake alert oriented breathing comfortably color good.  The wound is clean and dry and healing nicely there is no fluctuance induration or redness.  No palpable deformity as she is rolled into the lateral position.  In the lower extremities she has 4/5 strength in all tested groups and a week 4/5 in hip flexion and knee extension.  No evidence of clonus.  Reflexes are absent in the patellae.  Sensation appears grossly intact.    XRAY: Plain film x-rays in my office showed cut out of the L2 screws fracture of the L3 vertebrae into which the cages subsided and kyphosis over top of the construct.  Previous imaging shows a T11-12 disc protrusion.  Repeat MRI obtained April 12 is notable for  interference artifact from the metallic cage at 4 5 but shows no demonstrable stenosis below that level 2 3 is somewhat occluded because of the artifact and above that at the top of the picture we can see the disc protrusion which is unchanged from prior films.  I cannot tell how much is going on around L2 but the entire construct appears nicely reduced on this MRI supine image.  I reviewed the radiologist report with which I agree.    Other: n/a    Impression: She has upper lumbar radiculopathy from collapse of the superior aspect of her recently operated L2-5 laminectomy and fusion with instrumentation.  I am going to need to extend the fusion cephalad and because of the existing lesion at T11-12 I think T10 would be a good stopping point.  I will asked Dr. Segundo if he feels that 1112 needs to be decompressed or simply stabilized, and I will see what he thinks about the upper lumbar radiculopathy.  I were not going to get a better image on MRI.  It would be possible to get a myelogram and CT scan meantime but I think exploration of the fracture area would be just as reasonable.  I reviewed all the risk and benefits with the patient and she is very familiar with these from before and wants to proceed, hopefully today if we can find coverage for the operating room.    Plan: [6]

## 2021-04-14 NOTE — PROGRESS NOTES
"Adult Nutrition  Assessment/PES    Patient Name:  Wendi Ramos  YOB: 1955  MRN: [id number]  Admit Date:  4/13/2021    Assessment Date:  4/14/2021    Comments:  Assessed due to indication of pressure injury on coccyx    EMR reviewed. Pt has reddened, excoriated areas on bilateral buttocks/coccyx area (no open areas per image on chart). She had lumbar surgery in Feb 2021 (L2-5 laminectomy with fusion). Found to have L3 vertebra fracture and \"subsidence of the L2-3 cage with cut out of the L2 screws,\" per orthopedics note. Pt is NPO for possible surgery today. Of note, her current wt 186 lb; in Feb, 212 lb. UBW varies between 197-214 lb range for past 2 years. Will monitor weights, intake once diet advanced. RD following.     Reason for Assessment     Row Name 04/14/21 0854          Reason for Assessment    Reason For Assessment  identified at risk by screening criteria     Diagnosis  diabetes diagnosis/complications;neurologic conditions;infection/sepsis;surgery/postoperative complications Cystitis, DM2, fibromyalgia, seizure, spinal stenosis     Identified At Risk by Screening Criteria  large or nonhealing wound, burn or pressure injury         Nutrition/Diet History     Row Name 04/14/21 0855          Nutrition/Diet History    Typical Food/Fluid Intake  Currently NPO. Likely to have another back surgery today. Was scheduled for it tomorrow; had previous spinal fusion surgery performed 2/15/21. The pins came apart.     Factors Affecting Nutritional Intake  pain         Anthropometrics     Row Name 04/14/21 0905 04/14/21 0447       Anthropometrics    Height  175.3 cm (69\")  175.3 cm (69.02\")    Weight  --  84.6 kg (186 lb 8.2 oz)       Admit Weight    Admit Weight  84.4 kg (186 lb)  --       Ideal Body Weight (IBW)    Ideal Body Weight (IBW) (kg)  66.43  66.47    % Ideal Body Weight  --  127.27    % of Ideal Body Weight Assessment  other (see comments) 127%  --       Usual Body Weight (UBW)    Usual " "Body Weight  96.2 kg (212 lb) 212# 2/12/21; 197-214# range dating back to 2019  --    Weight Loss  other (see comments)  --    Weight Loss Time Frame  -26 lb since 2/12/21 (12%)  --       Body Mass Index (BMI)    BMI (kg/m2)  --  27.59        Labs/Tests/Procedures/Meds     Row Name 04/14/21 0908          Labs/Procedures/Meds    Lab Results Reviewed  reviewed, pertinent     Lab Results Comments  Glu, PLTs, h/h; UA noted        Diagnostic Tests/Procedures    Diagnostic Test/Procedure Reviewed  reviewed, pertinent     Diagnostic Test/Procedures Comments  pending spinal surgery        Medications    Pertinent Medications Reviewed  reviewed, pertinent     Pertinent Medications Comments  baclofen, insulin, keppra, synthroid, antiemetic, protonix, mirapex, colace,  cc/hr         Physical Findings     Row Name 04/14/21 0911          Physical Findings    Overall Physical Appearance  overweight     Gastrointestinal  other (see comments) urinating with some hesitancy and urge     Skin  pressure injury bilateral coccyx PI - image reviewed, reddened & excoriated but no open areas.         Estimated/Assessed Needs     Row Name 04/14/21 0915 04/14/21 0905       Calculation Measurements    Weight Used For Calculations  84.4 kg (186 lb)  --    Height  --  175.3 cm (69\")       Estimated/Assessed Needs    Additional Documentation  Protein Requirements (Group);KCAL/KG (Group);Fluid Requirements (Group)  --       KCAL/KG    KCAL/KG  25 Kcal/Kg (kcal);30 Kcal/Kg (kcal)  --    25 Kcal/Kg (kcal)  2109.225  --    30 Kcal/Kg (kcal)  2531.07  --       Protein Requirements    Weight Used For Protein Calculations  84.4 kg (186 lb)  --    Est Protein Requirement Amount (gms/kg)  1.2 gm protein  --    Estimated Protein Requirements (gms/day)  101.24  --       Fluid Requirements    Fluid Requirements (mL/day)  2100  --    Estimated Fluid Requirement Method  RDA Method  --    RDA Method (mL)  2100  --    Row Name 04/14/21 0447          " "Calculation Measurements    Height  175.3 cm (69.02\")         Nutrition Prescription Ordered     Row Name 04/14/21 0915          Nutrition Prescription PO    Current PO Diet  NPO         Evaluation of Received Nutrient/Fluid Intake     Row Name 04/14/21 0915          Fluid Intake Evaluation    IV Fluid (mL)  2400               Problem/Interventions:  Problem 1     Row Name 04/14/21 0918          Nutrition Diagnoses Problem 1    Problem 1  Predicted Suboptimal Intake     Etiology (related to)  Factors Affecting Nutrition     Mental State/Condition  Discomfort     Signs/Symptoms (evidenced by)  Report/Observation;NPO     Other Comment  Scheduled for another back surgery 4/15 - increased pain & walking difficulty -- admitted for poss surgery today.         Problem 2     Row Name 04/14/21 0919          Nutrition Diagnoses Problem 2    Problem 2  Increased Nutrient Needs     Macronutrient  Kcal;Protein     Etiology (related to)  Medical Diagnosis     Skin  Pressure injury             Intervention Goal     Row Name 04/14/21 0919          Intervention Goal    General  Maintain nutrition;Disease management/therapy     PO  Establish PO;Advance diet;Meet estimated needs     Weight  No significant weight loss         Nutrition Intervention     Row Name 04/14/21 0919          Nutrition Intervention    RD/Tech Action  Follow Tx progress;Care plan reviewd;Await begin PO           Education/Evaluation     Row Name 04/14/21 0919          Education    Education  Will Instruct as appropriate        Monitor/Evaluation    Monitor  Per protocol;I&O;PO intake;Pertinent labs;Weight;Skin status;Symptoms           Electronically signed by:  Magdalena Trujillo RD  04/14/21 09:20 EDT  "

## 2021-04-14 NOTE — PROGRESS NOTES
Discharge Planning Assessment  [facility]     Patient Name: Wendi Ramos  MRN: [id number]  Today's Date: 4/14/2021    Admit Date: 4/13/2021    Discharge Needs Assessment     Row Name 04/14/21 1608       Living Environment    Lives With  spouse    Current Living Arrangements  home/apartment/condo    Potentially Unsafe Housing Conditions  other (see comments) no concerns    Primary Care Provided by  self    Provides Primary Care For  no one    Family Caregiver if Needed  spouse    Quality of Family Relationships  helpful;involved;supportive    Able to Return to Prior Arrangements  yes       Resource/Environmental Concerns    Resource/Environmental Concerns  none    Transportation Concerns  car, none       Transition Planning    Patient/Family Anticipates Transition to  home    Transportation Anticipated  family or friend will provide       Discharge Needs Assessment    Readmission Within the Last 30 Days  no previous admission in last 30 days    Equipment Currently Used at Home  walker, rolling;cane, straight;glucometer    Concerns to be Addressed  no discharge needs identified;denies needs/concerns at this time    Anticipated Changes Related to Illness  none        Discharge Plan     Row Name 04/14/21 1609       Plan    Plan  Currently on bedrest; follow for PT eval    Patient/Family in Agreement with Plan  yes    Plan Comments  CCP met with patient at bedside. CCP role explained and discharge planning discussed. Face sheet verified and IMM given. Patient lives with her , with three steps to the entrance of the home. Patient’s bedroom and bathroom are on the main level. Patient uses a cane and walker for mobility. Patient has not used home health or been to SNF. Patient checks her blood sugar at home. Patient states her  is able to assist her as needed at home and plans to return home at discharge. Patient currently on bedrest; follow for PT eval and assist as needed. Left HH/SNF list at bedside.  Jessa WILSON        Continued Care and Services - Admitted Since 4/13/2021    Coordination has not been started for this encounter.         Demographic Summary     Row Name 04/14/21 1608       General Information    Admission Type  inpatient    Arrived From  emergency department    Required Notices Provided  Important Message from Medicare    Referral Source  admission list    Reason for Consult  discharge planning    Preferred Language  English     Used During This Interaction  no        Functional Status     Row Name 04/14/21 1608       Functional Status    Usual Activity Tolerance  good    Current Activity Tolerance  good       Functional Status, IADL    Medications  assistive equipment    Meal Preparation  assistive equipment    Housekeeping  assistive equipment    Laundry  assistive equipment    Shopping  assistive equipment       Mental Status    General Appearance WDL  WDL       Mental Status Summary    Recent Changes in Mental Status/Cognitive Functioning  no changes        Psychosocial    No documentation.       Abuse/Neglect    No documentation.       Legal    No documentation.       Substance Abuse    No documentation.       Patient Forms    No documentation.           STEVE Doll

## 2021-04-15 NOTE — ANESTHESIA PREPROCEDURE EVALUATION
Anesthesia Evaluation     Patient summary reviewed and Nursing notes reviewed                Airway   Mallampati: II  TM distance: >3 FB  Neck ROM: full  Dental      Pulmonary    (+) shortness of breath, sleep apnea,   Cardiovascular     ECG reviewed  Patient on routine beta blocker  Rhythm: regular  Rate: normal    (+) hypertension, dysrhythmias Paroxysmal Atrial Fib, PVC, hyperlipidemia,       Neuro/Psych  (+) seizures, numbness, psychiatric history Anxiety and Depression,     GI/Hepatic/Renal/Endo    (+) obesity,  hiatal hernia, GERD,  liver disease fatty liver disease, diabetes mellitus type 2 using insulin,     Musculoskeletal     Abdominal    Substance History - negative use     OB/GYN negative ob/gyn ROS         Other   arthritis,                      Anesthesia Plan    ASA 3     general   (Previous Afib ablation now in NSR however PAF episodes occur on occasion   I have reviewed the patient's history with the patient and the chart, including all pertinent laboratory results and imaging. I have explained the risks of anesthesia including but not limited to dental damage, corneal abrasion, nerve injury, MI, stroke, and death. Questions asked and answered. Anesthetic plan discussed with patient and team as indicated. Patient expressed understanding of the above.    Art line  )  intravenous induction     Anesthetic plan, all risks, benefits, and alternatives have been provided, discussed and informed consent has been obtained with: patient.

## 2021-04-15 NOTE — PROGRESS NOTES
LOS: 1 day   Patient Care Team:  Flo Temple MD as PCP - General (Family Medicine)  Jomar Steve MD as Consulting Physician (Interventional Cardiology)  Donny Gan MD as Consulting Physician (Pain Medicine)    Chief Complaint: Severe back pain    Subjective     This patient continues with severe pain in her back.  There is some radiation into her right thigh.  Is particularly in the anterior aspect.    Interval History:     History taken from: patient chart    Objective      Patient has good movement in both lower extremities    Vital Signs  Temp:  [97.3 °F (36.3 °C)-98.3 °F (36.8 °C)] 97.7 °F (36.5 °C)  Heart Rate:  [] 73  Resp:  [16-18] 16  BP: (103-152)/(53-71) 122/65       Results Review:     I reviewed the patient's new clinical results.  I reviewed her MRI and her CT scan.  This does show a fracture of the L3 vertebrae..  This indicates the L2-3 level is unstable.      Assessment/Plan       Cystitis    Type 2 diabetes mellitus without complication (CMS/HCC)    S/P lumbar fusion    Fibromyalgia    Rheumatoid arthritis (CMS/HCC)    Seizures (CMS/HCC)    Acute bilateral low back pain without sciatica    Spinal stenosis of lumbar region with neurogenic claudication      I told the patient that I see little option at this point but to proceed with a repeat surgery to extend the fusion up and potentially down some.  I recommended that we at least look at the nerves at L2-3 to be sure there is no evidence of pressure.  The screw on the right side which is where she has symptoms does appear to be in the inferior aspect of the neural foramen but I do not think that it is impinging on the nerve.  It may be causing some irritation and may need to be removed entirely.  She seems to understand all of this fairly well and does asked to proceed.      Jef Randall MD  04/15/21  06:41 EDT

## 2021-04-15 NOTE — OP NOTE
Preoperative diagnosis: L2 vertebral fracture with L2 radiculopathy    Postoperative diagnosis: Same    Procedure performed: Bilateral L2-3 laminectomy, facetectomy and neural lysis using microsurgical technique microsurgical instrumentation    Surgeon: Jef Randall M.D.    Asst.: Lainey Yoder CFA who was instrumental in helping with hemostasis, visualization of neural structures and retraction of neural structures.  Her skilled assistance was necessary for the success of this case    Estimated blood loss, crystalloid, colloid, blood: Please see anesthesia record    Material to lab:   Cultures    Drains: None    Complications: None    Indications for the procedure: This is a patient who had a previous fusion from L2-3 and L4-5 after having had an L3-4 fusion in the past.  The surgery was done a couple of months ago and the patient began having increased pain.  Because of that x-rays and a CT were done which showed a fracture of the top of the L2 vertebral body and fractures of both pedicles.    Operative summary:  The patient was brought into the operating room and placed under general endotracheal anesthesia using intravenous and inhalational agents.  The patient was then positioned on the operating table in the prone position.  All pressure points were padded including peripheral points of entrapment.  I came into the case with the back open and the L2-3 level exposed.  I proceeded to remove scar tissue from the L2-3 lateral mass on both sides.  The remainder the operation was done under the high-power magnification of the operating microscope using microsurgical technique microsurgical instrumentation.  The L3 pedicle on each side was then isolated and we were able to follow it up to the L2 pedicle.  The L2 pedicle on the right side was severely fractured with impingement on the L2 nerve root by both the remaining facet and the pedicle itself.  I ultimately remove the L2 pedicle screw and was able to drill  off most of the pedicle inferiorly in order to completely decompress the nerve I also remove the entire facet on the right side.  The entire facet on the left side was also removed but I did not have to drill off the pedicle.  Both nerve roots were thoroughly decompressed at the conclusion of the procedure.  Bleeding in those areas was controlled with FloSeal and then the operation was turned over to Dr. Rodriguez for extension of the fusion.  The sponge, instrument and needle counts were correct at the end of the procedure.

## 2021-04-15 NOTE — PROGRESS NOTES
Discharge Planning Assessment  [facility]     Patient Name: Wendi Ramos  MRN: [id number]  Today's Date: 4/15/2021    Admit Date: 4/13/2021    Discharge Needs Assessment    No documentation.       Discharge Plan     Row Name 04/15/21 1433       Plan    Plan Comments  Gone to surgery today 4/15/21. Will need to follow up post operative. KAMLA Tony RN, CCP.        Continued Care and Services - Admitted Since 4/13/2021    Coordination has not been started for this encounter.         Demographic Summary    No documentation.       Functional Status    No documentation.       Psychosocial    No documentation.       Abuse/Neglect    No documentation.       Legal    No documentation.       Substance Abuse    No documentation.       Patient Forms    No documentation.           Roxana Tony, RN

## 2021-04-15 NOTE — PERIOPERATIVE NURSING NOTE
During preoperative interview the patient had c/o lower back pain, radiating down right leg to front of knee. Pt also had n/t down to right ankle, pt also states neuropathy in imelda feet. Pt pain also radiates to left hip down to mid thigh. Pt also states pain radiates up to left shoulder. Pt also has c/o n/t imelda in the arms to the hands. Pt stated she couldn't urinate at home and came to the hospital. Pt states she cant bare weight on the right leg either.     THIS RN WORE APPROPRIATE PPE INCLUDING MASK, EYE PROTECTION, GLOVES, AND PERFORMED HAND HYGIENE UPON ENTERING AND EXITING PATIENT RM.       Pt had  the right was weaker than the left. Pt pedal pull rt was weaker than left, pedal push rt weaker than left.

## 2021-04-15 NOTE — ANESTHESIA PROCEDURE NOTES
Arterial Line    Pre-sedation assessment completed: 4/15/2021 1:06 PM    Patient reassessed immediately prior to procedure    Patient location during procedure: holding area  Start time: 4/15/2021 1:06 PM  Stop Time:4/15/2021 1:13 PM       Line placed for hemodynamic monitoring and ABGs/Labs/ISTAT.  Performed By   Anesthesiologist: Derek Vazquez MD  Preanesthetic Checklist  Completed: patient identified, IV checked, risks and benefits discussed, surgical consent, monitors and equipment checked, pre-op evaluation and timeout performed  Arterial Line Prep   Sterile Tech: cap, gloves, gown, mask and sterile barriers  Prep: ChloraPrep  Patient monitoring: blood pressure monitoring, continuous pulse oximetry and EKG  Arterial Line Procedure   Laterality:left  Location:  radial artery  Catheter size: 20 G   Guidance: ultrasound guided  PROCEDURE NOTE/ULTRASOUND INTERPRETATION.  Using ultrasound guidance the potential vascular sites for insertion of the catheter were visualized to determine the patency of the vessel to be used for vascular access.  After selecting the appropriate site for insertion, the needle was visualized under ultrasound being inserted into the radial artery, followed by ultrasound confirmation of wire and catheter placement. There were no abnormalities seen on ultrasound; an image was taken; and the patient tolerated the procedure with no complications.   Number of attempts: 1  Successful placement: yes  Post Assessment   Dressing Type: occlusive dressing applied, secured with tape and wrist guard applied.   Complications no  Circ/Move/Sens Assessment: normal.   Patient Tolerance: patient tolerated the procedure well with no apparent complications

## 2021-04-15 NOTE — PLAN OF CARE
Goal Outcome Evaluation:  Plan of Care Reviewed With: patient  Progress: no change  Outcome Summary: Pt c/o pain this morning and received dilaudid x1. Pt NPO since midnight for surgery today. Consents signed. Pt left for surgery and has been off unit.

## 2021-04-15 NOTE — PLAN OF CARE
Problem: Adult Inpatient Plan of Care  Goal: Plan of Care Review  Outcome: Ongoing, Progressing  Flowsheets (Taken 4/15/2021 0414)  Progress: no change  Plan of Care Reviewed With: patient  Outcome Summary: pt was given a melatonin to help sleep tonight, anxious about surgery tomorrow. pt was NPO @ midnight, dialudid 0.5 mg given x2. plaza care done. encouraged pt to turn. Called pt in pain rating a 10/10 called and got a one time dose of dilaudid 0.5 gave will continue to monitor and treat per MD     Problem: Adult Inpatient Plan of Care  Goal: Patient-Specific Goal (Individualized)  Outcome: Ongoing, Progressing  Flowsheets (Taken 4/15/2021 0414)  Patient-Specific Goals (Include Timeframe): Surgery 04/15  Individualized Care Needs: PRN meds, Scheduled meds, Q2 turns. accuchecks. IV fluids and antibotics, NPO at midnight,

## 2021-04-15 NOTE — BRIEF OP NOTE
LUMBAR LAMINECTOMY DISCECTOMY WITH FUSION  Progress Note    Wendi Ramos  4/15/2021    Pre-op Diagnosis:   Lumbar stenosis with neurogenic claudication [M48.062]       Post-Op Diagnosis Codes:     * Lumbar stenosis with neurogenic claudication [M48.062]    Procedure/CPT® Codes:        Procedure(s):  T10-L2 fusion with instrumentation and revision of current fixation.  Possible laminectomy by Dr. Segundo  LUMBAR 2 LUMBAR 3 LAMINECTOMY FACETECTOMY AND NEUROLYSIS    Surgeon(s):  Juancarlos Montelongo MD Reiss, Steven J., MD    Anesthesia: General    Staff:   Cell Saver : Jose Hong  Circulator: Josh Burns RN  Scrub Person: Daron Carrizales  Vendor Representative: Marta White  Assistant: Lainey Yoder CSA  Other: Dot Maldonado  Assistant: Lainey Yoder CSA      Estimated Blood Loss: 100ml    Urine Voided: 700 mL    Specimens:                Specimens     ID Source Type Tests Collected By Collected At Frozen?    1 Spine, Lumbar Wound · ANAEROBIC CULTURE  · WOUND CULTURE   Jef Randall MD 4/15/21 1442     Description: lumbar wound culture    2 Spine, Lumbar Wound · ANAEROBIC CULTURE  · WOUND CULTURE   Juancarlos Montelongo MD 4/15/21 1540     Description: lumbar spine wound culture                Drains:   Urethral Catheter Non-latex 16 Fr. (Active)   Daily Indications Acute Urinary Retention 04/15/21 0950   Site Assessment Clean;Skin intact 04/15/21 0950   Collection Container Standard drainage bag 04/15/21 0950   Securement Method Securing device 04/15/21 0950   Catheter care complete Yes 04/15/21 0950   Output (mL) 450 mL 04/15/21 0527       [REMOVED] External Urinary Catheter (Removed)   Site Assessment Clean;Skin intact 04/14/21 0834   Application/Removal external catheter applied 04/14/21 0500   Collection Container Wall suction 04/14/21 0500   Wall suction (mmHG) 250 mmHG 04/14/21 0500   Securement Method Securing device 04/14/21 1446   Catheter care complete Yes 04/14/21 1446        Findings: L2 nerve root impingement particularly on the right    Complications: None      Jef Randall MD     Date: 4/15/2021  Time: 15:59 EDT

## 2021-04-15 NOTE — ANESTHESIA PROCEDURE NOTES
Airway  Urgency: elective    Date/Time: 4/15/2021 2:00 PM  Airway not difficult    General Information and Staff    Patient location during procedure: OR  Anesthesiologist: Elia Devi MD  CRNA: Prince Moralez CRNA    Indications and Patient Condition  Indications for airway management: airway protection    Preoxygenated: yes  MILS maintained throughout  Mask difficulty assessment: 2 - vent by mask + OA or adjuvant +/- NMBA    Final Airway Details  Final airway type: endotracheal airway      Successful airway: ETT  Cuffed: yes   Successful intubation technique: direct laryngoscopy  Facilitating devices/methods: intubating stylet  Endotracheal tube insertion site: oral  Blade: Brett  Blade size: 3  ETT size (mm): 7.0  Cormack-Lehane Classification: grade I - full view of glottis  Placement verified by: chest auscultation and capnometry   Measured from: gums  ETT/EBT to gums (cm): 22  Number of attempts at approach: 1  Assessment: lips, teeth, and gum same as pre-op and atraumatic intubation

## 2021-04-15 NOTE — OP NOTE
Operative note    Preoperative diagnosis: L3 fracture and L2 334 instability and kyphosis status post L2-4 laminectomy and fusion with instrumentation with L2 and L3 radiculopathy    Postoperative diagnosis:  same    Procedure: T10-L3 bilateral posterior lateral fusion with AcroMed expedient segmental instrumentation.  Removal of instrumentation L2 and 3 from previous construct.  Right iliac marrow aspiration, local bone graft, and allograft bone graft with Pliafix bone graft extender    Surgeon:  Juancarlos Montelongo Jr. MD    Asst.:  Alexia Fernandes    Anesthetic: Gen.    EBL: 1 L total including Dr. Alonso's laminectomy    Condition: Satisfactory    Description of procedure: She was anesthetized and positioned prone.  The back was prepped and draped in sterile fashion.  I had begun exposure and expose the rods for the most part and made the incision up to the T10 level when Dr. Segundo came in.  He took over and performed the repeat laminectomy at L2-3 and L3-4 and he remove the right L2 screw.  We determined that the pedicle on each side at L2 had been fractured and was incompetent for screw removal and in any event probably not adequately stable and protected from the neural elements so I agreed not to replace screws at these levels.  See his note for further details.  I took over.  The L2 screws have been removed there was no pressure on the L2 nerve roots and dural sac.  I remove the remainder of the locking nuts.  The rods were removed and then the left-sided L3 screw was loose and pulled right out.  I did not replace this either as we know the cephalad endplate was fractured.  The opposite sided L3 screw remain well fixed and I did not remove it nor did I bother the screws at L4 and 5 which.  Tightly fixed.  Subperiosteal ovation was carried up to T10 and then curettes and rongeurs removed adherent soft tissue packs were placed for hemostasis the graft bed was decorticated Steffee probe was inserted at the  intersection of the landmarks and on the left side I placed screws at [id number] and 1 on the right side at 1112 and 1.  The pedicle at T10 on the right seemed loose and I took an x-ray and clearly we are at the appropriate position and level but I elected not to place a screw because there was no significant torque.  I consider going to S1 but I thought that the additional screws were unlikely to compensate for the lack of anterior support.  Contoured rods were placed nuts were tightened and torqued and the reduction remained anatomic and back to the preoperative nice position noted.  Anteriorly at the cage appeared satisfactorily reduced with the fracture and the nerve roots were free within the foramina.  Wound was irrigated and then combination of 60 cc of allograft bone, and ample supply of local bone graft from the laminectomy and mostly from thoracic spinous processes below T T11 along with 2 Pliafix wafers with iliac marrow aspirate were mixed and then placed in the lateral gutter graft beds and also in the midline well above the laminectomy site.  The wound was closed over a drain with running and interrupted #1 Vicryl for lumbodorsal fascia 2-0 Vicryl subcutaneously and then staples and Dermabond on the skin sterile dressing was applied she tolerated procedure well but returned recovery room

## 2021-04-16 PROBLEM — D62 ACUTE BLOOD LOSS ANEMIA: Status: ACTIVE | Noted: 2021-01-01

## 2021-04-16 NOTE — SIGNIFICANT NOTE
04/16/21 1255   OTHER   Discipline physical therapist   Rehab Time/Intention   Session Not Performed other (see comments)  (Per Neuro MD notes, pt. will be on bedrest for several weeks. Spoke with pt.'s nurse (Marii) and she confirms this information and reports that MD is supposed to cancel all P.T. orders. Will sign off.)

## 2021-04-16 NOTE — PLAN OF CARE
AO x4. Strict bedrest, pt able to log roll and reposition herself. Using IS. Voids per FC and bedpan. 295mL out of hemovac.  VSS.     Problem: Adult Inpatient Plan of Care  Goal: Absence of Hospital-Acquired Illness or Injury  Intervention: Identify and Manage Fall Risk  Recent Flowsheet Documentation  Taken 4/16/2021 1807 by Marii Chnaey, CIELO  Safety Promotion/Fall Prevention:  • activity supervised  • assistive device/personal items within reach  • clutter free environment maintained  • fall prevention program maintained  • gait belt  • nonskid shoes/slippers when out of bed  • room organization consistent  • safety round/check completed  Taken 4/16/2021 1621 by Marii Chaney, RN  Safety Promotion/Fall Prevention:  • activity supervised  • assistive device/personal items within reach  • clutter free environment maintained  • fall prevention program maintained  • gait belt  • nonskid shoes/slippers when out of bed  • room organization consistent  • safety round/check completed  Taken 4/16/2021 1441 by Marii Chaney, CIELO  Safety Promotion/Fall Prevention:  • activity supervised  • assistive device/personal items within reach  • clutter free environment maintained  • fall prevention program maintained  • gait belt  • nonskid shoes/slippers when out of bed  • room organization consistent  • safety round/check completed  Taken 4/16/2021 1218 by Marii Chaney, RN  Safety Promotion/Fall Prevention:  • activity supervised  • assistive device/personal items within reach  • clutter free environment maintained  • fall prevention program maintained  • gait belt  • nonskid shoes/slippers when out of bed  • room organization consistent  • safety round/check completed  Taken 4/16/2021 1026 by Marii Chaney, RN  Safety Promotion/Fall Prevention:  • activity supervised  • assistive device/personal items within reach  • clutter free environment maintained  • muscle strengthening facilitated  • safety round/check completed  • room  organization consistent  Taken 4/16/2021 0815 by Marii Chaney RN  Safety Promotion/Fall Prevention:  • activity supervised  • assistive device/personal items within reach  • clutter free environment maintained  • fall prevention program maintained  • gait belt  • nonskid shoes/slippers when out of bed  • room organization consistent  • safety round/check completed  Intervention: Prevent Skin Injury  Recent Flowsheet Documentation  Taken 4/16/2021 1621 by Marii Chaney RN  Body Position: position changed independently  Taken 4/16/2021 1026 by Marii Chaney RN  Body Position: supine  Skin Protection:  • adhesive use limited  • incontinence pads utilized  • tubing/devices free from skin contact  Intervention: Prevent and Manage VTE (venous thromboembolism) Risk  Recent Flowsheet Documentation  Taken 4/16/2021 1026 by Marii Chaney RN  VTE Prevention/Management:  • bilateral  • dorsiflexion/plantar flexion performed  • sequential compression devices on  Intervention: Prevent Infection  Recent Flowsheet Documentation  Taken 4/16/2021 1807 by Marii Chaney RN  Infection Prevention:  • visitors restricted/screened  • single patient room provided  • rest/sleep promoted  • personal protective equipment utilized  Taken 4/16/2021 1621 by Marii Chaney RN  Infection Prevention:  • visitors restricted/screened  • single patient room provided  • rest/sleep promoted  • personal protective equipment utilized  Taken 4/16/2021 1441 by Marii Chaney RN  Infection Prevention:  • single patient room provided  • visitors restricted/screened  • rest/sleep promoted  • personal protective equipment utilized  Taken 4/16/2021 1218 by Marii Chaney RN  Infection Prevention:  • visitors restricted/screened  • single patient room provided  • rest/sleep promoted  • personal protective equipment utilized  Taken 4/16/2021 1026 by Marii Chaney RN  Infection Prevention:  • visitors restricted/screened  • single patient room provided  • rest/sleep  promoted  • personal protective equipment utilized  Taken 4/16/2021 0815 by Marii Chaney RN  Infection Prevention:  • visitors restricted/screened  • single patient room provided  • rest/sleep promoted  • personal protective equipment utilized  Goal: Optimal Comfort and Wellbeing  Intervention: Provide Person-Centered Care  Recent Flowsheet Documentation  Taken 4/16/2021 1026 by Marii Chaney RN  Trust Relationship/Rapport:  • care explained  • choices provided  • questions encouraged  • thoughts/feelings acknowledged     Problem: Fall Injury Risk  Goal: Absence of Fall and Fall-Related Injury  Intervention: Identify and Manage Contributors to Fall Injury Risk  Recent Flowsheet Documentation  Taken 4/16/2021 1807 by Marii Chaney RN  Medication Review/Management:  • medications reviewed  • high-risk medications identified  Taken 4/16/2021 1621 by Marii Chaney RN  Medication Review/Management:  • medications reviewed  • high-risk medications identified  Taken 4/16/2021 1441 by Marii Chaney RN  Medication Review/Management:  • medications reviewed  • high-risk medications identified  Taken 4/16/2021 1218 by Marii Chaney RN  Medication Review/Management:  • medications reviewed  • high-risk medications identified  Taken 4/16/2021 1026 by Marii Chaney RN  Medication Review/Management:  • medications reviewed  • high-risk medications identified  Self-Care Promotion: independence encouraged  Taken 4/16/2021 0815 by Marii Chaney RN  Medication Review/Management:  • medications reviewed  • high-risk medications identified  Intervention: Promote Injury-Free Environment  Recent Flowsheet Documentation  Taken 4/16/2021 1807 by Marii Chaney RN  Safety Promotion/Fall Prevention:  • activity supervised  • assistive device/personal items within reach  • clutter free environment maintained  • fall prevention program maintained  • gait belt  • nonskid shoes/slippers when out of bed  • room organization consistent  • safety  round/check completed  Taken 4/16/2021 1621 by Marii Chaney, CIELO  Safety Promotion/Fall Prevention:  • activity supervised  • assistive device/personal items within reach  • clutter free environment maintained  • fall prevention program maintained  • gait belt  • nonskid shoes/slippers when out of bed  • room organization consistent  • safety round/check completed  Taken 4/16/2021 1441 by Marii Chaney, CIELO  Safety Promotion/Fall Prevention:  • activity supervised  • assistive device/personal items within reach  • clutter free environment maintained  • fall prevention program maintained  • gait belt  • nonskid shoes/slippers when out of bed  • room organization consistent  • safety round/check completed  Taken 4/16/2021 1218 by Marii Chaney RN  Safety Promotion/Fall Prevention:  • activity supervised  • assistive device/personal items within reach  • clutter free environment maintained  • fall prevention program maintained  • gait belt  • nonskid shoes/slippers when out of bed  • room organization consistent  • safety round/check completed  Taken 4/16/2021 1026 by Marii Chaney RN  Safety Promotion/Fall Prevention:  • activity supervised  • assistive device/personal items within reach  • clutter free environment maintained  • muscle strengthening facilitated  • safety round/check completed  • room organization consistent  Taken 4/16/2021 0815 by Marii Chaney RN  Safety Promotion/Fall Prevention:  • activity supervised  • assistive device/personal items within reach  • clutter free environment maintained  • fall prevention program maintained  • gait belt  • nonskid shoes/slippers when out of bed  • room organization consistent  • safety round/check completed     Problem: Skin Injury Risk Increased  Goal: Skin Health and Integrity  Intervention: Optimize Skin Protection  Recent Flowsheet Documentation  Taken 4/16/2021 1026 by Marii Chaney, RN  Pressure Reduction Techniques:  • sit time limited to 2 hours  • frequent  weight shift encouraged  Head of Bed (HOB): HOB at 15 degrees  Pressure Reduction Devices: alternating pressure pump (ADD)  Skin Protection:  • adhesive use limited  • incontinence pads utilized  • tubing/devices free from skin contact   Goal Outcome Evaluation:

## 2021-04-16 NOTE — PROGRESS NOTES
[facility] PROGRESS NOTE      CC: POD # 2: T10-L5 fusion of L2-3 fracture and kyphosis after L2-5 laminectomy and fusion.      Subjective     Interval History: Feeling better since surgery, she states that she has no problems in her right leg and states that she was unable to move her RLE before surgery,     ROS:  Constitutional: No fever, chills  GI: No nausea, vomiting  MS: back pain  Neuro: No numbness, tingling, or weakness  : No difficulty voiding, no incontinence    Objective     Vital signs in last 24 hours:  Temp:  [98.6 °F (37 °C)-100 °F (37.8 °C)] 99.6 °F (37.6 °C)  Heart Rate:  [] 94  Resp:  [10-18] 16  BP: ()/(46-66) 119/54  Arterial Line BP: ()/() 110/51    Intake/Output this shift:  I/O this shift:  In: -   Out: 60 [Drains:60]    LABS:  Results from last 7 days   Lab Units 04/16/21  0615 04/15/21  1900 04/15/21  1745 04/15/21  0709 04/14/21  0844 04/14/21  0844   WBC 10*3/mm3  --  11.16*  --  8.05  --  8.57   HEMOGLOBIN g/dL 8.5* 9.4*  --  8.9*  --  9.3*   HEMOGLOBIN, POC g/dL  --   --  8.2*  --    < >  --    HEMATOCRIT % 27.0* 28.2*  --  28.7*  --  29.7*   HEMATOCRIT POC %  --   --  24*  --    < >  --    PLATELETS 10*3/mm3  --  386  --  403  --  427    < > = values in this interval not displayed.     Results from last 7 days   Lab Units 04/16/21  0615 04/14/21  0844 04/13/21  2331   SODIUM mmol/L 139 137 138   POTASSIUM mmol/L 4.2 4.3 4.0   CHLORIDE mmol/L 106 103 104   CO2 mmol/L 23.9 24.4 23.5   BUN mg/dL 11 17 14   CREATININE mg/dL 0.42* 0.55* 0.68   CALCIUM mg/dL 8.7 9.6 10.0   BILIRUBIN mg/dL  --   --  <0.2   ALK PHOS U/L  --   --  122*   ALT (SGPT) U/L  --   --  14   AST (SGOT) U/L  --   --  22   GLUCOSE mg/dL 128* 168* 137*     Anaerobic cutlure of wound, pending    IMAGING STUDIES:  No new imaging      Meds reviewed/changed: Yes    Current Facility-Administered Medications:   •  acetaminophen (TYLENOL) tablet 650 mg, 650 mg, Oral, Q4H PRN **OR**  acetaminophen (TYLENOL) 160 MG/5ML solution 650 mg, 650 mg, Oral, Q4H PRN **OR** acetaminophen (TYLENOL) suppository 650 mg, 650 mg, Rectal, Q4H PRN, Juancarlos Montelongo MD  •  baclofen (LIORESAL) tablet 10 mg, 10 mg, Oral, BID, Juancarlos Montelongo MD, 10 mg at 04/16/21 0900  •  bisacodyl (DULCOLAX) suppository 10 mg, 10 mg, Rectal, Daily PRN, Juancarlos Montelongo MD  •  dextrose (D50W) 25 g/ 50mL Intravenous Solution 25 g, 25 g, Intravenous, Q15 Min PRN, Juancarlos Montelongo MD  •  dextrose (GLUTOSE) oral gel 15 g, 15 g, Oral, Q15 Min PRN, Juancarlos Montelongo MD  •  DULoxetine (CYMBALTA) DR capsule 60 mg, 60 mg, Oral, BID, Juancarlos Montelongo MD, 60 mg at 04/16/21 0900  •  glucagon (human recombinant) (GLUCAGEN DIAGNOSTIC) injection 1 mg, 1 mg, Subcutaneous, Q15 Min PRN, Juancarlos Montelongo MD  •  HYDROmorphone (DILAUDID) injection 0.5 mg, 0.5 mg, Intravenous, Q4H PRN, Juancarlos Montelongo MD, 0.5 mg at 04/15/21 1038  •  HYDROmorphone (DILAUDID) PCA 0.2 mg/ml 50 mL syringe, , Intravenous, Continuous, Juancarlos Montelongo MD, New Syringe/Cartridge at 04/15/21 1949  •  hydroxychloroquine (PLAQUENIL) tablet 200 mg, 200 mg, Oral, BID, Lay Magallon APRN, 200 mg at 04/16/21 0859  •  insulin lispro (ADMELOG) injection 0-9 Units, 0-9 Units, Subcutaneous, TID AC, Juancarlos Montelongo MD, 2 Units at 04/16/21 1156  •  lactated ringers infusion, 9 mL/hr, Intravenous, Continuous, Juancarlos Montelongo MD, Last Rate: 9 mL/hr at 04/15/21 1340, Restarted at 04/15/21 1347  •  lamoTRIgine (LaMICtal) tablet 150 mg, 150 mg, Oral, BID, Juancarlos Montelongo MD, 150 mg at 04/16/21 0859  •  levETIRAcetam (KEPPRA) tablet 1,500 mg, 1,500 mg, Oral, Q12H, Juancarlos Montelongo MD, 1,500 mg at 04/16/21 0859  •  levoFLOXacin (LEVAQUIN) 500 mg/100 mL D5W (premix) (LEVAQUIN) 500 mg, 500 mg, Intravenous, Q24H, Juancarlos Montelongo MD, Last Rate: 0 mL/hr at 04/14/21 0525, 500 mg at 04/16/21 0657  •  levothyroxine (SYNTHROID, LEVOTHROID) tablet 137 mcg, 137 mcg, Oral, Q AM,  Juancarlos Montelongo MD, 137 mcg at 04/16/21 0627  •  magnesium hydroxide (MILK OF MAGNESIA) suspension 2400 mg/10mL 10 mL, 10 mL, Oral, Daily PRN, Juancarlos Montelongo MD  •  melatonin tablet 3 mg, 3 mg, Oral, Nightly PRN, Juancarlos Montelongo MD, 3 mg at 04/14/21 2247  •  metoprolol tartrate (LOPRESSOR) tablet 50 mg, 50 mg, Oral, Q12H, Juancarlos Montelongo MD, 50 mg at 04/16/21 0901  •  naloxone (NARCAN) injection 0.1 mg, 0.1 mg, Intravenous, Q5 Min PRN, Juancarlos Montelongo MD  •  ondansetron (ZOFRAN) injection 4 mg, 4 mg, Intravenous, Once, Juancarlos Montelongo MD  •  ondansetron (ZOFRAN) tablet 4 mg, 4 mg, Oral, Q6H PRN **OR** ondansetron (ZOFRAN) injection 4 mg, 4 mg, Intravenous, Q6H PRN, Juancarlos Montelongo MD  •  OXcarbazepine (TRILEPTAL) tablet 600 mg, 600 mg, Oral, Q12H, Juancarlos Montelongo MD, 600 mg at 04/16/21 0859  •  oxyCODONE-acetaminophen (PERCOCET) 5-325 MG per tablet 2 tablet, 2 tablet, Oral, Q4H PRN, Juancarlos Montelongo MD  •  pantoprazole (PROTONIX) EC tablet 40 mg, 40 mg, Oral, QAM, Juancarlos Montelongo MD, 40 mg at 04/16/21 0627  •  Pharmacy to dose vancomycin, , Does not apply, Continuous PRN, Juancarlos Montelongo MD  •  phenylephrine (CURTIS-SYNEPHRINE) 50 mg in sodium chloride 0.9 % 250 mL infusion, 0.5-3 mcg/kg/min, Intravenous, Titrated, Anderson Shipman MD, Stopped at 04/15/21 1951  •  polyethylene glycol (MIRALAX) packet 17 g, 17 g, Oral, Daily, Juancarlos Montelongo MD, 17 g at 04/16/21 0859  •  pramipexole (MIRAPEX) tablet 0.5 mg, 0.5 mg, Oral, Q12H, Juancarlos Montelongo MD, 0.5 mg at 04/16/21 0901  •  rosuvastatin (CRESTOR) tablet 40 mg, 40 mg, Oral, Daily, Juancarlos Montelongo MD, 40 mg at 04/16/21 0900  •  sennosides-docusate (PERICOLACE) 8.6-50 MG per tablet 1 tablet, 1 tablet, Oral, BID, Juancarlos Montelongo MD, 1 tablet at 04/16/21 0859  •  sodium chloride 0.9 % flush 10 mL, 10 mL, Intravenous, Q12H, Juancarlos Montelongo MD, 10 mL at 04/16/21 0902  •  sodium chloride 0.9 % flush 10 mL, 10 mL, Intravenous, PRN, Reginald  "Juancarlos FLORES MD  •  sodium chloride 0.9 % flush 10 mL, 10 mL, Intravenous, PRN, Juancarlos Montelongo MD  •  sodium chloride 0.9 % flush 3 mL, 3 mL, Intravenous, Q12H, Juancarlos Montelongo MD, 3 mL at 04/16/21 0001  •  sodium chloride 0.9 % infusion, 100 mL/hr, Intravenous, Continuous, Juancarlos Montelongo MD, Last Rate: 100 mL/hr at 04/16/21 1157, 100 mL/hr at 04/16/21 1157  •  temazepam (RESTORIL) capsule 15 mg, 15 mg, Oral, Nightly PRN, Juancarlos Montelongo MD  •  vancomycin 1250 mg/250 mL 0.9% NS IVPB ([facility]), 15 mg/kg, Intravenous, Q12H, Juancarlos Montelongo MD      Physical Exam:    General:   Awake, alert, oriented x3  Back:    Incision to back is covered with foam dressing, no drainage  Motor: Normal muscle strength, bulk and tone in upper and lower extremities.  No fasciculations, rigidity, spasticity, or abnormal movements.  Sensation: Normal to light touch  Coordination: Moving all extremities well  Extremities:   Wearing SCDs      Assessment/Plan     ASSESSMENT:  Lying flat in bed.  Moving all extremities well.  She was working with OT, in the bed, and is aware of her precautions and she will need to bed on bedrest for 4-6 weeks.        Cystitis    Type 2 diabetes mellitus without complication (CMS/HCC)    S/P lumbar fusion    Fibromyalgia    Rheumatoid arthritis (CMS/HCC)    Seizures (CMS/HCC)    Acute bilateral low back pain without sciatica    Spinal stenosis of lumbar region with neurogenic claudication    Pain    Lumbar stenosis with neurogenic claudication      PLAN:   Will keep on bedrest  Continue vancomycin until cultures result  Keep HOB at 40 degrees  Possibly pull f/c in AM   Possible transfer to rehab on Monday    I discussed the patient's findings and my recommendations with patient, nursing staff and Dr. Randall       LOS: 2 days       Eusebia Cancino, APRN  4/16/2021  12:21 EDT    \"Dictated utilizing Dragon dictation\".      "

## 2021-04-16 NOTE — THERAPY DISCHARGE NOTE
Acute Care - Occupational Therapy Discharge  [facility]    Patient Name: Wendi Ramos  : 1955    MRN: [id number]                              Today's Date: 2021       Admit Date: 2021    Visit Dx:     ICD-10-CM ICD-9-CM   1. Cystitis  N30.90 595.9   2. Acute on chronic right-sided low back pain with right-sided sciatica, unspecified chronicity  M54.41 724.3   3. Lumbar stenosis with neurogenic claudication  M48.062 724.03     Patient Active Problem List   Diagnosis   • Type 2 diabetes mellitus without complication (CMS/HCC)   • Post-operative state   • S/P lumbar fusion   • Insomnia   • Abnormal EKG   • Cellulitis of breast   • Chest pain   • Dyspnea on exertion   • Fibromyalgia   • Hyperhidrosis   • LAFB (left anterior fascicular block)   • Mass of right breast   • Obesity   • PVC (premature ventricular contraction)   • Rheumatoid arthritis (CMS/HCC)   • S/P catheter ablation of slow pathway   • Seizures (CMS/HCC)   • Sinus tachycardia   • Chronic fatigue   • Vitamin D deficiency   • Complex dyslipidemia   • Hypothyroidism due to Hashimoto's thyroiditis   • Hypertension   • Hyperlipidemia   • Acute bilateral low back pain without sciatica   • Spinal stenosis of lumbar region with neurogenic claudication   • Sleep apnea   • Pain   • Lumbar stenosis with neurogenic claudication   • Cystitis     Past Medical History:   Diagnosis Date   • Chronic fatigue    • Depression    • Diabetes mellitus (CMS/HCC)    • Fibromyalgia    • GERD (gastroesophageal reflux disease)    • Hiatal hernia    • History of Clostridioides difficile colitis    • Hyperlipidemia    • Hypertension    • Hypothyroidism    • Irregular heart beat    • Liver disease     FATTY LIVER, NON ALCOHOLIC   • Low back pain    • Peripheral neuropathy    • Rheumatoid arthritis (CMS/HCC)    • Rheumatoid arthritis (CMS/HCC)     DR SMYTH   • Seizures (CMS/HCC)    • Sleep apnea     NO CPAP   • Type 2 diabetes mellitus (CMS/HCC)    • [place] fever 2002     Past Surgical History:   Procedure Laterality Date   • ABDOMINOPLASTY     • ADENOIDECTOMY     • BILATERAL BREAST REDUCTION     • BRONCHOSCOPY N/A 12/15/2016    Procedure: BRONCHOSCOPY WITH BAL AND ENDOBRONCHIAL ULTRASOUND WITH FNA;  Surgeon: Diego Ponce MD;  Location: [facility] ENDOSCOPY;  Service:    • CARDIAC ABLATION     • CATARACT EXTRACTION, BILATERAL     • COLONOSCOPY     • ENDOSCOPY     • EYE SURGERY     • HYSTERECTOMY     • LASIK     • LUMBAR DISCECTOMY FUSION INSTRUMENTATION Left 6/8/2018    Procedure: LEFT L3/4 lumbar discectomy;  Surgeon: Miguelangel Goldberg MD;  Location: [facility] OR;  Service: Neurosurgery   • LUMBAR FUSION Left 7/12/2018    Procedure: Left L3 4 lumbar decompression and discectomy with transforaminal lumbar interbody fusion and posterior instrumentation with posterior lateral arthrodesis;  Surgeon: Miguelangel Goldberg MD;  Location: [facility] OR;  Service: Neurosurgery   • LUMBAR LAMINECTOMY WITH FUSION N/A 2/15/2021    Procedure: Lumbar 2-3, lumbar 3 4, lumbar 4 5 fusion with instrumentation and BMP,  L2-L3 lift with cage, and removal of implants L3-4 with laminectomies by Dr. Segundo;  Surgeon: Juancarlos Montelongo MD;  Location: [facility] OR;  Service: Orthopedic Spine;  Laterality: N/A;   • LUMBAR LAMINECTOMY WITH FUSION N/A 2/15/2021    Procedure: Lumbar 2 3, Lumbar 3 4 and lumbar 4 5 laminectomy with a fusion by orthopedics;  Surgeon: Jef Randall MD;  Location: [facility] OR;  Service: Neurosurgery;  Laterality: N/A;   • LUMBAR LAMINECTOMY WITH FUSION N/A 4/15/2021    Procedure: T10-L2 fusion with instrumentation and revision of current fixation, laminectomy by Dr. Segundo;  Surgeon: Juancarlos Montelongo MD;  Location: [facility] OR;  Service: Orthopedic Spine;  Laterality: N/A;   • LUMBAR LAMINECTOMY WITH FUSION N/A 4/15/2021    Procedure: LUMBAR 2 LUMBAR 3 LAMINECTOMY FACETECTOMY AND NEUROLYSIS;  Surgeon: Jef Randall MD;  Location: [facility];   Service: Neurosurgery;  Laterality: N/A;   • MOUTH SURGERY     • REPLACEMENT TOTAL KNEE Left    • TONSILLECTOMY     • TONSILLECTOMY AND ADENOIDECTOMY  1966     General Information     Row Name 04/16/21 1236          OT Time and Intention    Document Type  evaluation  -LE     Mode of Treatment  individual therapy;occupational therapy  -     Row Name 04/16/21 1236          General Information    Patient Profile Reviewed  yes  -LE     Existing Precautions/Restrictions  fall;spinal Gentle extremity range of motion and resistance exercises only.  Do not get her out of bed.  Do not elevate bed above 35 to 40 degrees and keep her hips flexed, not her back.  She cannot get out of bed, sit or ambulate.  -LE     Barriers to Rehab  medically complex  -     Row Name 04/16/21 1236          Occupational Profile    Reason for Services/Referral (Occupational Profile)  s/p bhy Dr Montelongo: T10-L2 fusion with instrumentation and revision of current fixation, laminectomy by Dr. Segundo; LUMBAR 2 LUMBAR 3 LAMINECTOMY FACETECTOMY AND NEUROLYSIS.  h/o 3 other back surgeries.  -     Row Name 04/16/21 1236          Living Environment    Lives With  spouse  -     Row Name 04/16/21 1236          Cognition    Orientation Status (Cognition)  oriented x 4  -LE       User Key  (r) = Recorded By, (t) = Taken By, (c) = Cosigned By    Initials Name Provider Type    Lexi Suarez OTR Occupational Therapist        Mobility/ADL's     Row Name 04/16/21 1241          Bed Mobility    Bed Mobility  rolling left;rolling right  -LE     Rolling Left [place] (Bed Mobility)  standby assist  -LE     Assistive Device (Bed Mobility)  bed rails  -LE     Comment (Bed Mobility)  OLENA ARNP present and states ok to bend knees to help with log roll.  -LE     Row Name 04/16/21 1241          Transfers    Comment (Transfers)  defer  per MD orders not to sit or get OOB  -LE     Row Name 04/16/21 1241          Functional Mobility    Functional Mobility- Comment   defer per ortho MD orders.  -[facility] Name 04/16/21 1241          Activities of Daily Living    BADL Assessment/Intervention  toileting;feeding;grooming;bathing;lower body dressing;upper body dressing pt reports had to lay flat after back surgery in Feb. 2021 and so has experience with modifying activities.  -BASILIA     [facility] Name 04/16/21 1241          Toileting Assessment/Training    [place] Level (Toileting)  dependent (less than 25% patient effort)  -BASILIA     Comment (Toileting)  catheter.  aware  of need for bedpan or brief for bowel movement  -[facility] Name 04/16/21 1241          Self-Feeding Assessment/Training    [place] Level (Feeding)  finger foods;liquids to mouth  -LE     Position (Self-Feeding)  supine  -BASILIA     Comment (Feeding)  problem solve with pt since has to lay flat.  rec: sidelying, fingers foods, lowering rail and putting tray up next to bed, lidded cup and straw insert on water pitcher.  -BASILIA     [facility] Name 04/16/21 1241          Grooming Assessment/Training    [place] Level (Grooming)  oral care regimen  -BASILIA     Comment (Grooming)  declines to complete with OT but has wisps and has been using spit cups.  -[facility] Name 04/16/21 1241          Bathing Assessment/Intervention    Comment (Bathing)   assisted in Feb. with bed bath.  -[facility] Name 04/16/21 1241          Lower Body Dressing Assessment/Training    Comment (Lower Body Dressing)  ed pt will need full assist to thread and hike.  cue pt not to bridge to hike pants and to instead roll side to side.  -BASILIA     [facility] Name 04/16/21 1241          Upper Body Dressing Assessment/Training    Comment (Upper Body Dressing)  discuss tech to roll to pull shirt down. reports  assisted before.  -BASILIA       User Key  (r) = Recorded By, (t) = Taken By, (c) = Cosigned By    Initials Name Provider Type    Lexi Suarez OTR Occupational Therapist        Obj/Interventions     Row Name 04/16/21 1253          Sensory Interventions     Comment, Sensory Intervention  denies numb/tingle  -     Row Name 04/16/21 1253          Range of Motion Comprehensive    Comment, General Range of Motion  B UE AROM 7/8.   pt denies any deficits.  -     Row Name 04/16/21 1253          Strength Comprehensive (MMT)    Comment, General Manual Muscle Testing (MMT) Assessment  defer due to back surgery  -     Row Name 04/16/21 1253          Balance    Comment, Balance  defer due orders for strict bedrest.  -     Row Name 04/16/21 1253          Therapeutic Exercise    Therapeutic Exercise  shoulder pt reports doing arm raises on own and return demo to OT.  OT ed to use caution and stop if feeling strain or pain.  -LE       User Key  (r) = Recorded By, (t) = Taken By, (c) = Cosigned By    Initials Name Provider Type    Lexi Suarez OTR Occupational Therapist        Goals/Plan     Row Name 04/16/21 1305          Self-Feeding Goal 1 (OT)    Time Frame (Self-Feeding Goal 1, OT)  1 day  -LE     Strategies/Barriers (Self-Feeding Goal 1, OT)  Ed on modifications to allow for self feeding while supine.  -LE     Progress/Outcomes (Self-Feeding Goal 1, OT)  goal met  -LE       User Key  (r) = Recorded By, (t) = Taken By, (c) = Cosigned By    Initials Name Provider Type    Lexi Suarez OTR Occupational Therapist        Clinical Impression     Row Name 04/16/21 1256          Pain Assessment    Additional Documentation  Pain Scale: Numbers Pre/Post-Treatment (Group)  -LE     Row Name 04/16/21 1256          Pain Scale: Numbers Pre/Post-Treatment    Pretreatment Pain Rating  4/10  -LE     Posttreatment Pain Rating  4/10  -LE     Pain Location  back  -LE     Pre/Posttreatment Pain Comment  pt reports ice pack taped to back and feel a hump, pt declines OT to adjust, will have RN look at.  -LE     Pain Intervention(s)  Repositioned;Rest  -     Row Name 04/16/21 1256          Plan of Care Review    Plan of Care Reviewed With  patient  -LE     Outcome Summary  Pt presents  s/p  revision of back surgery in Feb. 2021.  Pt with strict bedrest orders for 4-6 weeks and orders for gentle ROM.  OT reviews modification recommendations for feeding, grooming in flat position.  Pt is moving B UE freely and rec. 3 set of 10 AROM B UE daily.  No further skilled acute care OT needs at this time.   Discuss with RN and CCP.  -LE     Row Name 04/16/21 1256          Therapy Assessment/Plan (OT)    Criteria for Skilled Therapeutic Interventions Met (OT)  does not meet criteria for skilled intervention  -LE     Therapy Frequency (OT)  evaluation only  -LE     Row Name 04/16/21 1256          Therapy Plan Review/Discharge Plan (OT)    Equipment Needs Upon Discharge (OT)  -- owns adjustable tempurpedic bed at home  -LE     Anticipated Discharge Disposition (OT)  -- pt plans d/c to SNU for nursing care.  pt aware therapy not indicated due to bedrest 4-6 weeks.  -     Row Name 04/16/21 1256          Vital Signs    O2 Delivery Pre Treatment  room air  -LE     O2 Delivery Intra Treatment  room air  -LE     O2 Delivery Post Treatment  room air  -LE     Pre Patient Position  Side Lying pt laying on R side when enter room. pt rolls back and forth while OLENA nurse checks back.  -LE     Intra Patient Position  Supine  -LE     Post Patient Position  Supine  -     Row Name 04/16/21 1256          Positioning and Restraints    Pre-Treatment Position  in bed  -LE     Post Treatment Position  bed  -LE     In Bed  notified nsg;supine;call light within reach;encouraged to call for assist;exit alarm on;patient within staff view;with family/caregiver;SCD pump applied HOB at or less than 30 degrees. pt report nsg raised to 35 degrees and pt could not tolerate due to pain.  enters as OT leaves.  -LE       User Key  (r) = Recorded By, (t) = Taken By, (c) = Cosigned By    Initials Name Provider Type    Lexi Suarez OTR Occupational Therapist        Outcome Measures     Row Name 04/16/21 9104          How much help  from another is currently needed...    Putting on and taking off regular lower body clothing?  1  -LE     Bathing (including washing, rinsing, and drying)  2  -LE     Toileting (which includes using toilet bed pan or urinal)  1  -LE     Putting on and taking off regular upper body clothing  2  -LE     Taking care of personal grooming (such as brushing teeth)  3  -LE     Eating meals  3  -LE     AM-PAC 6 Clicks Score (OT)  12  -LE     Row Name 04/16/21 1306          Functional Assessment    Outcome Measure Options  AM-PAC 6 Clicks Daily Activity (OT)  -LE       User Key  (r) = Recorded By, (t) = Taken By, (c) = Cosigned By    Initials Name Provider Type    LE Lexi Charles OTR Occupational Therapist        Occupational Therapy Education                 Title: PT OT SLP Therapies (Done)     Topic: Occupational Therapy (Done)     Point: ADL training (Done)     Description:   Instruct learner(s) on proper safety adaptation and remediation techniques during self care or transfers.   Instruct in proper use of assistive devices.              Learning Progress Summary           Patient Acceptance, E,D, VU by BASILIA at 4/16/2021 1306    Comment: role of OT, plan of care, adaptions for ADL                   Point: Precautions (Done)     Description:   Instruct learner(s) on prescribed precautions during self-care and functional transfers.              Learning Progress Summary           Patient Acceptance, E,D, VU by BASILIA at 4/16/2021 1306    Comment: role of OT, plan of care, adaptions for ADL                               User Key     Initials Effective Dates Name Provider Type Discipline    BASILIA 06/08/18 -  Lexi Charles OTR Occupational Therapist OT              OT Recommendation and Plan  Retired Outcome Summary/Treatment Plan (OT)  Anticipated Discharge Disposition (OT):  (pt plans d/c to SNU for nursing care.  pt aware therapy not indicated due to bedrest 4-6 weeks.)  Therapy Frequency (OT): evaluation only  Plan of Care  Review  Plan of Care Reviewed With: patient  Outcome Summary: Pt presents s/p  revision of back surgery in Feb. 2021.  Pt with strict bedrest orders for 4-6 weeks and orders for gentle ROM.  OT reviews modification recommendations for feeding, grooming in flat position.  Pt is moving B UE freely and rec. 3 set of 10 AROM B UE daily.  No further skilled acute care OT needs at this time.   Discuss with RN and CCP.  Plan of Care Reviewed With: patient  Outcome Summary: Pt presents s/p  revision of back surgery in Feb. 2021.  Pt with strict bedrest orders for 4-6 weeks and orders for gentle ROM.  OT reviews modification recommendations for feeding, grooming in flat position.  Pt is moving B UE freely and rec. 3 set of 10 AROM B UE daily.  No further skilled acute care OT needs at this time.   Discuss with RN and CCP.     Time Calculation:   Time Calculation- OT     Row Name 04/16/21 1311             Time Calculation- OT    OT Start Time  1231  -LE      OT Stop Time  1300  -LE      OT Time Calculation (min)  29 min  -LE      Total Timed Code Minutes- OT  20 minute(s)  -LE      OT Received On  04/16/21  -        User Key  (r) = Recorded By, (t) = Taken By, (c) = Cosigned By    Initials Name Provider Type    Lexi Suarez OTR Occupational Therapist        Therapy Charges for Today     Code Description Service Date Service Provider Modifiers Qty    [id number]  OT EVAL LOW COMPLEXITY 2 4/16/2021 Lexi Charles OTR GO 1    [id number]  OT SELF CARE/MGMT/TRAIN EA 15 MIN 4/16/2021 Lexi Charles OTR GO 1               DOROTHY Lewis  4/16/2021

## 2021-04-16 NOTE — PROGRESS NOTES
Name: Wendi Ramos ADMIT: 2021   : 1955  PCP: Flo Temple MD    MRN: [id number] LOS: 2 days   AGE/SEX: 65 y.o. female  ROOM: [facility]     Subjective   Subjective   Surgical notes reviewed.  Patient is on bedrest now    She has no complaints at all.  Apparently had a bowel movement today.    Review of Systems   Respiratory: Negative for shortness of breath.    Cardiovascular: Negative for chest pain.   Musculoskeletal: Positive for back pain.   All other systems reviewed and are negative.       Objective   Objective   Vital Signs  Temp:  [98.6 °F (37 °C)-100 °F (37.8 °C)] 98.6 °F (37 °C)  Heart Rate:  [] 78  Resp:  [16-18] 18  BP: ()/(51-66) 115/51  Arterial Line BP: ()/(33-58) 110/51  SpO2:  [95 %-100 %] 97 %  on  Flow (L/min):  [2-4] 2;   Device (Oxygen Therapy): room air  Body mass index is 27.53 kg/m².  Physical Exam  Vitals and nursing note reviewed.   Constitutional:       General: She is not in acute distress.     Appearance: She is obese.   HENT:      Head: Normocephalic and atraumatic.      Mouth/Throat:      Mouth: Mucous membranes are moist.   Cardiovascular:      Rate and Rhythm: Normal rate and regular rhythm.   Pulmonary:      Effort: Pulmonary effort is normal. No respiratory distress.      Breath sounds: Normal breath sounds.   Abdominal:      General: Bowel sounds are normal.      Palpations: Abdomen is soft.      Tenderness: There is no abdominal tenderness.   Musculoskeletal:      Cervical back: Neck supple.   Skin:     General: Skin is warm and dry.      Findings: No rash.   Neurological:      Mental Status: She is alert.   Psychiatric:         Mood and Affect: Mood normal.         Results Review     I reviewed the patient's new clinical results.  Results from last 7 days   Lab Units 21  0615 04/15/21  1900 04/15/21  1745 04/15/21  1647 04/15/21  0709 21  0844 21  2331   WBC 10*3/mm3  --  11.16*  --   --  8.05 8.57 9.94   HEMOGLOBIN g/dL  8.5* 9.4*  --   --  8.9* 9.3* 10.5*   HEMOGLOBIN, POC g/dL  --   --  8.2* 7.8*  --   --   --    PLATELETS 10*3/mm3  --  386  --   --  403 427 467*     Results from last 7 days   Lab Units 04/16/21  0615 04/14/21  0844 04/13/21  2331 04/10/21  1129   SODIUM mmol/L 139 137 138  --    POTASSIUM mmol/L 4.2 4.3 4.0  --    CHLORIDE mmol/L 106 103 104  --    CO2 mmol/L 23.9 24.4 23.5  --    BUN mg/dL 11 17 14  --    CREATININE mg/dL 0.42* 0.55* 0.68 0.60   GLUCOSE mg/dL 128* 168* 137*  --    Estimated Creatinine Clearance: 79.3 mL/min (A) (by C-G formula based on SCr of 0.42 mg/dL (L)).  Results from last 7 days   Lab Units 04/13/21  2331   ALBUMIN g/dL 3.70   BILIRUBIN mg/dL <0.2   ALK PHOS U/L 122*   AST (SGOT) U/L 22   ALT (SGPT) U/L 14     Results from last 7 days   Lab Units 04/16/21  0615 04/14/21  0844 04/13/21  2331   CALCIUM mg/dL 8.7 9.6 10.0   ALBUMIN g/dL  --   --  3.70       COVID19   Date Value Ref Range Status   04/14/2021 Not Detected Not Detected - Ref. Range Final     SARS-CoV-2 PCR   Date Value Ref Range Status   02/12/2021 Not Detected Not Detected Final     Comment:     Nucleic acid specific to SARS-CoV-2 (COVID-19) virus was not detected in  this sample by the Aptima (R) SARS-CoV-2 Assay.                SARS-CoV-2 (COVID-19) nucleic acid testing performed using [facility] Aptima (R) SARS-CoV-2 Assay or [employer] TaqPath (TM)  COVID-19 Combo Kit as indicated above under Emergency Use Authorization (EUA) from the FDA. Aptima (R) and TaqPath (TM) test performance  characteristics verified by [facility] in accordance with the FDAs Guidance Document (Policy for Diagnostic Tests for Coronavirus Disease-2019  during the Public Health Emergency) issued on March 16, 2020. The laboratory is regulated under CLIA as qualified to perform high-complexity testing  Unless otherwise noted, all testing was performed at [facility], CLIA No. [id number], [place] State Licensee No. [id number]     Glucose    Date/Time Value Ref Range Status   04/16/2021 1657 158 (H) 70 - 130 mg/dL Final   04/16/2021 1128 152 (H) 70 - 130 mg/dL Final   04/16/2021 0732 130 70 - 130 mg/dL Final   04/15/2021 2328 120 70 - 130 mg/dL Final   04/15/2021 1911 156 (H) 70 - 130 mg/dL Final   04/15/2021 1745 141 (H) 70 - 130 mg/dL Final   04/15/2021 1647 137 (H) 70 - 130 mg/dL Final       XR Spine Thoracolumbar junction 2 view  THORACOLUMBAR SPINE     HISTORY:Thoracolumbar spine surgery.     FINDINGS: Single AP and 2 lateral views are obtained in the region of  the thoracolumbar junction during thoracolumbar spine fusion with  placement of rods and pedicle screws and spacers performed by Dr. Montelongo. Fusion is listed as being from T10 to L2. The fluoroscopy time  is 17 seconds.     This report was finalized on 4/15/2021 6:26 PM by Dr. Uziel Self M.D.     FL C Arm During Surgery  This procedure was auto-finalized with no dictation required.    Scheduled Medications  baclofen, 10 mg, Oral, BID  [START ON 4/17/2021] cephalexin, 500 mg, Oral, Q6H  DULoxetine, 60 mg, Oral, BID  hydroxychloroquine, 200 mg, Oral, BID  insulin lispro, 0-9 Units, Subcutaneous, TID AC  lamoTRIgine, 150 mg, Oral, BID  levETIRAcetam, 1,500 mg, Oral, Q12H  levothyroxine, 137 mcg, Oral, Q AM  metoprolol tartrate, 50 mg, Oral, Q12H  ondansetron, 4 mg, Intravenous, Once  OXcarbazepine, 600 mg, Oral, Q12H  pantoprazole, 40 mg, Oral, QAM  polyethylene glycol, 17 g, Oral, Daily  pramipexole, 0.5 mg, Oral, Q12H  rosuvastatin, 40 mg, Oral, Daily  sennosides-docusate, 1 tablet, Oral, BID  sodium chloride, 10 mL, Intravenous, Q12H  sodium chloride, 3 mL, Intravenous, Q12H  vancomycin, 15 mg/kg, Intravenous, Q12H    Infusions  HYDROmorphone HCl-NaCl,   lactated ringers, 9 mL/hr, Last Rate: 9 mL/hr (04/15/21 1340)  Pharmacy to dose vancomycin,   phenylephrine, 0.5-3 mcg/kg/min, Last Rate: Stopped (04/15/21 1951)  sodium chloride, 100 mL/hr, Last Rate: 100 mL/hr (04/16/21  1157)    Diet  Diet Regular; Consistent Carbohydrate       Assessment/Plan     Active Hospital Problems    Diagnosis  POA   • **Cystitis [N30.90]  Yes   • Acute blood loss anemia [D62]  Unknown   • Lumbar stenosis with neurogenic claudication [M48.062]  Unknown   • Pain [R52]  Yes   • Spinal stenosis of lumbar region with neurogenic claudication [M48.062]  Yes   • Acute bilateral low back pain without sciatica [M54.5]  Yes   • S/P lumbar fusion [Z98.1]  Not Applicable   • Type 2 diabetes mellitus without complication (CMS/HCC) [E11.9]  Yes   • Seizures (CMS/HCC) [R56.9]  Yes   • Fibromyalgia [M79.7]  Yes   • Rheumatoid arthritis (CMS/HCC) [M06.9]  Yes      Resolved Hospital Problems   No resolved problems to display.     65-year-old obese female with diabetes, RA, seizures, spinal stenosis status post 2 prior back surgeries including 1 in February who presents with worsening back pain, now status post T10-L3 bilateral posterior lateral fusion with removal of prior instrumentation    · Bedrest ordered by spine surgery.  Vancomycin per spine as well  · Would like to start Lovenox whenever okay with them for DVT prophylaxis.  SCDs for now  · UTI-more likely asymptomatic bacteriuria but given her surgery will treat with full antibiotic course.  Transition to p.o. antibiotics today  · Discussed with pharmacy.  She does not really have a cephalosporin allergy, okay for Keflex  · Acute blood loss anemia with underlying chronic anemia -we will monitor and transfuse as needed  · Diabetes well controlled  · CCP working on skilled nursing placement.  Discharge anticipated Monday per orthopedics      Lang Junior MD  [facility]  04/16/21  17:10 EDT

## 2021-04-16 NOTE — ANESTHESIA POSTPROCEDURE EVALUATION
Patient: Wendi Ramos    Procedure Summary     Date: 04/15/21 Room / Location: [facility] OR  / [facility] MAIN OR    Anesthesia Start: 1347 Anesthesia Stop: 1854    Procedures:       T10-L2 fusion with instrumentation and revision of current fixation, laminectomy by Dr. Segundo (N/A Spine Lumbar)      LUMBAR 2 LUMBAR 3 LAMINECTOMY FACETECTOMY AND NEUROLYSIS (N/A Spine Lumbar) Diagnosis:       Lumbar stenosis with neurogenic claudication      (Lumbar stenosis with neurogenic claudication [M48.062])    Surgeons: Juancarlos Montelongo MD; Jef Randall MD Provider: Elia Devi MD    Anesthesia Type: general ASA Status: 3          Anesthesia Type: general    Vitals  Vitals Value Taken Time   /59 04/15/21 2000   Temp 37.2 °C (99 °F) 04/15/21 1949   Pulse 87 04/15/21 2011   Resp 16 04/15/21 2000   SpO2 99 % 04/15/21 2011   Vitals shown include unvalidated device data.        Post Anesthesia Care and Evaluation    Patient location during evaluation: bedside  Patient participation: complete - patient participated  Level of consciousness: awake  Pain management: adequate  Airway patency: patent  Anesthetic complications: No anesthetic complications  PONV Status: none  Cardiovascular status: acceptable  Respiratory status: acceptable  Hydration status: acceptable  Post Neuraxial Block status: Motor and sensory function returned to baseline

## 2021-04-16 NOTE — PROGRESS NOTES
"Pharmacokinetics by Pharmacy - Vancomycin Initial Consult    Consulting provider: Dr. Montelongo    Days of therapy: 1 of 2    Wendi Ramos is a 65 y.o. female being initiated on vancomycin for  post op prophylaxis . Patient is also receiving levofloxacin.    Objective:     [Ht: 175.3 cm (69\"); Wt: 84.6 kg (186 lb 8.2 oz)]     WBC   Date Value Ref Range Status   04/15/2021 11.16 (H) 3.40 - 10.80 10*3/mm3 Final   04/15/2021 8.05 3.40 - 10.80 10*3/mm3 Final   04/14/2021 8.57 3.40 - 10.80 10*3/mm3 Final      C-Reactive Protein   Date Value Ref Range Status   08/20/2020 0.17 0.00 - 0.50 mg/dL Final      Temp Readings from Last 1 Encounters:   04/15/21 98.6 °F (37 °C) (Oral)     Estimated Creatinine Clearance: 79.3 mL/min (A) (by C-G formula based on SCr of 0.55 mg/dL (L)).   Creatinine   Date Value Ref Range Status   04/14/2021 0.55 (L) 0.57 - 1.00 mg/dL Final   04/13/2021 0.68 0.57 - 1.00 mg/dL Final   04/10/2021 0.60 0.60 - 1.30 mg/dL Final     Comment:     Serial Number: [id number]:  [id number]   02/16/2021 0.68 0.57 - 1.00 mg/dL Final   12/10/2020 0.80 0.60 - 1.30 mg/dL Final     Comment:     Serial Number: [id number]:  [id number]   06/25/2018 0.60 0.60 - 1.30 mg/dL Final     Comment:     Serial Number: [id number]:  [id number]       Baseline culture results:  Microbiology Results (last 10 days)       Procedure Component Value - Date/Time    Wound Culture - Wound, Spine, Lumbar [[id number]] Collected: 04/15/21 1540    Lab Status: Preliminary result Specimen: Wound from Spine, Lumbar Updated: 04/15/21 8852     Gram Stain Rare (1+) WBCs per low power field      No organisms seen    Wound Culture - Wound, Spine, Lumbar [[id number]] Collected: 04/15/21 1442    Lab Status: Preliminary result Specimen: Wound from Spine, Lumbar Updated: 04/15/21 1830     Gram Stain Rare (1+) WBCs per low power field      No organisms seen    COVID PRE-OP / PRE-PROCEDURE SCREENING ORDER (NO ISOLATION) - Swab, Nasopharynx [[id number]]  (Normal) " Collected: 04/14/21 0130    Lab Status: Final result Specimen: Swab from Nasopharynx Updated: 04/14/21 0925    Narrative:      The following orders were created for panel order COVID PRE-OP / PRE-PROCEDURE SCREENING ORDER (NO ISOLATION) - Swab, Nasopharynx.  Procedure                               Abnormality         Status                     ---------                               -----------         ------                     COVID-19,APTIMA PANTHER,...[[id number]]  Normal              Final result                 Please view results for these tests on the individual orders.    COVID-19,APTIMA PANTHER,[facility] IN-HOUSE, NP/OP SWAB IN UTM/VTM/[facility]ALINE TRANSPORT MEDIA,24 HR TAT - Swab, Nasopharynx [[id number]]  (Normal) Collected: 04/14/21 0130    Lab Status: Final result Specimen: Swab from Nasopharynx Updated: 04/14/21 0925     COVID19 Not Detected    Narrative:      Fact sheet for providers: https://www.fda.gov/media/451120/download     Fact sheet for patients: https://www.fda.gov/media/807003/download    Test performed by RT PCR.          No results found for: RESPCX    Assessment  Surgical prophylaxis, 1000 mg every 12 hours.  No levels planned unless duration is extended.     Plan  1. Give vancomycin 1500mg IV x 1 followed by vancomycin 1000mg IV Q12H  2.  No levels planned unless renal function changes or duration extended  3. Pharmacy will monitor renal function and adjust dose accordingly.    Nathan Chicas, AnaliD, BCIDP, BCPS  04/15/21 23:58 EDT

## 2021-04-16 NOTE — PLAN OF CARE
Goal Outcome Evaluation:  Progress: improving    Problem: Adult Inpatient Plan of Care  Goal: Plan of Care Review  Outcome: Ongoing, Progressing  Flowsheets  Taken 4/16/2021 0509 by Sara Mackey RN  Progress: improving  Taken 4/15/2021 2030 by Naa Meraz RN  Plan of Care Reviewed With: patient  Goal: Patient-Specific Goal (Individualized)  Outcome: Ongoing, Progressing  Goal: Absence of Hospital-Acquired Illness or Injury  Outcome: Ongoing, Progressing  Intervention: Identify and Manage Fall Risk  Recent Flowsheet Documentation  Taken 4/16/2021 0400 by Sara Mackey RN  Safety Promotion/Fall Prevention:   activity supervised   safety round/check completed  Taken 4/16/2021 0200 by Sara Mackey RN  Safety Promotion/Fall Prevention:   activity supervised   safety round/check completed  Taken 4/16/2021 0000 by Sara Mackey RN  Safety Promotion/Fall Prevention:   activity supervised   safety round/check completed  Taken 4/15/2021 2200 by Sara Mackey RN  Safety Promotion/Fall Prevention: safety round/check completed  Taken 4/15/2021 2000 by Sara Mackey RN  Safety Promotion/Fall Prevention: safety round/check completed  Intervention: Prevent Skin Injury  Recent Flowsheet Documentation  Taken 4/16/2021 0400 by Sara Mackey RN  Body Position: supine, legs elevated  Taken 4/16/2021 0200 by Sara Mackey RN  Body Position: supine  Taken 4/16/2021 0000 by Sara Mackey RN  Body Position: supine  Taken 4/15/2021 2200 by Sara Mackey RN  Body Position: supine  Taken 4/15/2021 2000 by Sara Mackey RN  Body Position: supine  Intervention: Prevent and Manage VTE (venous thromboembolism) Risk  Recent Flowsheet Documentation  Taken 4/15/2021 2230 by Sara Mackey RN  VTE Prevention/Management:   bilateral   sequential compression devices on  Intervention: Prevent Infection  Recent Flowsheet Documentation  Taken 4/16/2021 0000 by Sara Mackey RN  Infection Prevention: rest/sleep promoted  Taken 4/15/2021  2200 by Sara Mackey RN  Infection Prevention: rest/sleep promoted  Taken 4/15/2021 2000 by Sara Mackey RN  Infection Prevention: rest/sleep promoted  Goal: Optimal Comfort and Wellbeing  Outcome: Ongoing, Progressing  Intervention: Provide Person-Centered Care  Recent Flowsheet Documentation  Taken 4/15/2021 2230 by Sara Mackey RN  Trust Relationship/Rapport:   care explained   choices provided   emotional support provided   empathic listening provided   questions answered   questions encouraged   reassurance provided   thoughts/feelings acknowledged  Goal: Readiness for Transition of Care  Outcome: Ongoing, Progressing     Problem: Fall Injury Risk  Goal: Absence of Fall and Fall-Related Injury  Outcome: Ongoing, Progressing  Intervention: Promote Injury-Free Environment  Recent Flowsheet Documentation  Taken 4/16/2021 0400 by Sara Mackey RN  Safety Promotion/Fall Prevention:   activity supervised   safety round/check completed  Taken 4/16/2021 0200 by Sara Mackey RN  Safety Promotion/Fall Prevention:   activity supervised   safety round/check completed  Taken 4/16/2021 0000 by Sara Mackey RN  Safety Promotion/Fall Prevention:   activity supervised   safety round/check completed  Taken 4/15/2021 2200 by Sara Mackey RN  Safety Promotion/Fall Prevention: safety round/check completed  Taken 4/15/2021 2000 by Sara Mackey RN  Safety Promotion/Fall Prevention: safety round/check completed     Problem: Skin Injury Risk Increased  Goal: Skin Health and Integrity  Outcome: Ongoing, Progressing  Intervention: Optimize Skin Protection  Recent Flowsheet Documentation  Taken 4/16/2021 0400 by Sara Mackey RN  Head of Bed (HOB): HOB flat  Taken 4/16/2021 0200 by Sara Mackey RN  Head of Bed (HOB): HOB flat  Taken 4/16/2021 0000 by Sara Mackey RN  Head of Bed (HOB): HOB flat  Taken 4/15/2021 2200 by Sara Mackey RN  Head of Bed (HOB): HOB flat  Taken 4/15/2021 2000 by Sara Mackey RN  Head of  Bed (HOB): HOB flat      No Repair - Repaired With Adjacent Surgical Defect Text (Leave Blank If You Do Not Want): After the excision the defect was repaired concurrently with another surgical defect which was in close approximation.

## 2021-04-16 NOTE — PLAN OF CARE
Problem: Adult Inpatient Plan of Care  Goal: Plan of Care Review  Recent Flowsheet Documentation  Taken 4/16/2021 1256 by Lexi Charles OTR  Plan of Care Reviewed With: patient  Outcome Summary: Pt presents s/p  revision of back surgery in Feb. 2021.  Pt with strict bedrest orders for 4-6 weeks and orders for gentle ROM.  OT reviews modification recommendations for feeding, grooming in flat position.  Pt is moving B UE freely and rec. 3 set of 10 AROM B UE daily.  No further skilled acute care OT needs at this time.   Discuss with RN and CCP.       Patient was placed in a face mask during this therapy encounter. Therapist used appropriate personal protective equipment including surgical mask, eye shield and gloves during the entire therapy session. Hand hygiene was completed before and after therapy session. Patient is not in enhanced droplet precautions.

## 2021-04-16 NOTE — PROGRESS NOTES
Postop day 1: T10-L5 fusion of L2-3 fracture and kyphosis after L2-5 laminectomy and fusion.  She had a little hypotension yesterday but is stable now awake alert and oriented.  Vital signs stable.  She moves the legs well and her preoperative right leg pain is gone.  Hemoglobin 8.5 WBC 11.2.  PT 14.9 INR 1.19, wound swab cultures x2 are both showing rare WBCs and no bacteria.  Cultures are pending.  Slightly elevated but not much out her drain which is currently set to overflow.  I am going to compress the drain and check the level later and may pull it.  Her fixation is rigid but I do not trust it to support her loss of anterior support due to the fracture.  I do not think longer posterior fixation would work and I do not recommend going anterior at this point.  I am recommending 4 to 6 weeks of bedrest and then will mobilize her upright and ambulate her at that point.  This will require good skin care and pulmonary toilet.  We will start some anticoagulants in a few days as well.  Meantime she has pumps on the legs.  I want to keep her on vancomycin a couple of days until we are sure the cultures are negative.  She can elevate the head of the bed to 40 degrees and can turn from side to side as she feels fit.  Hopefully we can pull the Jerome catheter tomorrow.  I had a long talk with the patient regarding plans and hopes for her recovery.  Anticipate transfer to nursing home on Monday.  I will take care of the above concerns over the weekend by phone, or in person if necessary, and both services will have their coverage physician rounding.

## 2021-04-16 NOTE — PROGRESS NOTES
"Adult Nutrition  Assessment/PES    Patient Name:  Wendi Ramos  YOB: 1955  MRN: [id number]  Admit Date:  4/13/2021    Assessment Date:  4/16/2021    Comments:  Nutrition F/U:   Postop day 1: T10-L5 fusion of L2-3 fracture and kyphosis after L2-5 laminectomy and fusion. Consistent Carb diet resumed post op. Adequate intake at this time. 50% PO intake at breakfast this AM and ate well for lunch per RN. +constipation. Meds: Miralax, Pericolace, Milk of Magesia (PRN).  +Bowel movement today (4/16).   RD to continue to follow for adequate PO intake at all meals post op.     Reason for Assessment     Row Name 04/16/21 1432          Reason for Assessment    Reason For Assessment  follow-up protocol         Nutrition/Diet History     Row Name 04/16/21 1432          Nutrition/Diet History    Typical Food/Fluid Intake  s/p T10-L2 fusion.  Diet resumed post-op, on a regular consistent carb diet. 50% po at breakfast today and per RN, patients  brought [employer] for lunch         Anthropometrics     Row Name 04/16/21 1434          Anthropometrics    Height  175.3 cm (69.02\")        Admit Weight    Admit Weight  84.6 kg (186 lb 8.2 oz)        Ideal Body Weight (IBW)    Ideal Body Weight (IBW) (kg)  66.47        Body Mass Index (BMI)    BMI Assessment  BMI 25-29.9: overweight         Labs/Tests/Procedures/Meds     Row Name 04/16/21 1434          Labs/Procedures/Meds    Lab Results Reviewed  reviewed, pertinent     Lab Results Comments  Gluc: 128-152, Cr        Diagnostic Tests/Procedures    Diagnostic Test/Procedure Reviewed  reviewed, pertinent     Diagnostic Test/Procedures Comments  s/p T10-L2 fusion        Medications    Pertinent Medications Reviewed  reviewed, pertinent     Pertinent Medications Comments  Insulin, Keppra, Synthroid, Zofran, Miralax, Crestor, Pericolace, IVF 100ml/hr         Physical Findings     Row Name 04/16/21 1436          Physical Findings    Overall Physical Appearance  " "overweight     Gastrointestinal  other (see comments) constipation, on bowel regimen     Skin  pressure injury;other (see comments);surgical incision spince incision, coccyx pressure injury, B: 16         Estimated/Assessed Needs     Row Name 04/16/21 1434          Calculation Measurements    Height  175.3 cm (69.02\")         Nutrition Prescription Ordered     Row Name 04/16/21 1439          Nutrition Prescription PO    Current PO Diet  Regular     Fluid Consistency  Thin     Common Modifiers  Consistent Carbohydrate         Evaluation of Received Nutrient/Fluid Intake     Row Name 04/16/21 1439          PO Evaluation    Number of Meals  2     % PO Intake                 Problem/Interventions:          Intervention Goal     Row Name 04/16/21 1441          Intervention Goal    General  Maintain nutrition;Reduce/improve symptoms     PO  Maintain intake;PO intake (%)     PO Intake %  100 %     Weight  No significant weight loss         Nutrition Intervention     Row Name 04/16/21 1441          Nutrition Intervention    RD/Tech Action  Follow Tx progress;Care plan reviewd;Encourage intake           Education/Evaluation     Row Name 04/16/21 1441          Education    Education  Will Instruct as appropriate        Monitor/Evaluation    Monitor  Per protocol;PO intake;Weight;Skin status;Symptoms;I&O     Education Follow-up  Reinforce PRN           Electronically signed by:  Ivory Rock RD  04/16/21 14:43 EDT  "

## 2021-04-17 PROBLEM — K21.9 GERD (GASTROESOPHAGEAL REFLUX DISEASE): Status: ACTIVE | Noted: 2021-01-01

## 2021-04-17 NOTE — PROGRESS NOTES
LOS: 3 days   Patient Care Team:  Flo Temple MD as PCP - General (Family Medicine)  Jomar Steve MD as Consulting Physician (Interventional Cardiology)  Donny Gan MD as Consulting Physician (Pain Medicine)    Chief Complaint: Postop lumbar laminectomy and fusion    Subjective     The patient has a little bit of pain in her right leg in the thigh this morning.  She had a little bit after surgery but it was quite a bit improved.  It is still better than it was preop.  It feels better with ice.    Interval History:     History taken from: patient chart    Objective      She has good movement in both lower extremities    Vital Signs  Temp:  [97.3 °F (36.3 °C)-99.2 °F (37.3 °C)] 97.3 °F (36.3 °C)  Heart Rate:  [70-88] 76  Resp:  [16-18] 16  BP: (109-133)/(51-56) 118/53       Results Review:     I reviewed the patient's new clinical results.  She is afebrile with a white count of 11.2.      Assessment/Plan       Cystitis    Type 2 diabetes mellitus without complication (CMS/HCC)    S/P lumbar fusion    Fibromyalgia    Rheumatoid arthritis (CMS/HCC)    Seizures (CMS/HCC)    Acute bilateral low back pain without sciatica    Spinal stenosis of lumbar region with neurogenic claudication    Pain    Lumbar stenosis with neurogenic claudication    Acute blood loss anemia      I told the patient we will continue with the current course.  Dr. Montelongo wants her to stay at bedrest for now.      Jef Randall MD  04/17/21  10:41 EDT

## 2021-04-17 NOTE — PLAN OF CARE
Goal Outcome Evaluation:     Progress: improving  Outcome Summary: Pt resting between care. 1 unit of PRBC given and the second unit is currently infusing. PRN pain medication given x1, VSS, will continue to monitor

## 2021-04-17 NOTE — PROGRESS NOTES
Orthopedic Progress Note      Patient: Wendi Ramos  Date of Admission: 4/13/2021  YOB: 1955  Medical Record Number: [id number]    POD # :  2 Days Post-Op Procedure(s) (LRB):  T10-L2 fusion with instrumentation and revision of current fixation, laminectomy by Dr. Segundo (N/A)  LUMBAR 2 LUMBAR 3 LAMINECTOMY FACETECTOMY AND NEUROLYSIS (N/A)    Systemic or Specific Complaints: right thigh pain    Pain Relief: some relief    Physical Exam:  65 y.o.  female  Vitals:  Temp:  [97.8 °F (36.6 °C)-99.6 °F (37.6 °C)] 98.3 °F (36.8 °C)  Heart Rate:  [70-94] 82  Resp:  [16-18] 16  BP: (109-133)/(51-56) 109/53  alert and oriented  Chest: Clear to auscultation  CV: Regular Rate and Rhythm  Abd: Soft, NT, with BS +  Ext: NV intact. ROM appropriate. Calf is soft and nontender. Negative Homans Sn  Skin: Dressingclean dry and intact w/out signs or  symptoms of infection.    Activity: Bedrest till Monday per Dr Montelongo  Weight Bearing: Bed rest    Data Review     No results displayed because visit has over 200 results.          No results found.    Medications:  baclofen, 10 mg, Oral, BID  cephalexin, 500 mg, Oral, Q6H  DULoxetine, 60 mg, Oral, BID  hydroxychloroquine, 200 mg, Oral, BID  insulin lispro, 0-9 Units, Subcutaneous, TID AC  lamoTRIgine, 150 mg, Oral, BID  levETIRAcetam, 1,500 mg, Oral, Q12H  levothyroxine, 137 mcg, Oral, Q AM  metoprolol tartrate, 50 mg, Oral, Q12H  ondansetron, 4 mg, Intravenous, Once  OXcarbazepine, 600 mg, Oral, Q12H  pantoprazole, 40 mg, Oral, QAM  polyethylene glycol, 17 g, Oral, Daily  pramipexole, 0.5 mg, Oral, Q12H  rosuvastatin, 40 mg, Oral, Daily  sennosides-docusate, 1 tablet, Oral, BID  sodium chloride, 10 mL, Intravenous, Q12H  sodium chloride, 3 mL, Intravenous, Q12H  vancomycin, 15 mg/kg, Intravenous, Q12H      •  acetaminophen **OR** acetaminophen **OR** acetaminophen  •  bisacodyl  •  dextrose  •  dextrose  •  glucagon (human recombinant)  •  HYDROmorphone  •  magnesium  hydroxide  •  melatonin  •  naloxone  •  ondansetron **OR** ondansetron  •  oxyCODONE-acetaminophen  •  Pharmacy to dose vancomycin  •  sodium chloride  •  sodium chloride  •  temazepam    Assessment:  Doing well POD  # 2 Days Post-Op Procedure(s) (LRB):  T10-L2 fusion with instrumentation and revision of current fixation, laminectomy by Dr. Segundo (N/A)  LUMBAR 2 LUMBAR 3 LAMINECTOMY FACETECTOMY AND NEUROLYSIS (N/A)  Problems Addressed this Visit        Genitourinary and Reproductive     * (Principal) Cystitis - Primary       Neuro    Lumbar stenosis with neurogenic claudication    Relevant Orders    Anaerobic Culture - Wound, Spine, Lumbar    Wound Culture - Wound, Spine, Lumbar (Completed)    Anaerobic Culture - Wound, Spine, Lumbar    Wound Culture - Wound, Spine, Lumbar (Completed)      Other Visit Diagnoses     Acute on chronic right-sided low back pain with right-sided sciatica, unspecified chronicity          Diagnoses       Codes Comments    Cystitis    -  Primary ICD-10-CM: N30.90  ICD-9-CM: 595.9     Acute on chronic right-sided low back pain with right-sided sciatica, unspecified chronicity     ICD-10-CM: M54.41  ICD-9-CM: 724.3     Lumbar stenosis with neurogenic claudication     ICD-10-CM: M48.062  ICD-9-CM: 724.03           Plan:  Bedrest till Monday  Continue Pain Control Measures  Continue incisional Care  Drain and other orders per Dr Montelongo  Rehab pending  Hb 7.3  - will transfuse 2 u PRBC    Discharge Plan:Rehab when medically ready    Alexis Yoder MD    Date: 4/17/2021  Time: 08:01 EDT

## 2021-04-17 NOTE — PROGRESS NOTES
DAILY PROGRESS NOTE  [facility]    Patient Identification:  Name: Wendi Ramos  Age: 65 y.o.  Sex: female  :  1955  MRN: [id number]         Primary Care Physician: Flo Temple MD    Subjective:  Interval History: No new complaints per patient.  Denies chest pain shortness of breath nausea or vomiting.  Is tolerating bed rest.  Pain control is adequate and improving    Objective: No family at bedside though aide was present.  Patient alert and oriented and appropriate and seems to be at baseline.    Scheduled Meds:baclofen, 10 mg, Oral, BID  cephalexin, 500 mg, Oral, Q6H  DULoxetine, 60 mg, Oral, BID  hydroxychloroquine, 200 mg, Oral, BID  insulin lispro, 0-9 Units, Subcutaneous, TID AC  lamoTRIgine, 150 mg, Oral, BID  levETIRAcetam, 1,500 mg, Oral, Q12H  levothyroxine, 137 mcg, Oral, Q AM  metoprolol tartrate, 50 mg, Oral, Q12H  ondansetron, 4 mg, Intravenous, Once  OXcarbazepine, 600 mg, Oral, Q12H  pantoprazole, 40 mg, Oral, QAM  polyethylene glycol, 17 g, Oral, Daily  pramipexole, 0.5 mg, Oral, Q12H  rosuvastatin, 40 mg, Oral, Daily  sennosides-docusate, 1 tablet, Oral, BID  sodium chloride, 10 mL, Intravenous, Q12H  sodium chloride, 3 mL, Intravenous, Q12H  vancomycin, 15 mg/kg, Intravenous, Q12H      Continuous Infusions:HYDROmorphone HCl-NaCl,   lactated ringers, 9 mL/hr, Last Rate: 9 mL/hr (04/15/21 1340)  Pharmacy to dose vancomycin,   phenylephrine, 0.5-3 mcg/kg/min, Last Rate: Stopped (04/15/21 1951)        Vital signs in last 24 hours:  Temp:  [97.3 °F (36.3 °C)-99.2 °F (37.3 °C)] 97.3 °F (36.3 °C)  Heart Rate:  [70-88] 76  Resp:  [16-18] 16  BP: (109-133)/(51-56) 118/53    Intake/Output:    Intake/Output Summary (Last 24 hours) at 2021 1056  Last data filed at 2021 0832  Gross per 24 hour   Intake 1705 ml   Output 2085 ml   Net -380 ml       Exam:  /53 (BP Location: Left arm, Patient Position: Lying)   Pulse 76   Temp 97.3 °F (36.3 °C) (Oral)   Resp 16   " Ht 175.3 cm (69.02\")   Wt 84.6 kg (186 lb 8.2 oz)   SpO2 97%   BMI 27.53 kg/m²     General Appearance:    Alert, cooperative, no distress, AAOx3                          Throat:   Oral mucosa pink and moist                           Neck:   No JVD                         Lungs:    Clear to auscultation bilaterally, respirations unlabored                          Heart:    Regular rate and rhythm, S1 and S2 normal                  Abdomen:     Soft, nontender, bowel sounds active                 Extremities: Moving all, no cyanosis or edema                        Pulses:   Pulses palpable in lower extremities                                Data Review:  Labs in chart were reviewed.    Assessment:  Active Hospital Problems    Diagnosis  POA   • **Acute blood loss anemia [D62]  Unknown   • GERD (gastroesophageal reflux disease) [K21.9]  Unknown   • Lumbar stenosis with neurogenic claudication [M48.062]  Unknown   • Spinal stenosis of lumbar region with neurogenic claudication [M48.062]  Yes   • Acute bilateral low back pain without sciatica [M54.5]  Yes   • Hypertension [I10]  Yes   • Hyperlipidemia [E78.5]  Yes   • Hypothyroidism due to Hashimoto's thyroiditis [E03.8, E06.3]  Yes   • S/P lumbar fusion [Z98.1]  Not Applicable   • Type 2 diabetes mellitus without complication (CMS/HCC) [E11.9]  Yes   • Seizures (CMS/HCC) [R56.9]  Yes   • Fibromyalgia [M79.7]  Yes   • Rheumatoid arthritis (CMS/HCC) [M06.9]  Yes      Resolved Hospital Problems   No resolved problems to display.       Plan:    T10-L3 bilateral posterior lateral fusion with surgical instrumentation and removal of instrumentation L2/L3 with marrow aspiration bone graft and extender with bilateral L2-L3 laminectomy facetectomy and neural lysis with microsurgi    -Orthopedic and OLENA surgically involved and following   -Lovenox allowed per surgery    -Bed rest per Ortho planned until Monday -patient tolerating   -Drain/antibiotic management per " surgery   -Bowel regimen in place   -SLIVF     Acute blood loss anemia       - -2 units packed red blood cells ordered       DM2 stable BS    HTN stable     HLD on statin    Past history of seizure on maintenance meds    GERD on PPI so no additional prophylaxis needed    Hypothyroidism on levothyroxine             Roman Juarez MD  4/17/2021

## 2021-04-17 NOTE — PLAN OF CARE
Goal Outcome Evaluation:  Plan of Care Reviewed With: patient  Progress: improving     Pt A+O x4, VSS. Bedrest maintained. Jerome catheter in and care completed. Pt using bedpan. PCA at bedside, but not used much. Hemovac left uncompressed and total of 25 mL of bloody output. Pt did mention some discomfort in right thigh, but has been able to move independently and sleep tonight. Pending plan of care. SCDs on some of the time-pt complains of not sleeping well with them. Education given about reasons for it. IS encouraged. I will continue to monitor and progress towards goals as tolerated.

## 2021-04-18 NOTE — PROGRESS NOTES
LOS: 4 days   Patient Care Team:  Flo Temple MD as PCP - General (Family Medicine)  Jomar Steve MD as Consulting Physician (Interventional Cardiology)  Donny Gan MD as Consulting Physician (Pain Medicine)    Chief Complaint: Postop lumbar fusion    Subjective     Patient continues with some pain in her right thigh.  It is better than preop however.    Interval History:     History taken from: patient chart    Objective      She has good movement in both lower extremities.    Vital Signs  Temp:  [96.9 °F (36.1 °C)-98.8 °F (37.1 °C)] 97.4 °F (36.3 °C)  Heart Rate:  [72-90] 79  Resp:  [15-18] 16  BP: (111-151)/(52-92) 130/57       Results Review:     I reviewed the patient's new clinical results.  She is afebrile.  She did not have any lab work today.      Assessment/Plan       Acute blood loss anemia    Type 2 diabetes mellitus without complication (CMS/HCC)    S/P lumbar fusion    Fibromyalgia    Rheumatoid arthritis (CMS/HCC)    Seizures (CMS/HCC)    Hypothyroidism due to Hashimoto's thyroiditis    Hypertension    Hyperlipidemia    Acute bilateral low back pain without sciatica    Spinal stenosis of lumbar region with neurogenic claudication    Lumbar stenosis with neurogenic claudication    GERD (gastroesophageal reflux disease)      I told the patient that we will mobilize her as indicated by Dr. Rodriguez.      Jef Randall MD  04/18/21  09:55 EDT

## 2021-04-18 NOTE — PLAN OF CARE
Goal Outcome Evaluation:     Progress: improving  Outcome Summary: Pt A&O. Hemoglobin 9.0. Hemovac drain removed today; dressing intact. Pt turns self. Percocet x1, dilaudid PCA cont. will cont to monitor

## 2021-04-18 NOTE — PROGRESS NOTES
"    DAILY PROGRESS NOTE  [facility]    Patient Identification:  Name: Wendi Ramos  Age: 65 y.o.  Sex: female  :  1955  MRN: [id number]         Primary Care Physician: Flo Temple MD    Subjective:  Interval History: Feeling much better overall.  Tolerated transfusion without issue.  Denies any fevers or chills.  Has more energy today.  P.o. intake is good.  Denies any abdominal pain chest pain or troubles breathing    Objective: Clinically stable.  Conversational and pleasant.  Nontoxic.  No family at bedside    Scheduled Meds:baclofen, 10 mg, Oral, BID  cephalexin, 500 mg, Oral, Q6H  DULoxetine, 60 mg, Oral, BID  hydroxychloroquine, 200 mg, Oral, BID  insulin lispro, 0-9 Units, Subcutaneous, TID AC  lamoTRIgine, 150 mg, Oral, BID  levETIRAcetam, 1,500 mg, Oral, Q12H  levothyroxine, 137 mcg, Oral, Q AM  metoprolol tartrate, 50 mg, Oral, Q12H  ondansetron, 4 mg, Intravenous, Once  OXcarbazepine, 600 mg, Oral, Q12H  pantoprazole, 40 mg, Oral, QAM  polyethylene glycol, 17 g, Oral, Daily  pramipexole, 0.5 mg, Oral, Q12H  rosuvastatin, 40 mg, Oral, Daily  sennosides-docusate, 1 tablet, Oral, BID  sodium chloride, 10 mL, Intravenous, Q12H  sodium chloride, 3 mL, Intravenous, Q12H      Continuous Infusions:HYDROmorphone HCl-NaCl,   lactated ringers, 9 mL/hr, Last Rate: 9 mL/hr (04/15/21 1340)  phenylephrine, 0.5-3 mcg/kg/min, Last Rate: Stopped (04/15/21 1951)        Vital signs in last 24 hours:  Temp:  [96.9 °F (36.1 °C)-98.8 °F (37.1 °C)] 97.4 °F (36.3 °C)  Heart Rate:  [72-90] 79  Resp:  [15-18] 16  BP: (111-151)/(52-92) 130/57    Intake/Output:    Intake/Output Summary (Last 24 hours) at 2021 0917  Last data filed at 2021 0823  Gross per 24 hour   Intake 1622.5 ml   Output 915 ml   Net 707.5 ml       Exam:  /57   Pulse 79   Temp 97.4 °F (36.3 °C) (Oral)   Resp 16   Ht 175.3 cm (69.02\")   Wt 84.6 kg (186 lb 8.2 oz)   SpO2 98%   BMI 27.53 kg/m²     General Appearance: "    Alert, cooperative, no distress, AAOx3                        Throat:   Oral mucosa pink and moist                           Neck:   No JVD                         Lungs:    Clear to auscultation bilaterally, respirations unlabored                          Heart:    Regular rate and rhythm, S1 and S2 normal                  Abdomen:     Soft, nontender, bowel sounds active                 Extremities:   No cyanosis or edema                        Pulses:   Pulses palpable in lower extremities                               Data Review:  Labs in chart were reviewed.    Assessment:  Active Hospital Problems    Diagnosis  POA   • **Acute blood loss anemia [D62]  Unknown   • GERD (gastroesophageal reflux disease) [K21.9]  Unknown   • Lumbar stenosis with neurogenic claudication [M48.062]  Unknown   • Spinal stenosis of lumbar region with neurogenic claudication [M48.062]  Yes   • Acute bilateral low back pain without sciatica [M54.5]  Yes   • Hypertension [I10]  Yes   • Hyperlipidemia [E78.5]  Yes   • Hypothyroidism due to Hashimoto's thyroiditis [E03.8, E06.3]  Yes   • S/P lumbar fusion [Z98.1]  Not Applicable   • Type 2 diabetes mellitus without complication (CMS/HCC) [E11.9]  Yes   • Seizures (CMS/HCC) [R56.9]  Yes   • Fibromyalgia [M79.7]  Yes   • Rheumatoid arthritis (CMS/HCC) [M06.9]  Yes      Resolved Hospital Problems   No resolved problems to display.       Plan:    T10-L3 bilateral posterior lateral fusion with surgical instrumentation and removal of instrumentation L2/L3 with marrow aspiration bone graft and extender with bilateral L2-L3 laminectomy facetectomy and neural lysis with microsurgi                          -Orthopedic and OLENA surgically involved and following              -Lovenox allowed per surgery                          -Bed rest per Ortho planned until Monday -patient tolerating              -Drain/antibiotic/plaza management per surgery              -Bowel regimen in place              Acute blood loss   -Status post 2 units packed red blood cells pending CBC                             DM2 stable BS     HTN stable           HLD on statin     Past history of seizure on maintenance meds     GERD on PPI so no additional prophylaxis needed     Hypothyroidism on levothyroxine           Disposition -awaiting surgical okay to advanced activity levels as well as PT when appropriate and then will have to look into subacute rehab            Addendum -pending CBC demonstrates hemoglobin of 9.0            Roman Juarez MD  4/18/2021  09:17 EDT

## 2021-04-18 NOTE — PLAN OF CARE
Goal Outcome Evaluation:  Plan of Care Reviewed With: patient  Progress: improving     Pt A+O x4. VSS. She had her second dose of blood running start of nightshift- no adverse reactions noted. PRN pain meds given and PCA Dilaudid syringe replaced this shift. She still has the right thigh discomfort. Ice given. Bedrest maintained. Jerome Catheter care completed. Lumber dressing CDI. Hemovac in place still with minimal output. She has slept well. I will continue to monitor and progress towards goals as tolerated.

## 2021-04-19 NOTE — PROGRESS NOTES
"    DAILY PROGRESS NOTE  [facility]    Patient Identification:  Name: Wendi Ramos  Age: 65 y.o.  Sex: female  :  1955  MRN: [id number]         Primary Care Physician: Flo Temple MD    Subjective:  Interval History: No new complaints per patient.  No chest pain troubles breathing nausea and/or vomiting    Objective: Resting comfortably in bed.  Nontoxic conversational and pleasant.  No family at bedside    Scheduled Meds:baclofen, 10 mg, Oral, BID  DULoxetine, 60 mg, Oral, BID  hydroxychloroquine, 200 mg, Oral, BID  insulin lispro, 0-9 Units, Subcutaneous, TID AC  lamoTRIgine, 150 mg, Oral, BID  levETIRAcetam, 1,500 mg, Oral, Q12H  levothyroxine, 137 mcg, Oral, Q AM  metoprolol tartrate, 50 mg, Oral, Q12H  ondansetron, 4 mg, Intravenous, Once  OXcarbazepine, 600 mg, Oral, Q12H  pantoprazole, 40 mg, Oral, QAM  polyethylene glycol, 17 g, Oral, Daily  pramipexole, 0.5 mg, Oral, Q12H  rosuvastatin, 40 mg, Oral, Daily  sennosides-docusate, 1 tablet, Oral, BID  sodium chloride, 10 mL, Intravenous, Q12H  sodium chloride, 3 mL, Intravenous, Q12H      Continuous Infusions:HYDROmorphone HCl-NaCl,   lactated ringers, 9 mL/hr, Last Rate: 9 mL/hr (04/15/21 1340)  phenylephrine, 0.5-3 mcg/kg/min, Last Rate: Stopped (04/15/21 1951)        Vital signs in last 24 hours:  Temp:  [97.6 °F (36.4 °C)-99.4 °F (37.4 °C)] 97.7 °F (36.5 °C)  Heart Rate:  [72-84] 76  Resp:  [16-20] 20  BP: (121-152)/(61-80) 129/68    Intake/Output:    Intake/Output Summary (Last 24 hours) at 2021 1025  Last data filed at 2021 0542  Gross per 24 hour   Intake --   Output 2150 ml   Net -2150 ml       Exam:  /68 (BP Location: Right arm, Patient Position: Lying)   Pulse 76   Temp 97.7 °F (36.5 °C) (Oral)   Resp 20   Ht 175.3 cm (69.02\")   Wt 84.6 kg (186 lb 8.2 oz)   SpO2 95%   BMI 27.53 kg/m²     General Appearance:    Alert, cooperative, AAOx3                         Throat:   Oral mucosa pink and moist      "                      Neck:   No JVD                         Lungs:    Clear to auscultation bilaterally, respirations unlabored                          Heart:    Regular rate and rhythm, S1 and S2 normal                  Abdomen:     Soft, nontender, bowel sounds active                 Extremities:   No cyanosis or edema                        Pulses:   Pulses palpable in lower extremities                               Data Review:  Labs in chart were reviewed.    Assessment:  Active Hospital Problems    Diagnosis  POA   • **Acute blood loss anemia [D62]  Unknown   • GERD (gastroesophageal reflux disease) [K21.9]  Unknown   • Lumbar stenosis with neurogenic claudication [M48.062]  Unknown   • Spinal stenosis of lumbar region with neurogenic claudication [M48.062]  Yes   • Acute bilateral low back pain without sciatica [M54.5]  Yes   • Hypertension [I10]  Yes   • Hyperlipidemia [E78.5]  Yes   • Hypothyroidism due to Hashimoto's thyroiditis [E03.8, E06.3]  Yes   • S/P lumbar fusion [Z98.1]  Not Applicable   • Type 2 diabetes mellitus without complication (CMS/HCC) [E11.9]  Yes   • Seizures (CMS/HCC) [R56.9]  Yes   • Fibromyalgia [M79.7]  Yes   • Rheumatoid arthritis (CMS/HCC) [M06.9]  Yes      Resolved Hospital Problems   No resolved problems to display.       Plan:       T10-L3 bilateral posterior lateral fusion with surgical instrumentation and removal of instrumentation L2/L3 with marrow aspiration bone graft and extender with bilateral L2-L3 laminectomy facetectomy and neural lysis with microsurgi                          -Orthopedic and OLENA surgically involved and following              -Lovenox allowed per surgery                          -Bed rest per Ortho and hopefully its removed today as will hinder dispo needs/acceptance              -Drain/antibiotic/plaza management per surgery              -Bowel regimen in place             Acute blood loss              -Status post 2 units packed red blood cells  pending CBC     -Hgb 10.2                           DM2 stable BS     HTN stable           HLD on statin     Past history of seizure on maintenance meds     GERD on PPI so no additional prophylaxis needed     Hypothyroidism on levothyroxine             Disposition -awaiting surgical okay to advanced activity levels as well as PT when appropriate and then will have to look into subacute rehab -I discussed with CCP                      Roman Juarez MD  4/19/2021  10:25 EDT

## 2021-04-19 NOTE — PROGRESS NOTES
"Continued Stay Note  [facility]     Patient Name: Wendi Ramos  MRN: [id number]  Today's Date: 4/19/2021    Admit Date: 4/13/2021    Discharge Plan     Row Name 04/19/21 1612       Plan    Plan  Home with family assist, home health referrals pending    Provided Post Acute Provider List?  Yes    Post Acute Provider List  Home Health    Delivered To  Patient    Method of Delivery  In person    Patient/Family in Agreement with Plan  yes    Plan Comments  CCP followed up with pt who confirms plan remains to return home where she resides with her . Pt states her  will be able to assist her as needed (with meals, bed pan management, etc), and when he is unavailable her adult son (Tai oLu) who lives 7 minutes away has volunteered to \"step up to the plate.\" Pt ineligible for sub-acute rehab as she will be on bed rest for several weeks. Pt states she has a bed which can be adjusted for upper body incline, and declines hospital bed. Pt agreeable to home health referrals to area agencies to evaluate insurance eligibility (pending in EPIC). Pt will require EMS/stretcher transport at d/c. Pt denies additional needs. CCP to follow for  em. Key Ortiz LCSW        Discharge Codes    No documentation.       Expected Discharge Date and Time     Expected Discharge Date Expected Discharge Time    Apr 20, 2021             Dolores Ortiz LCSW    "

## 2021-04-19 NOTE — PLAN OF CARE
Goal Outcome Evaluation:  Plan of Care Reviewed With: patient  Progress: improving  Outcome Summary: The pt remains stable. A/O. No s/s distress. Continues PCA Dilaudid. Pain controlled w/ PRN Percocet. Maintains on strict bed rest. FBC connected to drain bag, ana colored urine noted, adequate amount noted. Kept comfortable in bed. Will continue to monitor.

## 2021-04-19 NOTE — PROGRESS NOTES
"[facility] NEUROSURGERY PROGRESS NOTE      CC: POD 4 L2/3 laminectomy facetectomy and neurolysis with T10-L2 fusion by Dr. Montelongo      Subjective     Interval History: Reports ongoing sensitivity in the right thigh that she describes as a mild pain.  With use of the leg, it does get aggravated at times.  Otherwise she states \"I am doing pretty good\".  No nausea vomiting.  Passing flatus.  No BM yet    ROS:  Constitutional: No fever, chills  GI: No nausea, vomiting  MS:  back pain  Neuro: Right anterior thigh discomfort  : Has Jerome    Objective     Vital signs in last 24 hours:  Temp:  [97.6 °F (36.4 °C)-99.4 °F (37.4 °C)] 97.7 °F (36.5 °C)  Heart Rate:  [75-84] 76  Resp:  [16-20] 20  BP: (121-152)/(61-80) 129/68    Intake/Output this shift:  No intake/output data recorded.    LABS:  Results from last 7 days   Lab Units 04/19/21  0558 04/18/21  0932 04/17/21  0627   WBC 10*3/mm3 7.22 8.66 11.20*   HEMOGLOBIN g/dL 10.2* 9.0* 7.3*   HEMATOCRIT % 32.4* 28.2* 23.0*   PLATELETS 10*3/mm3 287 234 235     IMAGING STUDIES:  No new imaging    I personally viewed and interpreted the patient's chart.    Meds reviewed/changed: Yes    Current Facility-Administered Medications:   •  acetaminophen (TYLENOL) tablet 650 mg, 650 mg, Oral, Q4H PRN **OR** acetaminophen (TYLENOL) 160 MG/5ML solution 650 mg, 650 mg, Oral, Q4H PRN **OR** acetaminophen (TYLENOL) suppository 650 mg, 650 mg, Rectal, Q4H PRN, Juancarlos Montelongo MD  •  baclofen (LIORESAL) tablet 10 mg, 10 mg, Oral, BID, Juancarlos Montelongo MD, 10 mg at 04/19/21 0846  •  bisacodyl (DULCOLAX) suppository 10 mg, 10 mg, Rectal, Daily PRN, Juancarlos Montelongo MD  •  dextrose (D50W) 25 g/ 50mL Intravenous Solution 25 g, 25 g, Intravenous, Q15 Min PRN, Juancarlos Montelongo MD  •  dextrose (GLUTOSE) oral gel 15 g, 15 g, Oral, Q15 Min PRN, Juancarlos Montelongo MD  •  DULoxetine (CYMBALTA) DR capsule 60 mg, 60 mg, Oral, BID, Juancarlos Montelongo MD, 60 mg at 04/19/21 0846  •  glucagon (human " recombinant) (GLUCAGEN DIAGNOSTIC) injection 1 mg, 1 mg, Subcutaneous, Q15 Min PRN, Juancarlos Montelongo MD  •  HYDROmorphone (DILAUDID) injection 0.5 mg, 0.5 mg, Intravenous, Q4H PRN, Juancarlos Montelongo MD, 0.5 mg at 04/15/21 1038  •  HYDROmorphone (DILAUDID) PCA 0.2 mg/ml 50 mL syringe, , Intravenous, Continuous, Juancarlos Montelongo MD, New Syringe/Cartridge at 04/19/21 1055  •  hydroxychloroquine (PLAQUENIL) tablet 200 mg, 200 mg, Oral, BID, Lay Magallon, APRN, 200 mg at 04/19/21 0841  •  insulin lispro (ADMELOG) injection 0-9 Units, 0-9 Units, Subcutaneous, TID AC, Juancarlos Montelongo MD, 2 Units at 04/18/21 1723  •  lactated ringers infusion, 9 mL/hr, Intravenous, Continuous, Juancarlos Montelongo MD, Last Rate: 9 mL/hr at 04/15/21 1340, Restarted at 04/15/21 1347  •  lamoTRIgine (LaMICtal) tablet 150 mg, 150 mg, Oral, BID, Juancarlos Montelongo MD, 150 mg at 04/19/21 0841  •  levETIRAcetam (KEPPRA) tablet 1,500 mg, 1,500 mg, Oral, Q12H, Juancarlos Montelongo MD, 1,500 mg at 04/19/21 0841  •  levothyroxine (SYNTHROID, LEVOTHROID) tablet 137 mcg, 137 mcg, Oral, Q AM, Juancarlos Montelongo MD, 137 mcg at 04/19/21 0537  •  magnesium hydroxide (MILK OF MAGNESIA) suspension 2400 mg/10mL 10 mL, 10 mL, Oral, Daily PRN, Juancarlos Montelongo MD  •  melatonin tablet 3 mg, 3 mg, Oral, Nightly PRN, Juancarlos Montelongo MD, 3 mg at 04/14/21 2247  •  metoprolol tartrate (LOPRESSOR) tablet 50 mg, 50 mg, Oral, Q12H, Juancarlos Montelongo MD, 50 mg at 04/19/21 0840  •  naloxone (NARCAN) injection 0.1 mg, 0.1 mg, Intravenous, Q5 Min PRN, Juancarlos Montelongo MD  •  ondansetron (ZOFRAN) injection 4 mg, 4 mg, Intravenous, Once, Juancarlos Montelongo MD  •  ondansetron (ZOFRAN) tablet 4 mg, 4 mg, Oral, Q6H PRN **OR** ondansetron (ZOFRAN) injection 4 mg, 4 mg, Intravenous, Q6H PRN, Juancarlos Montelongo MD  •  OXcarbazepine (TRILEPTAL) tablet 600 mg, 600 mg, Oral, Q12H, Juancarlos Montelongo MD, 600 mg at 04/19/21 0841  •  oxyCODONE-acetaminophen (PERCOCET) 5-325 MG per  tablet 2 tablet, 2 tablet, Oral, Q4H PRN, Juancarlos Montelongo MD, 2 tablet at 04/19/21 0852  •  pantoprazole (PROTONIX) EC tablet 40 mg, 40 mg, Oral, QAM, Juancarlos Montelongo MD, 40 mg at 04/19/21 0537  •  polyethylene glycol (MIRALAX) packet 17 g, 17 g, Oral, Daily, Juancarlos Montelongo MD, 17 g at 04/16/21 0859  •  pramipexole (MIRAPEX) tablet 0.5 mg, 0.5 mg, Oral, Q12H, Juancarlos Montelongo MD, 0.5 mg at 04/19/21 0841  •  rosuvastatin (CRESTOR) tablet 40 mg, 40 mg, Oral, Daily, Juancarlos Montelongo MD, 40 mg at 04/19/21 0841  •  sennosides-docusate (PERICOLACE) 8.6-50 MG per tablet 1 tablet, 1 tablet, Oral, BID, Juancarlos Montelongo MD, 1 tablet at 04/19/21 0840  •  sodium chloride 0.9 % flush 10 mL, 10 mL, Intravenous, Q12H, Juancarlos Montelongo MD, 10 mL at 04/19/21 0841  •  sodium chloride 0.9 % flush 10 mL, 10 mL, Intravenous, PRN, Juancarlos Montelongo MD  •  sodium chloride 0.9 % flush 10 mL, 10 mL, Intravenous, PRN, Juancarlos Montelongo MD  •  sodium chloride 0.9 % flush 3 mL, 3 mL, Intravenous, Q12H, Juancarlos Montelongo MD, 3 mL at 04/18/21 2012  •  temazepam (RESTORIL) capsule 15 mg, 15 mg, Oral, Nightly PRN, Juancarlos Montelongo MD      Physical Exam:    General:   Awake, alert, pleasant and cooperative  Back:    Incision with large bulky dressing.  Removed.  Quite a bit of pink-tinged serous drainage on dressing.  Dressing removed and replaced with dry ABD pads     Motor: Normal muscle strength, bulk and tone in lower extremities.  No fasciculations, rigidity, spasticity, or abnormal movements.  Sensation: Mild hypersensitivity right anterior thigh  Station and Gait:             On bedrest  Extremities:   Wearing SCD.  No calf tenderness or swelling      Assessment/Plan     ASSESSMENT:      Acute blood loss anemia    Type 2 diabetes mellitus without complication (CMS/HCC)    S/P lumbar fusion    Fibromyalgia    Rheumatoid arthritis (CMS/HCC)    Seizures (CMS/HCC)    Hypothyroidism due to Hashimoto's thyroiditis    Hypertension     "Hyperlipidemia    Acute bilateral low back pain without sciatica    Spinal stenosis of lumbar region with neurogenic claudication    Lumbar stenosis with neurogenic claudication    GERD (gastroesophageal reflux disease)    Patient doing fairly well following multilevel lumbar fusion with level lumbar decompression 4 days ago.  She still has some discomfort in the right anterior thigh.  She describes it no better no worse than preop.  She is using the PCA very minimally.  She is unfortunately on strict bedrest but is tolerating this better than most.  Her incision is well approximated but the dressing is quite saturated likely from removal of the SOLO drain yesterday.  Discussed with Dr. Montelongo.  Removed and replaced the dressing with a dry ABD.  Will follow.  Hold off on any AC for DVT prophylaxis or treatment today.  Will DC PCA and Jerome today.  Anticipating bed rest 4-6 weeks.  Likely need placement in rehab facility following discharge.  Will consult CCP.    PLAN:   Change lumbar dressing  DC PCA  DC Jerome  Consult CCP  Continue strict bedrest per Dr. Montelongo    I discussed the patient's findings and my recommendations with patient, nursing staff and Dr. Randall and Dr. Montelongo       LOS: 5 days       Iwona Neal, APRN  4/19/2021  11:46 EDT    \"Dictated utilizing Dragon dictation\".      "

## 2021-04-19 NOTE — TELEPHONE ENCOUNTER
Not seen by you , BTI 2020 needs 90 refill fenofibrate 160 #90 to [facility] fill or not? Says your her primary now

## 2021-04-19 NOTE — PROGRESS NOTES
Postop day 5: AVSS awake alert oriented.  Seems in pretty good spirits.  She remains at bedrest status post T10-L5 revision fusion and instrumentation.  Minimal leg pain, expected back pain but much better than preop in both cases.  Moves the legs well.  Her wound showed a scant amount of serous drainage but was reportedly looking good otherwise.  If it stays dry we will switch her to a Mepilex dressing tomorrow and can then begin anticoagulation, probably with Eliquis.  Hemoglobin 10.2.  She lacks adequate anterior support to mobilize her upright so she is going to stay at bedrest for at least 4 if not 6 weeks.  I do want to obtain a Doppler exam tomorrow morning and make sure that is negative before discharge otherwise she may need to be placed on Lovenox as well as Eliquis.

## 2021-04-20 NOTE — PROGRESS NOTES
Continued Stay Note  [facility]     Patient Name: Wendi Ramos  MRN: [id number]  Today's Date: 4/20/2021    Admit Date: 4/13/2021    Discharge Plan     Row Name 04/20/21 1629       Plan    Plan  Home with family/HH vs SNF, referrals pending    Patient/Family in Agreement with Plan  yes    Plan Comments  CCP followed up with pt who now requests SNF referrals (pending per request to [facility] [place], [facility]). Due to planned weeks of bedrest, CCP discussed that should pt not have a skilled need for Medicare to pre-cert rehab, her stay at a SNF would be private pay. Pt states she is able to pay privately for about 4 weeks. CCP to follow for facility evals. Key Ortiz LCSW        Discharge Codes    No documentation.       Expected Discharge Date and Time     Expected Discharge Date Expected Discharge Time    Apr 21, 2021             Dolores Ortiz LCSW

## 2021-04-20 NOTE — PLAN OF CARE
Patient A&Ox4, RA, VSS. Surgical dressing CDI. Right anterior thigh pain management w/ prn medication. Voiding spontaneously w/o problem. Purewick in use. Strict bedrest as ordered. Will continue to monitor.   Problem: Adult Inpatient Plan of Care  Goal: Plan of Care Review  Outcome: Ongoing, Progressing  Flowsheets (Taken 4/20/2021 0423)  Plan of Care Reviewed With: patient  Goal: Patient-Specific Goal (Individualized)  Outcome: Ongoing, Progressing  Goal: Absence of Hospital-Acquired Illness or Injury  Outcome: Ongoing, Progressing  Intervention: Identify and Manage Fall Risk  Flowsheets  Taken 4/20/2021 0420  Safety Promotion/Fall Prevention:  • assistive device/personal items within reach  • clutter free environment maintained  Taken 4/20/2021 0211  Safety Promotion/Fall Prevention:  • activity supervised  • assistive device/personal items within reach  • clutter free environment maintained  Taken 4/20/2021 0044  Safety Promotion/Fall Prevention:  • assistive device/personal items within reach  • clutter free environment maintained  Taken 4/19/2021 2201  Safety Promotion/Fall Prevention:  • assistive device/personal items within reach  • clutter free environment maintained  Taken 4/19/2021 2009  Safety Promotion/Fall Prevention:  • activity supervised  • assistive device/personal items within reach  • clutter free environment maintained  Intervention: Prevent Skin Injury  Flowsheets  Taken 4/20/2021 0420  Body Position: position changed independently  Taken 4/20/2021 0211  Body Position: position changed independently  Taken 4/20/2021 0044  Body Position: position changed independently  Taken 4/19/2021 2321  Body Position: position changed independently  Taken 4/19/2021 2009  Body Position: position changed independently  Skin Protection: adhesive use limited  Intervention: Prevent and Manage VTE (venous thromboembolism) Risk  Flowsheets (Taken 4/19/2021 2009)  VTE Prevention/Management:  • bilateral  •  dorsiflexion/plantar flexion performed  • sequential compression devices off  • patient refused intervention  Intervention: Prevent Infection  Flowsheets  Taken 4/20/2021 0420  Infection Prevention: environmental surveillance performed  Taken 4/20/2021 0211  Infection Prevention: environmental surveillance performed  Taken 4/20/2021 0044  Infection Prevention: environmental surveillance performed  Taken 4/19/2021 2201  Infection Prevention: environmental surveillance performed  Taken 4/19/2021 2009  Infection Prevention: environmental surveillance performed  Goal: Optimal Comfort and Wellbeing  Outcome: Ongoing, Progressing  Intervention: Provide Person-Centered Care  Flowsheets (Taken 4/19/2021 2009)  Trust Relationship/Rapport: care explained  Goal: Readiness for Transition of Care  Outcome: Ongoing, Progressing

## 2021-04-20 NOTE — PROGRESS NOTES
"    DAILY PROGRESS NOTE  [facility]    Patient Identification:  Name: Wendi Ramos  Age: 65 y.o.  Sex: female  :  1955  MRN: [id number]         Primary Care Physician: Flo Temple MD    Subjective:  Interval History: Patient was in tears today stating that she is having some marital issues.  She otherwise is not voicing any new medical issues today no chest pain troubles breathing nausea and/or vomiting.    Objective: Resting comfortably in bed.  Tearful upon conversation.  Nontoxic.  No family at bedside.  Case discussed with CCP    Scheduled Meds:baclofen, 10 mg, Oral, BID  DULoxetine, 60 mg, Oral, BID  hydroxychloroquine, 200 mg, Oral, BID  insulin lispro, 0-9 Units, Subcutaneous, TID AC  lamoTRIgine, 150 mg, Oral, BID  levETIRAcetam, 1,500 mg, Oral, Q12H  levothyroxine, 137 mcg, Oral, Q AM  metoprolol tartrate, 50 mg, Oral, Q12H  ondansetron, 4 mg, Intravenous, Once  OXcarbazepine, 600 mg, Oral, Q12H  pantoprazole, 40 mg, Oral, QAM  polyethylene glycol, 17 g, Oral, Daily  pramipexole, 0.5 mg, Oral, Q12H  rosuvastatin, 40 mg, Oral, Daily  sennosides-docusate, 1 tablet, Oral, BID  sodium chloride, 10 mL, Intravenous, Q12H  sodium chloride, 3 mL, Intravenous, Q12H      Continuous Infusions:     Vital signs in last 24 hours:  Temp:  [98.1 °F (36.7 °C)-98.7 °F (37.1 °C)] 98.1 °F (36.7 °C)  Heart Rate:  [72-84] 74  Resp:  [16-17] 16  BP: (128-142)/(62-83) 142/73    Intake/Output:    Intake/Output Summary (Last 24 hours) at 2021 1003  Last data filed at 2021 1823  Gross per 24 hour   Intake --   Output 1230 ml   Net -1230 ml       Exam:  /73 (BP Location: Right arm, Patient Position: Lying)   Pulse 74   Temp 98.1 °F (36.7 °C) (Oral)   Resp 16   Ht 175.3 cm (69.02\")   Wt 84.6 kg (186 lb 8.2 oz)   SpO2 95%   BMI 27.53 kg/m²     General Appearance:    Alert, cooperative, AAOx3                         Throat:   Oral mucosa pink and moist                           Neck:   " No JVD                         Lungs:    Clear to auscultation bilaterally, respirations unlabored                          Heart:    Regular rate and rhythm, S1 and S2 normal                  Abdomen:     Soft, nontender, bowel sounds active                 Extremities: Moving all, no cyanosis or edema                        Pulses:   Pulses palpable in lower extremities                               Data Review:  Labs in chart were reviewed.    Assessment:  Active Hospital Problems    Diagnosis  POA   • **Acute blood loss anemia [D62]  Unknown   • GERD (gastroesophageal reflux disease) [K21.9]  Unknown   • Lumbar stenosis with neurogenic claudication [M48.062]  Unknown   • Spinal stenosis of lumbar region with neurogenic claudication [M48.062]  Yes   • Acute bilateral low back pain without sciatica [M54.5]  Yes   • Hypertension [I10]  Yes   • Hyperlipidemia [E78.5]  Yes   • Hypothyroidism due to Hashimoto's thyroiditis [E03.8, E06.3]  Yes   • S/P lumbar fusion [Z98.1]  Not Applicable   • Type 2 diabetes mellitus without complication (CMS/HCC) [E11.9]  Yes   • Seizures (CMS/HCC) [R56.9]  Yes   • Fibromyalgia [M79.7]  Yes   • Rheumatoid arthritis (CMS/HCC) [M06.9]  Yes      Resolved Hospital Problems   No resolved problems to display.       Plan:    Discussed with  and he does in fact when extended bed rest    Discussed disposition with CCP -fluid we can finalize everything by tomorrow as I assume we likely will be going home due to bedrest restrictions though patient has been on extended bedrest well prior to this admission so I am assuming family will be able to coordinate    ABLA with hemoglobin at 9.5    Continue management for DM2/HTN/HLD/GERD/seizures      Disposition hopefully for tomorrow    Roman Juarez MD  4/20/2021  10:03 EDT

## 2021-04-20 NOTE — PLAN OF CARE
AO x4. Intermittently emotional/crying throughout shift about dc plan/'s hesitation to dc home w HH. Pt requests CCP to call  in the AM to discuss options. Strict bedrest, pt has good bed mobility. Voiding per pw and bedpan. Prn Percocet and dilaudid given x2 for back and R thigh pain. Venous duplex negative. VSS.    Problem: Adult Inpatient Plan of Care  Goal: Absence of Hospital-Acquired Illness or Injury  Intervention: Identify and Manage Fall Risk  Recent Flowsheet Documentation  Taken 4/20/2021 1410 by Marii Chaney, CIELO  Safety Promotion/Fall Prevention:   activity supervised   assistive device/personal items within reach   clutter free environment maintained   fall prevention program maintained   gait belt   nonskid shoes/slippers when out of bed   room organization consistent   safety round/check completed  Taken 4/20/2021 1258 by Marii Chaney, CIELO  Safety Promotion/Fall Prevention:   activity supervised   assistive device/personal items within reach   clutter free environment maintained   fall prevention program maintained   gait belt   nonskid shoes/slippers when out of bed   room organization consistent   safety round/check completed  Taken 4/20/2021 1035 by Marii Chaney, CIELO  Safety Promotion/Fall Prevention: patient off unit  Taken 4/20/2021 1007 by Marii Chaney RN  Safety Promotion/Fall Prevention:   activity supervised   assistive device/personal items within reach   clutter free environment maintained   fall prevention program maintained   gait belt   nonskid shoes/slippers when out of bed   room organization consistent   safety round/check completed  Taken 4/20/2021 0806 by Marii Chaney, CIELO  Safety Promotion/Fall Prevention:   activity supervised   assistive device/personal items within reach   clutter free environment maintained   fall prevention program maintained   gait belt   nonskid shoes/slippers when out of bed   room organization consistent   safety round/check  completed  Intervention: Prevent Skin Injury  Recent Flowsheet Documentation  Taken 4/20/2021 0806 by Marii Chaney RN  Body Position: position changed independently  Skin Protection:   adhesive use limited   incontinence pads utilized   tubing/devices free from skin contact  Intervention: Prevent and Manage VTE (venous thromboembolism) Risk  Recent Flowsheet Documentation  Taken 4/20/2021 0806 by Marii Chaney RN  VTE Prevention/Management:   bilateral   dorsiflexion/plantar flexion performed   sequential compression devices on  Intervention: Prevent Infection  Recent Flowsheet Documentation  Taken 4/20/2021 1410 by Marii Chaney RN  Infection Prevention:   visitors restricted/screened   single patient room provided   rest/sleep promoted   personal protective equipment utilized  Taken 4/20/2021 1007 by Marii Chaney RN  Infection Prevention:   visitors restricted/screened   personal protective equipment utilized   rest/sleep promoted   single patient room provided  Taken 4/20/2021 0806 by Marii Chaney RN  Infection Prevention:   visitors restricted/screened   single patient room provided   rest/sleep promoted   personal protective equipment utilized  Goal: Optimal Comfort and Wellbeing  Intervention: Provide Person-Centered Care  Recent Flowsheet Documentation  Taken 4/20/2021 0806 by Marii Chaney RN  Trust Relationship/Rapport:   care explained   choices provided   questions encouraged   thoughts/feelings acknowledged     Problem: Fall Injury Risk  Goal: Absence of Fall and Fall-Related Injury  Intervention: Identify and Manage Contributors to Fall Injury Risk  Recent Flowsheet Documentation  Taken 4/20/2021 1410 by Marii Chaney RN  Medication Review/Management:   medications reviewed   high-risk medications identified  Taken 4/20/2021 1007 by Marii Chaney RN  Medication Review/Management:   medications reviewed   high-risk medications identified  Taken 4/20/2021 0806 by Marii Chaney RN  Medication  Review/Management:   medications reviewed   high-risk medications identified  Self-Care Promotion: independence encouraged  Intervention: Promote Injury-Free Environment  Recent Flowsheet Documentation  Taken 4/20/2021 1410 by Marii Chaney RN  Safety Promotion/Fall Prevention:   activity supervised   assistive device/personal items within reach   clutter free environment maintained   fall prevention program maintained   gait belt   nonskid shoes/slippers when out of bed   room organization consistent   safety round/check completed  Taken 4/20/2021 1258 by Marii Chaney RN  Safety Promotion/Fall Prevention:   activity supervised   assistive device/personal items within reach   clutter free environment maintained   fall prevention program maintained   gait belt   nonskid shoes/slippers when out of bed   room organization consistent   safety round/check completed  Taken 4/20/2021 1035 by Marii Chaney RN  Safety Promotion/Fall Prevention: patient off unit  Taken 4/20/2021 1007 by Marii Chaney RN  Safety Promotion/Fall Prevention:   activity supervised   assistive device/personal items within reach   clutter free environment maintained   fall prevention program maintained   gait belt   nonskid shoes/slippers when out of bed   room organization consistent   safety round/check completed  Taken 4/20/2021 0806 by Marii Chaney RN  Safety Promotion/Fall Prevention:   activity supervised   assistive device/personal items within reach   clutter free environment maintained   fall prevention program maintained   gait belt   nonskid shoes/slippers when out of bed   room organization consistent   safety round/check completed     Problem: Skin Injury Risk Increased  Goal: Skin Health and Integrity  Intervention: Optimize Skin Protection  Recent Flowsheet Documentation  Taken 4/20/2021 0806 by Marii Chaney RN  Pressure Reduction Techniques:   frequent weight shift encouraged   weight shift assistance provided  Head of Bed (HOB):  HOB at 20-30 degrees  Pressure Reduction Devices:   alternating pressure pump (ADD)   pressure-redistributing mattress utilized  Skin Protection:   adhesive use limited   incontinence pads utilized   tubing/devices free from skin contact   Goal Outcome Evaluation:

## 2021-04-20 NOTE — PROGRESS NOTES
Orthopedic Progress Note      Patient: Wendi Ramos    YOB: 1955    Medical Record Number: [id number]    Attending Physician: Roman Juarez MD    Date of Admission: 4/13/2021 11:03 PM    Admitting Dx:  Cystitis [N30.90]  Right-sided low back pain with right-sided sciatica, unspecified chronicity [M54.41]  Pain [R52]    Status Post: Procedure: T10-L3 bilateral posterior lateral fusion with [employer]ro[employer] expedient segmental instrumentation.  Removal of instrumentation L2 and 3 from previous construct.  Right iliac marrow aspiration, local bone graft, and allograft bone graft with Pliafix bone graft extender    Post Operative Day Number: 5    Current Problem List:   Patient Active Problem List   Diagnosis    Type 2 diabetes mellitus without complication (CMS/HCC)    Post-operative state    S/P lumbar fusion    Insomnia    Abnormal EKG    Cellulitis of breast    Chest pain    Dyspnea on exertion    Fibromyalgia    Hyperhidrosis    LAFB (left anterior fascicular block)    Mass of right breast    Obesity    PVC (premature ventricular contraction)    Rheumatoid arthritis (CMS/HCC)    S/P catheter ablation of slow pathway    Seizures (CMS/HCC)    Sinus tachycardia    Chronic fatigue    Vitamin D deficiency    Complex dyslipidemia    Hypothyroidism due to Hashimoto's thyroiditis    Hypertension    Hyperlipidemia    Acute bilateral low back pain without sciatica    Spinal stenosis of lumbar region with neurogenic claudication    Sleep apnea    Pain    Lumbar stenosis with neurogenic claudication    Cystitis    Acute blood loss anemia    GERD (gastroesophageal reflux disease)         Past Medical History:   Diagnosis Date    Chronic fatigue     Depression     Diabetes mellitus (CMS/HCC)     Fibromyalgia     GERD (gastroesophageal reflux disease)     Hiatal hernia     History of Clostridioides difficile colitis     Hyperlipidemia     Hypertension     Hypothyroidism     Irregular heart beat     Liver  disease     FATTY LIVER, NON ALCOHOLIC    Low back pain     Peripheral neuropathy     Rheumatoid arthritis (CMS/[facility])     Rheumatoid arthritis (CMS/[facility]) 2005    DR SMYTH    Seizures (CMS/[facility])     Sleep apnea     NO CPAP    Type 2 diabetes mellitus (CMS/[facility])     West Nile fever 2002       Current Medications:  Scheduled Meds:baclofen, 10 mg, Oral, BID  DULoxetine, 60 mg, Oral, BID  hydroxychloroquine, 200 mg, Oral, BID  insulin lispro, 0-9 Units, Subcutaneous, TID AC  lamoTRIgine, 150 mg, Oral, BID  levETIRAcetam, 1,500 mg, Oral, Q12H  levothyroxine, 137 mcg, Oral, Q AM  metoprolol tartrate, 50 mg, Oral, Q12H  ondansetron, 4 mg, Intravenous, Once  OXcarbazepine, 600 mg, Oral, Q12H  pantoprazole, 40 mg, Oral, QAM  polyethylene glycol, 17 g, Oral, Daily  pramipexole, 0.5 mg, Oral, Q12H  rosuvastatin, 40 mg, Oral, Daily  sennosides-docusate, 1 tablet, Oral, BID  sodium chloride, 10 mL, Intravenous, Q12H  sodium chloride, 3 mL, Intravenous, Q12H      PRN Meds:.  acetaminophen **OR** acetaminophen **OR** acetaminophen    bisacodyl    dextrose    dextrose    glucagon (human recombinant)    HYDROmorphone    magnesium hydroxide    melatonin    naloxone    ondansetron **OR** ondansetron    oxyCODONE-acetaminophen    sodium chloride    sodium chloride    temazepam    SUBJECTIVE: 65 y.o.  female.  Awake and alert.  Tearful today due to marital problems.    OBJECTIVE:   Vitals:    04/19/21 1823 04/19/21 2102 04/20/21 0522 04/20/21 0956   BP: 130/83 142/73 128/62 142/73   BP Location: Right arm Right arm Right arm Right arm   Patient Position: Lying Lying Lying Lying   Pulse: 75 84 72 74   Resp: 16 17 17 16   Temp: 98.6 °F (37 °C) 98.7 °F (37.1 °C) 98.2 °F (36.8 °C) 98.1 °F (36.7 °C)   TempSrc: Oral Oral Oral Oral   SpO2: 97% 95% 95% 95%   Weight:       Height:         I/O last 3 completed shifts:  In: -   Out: 2930 [Urine:2930]    Diagnostic Tests:   Lab Results (last 24 hours)       Procedure Component Value Units Date/Time     Anaerobic Culture - Wound, Spine, Lumbar [[id number]] Collected: 04/15/21 1540    Specimen: Wound from Spine, Lumbar Updated: 04/20/21 0804     Anaerobic Culture No anaerobes isolated at 5 days    Anaerobic Culture - Wound, Spine, Lumbar [[id number]] Collected: 04/15/21 1442    Specimen: Wound from Spine, Lumbar Updated: 04/20/21 0804     Anaerobic Culture No anaerobes isolated at 5 days    POC Glucose Once [[id number]]  (Abnormal) Collected: 04/20/21 0743    Specimen: Blood Updated: 04/20/21 0749     Glucose 140 mg/dL     CBC (No Diff) [[id number]]  (Abnormal) Collected: 04/20/21 0523    Specimen: Blood from Arm, Left Updated: 04/20/21 0622     WBC 7.86 10*3/mm3      RBC 3.37 10*6/mm3      Hemoglobin 9.5 g/dL      Hematocrit 28.9 %      MCV 85.8 fL      MCH 28.2 pg      MCHC 32.9 g/dL      RDW 13.9 %      RDW-SD 42.6 fl      MPV 9.9 fL      Platelets 322 10*3/mm3     POC Glucose Once [[id number]]  (Abnormal) Collected: 04/19/21 2106    Specimen: Blood Updated: 04/19/21 2107     Glucose 169 mg/dL     POC Glucose Once [[id number]]  (Abnormal) Collected: 04/19/21 1818    Specimen: Blood Updated: 04/19/21 1829     Glucose 145 mg/dL             PHYSICAL EXAM:  Back incision intact with staples.   No drainage or erythema.  Edges of incision at proximal third of incision are darkened in color.  Pain well controlled.  Moving legs well and repositioning self well in bed with no assist.  Still complaints of right anterior thigh pain but better than preop.   Strength equal bilaterally.  Hg 9.5.  ABD soft with BS.  Last BM  4/16/21.  Voiding well.       ASSESSMENT & PLAN:    Social service to see patient about possible short stay in SNF.  Offered patient services of sri but denied at present.      Patient to remain on strict Bedret for 4-6 weeks  Increased risk for DVT.  Plan anticoagulation while immobile.      Plan to check Doppler today.  Will start on anticoagulation pending doppler results later today.            Date:  4/20/2021    Sena Dunbar RN

## 2021-04-20 NOTE — PLAN OF CARE
AO x4. Strict bedrest. FC removed, voiding w/out difficulty per bedpan. PCA d/c'd. Pain controlled w prn dilaudid and percocet. VSS.     Problem: Adult Inpatient Plan of Care  Goal: Absence of Hospital-Acquired Illness or Injury  Intervention: Identify and Manage Fall Risk  Recent Flowsheet Documentation  Taken 4/19/2021 1840 by Marii Chaney RN  Safety Promotion/Fall Prevention:   activity supervised   assistive device/personal items within reach   clutter free environment maintained   fall prevention program maintained   gait belt   nonskid shoes/slippers when out of bed   room organization consistent   safety round/check completed  Taken 4/19/2021 1609 by Marii Chaney, CIELO  Safety Promotion/Fall Prevention:   activity supervised   assistive device/personal items within reach   clutter free environment maintained   fall prevention program maintained   gait belt   nonskid shoes/slippers when out of bed   room organization consistent   safety round/check completed  Taken 4/19/2021 1453 by Marii Chaney, CIELO  Safety Promotion/Fall Prevention:   activity supervised   assistive device/personal items within reach   clutter free environment maintained   fall prevention program maintained   gait belt   nonskid shoes/slippers when out of bed   room organization consistent   safety round/check completed  Taken 4/19/2021 1226 by Marii Chaney RN  Safety Promotion/Fall Prevention:   activity supervised   assistive device/personal items within reach   clutter free environment maintained   fall prevention program maintained   gait belt   nonskid shoes/slippers when out of bed   room organization consistent   safety round/check completed  Taken 4/19/2021 1024 by Marii Chaney RN  Safety Promotion/Fall Prevention:   assistive device/personal items within reach   activity supervised   clutter free environment maintained   fall prevention program maintained   gait belt   nonskid shoes/slippers when out of bed   room organization  consistent   safety round/check completed  Intervention: Prevent Infection  Recent Flowsheet Documentation  Taken 4/19/2021 1840 by Marii Chaney RN  Infection Prevention:   visitors restricted/screened   single patient room provided   rest/sleep promoted   personal protective equipment utilized  Taken 4/19/2021 1609 by Marii Chaney RN  Infection Prevention:   visitors restricted/screened   single patient room provided   rest/sleep promoted   personal protective equipment utilized  Taken 4/19/2021 1453 by Marii Chaney RN  Infection Prevention:   visitors restricted/screened   single patient room provided   rest/sleep promoted   personal protective equipment utilized  Taken 4/19/2021 1226 by Marii Chaney RN  Infection Prevention:   visitors restricted/screened   single patient room provided   rest/sleep promoted   personal protective equipment utilized  Taken 4/19/2021 1024 by Marii Chaney RN  Infection Prevention:   visitors restricted/screened   single patient room provided   rest/sleep promoted   personal protective equipment utilized     Problem: Fall Injury Risk  Goal: Absence of Fall and Fall-Related Injury  Intervention: Identify and Manage Contributors to Fall Injury Risk  Recent Flowsheet Documentation  Taken 4/19/2021 1840 by Marii Chaney RN  Medication Review/Management:   medications reviewed   high-risk medications identified  Taken 4/19/2021 1609 by Marii Chaney RN  Medication Review/Management:   medications reviewed   high-risk medications identified  Taken 4/19/2021 1453 by Marii Chaney RN  Medication Review/Management:   medications reviewed   high-risk medications identified  Taken 4/19/2021 1024 by Marii Chaney RN  Medication Review/Management:   medications reviewed   high-risk medications identified  Intervention: Promote Injury-Free Environment  Recent Flowsheet Documentation  Taken 4/19/2021 1840 by Marii Chaney RN  Safety Promotion/Fall Prevention:   activity supervised   assistive  device/personal items within reach   clutter free environment maintained   fall prevention program maintained   gait belt   nonskid shoes/slippers when out of bed   room organization consistent   safety round/check completed  Taken 4/19/2021 1609 by Marii Chaney RN  Safety Promotion/Fall Prevention:   activity supervised   assistive device/personal items within reach   clutter free environment maintained   fall prevention program maintained   gait belt   nonskid shoes/slippers when out of bed   room organization consistent   safety round/check completed  Taken 4/19/2021 1453 by Marii Chaney RN  Safety Promotion/Fall Prevention:   activity supervised   assistive device/personal items within reach   clutter free environment maintained   fall prevention program maintained   gait belt   nonskid shoes/slippers when out of bed   room organization consistent   safety round/check completed  Taken 4/19/2021 1226 by Marii Chaney RN  Safety Promotion/Fall Prevention:   activity supervised   assistive device/personal items within reach   clutter free environment maintained   fall prevention program maintained   gait belt   nonskid shoes/slippers when out of bed   room organization consistent   safety round/check completed  Taken 4/19/2021 1024 by Marii Chaney RN  Safety Promotion/Fall Prevention:   assistive device/personal items within reach   activity supervised   clutter free environment maintained   fall prevention program maintained   gait belt   nonskid shoes/slippers when out of bed   room organization consistent   safety round/check completed   Goal Outcome Evaluation:

## 2021-04-20 NOTE — PROGRESS NOTES
[facility] NEUROSURGERY PROGRESS NOTE      CC: POD 5 L2/3 laminectomy facetectomy and neurolysis with T10-L2 fusion by Dr. Montelongo      Subjective     Interval History: Reports right thigh sensitivity somewhat better today.  Just had Doppler study.  Voiding without difficulty    ROS:  MS:  back pain  Neuro: Right anterior thigh discomfort-improved  : Voiding    Objective     Vital signs in last 24 hours:  Temp:  [98.1 °F (36.7 °C)-98.7 °F (37.1 °C)] 98.1 °F (36.7 °C)  Heart Rate:  [72-84] 74  Resp:  [16-17] 16  BP: (128-142)/(62-83) 142/73    Intake/Output this shift:  I/O this shift:  In: 240 [P.O.:240]  Out: 1100 [Urine:1100]    LABS:  Results from last 7 days   Lab Units 04/20/21  0523 04/19/21  0558 04/18/21  0932   WBC 10*3/mm3 7.86 7.22 8.66   HEMOGLOBIN g/dL 9.5* 10.2* 9.0*   HEMATOCRIT % 28.9* 32.4* 28.2*   PLATELETS 10*3/mm3 322 287 234     IMAGING STUDIES:  Doppler bilateral lower extremity-normal study    I personally viewed and interpreted the patient's chart.    Meds reviewed/changed: Yes    Current Facility-Administered Medications:   •  acetaminophen (TYLENOL) tablet 650 mg, 650 mg, Oral, Q4H PRN **OR** acetaminophen (TYLENOL) 160 MG/5ML solution 650 mg, 650 mg, Oral, Q4H PRN **OR** acetaminophen (TYLENOL) suppository 650 mg, 650 mg, Rectal, Q4H PRN, Juancarlos Montelongo MD  •  baclofen (LIORESAL) tablet 10 mg, 10 mg, Oral, BID, Juancarlos Montelongo MD, 10 mg at 04/20/21 0806  •  bisacodyl (DULCOLAX) suppository 10 mg, 10 mg, Rectal, Daily PRN, Juancarlos Montelongo MD  •  dextrose (D50W) 25 g/ 50mL Intravenous Solution 25 g, 25 g, Intravenous, Q15 Min PRN, Juancarlos Montelongo MD  •  dextrose (GLUTOSE) oral gel 15 g, 15 g, Oral, Q15 Min PRN, Juancarlos Montelongo MD  •  DULoxetine (CYMBALTA) DR capsule 60 mg, 60 mg, Oral, BID, Juancarlos Montelongo MD, 60 mg at 04/20/21 0806  •  glucagon (human recombinant) (GLUCAGEN DIAGNOSTIC) injection 1 mg, 1 mg, Subcutaneous, Q15 Min PRN, Juancarlos Montelongo MD  •  HYDROmorphone  (DILAUDID) injection 0.5 mg, 0.5 mg, Intravenous, Q4H PRN, Iwona Neal, APRN, 0.5 mg at 04/20/21 0529  •  hydroxychloroquine (PLAQUENIL) tablet 200 mg, 200 mg, Oral, BID, Lay Magallon, APRN, 200 mg at 04/20/21 0806  •  insulin lispro (ADMELOG) injection 0-9 Units, 0-9 Units, Subcutaneous, TID AC, Juancarlos Montelongo MD, 2 Units at 04/19/21 1217  •  lamoTRIgine (LaMICtal) tablet 150 mg, 150 mg, Oral, BID, Juancarlos Montelongo MD, 150 mg at 04/20/21 0806  •  levETIRAcetam (KEPPRA) tablet 1,500 mg, 1,500 mg, Oral, Q12H, Juancarlos Montelongo MD, 1,500 mg at 04/20/21 0806  •  levothyroxine (SYNTHROID, LEVOTHROID) tablet 137 mcg, 137 mcg, Oral, Q AM, Juancarlos Montelongo MD, 137 mcg at 04/20/21 0529  •  magnesium hydroxide (MILK OF MAGNESIA) suspension 2400 mg/10mL 10 mL, 10 mL, Oral, Daily PRN, Juancarlos Montelongo MD  •  melatonin tablet 3 mg, 3 mg, Oral, Nightly PRN, Juancarlos Montelongo MD, 3 mg at 04/14/21 2247  •  metoprolol tartrate (LOPRESSOR) tablet 50 mg, 50 mg, Oral, Q12H, Juancarlos Montelongo MD, 50 mg at 04/20/21 0806  •  naloxone (NARCAN) injection 0.1 mg, 0.1 mg, Intravenous, Q5 Min PRN, Juancarlos Montelongo MD  •  ondansetron (ZOFRAN) injection 4 mg, 4 mg, Intravenous, Once, Juancarlos Montelongo MD  •  ondansetron (ZOFRAN) tablet 4 mg, 4 mg, Oral, Q6H PRN **OR** ondansetron (ZOFRAN) injection 4 mg, 4 mg, Intravenous, Q6H PRN, Juancarlos Montelongo MD  •  OXcarbazepine (TRILEPTAL) tablet 600 mg, 600 mg, Oral, Q12H, Juancarlos Montelongo MD, 600 mg at 04/20/21 0806  •  oxyCODONE-acetaminophen (PERCOCET) 5-325 MG per tablet 2 tablet, 2 tablet, Oral, Q4H PRN, Juancarlos Montelongo MD, 2 tablet at 04/20/21 0806  •  pantoprazole (PROTONIX) EC tablet 40 mg, 40 mg, Oral, QAM, Juancarlos Montelongo MD, 40 mg at 04/20/21 0529  •  polyethylene glycol (MIRALAX) packet 17 g, 17 g, Oral, Daily, Juancarlos Montelongo MD, 17 g at 04/16/21 0859  •  pramipexole (MIRAPEX) tablet 0.5 mg, 0.5 mg, Oral, Q12H, Juancarlos Montelongo MD, 0.5 mg at 04/20/21 0806  •   rosuvastatin (CRESTOR) tablet 40 mg, 40 mg, Oral, Daily, Juancarlos Montelongo MD, 40 mg at 04/20/21 0806  •  sennosides-docusate (PERICOLACE) 8.6-50 MG per tablet 1 tablet, 1 tablet, Oral, BID, Juancarlos Montelongo MD, 1 tablet at 04/20/21 0806  •  sodium chloride 0.9 % flush 10 mL, 10 mL, Intravenous, Q12H, Juancarlos Montelongo MD, 10 mL at 04/20/21 0807  •  sodium chloride 0.9 % flush 10 mL, 10 mL, Intravenous, PRN, Juancarlos Montelongo MD  •  sodium chloride 0.9 % flush 10 mL, 10 mL, Intravenous, PRN, Juancarlos Montelongo MD  •  sodium chloride 0.9 % flush 3 mL, 3 mL, Intravenous, Q12H, Juancarlos Montelongo MD, 3 mL at 04/19/21 2154  •  temazepam (RESTORIL) capsule 15 mg, 15 mg, Oral, Nightly PRN, Juancarlos Montelongo MD      Physical Exam:    General:   Awake, alert, pleasant and cooperative  Back:    Incision with CDI Mepelex dressing   Motor: Normal muscle strength, bulk and tone in lower extremities except very mild right IP weakness (4+/5).  No fasciculations, rigidity, spasticity, or abnormal movements.  Sensation: Mild hypersensitivity right anterior thigh  Station and Gait:             On bedrest  Extremities:   Wearing SCD.      Assessment/Plan     ASSESSMENT:      Acute blood loss anemia    Type 2 diabetes mellitus without complication (CMS/[facility])    S/P lumbar fusion    Fibromyalgia    Rheumatoid arthritis (CMS/[facility])    Seizures (CMS/[facility])    Hypothyroidism due to Hashimoto's thyroiditis    Hypertension    Hyperlipidemia    Acute bilateral low back pain without sciatica    Spinal stenosis of lumbar region with neurogenic claudication    Lumbar stenosis with neurogenic claudication    GERD (gastroesophageal reflux disease)    Patient stable from neurosurgical standpoint.  Still has some intermittent discomfort right anterior thigh.  Had Doppler study this morning negative for DVT.  Stable exam.  Dressing clean dry intact today.  Appears that she will be discharged to home with home health.  She is not a candidate for rehab  "facility due to strict bedrest orders. We will arrange OP follow up 6 weeks with Dr. Randall.    PLAN:   Stable from OLENA standpoint  Continue strict bedrest per Dr. Montelongo  OLENA f/u 6 weeks    I discussed the patient's findings and my recommendations with patient, nursing staff and Dr. Randall and Dr. Montelongo       LOS: 6 days       Iwona Neal, APRN  4/20/2021  12:07 EDT    \"Dictated utilizing Dragon dictation\".      "

## 2021-04-21 NOTE — TELEPHONE ENCOUNTER
PT IS CURRENTLY ADMITTED IN HOSPITAL, IS BEING DISCHARGED TODAY AND [facility] WILL BE ON BEDREST FOR 8 WEEKS.  DR. LOVE IS LISTED AS HER PCP BUT SHE WAS AN [facility] PT FROM WHAT I SEE IN HER CHART.  [facility] WILL NOT LET HER GO UNTIL SHE IS ESTABLISHED WITH A PCP FOR HOME HEALTH ORDERS AND FOLLOW UP.  PT ASKING WHAT SHE IS SUPPOSE TO DO?

## 2021-04-21 NOTE — PROGRESS NOTES
"Physicians Statement of Medical Necessity for  Ambulance Transportation    GENERAL INFORMATION     Name: Wendi Ramos  YOB: 1955  Medicare #: [id number]  Transport Date: 04/21/21 (Valid for round trips this date, or for scheduled repetitive trips for 60 days from the date signed below.)  Origin: [facility]  Destination: [address] [place] [address]  Is the Patient's stay covered under Medicare Part A (PPS/DRG?)Yes  Closest appropriate facility? Yes  If this a hosp-hosp transfer? No  Is this a hospice patient? No    MEDICAL NECESSITY QUESTIONAIRE    Ambulance Transportation is medically necessary only if other means of transportation are contraindicated or would be potentially harmful to the patient.  To meet this requirement, the patient must be either \"bed confined\" or suffer from a condition such that transport by means other than an ambulance is contraindicated by the patient's condition.  The following questions must be answered by the healthcare professional signing below for this form to be valid:     1) Describe the MEDICAL CONDITION (physical and/or mental) of this patient AT THE TIME OF AMBULANCE TRANSPORT that requires the patient to be transported in an ambulance, and why transport by other means is contraindicated by the patient's condition: pt ordered on bedrest, must transport in flat position  Past Medical History:   Diagnosis Date   • Chronic fatigue    • Depression    • Diabetes mellitus (CMS/HCC)    • Fibromyalgia    • GERD (gastroesophageal reflux disease)    • Hiatal hernia    • History of Clostridioides difficile colitis    • Hyperlipidemia    • Hypertension    • Hypothyroidism    • Irregular heart beat    • Liver disease     FATTY LIVER, NON ALCOHOLIC   • Low back pain    • Peripheral neuropathy    • Rheumatoid arthritis (CMS/HCC)    • Rheumatoid arthritis (CMS/HCC) 2005    DR SMYTH   • Seizures (CMS/HCC)    • Sleep apnea     NO CPAP   • Type 2 diabetes " mellitus (CMS/HCC)    • West Nile fever 2002      Past Surgical History:   Procedure Laterality Date   • ABDOMINOPLASTY     • ADENOIDECTOMY     • BILATERAL BREAST REDUCTION     • BRONCHOSCOPY N/A 12/15/2016    Procedure: BRONCHOSCOPY WITH BAL AND ENDOBRONCHIAL ULTRASOUND WITH FNA;  Surgeon: Diego Ponce MD;  Location: [facility] ENDOSCOPY;  Service:    • CARDIAC ABLATION     • CATARACT EXTRACTION, BILATERAL     • COLONOSCOPY     • ENDOSCOPY     • EYE SURGERY     • HYSTERECTOMY     • LASIK     • LUMBAR DISCECTOMY FUSION INSTRUMENTATION Left 6/8/2018    Procedure: LEFT L3/4 lumbar discectomy;  Surgeon: Miguelangel Goldberg MD;  Location: [facility] OR;  Service: Neurosurgery   • LUMBAR FUSION Left 7/12/2018    Procedure: Left L3 4 lumbar decompression and discectomy with transforaminal lumbar interbody fusion and posterior instrumentation with posterior lateral arthrodesis;  Surgeon: Miguelangel Goldberg MD;  Location: [facility] OR;  Service: Neurosurgery   • LUMBAR LAMINECTOMY WITH FUSION N/A 2/15/2021    Procedure: Lumbar 2-3, lumbar 3 4, lumbar 4 5 fusion with instrumentation and BMP,  L2-L3 lift with cage, and removal of implants L3-4 with laminectomies by Dr. Segundo;  Surgeon: Juancarlos Montelongo MD;  Location: [facility] OR;  Service: Orthopedic Spine;  Laterality: N/A;   • LUMBAR LAMINECTOMY WITH FUSION N/A 2/15/2021    Procedure: Lumbar 2 3, Lumbar 3 4 and lumbar 4 5 laminectomy with a fusion by orthopedics;  Surgeon: Jef Randall MD;  Location: [facility] OR;  Service: Neurosurgery;  Laterality: N/A;   • LUMBAR LAMINECTOMY WITH FUSION N/A 4/15/2021    Procedure: T10-L2 fusion with instrumentation and revision of current fixation, laminectomy by Dr. Segundo;  Surgeon: Juancarlos Montelongo MD;  Location: [facility] OR;  Service: Orthopedic Spine;  Laterality: N/A;   • LUMBAR LAMINECTOMY WITH FUSION N/A 4/15/2021    Procedure: LUMBAR 2 LUMBAR 3 LAMINECTOMY FACETECTOMY AND NEUROLYSIS;  Surgeon: Jef Randall  "MD;  Location: [facility] MAIN OR;  Service: Neurosurgery;  Laterality: N/A;   • MOUTH SURGERY     • REPLACEMENT TOTAL KNEE Left    • TONSILLECTOMY     • TONSILLECTOMY AND ADENOIDECTOMY  1966      2) Is this patient \"bed confined\" as defined below?Yes   To be \"bed confined\" the patient must satisfy all three of the following criteria:  (1) unable to get up from bed without assistance; AND (2) unable to ambulate;  AND (3) unable to sit in a chair or wheelchair.  3) Can this patient safely be transported by car or wheelchair van (I.e., may safely sit during transport, without an attendant or monitoring?)No   4. In addition to completing questions 1-3 above, please check any of the following conditions that apply*:          *Note: supporting documentation for any boxes checked must be maintained in the patient's medical records Unable to tolerate seated position for time needed to transport      SIGNATURE OF PHYSICIAN OR OTHER AUTHORIZED HEALTHCARE PROFESSIONAL    I certify that the above information is true and correct based on my evaluation of this patient, and represent that the patient requires transport by ambulance and that other forms of transport are contraindicated.  I understand that this information will be used by the Centers for Medicare and Medicaid Services (CMS) to support the determiniation of medical necessity for ambulance services, and I represent that I have personal knowledge of the patient's condition at the time of transport.    X   If this box is checked, I also certify that the patient is physically or mentally incapable of signing the ambulance service's claim form and that the institution with which I am affiliated has furnished care, services or assistance to the patient.  My signature below is made on behalf of the patient pursuant to 42 .36(b)(4). In accordance with 42 .37, the specific reason(s) that the patient is physically or mentally incapable of signing the claim for is as " follows:     Signature of Physician or Healthcare Professional  Date/Time:   04/21/2021     (For Scheduled repetitive transport, this form is not valid for transports performed more than 60 days after this date).                                                                                                                                            --------------------------------------------------------------------------------------------  Printed Name and Credentials of Physician or Authorized Healthcare Professional     *Form must be signed by patient's attending physician for scheduled, repetitive transports,.  For non-repetitive ambulance transports, if unable to obtain the signature of the attending physician, any of the following may sign (please select below):     Physician  Clinical Nurse Specialist  Registered Nurse     Physician Assistant  Discharge Planner  Licensed Practical Nurse     Nurse Practitioner

## 2021-04-21 NOTE — PROGRESS NOTES
Case Management Discharge Note      Final Note: CCP followed up with pt and  at bedside to confirm d/c plan. Pt/ now state that pt plans to return home with home health services (they select [facility] from three accepting agencies, Milvia Kennedy informed of d/c). Pt/ decline pursue private pay SNF placement as they now state it is cost prohibitive at $300-$400/day. CCP reviewed process to pursue SNF placement when pt is cleared from bed rest. CCP offered private pay caregiver list, which  declined. Pt/ state they plan to coordinate with their adult son (Tai) to be available to assist/relieve  when needed. [place] refuses to transport pt via stretcher because her home has 5 exterior steps (per Randall). [facility] to transport pt home at 7:30 PM (Dariana, only available time). Key Ortiz LCSW    Provided Post Acute Provider List?: Yes  Post Acute Provider List: Home Health  Delivered To: Patient  Method of Delivery: In person    Selected Continued Care - Admitted Since 4/13/2021     Destination    No services have been selected for the patient.              Durable Medical Equipment    No services have been selected for the patient.              Dialysis/Infusion    No services have been selected for the patient.              Home Medical Care     Service Provider Selected Services Address Phone Fax Patient Preferred    [facility]  [facility] [address] [place] [id number] [phone] --          Therapy    No services have been selected for the patient.              Community Resources    No services have been selected for the patient.                  Transportation Services  Ambulance: [facility]    Final Discharge Disposition Code: 06 - home with home health care

## 2021-04-21 NOTE — DISCHARGE SUMMARY
[facility]    Date of Discharge:  4/21/2021    PCP: Flo Temple MD    Discharge Diagnosis:   Active Hospital Problems    Diagnosis  POA   • **Lumbar stenosis with neurogenic claudication [M48.062]  Unknown   • GERD (gastroesophageal reflux disease) [K21.9]  Unknown   • Acute blood loss anemia [D62]  Unknown   • Spinal stenosis of lumbar region with neurogenic claudication [M48.062]  Yes   • Hypertension [I10]  Yes   • Hyperlipidemia [E78.5]  Yes   • Hypothyroidism due to Hashimoto's thyroiditis [E03.8, E06.3]  Yes   • S/P lumbar fusion [Z98.1]  Not Applicable   • Type 2 diabetes mellitus without complication (CMS/HCC) [E11.9]  Yes   • Seizures (CMS/HCC) [R56.9]  Yes   • Fibromyalgia [M79.7]  Yes   • Rheumatoid arthritis (CMS/HCC) [M06.9]  Yes      Resolved Hospital Problems   No resolved problems to display.     Procedure(s):  T10-L2 fusion with instrumentation and revision of current fixation, laminectomy by Dr. Segundo  LUMBAR 2 LUMBAR 3 LAMINECTOMY FACETECTOMY AND NEUROLYSIS     Consults     Date and Time Order Name Status Description    4/15/2021  9:03 PM Inpatient Hospitalist Consult      4/14/2021  2:47 AM Inpatient Orthopedic Surgery Consult      4/14/2021  2:17 AM [facility] (on-call MD unless specified) Details Completed         Hospital Course  Please see history and physical for details. Patient is a 65 y.o. female initially admitted to [facility] but ultimately was a surgical patient.  She was under the care of /Prakash.  She states she had basically been on bedrest for months prior to coming in the hospital and on 4/15/2021 she underwent surgical intervention by both surgeons.  Neurosurgery performed bilateral L2-L3 laminectomy facetectomy with neurolysis using microsurgical instrumentation and orthopedic spine performed T10-L3 bilateral posterior fusion with removal of instrumentation L2 and L3 from previous construction.  Postoperatively she did have  some acute blood loss anemia and required transfusion of 2 units packed red blood cells.  By discharge her hemoglobin is holding stable at 9.7 and her vital signs are stable as well as afebrile.  She is on Eliquis now for DVT prophylaxis and was formally on baby aspirin which will be continued.  Postoperative pain control has been an issue and I will defer that to surgery.  They will provide the narcotic prescription at discharge.  Other comorbidities such as diabetes have remained stable.  We adjusted her regimen as her home medication is nonformulary here though I resume that at discharge.  Hypertension is stable.  Hyperlipidemia is stable with use of statin and patient is already on a PPI for GERD so no additional prophylaxis is warranted.  She is also maintained on numerous medications for seizure and her above history is complicated by past history of fibromyalgia which definitely affects her mood and ultimately affects pain control.  At this time she is calm and interactive and conversational.  CCP is working with the spouse to try to coordinate discharge to home secondary to the strict bed rest which complicates is being able to send this patient to subacute rehab and long-term care is not physically possible for this patient.  I am proactively putting in discharge orders with home health ordered for nursing as well as physical therapy as long as CCP can coordinate final discharge needs letter adequate for patient and spouse.  Suggest continuation of bowel regimen as well as use of incentive spirometry given mobility restrictions     Condition on Discharge: Improved.     Temp:  [97.6 °F (36.4 °C)-99 °F (37.2 °C)] 98.3 °F (36.8 °C)  Heart Rate:  [67-76] 69  Resp:  [16-17] 17  BP: (140-150)/(70-76) 150/73  Body mass index is 27.53 kg/m².    Physical Exam  HENT:      Head: Normocephalic.      Nose: Nose normal.      Mouth/Throat:      Mouth: Mucous membranes are moist.      Pharynx: Oropharynx is clear.   Eyes:       General: No scleral icterus.     Conjunctiva/sclera: Conjunctivae normal.   Cardiovascular:      Rate and Rhythm: Normal rate and regular rhythm.   Pulmonary:      Effort: Pulmonary effort is normal. No respiratory distress.      Breath sounds: Normal breath sounds.   Abdominal:      General: Bowel sounds are normal. There is no distension.      Palpations: Abdomen is soft.      Tenderness: There is no abdominal tenderness.   Musculoskeletal:         General: No swelling.      Comments: Moving all   Skin:     General: Skin is warm and dry.   Neurological:      Mental Status: She is alert and oriented to person, place, and time.      Cranial Nerves: No cranial nerve deficit.     [unfilled]    Disposition: Home or Self Care       Discharge Medications      New Medications      Instructions Start Date   apixaban 2.5 MG tablet tablet  Commonly known as: ELIQUIS   2.5 mg, Oral, Every 12 Hours Scheduled      polyethylene glycol 17 g packet  Commonly known as: MIRALAX   17 g, Oral, Daily         Continue These Medications      Instructions Start Date   aspirin 81 MG EC tablet   81 mg, Oral, Daily      baclofen 20 MG tablet  Commonly known as: LIORESAL   20 mg, Oral, 2 Times Daily      DULoxetine 60 MG capsule  Commonly known as: CYMBALTA   60 mg, Oral, 2 Times Daily      fenofibrate 160 MG tablet   160 mg, Oral, Daily      High Potency Iron 65 MG tablet   65 mg, Oral, Every Other Day      hydroxychloroquine 200 MG tablet  Commonly known as: PLAQUENIL   200 mg, Oral, 2 Times Daily      lamoTRIgine 100 MG tablet  Commonly known as: LaMICtal   150 mg, Oral, 2 Times Daily, 1.5 pills BId      levETIRAcetam 500 MG tablet  Commonly known as: KEPPRA   1,500 mg, Oral, 2 Times Daily, Take 3 tabs bid      levothyroxine 137 MCG tablet  Commonly known as: SYNTHROID, LEVOTHROID   137 mcg, Oral, Daily      metoprolol tartrate 50 MG tablet  Commonly known as: LOPRESSOR   50 mg, Oral, Every 12 Hours Scheduled      omega-3 acid ethyl  esters 1 g capsule  Commonly known as: LOVAZA   TAKE 2 CAPSULES TWICE DAILY      omeprazole 40 MG capsule  Commonly known as: priLOSEC   TAKE 1 CAPSULE EVERY DAY      OXcarbazepine 600 MG tablet  Commonly known as: TRILEPTAL   600 mg, Oral, 2 Times Daily      pramipexole 0.5 MG tablet  Commonly known as: MIRAPEX   0.5 mg, Oral, 2 times daily      rosuvastatin 40 MG tablet  Commonly known as: CRESTOR   TAKE 1 TABLET EVERY DAY      Senexon-S 8.6-50 MG per tablet  Generic drug: sennosides-docusate   1 tablet, Oral, 2 Times Daily      Soliqua 100-33 UNT-MCG/ML solution pen-injector injection  Generic drug: Insulin Glargine-Lixisenatide   60 Units, Subcutaneous, Daily With Breakfast      VITAMIN B COMPLEX PO   1 tablet, Oral, Daily      vitamin D3 125 MCG (5000 UT) capsule capsule   5,000 Units, Oral, Daily      Xigduo XR 5-1000 MG tablet  Generic drug: dapagliflozin-metformin HCl ER   2 tablets, Oral, Daily      zolpidem 10 MG tablet  Commonly known as: AMBIEN   10 mg, Oral, Nightly PRN         Stop These Medications    estradiol 0.075 MG/24HR patch  Commonly known as: MILES HORNER-ALETA     HYDROcodone-acetaminophen  MG per tablet  Commonly known as: NORCO     methylPREDNISolone 4 MG dose pack  Commonly known as: MEDROL             Additional Instructions for the Follow-ups that You Need to Schedule     Ambulatory Referral to Home Health   As directed      Face to Face Visit Date: 4/21/2021    Follow-up provider for Plan of Care?: I treated the patient in an acute care facility and will not continue treatment after discharge.    Follow-up provider: CHRISTINA LOVE [[id number]]    Reason/Clinical Findings: Status post lumbar surgery    Describe mobility limitations that make leaving home difficult: Taxing effort to leave home    Nursing/Therapeutic Services Requested: Skilled Nursing Physical Therapy    Skilled nursing orders: Wound care dressing/changes Medication education    PT orders: Therapeutic exercise Other  (add comment) Strengthening Home safety assessment    Frequency: 1 Week 1 (Strict bed rest per )         Discharge Follow-up with PCP   As directed       Currently Documented PCP:    Flo Temple MD    PCP Phone Number:    [phone]     Follow Up Details: PCP as needed.  Orthopedics and neurosurgery per their recommendations           Follow-up Information     Flo Temple MD .    Specialty: Family Medicine  Why: PCP as needed.  Orthopedics and neurosurgery per their recommendations  Contact information:  [address] [place] [address]  [phone]                     Roman Juarez MD  04/21/21  08:49 EDT    Discharge time spent greater than 30 minutes.

## 2021-04-21 NOTE — PROGRESS NOTES
Orthopedic Progress Note      Patient: Wendi Ramos    YOB: 1955    Medical Record Number: [id number]    Attending Physician: Roman Juarez MD    Date of Admission: 4/13/2021 11:03 PM    Admitting Dx:  Cystitis [N30.90]  Right-sided low back pain with right-sided sciatica, unspecified chronicity [M54.41]  Pain [R52]    Status Post:  T10-L3 bilateral posterior lateral fusion with [employer] expedient segmental instrumentation.  Removal of instrumentation L2 and 3 from previous construct.  Right iliac marrow aspiration, local bone graft, and allograft bone graft with Pliafix bone graft extender     Post Operative Day Number: 6    Current Problem List:   Patient Active Problem List   Diagnosis    Type 2 diabetes mellitus without complication (CMS/HCC)    Post-operative state    S/P lumbar fusion    Insomnia    Abnormal EKG    Cellulitis of breast    Chest pain    Dyspnea on exertion    Fibromyalgia    Hyperhidrosis    LAFB (left anterior fascicular block)    Mass of right breast    Obesity    PVC (premature ventricular contraction)    Rheumatoid arthritis (CMS/HCC)    S/P catheter ablation of slow pathway    Seizures (CMS/HCC)    Sinus tachycardia    Chronic fatigue    Vitamin D deficiency    Complex dyslipidemia    Hypothyroidism due to Hashimoto's thyroiditis    Hypertension    Hyperlipidemia    Acute bilateral low back pain without sciatica    Spinal stenosis of lumbar region with neurogenic claudication    Sleep apnea    Pain    Lumbar stenosis with neurogenic claudication    Cystitis    Acute blood loss anemia    GERD (gastroesophageal reflux disease)         Past Medical History:   Diagnosis Date    Chronic fatigue     Depression     Diabetes mellitus (CMS/HCC)     Fibromyalgia     GERD (gastroesophageal reflux disease)     Hiatal hernia     History of Clostridioides difficile colitis     Hyperlipidemia     Hypertension     Hypothyroidism     Irregular heart beat     Liver disease      FATTY LIVER, NON ALCOHOLIC    Low back pain     Peripheral neuropathy     Rheumatoid arthritis (CMS/[facility])     Rheumatoid arthritis (CMS/[facility]) 2005    DR SMYTH    Seizures (CMS/[facility])     Sleep apnea     NO CPAP    Type 2 diabetes mellitus (CMS/[facility])     West Nile fever 2002       Current Medications:  Scheduled Meds:apixaban, 2.5 mg, Oral, Q12H  baclofen, 10 mg, Oral, BID  DULoxetine, 60 mg, Oral, BID  hydroxychloroquine, 200 mg, Oral, BID  insulin lispro, 0-9 Units, Subcutaneous, TID AC  lamoTRIgine, 150 mg, Oral, BID  levETIRAcetam, 1,500 mg, Oral, Q12H  levothyroxine, 137 mcg, Oral, Q AM  metoprolol tartrate, 50 mg, Oral, Q12H  ondansetron, 4 mg, Intravenous, Once  OXcarbazepine, 600 mg, Oral, Q12H  pantoprazole, 40 mg, Oral, QAM  polyethylene glycol, 17 g, Oral, Daily  pramipexole, 0.5 mg, Oral, Q12H  rosuvastatin, 40 mg, Oral, Daily  sennosides-docusate, 1 tablet, Oral, BID  sodium chloride, 10 mL, Intravenous, Q12H  sodium chloride, 3 mL, Intravenous, Q12H      PRN Meds:.  acetaminophen **OR** acetaminophen **OR** acetaminophen    bisacodyl    dextrose    dextrose    glucagon (human recombinant)    magnesium hydroxide    melatonin    ondansetron **OR** ondansetron    oxyCODONE-acetaminophen    sodium chloride    sodium chloride    temazepam    SUBJECTIVE: 65 y.o.  female.  Awake and alert.  Better today.    OBJECTIVE:   Vitals:    04/21/21 0300 04/21/21 0507 04/21/21 0900 04/21/21 0928   BP:  150/73 137/69 153/77   BP Location:  Right arm  Right arm   Patient Position:  Lying  Lying   Pulse: 71 69 84 81   Resp:  17  16   Temp:  98.3 °F (36.8 °C)  98.8 °F (37.1 °C)   TempSrc:  Oral  Oral   SpO2: 90% 93%  95%   Weight:       Height:         I/O last 3 completed shifts:  In: 240 [P.O.:240]  Out: 2600 [Urine:2600]    Diagnostic Tests:   Lab Results (last 24 hours)       Procedure Component Value Units Date/Time    POC Glucose Once [[id number]]  (Abnormal) Collected: 04/21/21 1153    Specimen: Blood Updated: 04/21/21  1210     Glucose 153 mg/dL     POC Glucose Once [[id number]]  (Abnormal) Collected: 04/21/21 0721    Specimen: Blood Updated: 04/21/21 0740     Glucose 145 mg/dL     CBC (No Diff) [[id number]]  (Abnormal) Collected: 04/21/21 0549    Specimen: Blood Updated: 04/21/21 0647     WBC 8.31 10*3/mm3      RBC 3.42 10*6/mm3      Hemoglobin 9.7 g/dL      Hematocrit 29.7 %      MCV 86.8 fL      MCH 28.4 pg      MCHC 32.7 g/dL      RDW 13.9 %      RDW-SD 43.4 fl      MPV 10.2 fL      Platelets 339 10*3/mm3     POC Glucose Once [[id number]]  (Abnormal) Collected: 04/20/21 2019    Specimen: Blood Updated: 04/20/21 2021     Glucose 201 mg/dL     POC Glucose Once [[id number]]  (Abnormal) Collected: 04/20/21 1635    Specimen: Blood Updated: 04/20/21 1639     Glucose 142 mg/dL             PHYSICAL EXAM:  Back dressing remains dry and intact.  Moving legs well.  RLE pain and weakness improving.  ABD soft with BS.  Voiding well.  Hg 9.7.       ASSESSMENT & PLAN:    Plan home later today.     RTO in 2-3 weeks.      Continue complete bedrest for 4-6 weeks postop.          Date: 4/21/2021    Sena Dunbar RN

## 2021-04-21 NOTE — TELEPHONE ENCOUNTER
PATIENT SAID SHE SAW YOU ALONG TIME AGO AND PATIENT SAID SHE IS IN HOSPITAL RIGHT NOW AND WAS told she could see you but I did inform her she needed new PCP

## 2021-04-21 NOTE — PROGRESS NOTES
Case Management Discharge Note      Final Note: Per Milvia Kennedy, [facility] cannot accept pt. Pt agreeable to [facility] (Callie updated and aware of d/c this evening). Key Ortiz LCSW    Provided Post Acute Provider List?: Yes  Post Acute Provider List: Home Health  Delivered To: Patient  Method of Delivery: In person    Selected Continued Care - Admitted Since 4/13/2021     Destination    No services have been selected for the patient.              Durable Medical Equipment    No services have been selected for the patient.              Dialysis/Infusion    No services have been selected for the patient.              Home Medical Care Coordination complete    Service Provider Selected Services Address Phone Fax Patient Preferred    [facility]  [facility] [address], [address], [facility] [address] [phone] --          Therapy    No services have been selected for the patient.              Community Resources    No services have been selected for the patient.                  Transportation Services  Ambulance: [facility]    Final Discharge Disposition Code: 06 - home with home health care

## 2021-04-21 NOTE — PROGRESS NOTES
Patient was counseled extensively on Eliquis including the followin) Eliquis's indication, mechanism of action, and dosing  2) Enforced the importance of taking Eliquis as instructed every day and that it is a twice a day medication.  3) Explained possible side effects of Eliquis therapy, including increased risk of bleeding, s/sx of bleeding, and s/sx of any additional clots/PE/CVA.   4) Emphasized the importance of going to the emergency room if any of the following occur: Falling and hitting your head; noticing bright red blood in urine or dark/tarry stools; vomiting up blood or vomit has a coffee-ground like texture; coughing up blood  5) Discussed the importance of speaking with physician/pharmacist when starting any new medications to check for drug interactions with Eliquis  6) Discussed the importance of informing any surgeon or proceduralist that you are on Eliquis and may need to go off prior to the procedure (including spinal/epidural procedures)  7) Discussed what to do about a missed dose (Take as soon as you remember and if it is closer to the time for your next dose, skip the missed dose and go back to your normal time; DO NOT double up doses)  8) Instructed the pt not to take or discontinue any medications without informing his physician/pharmacist     I also explained to the patient that this medication may have a high copay associated with it and to let the cardiologist know if it is unaffordable.     Patient expressed understanding and had no further questions.       Roderick Germain Pharmacy Intern

## 2021-04-21 NOTE — PROGRESS NOTES
[facility] NEUROSURGERY PROGRESS NOTE      CC: POD 6 L2/3 laminectomy facetectomy and neurolysis with T10-L2 fusion by Dr. Montelongo      Subjective     Interval History: Reports no pain in her legs today.  She states it helps to keep her right hip externally rotated.  Voiding without difficulty.  Anticipating discharge to home today.    ROS:  MS:  back pain  Neuro: Right anterior thigh discomfort-resolved  : Voiding    Objective     Vital signs in last 24 hours:  Temp:  [97.6 °F (36.4 °C)-99 °F (37.2 °C)] 98.8 °F (37.1 °C)  Heart Rate:  [67-84] 81  Resp:  [16-17] 16  BP: (137-153)/(69-77) 153/77    Intake/Output this shift:  No intake/output data recorded.    LABS:  Results from last 7 days   Lab Units 04/21/21  0549 04/20/21  0523 04/19/21  0558   WBC 10*3/mm3 8.31 7.86 7.22   HEMOGLOBIN g/dL 9.7* 9.5* 10.2*   HEMATOCRIT % 29.7* 28.9* 32.4*   PLATELETS 10*3/mm3 339 322 287     IMAGING STUDIES:  No new imaging    I personally viewed and interpreted the patient's chart.    Meds reviewed/changed: Yes    Current Facility-Administered Medications:   •  acetaminophen (TYLENOL) tablet 650 mg, 650 mg, Oral, Q4H PRN **OR** acetaminophen (TYLENOL) 160 MG/5ML solution 650 mg, 650 mg, Oral, Q4H PRN **OR** acetaminophen (TYLENOL) suppository 650 mg, 650 mg, Rectal, Q4H PRN, Juancarlos Montelongo MD  •  apixaban (ELIQUIS) tablet 2.5 mg, 2.5 mg, Oral, Q12H, Juancarlos Montelongo MD, 2.5 mg at 04/21/21 0859  •  baclofen (LIORESAL) tablet 10 mg, 10 mg, Oral, BID, Juancarlos Montelongo MD, 10 mg at 04/21/21 0859  •  bisacodyl (DULCOLAX) suppository 10 mg, 10 mg, Rectal, Daily PRN, Juancarlos Montelongo MD  •  dextrose (D50W) 25 g/ 50mL Intravenous Solution 25 g, 25 g, Intravenous, Q15 Min PRN, Juancarlos Montelongo MD  •  dextrose (GLUTOSE) oral gel 15 g, 15 g, Oral, Q15 Min PRN, Juancarlos Montelongo MD  •  DULoxetine (CYMBALTA) DR capsule 60 mg, 60 mg, Oral, BID, Juancarlos Montelongo MD, 60 mg at 04/21/21 0859  •  glucagon (human recombinant) (GLUCAGEN  DIAGNOSTIC) injection 1 mg, 1 mg, Subcutaneous, Q15 Min PRN, Juancarlos Montelongo MD  •  hydroxychloroquine (PLAQUENIL) tablet 200 mg, 200 mg, Oral, BID, Lay Magallon APRN, 200 mg at 04/21/21 0900  •  insulin lispro (ADMELOG) injection 0-9 Units, 0-9 Units, Subcutaneous, TID AC, Juancarlos Montelongo MD, 4 Units at 04/20/21 2101  •  lamoTRIgine (LaMICtal) tablet 150 mg, 150 mg, Oral, BID, Juancarlos Montelongo MD, 150 mg at 04/21/21 0858  •  levETIRAcetam (KEPPRA) tablet 1,500 mg, 1,500 mg, Oral, Q12H, Juancarlos Montelongo MD, 1,500 mg at 04/21/21 0858  •  levothyroxine (SYNTHROID, LEVOTHROID) tablet 137 mcg, 137 mcg, Oral, Q AM, Juancarlos Montelongo MD, 137 mcg at 04/21/21 0622  •  magnesium hydroxide (MILK OF MAGNESIA) suspension 2400 mg/10mL 10 mL, 10 mL, Oral, Daily PRN, Juancarlos Montelongo MD  •  melatonin tablet 3 mg, 3 mg, Oral, Nightly PRN, Juancarlos Montelongo MD, 3 mg at 04/14/21 2247  •  metoprolol tartrate (LOPRESSOR) tablet 50 mg, 50 mg, Oral, Q12H, Juancarlos Montelongo MD, 50 mg at 04/21/21 0901  •  ondansetron (ZOFRAN) injection 4 mg, 4 mg, Intravenous, Once, Juancarlos Montelongo MD  •  ondansetron (ZOFRAN) tablet 4 mg, 4 mg, Oral, Q6H PRN **OR** ondansetron (ZOFRAN) injection 4 mg, 4 mg, Intravenous, Q6H PRN, Juancarlos Montelongo MD  •  OXcarbazepine (TRILEPTAL) tablet 600 mg, 600 mg, Oral, Q12H, Juancarlos Montelongo MD, 600 mg at 04/21/21 0900  •  oxyCODONE-acetaminophen (PERCOCET) 5-325 MG per tablet 2 tablet, 2 tablet, Oral, Q4H PRN, Juancarlos Montelongo MD, 2 tablet at 04/21/21 0900  •  pantoprazole (PROTONIX) EC tablet 40 mg, 40 mg, Oral, QAM, Juancarlos Montelongo MD, 40 mg at 04/21/21 0648  •  polyethylene glycol (MIRALAX) packet 17 g, 17 g, Oral, Daily, Juancarlos Montelongo MD, 17 g at 04/21/21 0901  •  pramipexole (MIRAPEX) tablet 0.5 mg, 0.5 mg, Oral, Q12H, Juancarlos Montelongo MD, 0.5 mg at 04/21/21 0858  •  rosuvastatin (CRESTOR) tablet 40 mg, 40 mg, Oral, Daily, Juancarlos Montelongo MD, 40 mg at 04/21/21 0859  •   "sennosides-docusate (PERICOLACE) 8.6-50 MG per tablet 1 tablet, 1 tablet, Oral, BID, Juancarlos Montelongo MD, 1 tablet at 04/21/21 0901  •  sodium chloride 0.9 % flush 10 mL, 10 mL, Intravenous, Q12H, Juancarlos Montelongo MD, 10 mL at 04/20/21 2052  •  sodium chloride 0.9 % flush 10 mL, 10 mL, Intravenous, PRN, Juancarlos Montelongo MD  •  sodium chloride 0.9 % flush 10 mL, 10 mL, Intravenous, PRN, Juancarlos Montelongo MD  •  sodium chloride 0.9 % flush 3 mL, 3 mL, Intravenous, Q12H, Juancarlos Montelongo MD, 3 mL at 04/21/21 0902  •  temazepam (RESTORIL) capsule 15 mg, 15 mg, Oral, Nightly PRN, Juancarlos Montelongo MD      Physical Exam:    General:   Awake, alert, pleasant and cooperative  Back:    Incision with CDI Mepelex dressing   Motor: FRANCIS  Station and Gait:             On bedrest  Extremities:   Wearing SCD.      Assessment/Plan     ASSESSMENT:      Lumbar stenosis with neurogenic claudication    Type 2 diabetes mellitus without complication (CMS/HCC)    S/P lumbar fusion    Fibromyalgia    Rheumatoid arthritis (CMS/HCC)    Seizures (CMS/HCC)    Hypothyroidism due to Hashimoto's thyroiditis    Hypertension    Hyperlipidemia    Spinal stenosis of lumbar region with neurogenic claudication    Acute blood loss anemia    GERD (gastroesophageal reflux disease)    Patient reports leg pain better.  No neuro surgical issues at this time.  CBC stable.  Okay for discharge from neurosurgical standpoint.  We will arrange outpatient follow-up 6 weeks due to her bedrest orders per Dr. Montelongo.    PLAN:   Okay for DC from OLENA standpoint.  OLENA f/u 6 weeks    I discussed the patient's findings and my recommendations with patient, nursing staff and Dr. Randall        LOS: 7 days       Iwona Neal, APRN  4/21/2021  12:18 EDT    \"Dictated utilizing Dragon dictation\".      "

## 2021-04-22 NOTE — NURSING NOTE
Patient discharged at this time. Taken by [facility]. Belongings sent with patient and nighttime medications given before leaving.

## 2021-04-22 NOTE — OUTREACH NOTE
Prep Survey      Responses   [facility] patient discharged from?  [place]   Is LACE score < 7 ?  No   Emergency Room discharge w/ pulse ox?  No   Eligibility  [facility]   Date of Admission  04/13/21   Date of Discharge  04/21/21   Discharge Disposition  Home or Self Care   Discharge diagnosis  T10-L2 fusion with instrumentation and revision of current fixation, laminectomy    Does the patient have one of the following disease processes/diagnoses(primary or secondary)?  General Surgery   Does the patient have Home health ordered?  Yes   What is the Home health agency?   [facility]   Is there a DME ordered?  No   Prep survey completed?  Yes          Ruth Trejo RN

## 2021-04-22 NOTE — TELEPHONE ENCOUNTER
What exercises can PT do w/ patient.   How long does dressing stay on? And when do the staples come out? Please advise

## 2021-04-23 NOTE — TELEPHONE ENCOUNTER
Have spoken with the patient.  Apparently the discharge planner at the hospital had arranged for home health through [facility] to see her.  The nurse did come out yesterday to do her initial evaluation.  I have contacted the supervisor and care [facility] to let them know that the patient is on absolute bedrest.  She may have the head of the bed up slightly for short periods of time but should not be sitting straight up in bed.  Would recommend that they work on upper body strengthening exercises such as wrist and elbow flexion and extension, bicep and tricep curls, front arm and lateral arm raises, bent arm raises and flies.  For lower extremity exercises the patient can do ankle pumps, hip abduction exercises, quad sets and glutes sets, and hamstring stretching.  I have recommended that the patient not do any straight leg raises or core strengthening exercises at this time.  I have given her  the name of [facility] and asked them to contact them to set up the arrangements to bring her to and from her appointment with Dr. Montelongo in a few weeks.  I have also left orders with care tenders to have them remove her staples on April 30.  He should leave her dressing on until her staples are to be removed.  Have left it open for both the patient and care tenders to contact me at the office if they have any other questions or concerns.  I also discussed with the patient's  that her insurance would not cover acute rehab such as [facility] for a patient coming directly from home.  They would however cover for her to go to a skilled nursing facility for PT and if she progressed that she could be evaluated for acute rehab.

## 2021-04-23 NOTE — TELEPHONE ENCOUNTER
Spoke with the patient's  yesterday.  He wanted to let us know that even prior to her original surgery the patient spent about 80% of her time in bed.  She does have a history of rheumatoid arthritis and fibromyalgia and really did not do much other than get up only when she had to.  He is concerned about her deconditioning and being on bedrest for the next month would make it worse.  He has questions about her going to rehab center once she is allowed to start getting out of bed.  Have advised him that I would have to check into it and make sure it was okay with her insurance company.  We discussed some exercises that she could do while in bed to help with her strengthening until she is able to get up.  I will get back with him with information regarding rehab but will also try to find out if I can make arrangements to have home health remove her staples.  I will also give them some information about arranging transportation to the office.

## 2021-04-28 NOTE — TELEPHONE ENCOUNTER
Explained to patient that order went to [facility] and she said that was fine and appreciated all the work that went into making this happen today.

## 2021-04-28 NOTE — TELEPHONE ENCOUNTER
Patient calling with request for plaza catheter and anything necessary to go with it.  She has a nurse that can set up, she just needs order.  She is tired of diapers.    [facility], she would appreciate a call if and when it is completed.

## 2021-04-30 NOTE — OUTREACH NOTE
General Surgery Week 2 Survey      Responses   [facility] patient discharged from?  [place]   Does the patient have one of the following disease processes/diagnoses(primary or secondary)?  General Surgery   Week 2 attempt successful?  Yes   Call start time  1430   Call end time  1432   Discharge diagnosis  T10-L2 fusion with instrumentation and revision of current fixation, laminectomy    Meds reviewed with patient/caregiver?  Yes   Is the patient taking all medications as directed (includes completed medication regime)?  Yes   Does the patient have a follow up appointment scheduled with their surgeon?  Yes   Has the patient kept scheduled appointments due by today?  Yes   Comments  Pt had staples removed today.  The incision looks good per provider.    What is the Home health agency?   [facility]   Has home health visited the patient within 72 hours of discharge?  Yes   Psychosocial issues?  No   Did the patient receive a copy of their discharge instructions?  Yes   Nursing interventions  Reviewed instructions with patient   What is the patient's perception of their health status since discharge?  Improving   Is the patient/caregiver able to teach back signs and symptoms of incisional infection?  Increased redness, swelling or pain at the incisonal site, Increased drainage or bleeding, Incisional warmth, Pus or odor from incision, Fever   Is the patient/caregiver able to teach back steps to recovery at home?  Set small, achievable goals for return to baseline health, Rest and rebuild strength, gradually increase activity   If the patient is a current smoker, are they able to teach back resources for cessation?  Not a smoker   Is the patient/caregiver able to teach back the hierarchy of who to call/visit for symptoms/problems? PCP, Specialist, Home health nurse, Urgent Care, ED, 911  Yes   Week 2 call completed?  Yes   Wrap up additional comments  Pt reports she is doing great.  She had staples removed  today and is aware of s/sx of infection to watch for.  She denies needs at this time.           Isabel Chicas RN

## 2021-05-03 NOTE — TELEPHONE ENCOUNTER
Call returned to the patient.  She is been having trouble finding anybody in town that had the pure wick system.  She did contact them online and however is a great demand for the pure wick and it may take 5 to 7 days before they could send her a unit.  In the meantime patient is asking if she can have a Jerome catheter again.  I advised her I had have to talk to ART [facility]INGLE however she is at increased risk for UTI with the Jerome catheter.  Will get back to her once of talked to Dr. Montelongo

## 2021-05-04 NOTE — TELEPHONE ENCOUNTER
I have left orders with care [facility] to insert a Jerome catheter.  Patient understands that she does have increased risk for UTI and possible infection.  Will plan to remove the Jerome once the patient is allowed to get out of bed

## 2021-05-10 NOTE — OUTREACH NOTE
General Surgery Week 3 Survey      Responses   [facility] patient discharged from?  [place]   Does the patient have one of the following disease processes/diagnoses(primary or secondary)?  General Surgery   Week 3 attempt successful?  Yes   Call start time  1828   Call end time  1831   Meds reviewed with patient/caregiver?  Yes   Is the patient taking all medications as directed (includes completed medication regime)?  Yes   Has the patient kept scheduled appointments due by today?  Yes   Comments  will be keeping her appointments   What is the patient's perception of their health status since discharge?  Improving [feeling she is doing well]   Week 3 call completed?  Yes   Wrap up additional comments  patient now has FC at her request and feels she is doing well          Ricarda Inman RN

## 2021-05-20 NOTE — OUTREACH NOTE
General Surgery Week 4 Survey      Responses   [facility] patient discharged from?  [place]   Does the patient have one of the following disease processes/diagnoses(primary or secondary)?  General Surgery   Week 4 attempt successful?  Yes   Call start time  1601   Call end time  1609   Discharge diagnosis  T10-L2 fusion with instrumentation and revision of current fixation, laminectomy    Is patient permission given to speak with other caregiver?  No   Is the patient taking all medications as directed (includes completed medication regime)?  Yes   Has the patient kept scheduled appointments due by today?  N/A [Patient has appt tomorrow with surgeon. She has to be transported per stretcher. She states that it was difficult to find the transportation to accomodate, but that [facility] is going to be transporting her tomorrow. ]   Is the patient still receiving Home Health Services?  Yes [[facility] . ]   Psychosocial issues?  No   What is the patient's perception of their health status since discharge?  Improving   Nursing interventions  Nurse provided patient education   Is the patient/caregiver able to teach back steps to recovery at home?  Set small, achievable goals for return to baseline health   If the patient is a current smoker, are they able to teach back resources for cessation?  Not a smoker   Is the patient/caregiver able to teach back the hierarchy of who to call/visit for symptoms/problems? PCP, Specialist, Home health nurse, Urgent Care, ED, 911  Yes   Week 4 call completed?  Yes   Would the patient like one additional call?  No   Graduated  Yes   Is the patient interested in additional calls from an ambulatory ?  NOTE:  applies to high risk patients requiring additional follow-up.  No   Did the patient feel the follow up calls were helpful during their recovery period?  Yes   Was the number of calls appropriate?  Yes          Sara Grijalva RN

## 2021-05-21 NOTE — TELEPHONE ENCOUNTER
PT calls after appointment to state that in the office note it needed to state TRANSPORTATION IS MEDICALLY NECESSARY, PT states it is very important to have this documented in Office Note

## 2021-05-21 NOTE — PROGRESS NOTES
She is 5 and a half weeks out from repair fracture above construct from L2 who to the sacrum.  She is now fused from T10-L3 and feels much better having been on bedrest this time.  No skin issues and they have been watching closely no leg pain in fact she is off narcotics she still has a Jerome catheter in place.  She moves her legs well and the appear except for significant weakness in the right foot dorsiflexion noted previously.  Plain film x-rays of the lumbar spine show good position of the graft and implants and the prior fractures well reduced compared to previous films and show some opacity to suggest it is healing at this point the remainder the screws look fine and I think they will be able to withstand weight for ambulation at this point.  I plan to have physical therapy see her at home to mobilize her.  When she is well mobilized and ambulatory she can stop the Eliquis and at some point will transfer her therapy to outpatient.  She wants to hold off of her follow-up appointment with Dr. Segundo next week and wait until she is better ambulatory and so long is that goal is reached then I think for now she can postpone seeing him in he will likely agree with that.  Finally we will stop the Eliquis when she is ambulatory.  I want to see her back in 4 weeks but can do so sooner if there are any problems but I have told her I will be out of the office the first 2 weeks of June and to be sure to call next week if there are any problems.    Until she is ambulatory enough to go by car it should be noted by all concern that transportation to and from appointments, treatments and test is medically necessary.

## 2021-06-02 NOTE — PROGRESS NOTES
"SUBJECTIVE:  The patient is a 65-year-old female with multiple medical problems.` She sees multiple medical specialist.  She has had recent lumbar surgery.  She states she is feeling better actually.    PAST MEDICAL HISTORY:  Reviewed.    REVIEW OF SYSTEMS:  Please see above.  All others reviewed and are negative.      OBJECTIVE:   /72 (BP Location: Left arm, Patient Position: Sitting, Cuff Size: Adult)   Pulse 80   Temp 97.1 °F (36.2 °C) (Temporal)   Ht 175.3 cm (69\")   Wt 83 kg (183 lb)   SpO2 98%   BMI 27.02 kg/m²    Vitals signs are reviewed and are stable.    General:  Well-nourished.  Alert and oriented x3 in no acute distress.  HEENT: PERRLA.   Neck:  Supple.   Lungs:  Clear.    Heart:  Regular rate and rhythm.   Abdomen:   Soft, nontender.   Back: The patient is wearing a brace.  Extremities:  No cyanosis, clubbing or edema.  She ambulates with some difficulty.  .     ASSESSMENT:      Diagnoses and all orders for this visit:    1. Complex dyslipidemia (Primary)    2. Essential hypertension    3. Type 2 diabetes mellitus without complication, with long-term current use of insulin (CMS/[facility])    4. Hypothyroidism due to Hashimoto's thyroiditis    5. Gastroesophageal reflux disease, unspecified whether esophagitis present    6. Rheumatoid arthritis, involving unspecified site, unspecified whether rheumatoid factor present (CMS/[facility])    7. Seizures (CMS/[facility])    8. Insomnia, unspecified type    9. Chronic fatigue         PLAN: Cont.  Current Meds.  F/U with your specialist. Monitor BP and BS. Follow healthy lifestyle. Call if problems        "

## 2021-06-14 NOTE — TELEPHONE ENCOUNTER
Caller: Wendi Ramos    Relationship: Self    Best call back number: [phone]    What is the best time to reach you: ANY    Who are you requesting to speak with (clinical staff, provider,  specific staff member): CLINICAL     What was the call regarding: PATIENT WOULD LIKE A CALL BACK WHEN HER DNR FORM THAT HER  DROPPED OFF LAST WEEK FOR DR. LOVE TO SIGN IS READY FOR PICKUP.    Do you require a callback: YES

## 2021-06-25 NOTE — TELEPHONE ENCOUNTER
Patient is needing a refill on     Soliqua 100-33 UNT-MCG/ML solution pen-injector injection    Sent to     [facility] - [place], [place] - [address] - [phone]  - [phone] FX   Phone:  [phone]   Fax:  [phone]

## 2021-08-02 NOTE — PROGRESS NOTES
"Chief Complaint  Diabetes    Subjective          Wendi Ramos presents to [facility] ENDOCRINOLOGY  History of Present Illness     H/o seizures- frontal and temporal lobe on keppra  Has had back surgery x 2 feb and April     Type 2 dm   Diagnosed about 2 years ago.   Today in clinic pt reports being on soliqua 60u daily and xigduo 5-1000mg XR BID- tolerating well   FBG -   Checks BG - daily  Dm retinopathy - denies,Last eye exam - 7/2021  Dm nephropathy - denies  Dm neuropathy - yes up to her knees, sees podiatry, two small sores on big toes on ABX ointment, Dm neuropathy meds - no  CAD - s/p ablation for afib  CVA - denies   Episodes of hypoglycemia - denies  Pt is less physically active. weight fluctuates a few pounds.   Pt does not follow DM diet for most part.   Lab Results   Component Value Date    HGBA1C 6.80 (H) 02/16/2021       HLP  Rosuvastatin 40mg daily  lovaza daily   Fenofibrate 160mg daily  Lab Results   Component Value Date    CHOL 220 (H) 07/29/2019    CHLPL 114 01/12/2021    TRIG 137 01/12/2021    HDL 47 01/12/2021    LDL 43 01/12/2021          Hypothyroid  Levothyroxine 137mcg daily   Reports compliance  Denies any s/s of hyper/hypothyroid   Lab Results   Component Value Date    TSH 3.740 01/12/2021         Objective   Vital Signs:   /76 (BP Location: Right arm, Patient Position: Sitting, Cuff Size: Adult)   Ht 175.3 cm (69.02\")   Wt 88.5 kg (195 lb)   BMI 28.78 kg/m²     Physical Exam  Vitals reviewed.   Constitutional:       General: She is not in acute distress.  HENT:      Head: Normocephalic and atraumatic.   Cardiovascular:      Rate and Rhythm: Normal rate and regular rhythm.   Pulmonary:      Effort: Pulmonary effort is normal. No respiratory distress.   Musculoskeletal:         General: No signs of injury. Normal range of motion.      Cervical back: Normal range of motion and neck supple.   Skin:     General: Skin is warm and dry.   Neurological:      " Mental Status: She is alert and oriented to person, place, and time. Mental status is at baseline.   Psychiatric:         Mood and Affect: Mood normal.         Behavior: Behavior normal.         Thought Content: Thought content normal.         Judgment: Judgment normal.        Result Review :   The following data was reviewed by: ALESSANDRA Rogers on 08/02/2021:  Common labs    Common Labsle 4/19/21 4/20/21 4/21/21   WBC 7.22 7.86 8.31   Hemoglobin 10.2 (A) 9.5 (A) 9.7 (A)   Hematocrit 32.4 (A) 28.9 (A) 29.7 (A)   Platelets 287 322 339   (A) Abnormal value                      Assessment and Plan    Diagnoses and all orders for this visit:    1. Type 2 diabetes mellitus with hyperglycemia, with long-term current use of insulin (CMS/[facility]) (Primary)  -     Hemoglobin A1c  -     Comprehensive Metabolic Panel  -     Lipid Panel  -     Microalbumin / Creatinine Urine Ratio - Urine, Clean Catch  -     TSH  -     T4, Free    2. Hypothyroidism due to Hashimoto's thyroiditis    3. Complex dyslipidemia        Follow Up   Return in about 6 months (around 2/2/2022).   Continue soliqua and xigduo  Goal a1x <7%  On statin    Continue current t4 dose until labs are back  Needs close monitoring to reduce risk of complications from over or under treatment with thyroid hormone replacement    Patient was given instructions and counseling regarding her condition or for health maintenance advice. Please see specific information pulled into the AVS if appropriate.     ALESSANDRA Rogers

## 2021-08-02 NOTE — PROGRESS NOTES
MTM-[facility]M Pharmacy Visit [facility] Endocrinology    Wendi Ramos is a 65 y.o. female seen by Endocrinology and assessed by the [facility] Management [facility] Pharmacist at [facility].  This was an initial MTM visit for Wendi Ramos.    Wendi Ramos was introduced to the [facility] and her allergies, PMH, and medications were reviewed.       Past Medical History:   Diagnosis Date   • Chronic fatigue    • Depression    • Diabetes mellitus (CMS/[facility])    • Fibromyalgia    • GERD (gastroesophageal reflux disease)    • Hiatal hernia    • History of Clostridioides difficile colitis    • Hyperlipidemia    • Hypertension    • Hypothyroidism    • Irregular heart beat    • Liver disease     FATTY LIVER, NON ALCOHOLIC   • Low back pain    • Peripheral neuropathy    • Rheumatoid arthritis (CMS/HCC)    • Rheumatoid arthritis (CMS/HCC) 2005    DR SMYTH   • Seizures (CMS/[facility])    • Sleep apnea     NO CPAP   • Type 2 diabetes mellitus (CMS/[facility])    • West Nile fever 2002     Social History     Socioeconomic History   • Marital status:      Spouse name: Not on file   • Number of children: 1   • Years of education: 14   • Highest education level: Not on file   Tobacco Use   • Smoking status: Never Smoker   • Smokeless tobacco: Never Used   Vaping Use   • Vaping Use: Never used   Substance and Sexual Activity   • Alcohol use: Yes     Comment: social   • Drug use: No   • Sexual activity: Defer       Medication Allergies:  Adhesive tape, Sulfamethoxazole w/trimethoprim 800-160  [sulfamethoxazole-trimethoprim], Cefpodoxime, Cephalosporins, Metformin and related, Sulfa antibiotics, Topiramate, and Latex        Current Outpatient Medications:   •  aspirin 81 MG EC tablet, Take 1 tablet by mouth Daily., Disp:  , Rfl:   •  B Complex Vitamins (VITAMIN B COMPLEX PO), Take 1 tablet by mouth Daily., Disp: , Rfl:   •  baclofen (LIORESAL) 20 MG tablet, Take 20 mg by mouth 2 (Two) Times a  Day., Disp: , Rfl:   •  dapagliflozin-metformin HCl ER (Xigduo XR) 5-1000 MG tablet, Take 2 tablets by mouth Daily., Disp: 180 tablet, Rfl: 1  •  DULoxetine (CYMBALTA) 60 MG capsule, Take 60 mg by mouth 2 (Two) Times a Day., Disp: , Rfl:   •  fenofibrate 160 MG tablet, Take 1 tablet by mouth Daily., Disp: 90 tablet, Rfl: 1  •  Ferrous Sulfate Dried (High Potency Iron) 65 MG tablet, Take 65 mg by mouth Every Other Day., Disp: , Rfl:   •  HYDROcodone-acetaminophen (NORCO) 7.5-325 MG per tablet, TAKE 1 TABLET BY MOUTH EVERY DAY AS NEEDED FOR PAIN, Disp: , Rfl:   •  hydroxychloroquine (PLAQUENIL) 200 MG tablet, Take 200 mg by mouth 2 (Two) Times a Day., Disp: , Rfl:   •  lamoTRIgine (LaMICtal) 200 MG tablet, , Disp: , Rfl:   •  levETIRAcetam (KEPPRA) 500 MG tablet, Take 1,500 mg by mouth 2 (Two) Times a Day. Take 3 tabs bid, Disp: , Rfl:   •  levothyroxine (SYNTHROID, LEVOTHROID) 137 MCG tablet, Take 1 tablet by mouth Daily., Disp: 90 tablet, Rfl: 0  •  metoprolol tartrate (LOPRESSOR) 50 MG tablet, TAKE 1 TABLET EVERY 12 HOURS., Disp: 180 tablet, Rfl: 0  •  mupirocin (Bactroban) 2 % ointment, Apply  topically to the appropriate area as directed 3 (Three) Times a Day., Disp: 1 each, Rfl: 0  •  omega-3 acid ethyl esters (LOVAZA) 1 g capsule, TAKE 2 CAPSULES TWICE DAILY, Disp: 360 capsule, Rfl: 0  •  omeprazole (priLOSEC) 40 MG capsule, Take 1 capsule by mouth Daily., Disp: 90 capsule, Rfl: 1  •  OXcarbazepine (TRILEPTAL) 600 MG tablet, Take 600 mg by mouth 2 (Two) Times a Day., Disp: , Rfl:   •  oxyCODONE-acetaminophen (PERCOCET) 5-325 MG per tablet, Take 1-2 tablets by mouth every 4 hours as needed for pain., Disp: 50 tablet, Rfl: 0  •  pramipexole (MIRAPEX) 0.5 MG tablet, Take 0.5 mg by mouth 2 (two) times a day., Disp: , Rfl:   •  rosuvastatin (CRESTOR) 40 MG tablet, Take 1 tablet by mouth Daily., Disp: 90 tablet, Rfl: 1  •  Soliqua 100-33 UNT-MCG/ML solution pen-injector injection, Inject 60 Units under the skin into  the appropriate area as directed Daily With Breakfast., Disp: 60 mL, Rfl: 0  •  vitamin D3 125 MCG (5000 UT) capsule capsule, Take 5,000 Units by mouth Daily., Disp: , Rfl:   •  zolpidem (AMBIEN) 10 MG tablet, Take 1 tablet by mouth At Night As Needed for Sleep., Disp: 90 tablet, Rfl: 0      Labs:  There were no vitals filed for this visit.  There were no vitals filed for this visit.  Lab Results   Component Value Date    HGBA1C 6.80 (H) 02/16/2021     Lab Results   Component Value Date    GLUCOSE 128 (H) 04/16/2021    CALCIUM 8.7 04/16/2021     04/16/2021    K 4.2 04/16/2021    CO2 23.9 04/16/2021     04/16/2021    BUN 11 04/16/2021    CREATININE 0.37 (L) 04/17/2021    EGFRIFAFRI 97 01/12/2021    EGFRIFNONA >150 04/17/2021    BCR 26.2 (H) 04/16/2021    ANIONGAP 9.1 04/16/2021     Lab Results   Component Value Date    CHOL 220 (H) 07/29/2019    CHLPL 114 01/12/2021    TRIG 137 01/12/2021    HDL 47 01/12/2021    LDL 43 01/12/2021         Drug-Drug Interactions:   No significant DDIs were noted.      Medication Assessment:   1. Aspirin - Currently Taking   2. Statin - Currently Taking  3. ACEi/ARB - Not currently taking      Medication Education on Specialty Medication:  Soliqua  · Take within the hour before your first meal every day  • Given as a shot in the fatty part of your skin on the top of your thigh, belly area or upper arm  o Wash your hand before and after use  o Rotate the site where you give the sot each time  o Put the cap back on after you are done using your dose.  Throw away needles in a sharps container and do not reuse needles.  Attach a new needle before each use.  • If you miss a dose, skip it and go back to your normal time  o Never double up doses to make up for a missed dose  • Unopened pens should be stored in refrigerator  o Store opened pens at room temperature and th row away any part not used after 28 days  • Nausea, vomiting, and diarrhea are most common when first starting,  but they should decrease over time  o Try to eat smaller meals throughout the day versus larger meals and this should help with GI symptoms as well    Xigduo XR  • Discussed the medication's MOA and that it may cause more frequent urination particularly upon starting the medication  • Take one tablet in the morning with or without food    • Encouraged to drink plenty of water as to not get dehydrated especially in warmer weather or with activity  • Also discussed there may be an increased incidence of UTIs or yeast infections and to monitor for signs/symptoms  • Encouraged to take with food as it may cause N/V/D if taken on empty stomach        Assessment / Plan:  1. The patient was provided medication education via verbal information. Patient expressed understanding and had no further questions at this time.  2. Consider starting an ACEi or ARB in the future.  3. Discussed the [facility] pharmacy services that are available to her, including monitoring of refills, prior authorizations, and copay coupon cards, as applicable, as well as curb-side pick-up or mail-order as needed.  o She did endorse extremely high costs due to being in the coverage gap with Medicare right now (Soliqua = $800/90-day supply recently).  o I will follow-up with her on 9/13/21 to assess the cost of Soliqua and what the plan will be going forward.  4. Contact information for the pharmacy team was provided to the patient.        Leighann Temple, PharmD  8/2/2021  12:00 EDT

## 2021-08-04 NOTE — PROGRESS NOTES
A1c has improved nicely.  Great jobAll labs are within normal limits and look good, no changes needed at this time

## 2021-09-14 NOTE — PROGRESS NOTES
Back pain is improved.  No significant leg pain.  Two-view x-rays of the lumbar spine obtained to evaluate hardware and  fusion bone suggest further healing since prior x-ray.  We will need an AP and lateral lumbar x-ray on return visit.  Instructions were given.  She is actually doing very well 4 months out now we will do some physical therapy I will see her for final checkup in 2 months

## 2021-09-17 NOTE — TELEPHONE ENCOUNTER
Caller: YONY PRINGLE/JAN    Relationship: NURSE    Best call back number: [phone]    What was the call regarding: YONY SEAY NURSE WITH [employer] CALLED TO INFORM DR. LOVE THAT THE PATIENT FRACTURED 2ND LUMBAR VERTBRE ON 04/14/2021, PATIENT HAS NOT HAD A DEXA SCAN.    GUIDELINES ARE THAT THE PATIENT TO HAVE A DEXA SCAN WITHIN 6 MONTHS FROM A FRACTURE.    Do you require a callback: NO

## 2021-09-22 NOTE — PROGRESS NOTES
Physical Therapy Initial Evaluation and Plan of Care    Subjective Evaluation    History of Present Illness  Mechanism of injury: Patient reports that she has had 2 surgeries this year.  After the first surgery she reported that the pins started coming undone and the second surgery was in April.  First surgery was a fusion in L3-4.  Second surgery was a fusion from T10-L5.  Patient was on bed rest for 6 weeks after the second surgery.  Had 4 weeks of home health PT.    Patient has had a history of lumbar pain and two prior surgery.      Patient Occupation: Retired Pain  Current pain ratin  At worst pain rating: 10  Location: mid to low back  Quality: just pain.  Alleviating factors: laying inclined in her bed.  Exacerbated by: nothing specific.  Symptom course: about the same, maybe a little worse.             Objective          Postural Observations    Additional Postural Observation Details  Patient ambulates with an antalgic gait pattern.  Trunk is in a flexed position.  Slow, guarded transitions.    Palpation     Additional Palpation Details  TTP to bilateral T12-L5 pvms.    Active Range of Motion     Lumbar   Flexion: 40 degrees   Extension: 5 degrees   Left lateral flexion: 5 degrees   Right lateral flexion: 5 degrees     Strength/Myotome Testing     Left Hip   Planes of Motion   Abduction: 4  Adduction: 4    Right Hip   Planes of Motion   Abduction: 4  Adduction: 4    Right Knee   Extension: 4    Left Ankle/Foot   Dorsiflexion: 5    Right Ankle/Foot   Dorsiflexion: 5          Assessment & Plan     Assessment  Impairments: abnormal gait, abnormal or restricted ROM, activity intolerance, impaired physical strength, lacks appropriate home exercise program and pain with function  Assessment details: Patient presents with c/o pain, TTP, limited AROM, decreased core strength and an antalgic gait pattern which is limiting her ability to perform ADL'S.  Barriers to therapy: none  Prognosis: good  Prognosis  details: STG's   1)  Independent with HEP  2)  Decrease pain by 50% or more  3)  Increase AROM for the lumbar spine    LTG's   1)  Independent with HEP progression  2)  Decrease pain by 75% or more  3)  Increase strength for the LE to 4+/5  4)  Patient to ambulate with more of an upright position  5)  Min to no TTP present  6)  Patient to ambulate with a min antalgic gait pattern      Plan  Therapy options: will be seen for skilled physical therapy services  Planned modality interventions: thermotherapy (hydrocollator packs) and cryotherapy  Planned therapy interventions: strengthening, stretching, therapeutic activities, home exercise program, gait training and neuromuscular re-education  Treatment plan discussed with: patient        Manual Therapy:    0     mins  62316;  Therapeutic Exercise:    23     mins  23421;    Neuromuscular Roma:    0    mins  22049;    Therapeutic Activity:     0     mins  65202;     Gait Trainin     mins  48759;     Ultrasound:     0     mins  60839;    Work Hardening           0      mins 34942  Iontophoresis               0   mins 17423    Timed Treatment:   23   mins   Total Treatment:     37   mins    PT SIGNATURE: Haroon Pedraza, PT   DATE TREATMENT INITIATED: 2021    Initial Certification  Certification Period: 2021  I certify that the therapy services are furnished while this patient is under my care.  The services outlined above are required by this patient, and will be reviewed every 90 days.     PHYSICIAN: Juancarlos Montelongo MD      DATE:     Please sign and return via fax to [phone].. Thank you, [facility] Physical Therapy.

## 2021-09-29 NOTE — PROGRESS NOTES
Physical Therapy Daily Progress Note        Subjective  Patient reports that she is standing up straighter since last visit; rates the pain at /10.  Reports that the exercises are less painful.    Objective   See Exercise, Manual, and Modality Logs for complete treatment.       Assessment/Plan  Subjective reports are improved from the previous visit (7/10).  Patient tolerated the exercise routine very well, no significant reports of pain with the routine.  Reported that the hips loosened up with the exercises.                     Manual Therapy:    0     mins  11542;  Therapeutic Exercise:    27     mins  09528;     Neuromuscular Roma:    0    mins  72155;    Therapeutic Activity:     0     mins  70219;     Gait Trainin     mins  00012;     Ultrasound:     0     mins  87229;    Work Hardening           0      mins 91669  Iontophoresis               0   mins 88359    Timed Treatment:   27   mins   Total Treatment:     27   mins    Haroon Pedraza, PT  Physical Therapist

## 2021-10-04 NOTE — TELEPHONE ENCOUNTER
Rosuvastatin  40 mg    Test strips  [facility] true matrix test strips     100 for 90 days     Pharmacy has sent over request     Send to [facility] pharmacy  90 day supply    Last prescribed by rosemary

## 2021-10-06 NOTE — PROGRESS NOTES
Physical Therapy Daily Progress Note           Subjective  Patient reports that she is doing a little better.    Objective   See Exercise, Manual, and Modality Logs for complete treatment.       Assessment/Plan  Subjective reports continue to improve since beginning PT.  Patient tolerated the progression of core stabilization exercises very well, no reports of pain with the routine.  Plan to gradually increase the strengthening exercises as the patient can tolerate.                   Manual Therapy:    0     mins  79985;  Therapeutic Exercise:    30     mins  92753;    Neuromuscular Roma:    0    mins  81463;    Therapeutic Activity:     0     mins  91794;     Gait Trainin     mins  39787;     Ultrasound:     0     mins  70303;    Work Hardening           0      mins 28595  Iontophoresis               0   mins 97734    Timed Treatment:   30   mins   Total Treatment:     30   mins    Haroon Pedraza, PT  Physical Therapist

## 2021-10-13 NOTE — PROGRESS NOTES
Physical Therapy Daily Progress Note            Subjective  Patient reports that the pain is better and reports that she continues to be able to walk more upright.    Objective   See Exercise, Manual, and Modality Logs for complete treatment.       Assessment/Plan  Subjective reports are much improved from the initial visit.  Patient tolerated the increase in core stabilization exercises very well, no reports of pain with the routine.  Continue PT 1 more visit, then D/C to HEP.                   Manual Therapy:    0     mins  14702;  Therapeutic Exercise:    28     mins  71448;     Neuromuscular Roma:    0    mins  01488;    Therapeutic Activity:     0     mins  82648;     Gait Trainin     mins  36022;     Ultrasound:     0     mins  00946;    Work Hardening           0      mins 00509  Iontophoresis               0   mins 68067    Timed Treatment:   28   mins   Total Treatment:     28   mins    Haroon Pedraza, PT  Physical Therapist

## 2021-10-20 NOTE — PROGRESS NOTES
Physical Therapy Daily Progress Note           Subjective  Patient reports that she is standing up and moving better.  States that she has reduced the amount of pain medicine she is taking.    Objective   See Exercise, Manual, and Modality Logs for complete treatment.       Assessment/Plan  Subjective reports continue to improve.  Feel that the patient can continue with the HEP at this time.  Patient performed the routine without visual or verbal c/o pain.                     Manual Therapy:    0     mins  77035;  Therapeutic Exercise:    25     mins  36930;     Neuromuscular Roma:    0    mins  88442;    Therapeutic Activity:     0     mins  32536;     Gait Trainin     mins  13464;     Ultrasound:     0     mins  48519;    Work Hardening           0      mins 62449  Iontophoresis               0   mins 56186    Timed Treatment:   25   mins   Total Treatment:     25   mins    Haroon Pedraza, PT  Physical Therapist

## 2021-10-20 NOTE — PROGRESS NOTES
Discharge Summary  Discharge Summary from Physical Therapy Report    Patient Information  Wendi Ramos  1955    Dates  PT visit: 9/22 to 10/20/21  Number of Visits: 5     Discharge Status of Patient: See last daily note.    Goals: Partially Met    Visit Diagnoses:  No diagnosis found.    Discharge Plan: Continue with current home exercise program as instructed    Comments see above.    Date of Discharge 10/20/21        Haroon Pedraza, PT  Physical Therapist

## 2021-11-16 NOTE — PROGRESS NOTES
She is doing very well now 6 months out from T10-L2 fusion and repair of kyphosis.  Occasional back pain some days worse than others but overall she is much happier with her remaining pain and gets around fairly nicely.  Good strength in the legs and 2 view x-rays of the thoracolumbar spine show good position of graft and implants and I think she is solid lumbar part we know to be solid in from L2 it is less obvious but there is no hardware lucency whatsoever in this area and I think she is got some posterior fusion in the thoracic part.  Looks better compared to prior films doing well I will see her back as needed instructions given.   denies

## 2021-12-08 NOTE — PROGRESS NOTES
"Chief Complaint  Diabetes, Hyperthyroidism, Hyperlipidemia, and Vitamin D Deficiency    Subjective          Wendi Ramos presents to [facility] ENDOCRINOLOGY  History of Present Illness   I have reviewed PMH, allergies and medications UTD at this visit     H/o seizures- frontal and temporal lobe- weaned off medication but feels like this increased her BS reading    Has had back surgery x 2 feb and April      Type 2 dm   Diagnosed about 2 years ago.   Today in clinic pt reports being on soliqua 60u daily and xigduo 5-1000mg XR BID- tolerating well   FBG -   Checks BG - daily  Dm retinopathy - denies, Last eye exam - 7/2021  Dm nephropathy - denies  Dm neuropathy - yes, Dm neuropathy meds - no  CAD - no but is s/p ablation for afib  CVA - denies   Episodes of hypoglycemia - denies  Pt is less physically active after back surgery. weight overstable  Pt does not follow DM diet for most part. but does need to cut back on carbs and sweets  Lab Results   Component Value Date    HGBA1C 7.2 (H) 11/24/2021       Objective   Vital Signs:   /75   Pulse 81   Ht 175.3 cm (69\")   Wt 96.7 kg (213 lb 3.2 oz)   SpO2 96%   BMI 31.48 kg/m²     Physical Exam  Vitals reviewed.   Constitutional:       General: She is not in acute distress.  HENT:      Head: Normocephalic and atraumatic.   Cardiovascular:      Rate and Rhythm: Normal rate and regular rhythm.   Pulmonary:      Effort: Pulmonary effort is normal. No respiratory distress.   Musculoskeletal:         General: No signs of injury. Normal range of motion.      Cervical back: Normal range of motion and neck supple.   Skin:     General: Skin is warm and dry.   Neurological:      Mental Status: She is alert and oriented to person, place, and time. Mental status is at baseline.   Psychiatric:         Mood and Affect: Mood normal.         Behavior: Behavior normal.         Thought Content: Thought content normal.         Judgment: Judgment normal. "          Result Review :   The following data was reviewed by: ALESSANDRA Rogers on 12/08/2021:  Common labs    Common Labsle 4/21/21 8/2/21 8/2/21 8/2/21 8/2/21 11/24/21 11/24/21 11/24/21 11/24/21     1152 1152 1152 1152 1147 1147 1147 1147   Glucose   97      119 (A)   BUN   20      17   Creatinine   0.65      0.69   eGFR Non  Am   92      90   eGFR  Am   106      104   Sodium   141      145 (A)   Potassium   5.1      5.2   Chloride   104      105   Calcium   10.0      10.2   Total Protein   7.8      7.6   Albumin   4.8      4.8   Total Bilirubin   <0.2      <0.2   Alkaline Phosphatase   86      81   AST (SGOT)   37      33   ALT (SGPT)   24      29   WBC 8.31           Hemoglobin 9.7 (A)           Hematocrit 29.7 (A)           Platelets 339           Total Cholesterol    115   91 (A)     Triglycerides    95   109     HDL Cholesterol    57   36 (A)     LDL Cholesterol     40   35     Hemoglobin A1C  5.9 (A)      7.2 (A)    Microalbumin, Urine     CANCELED 12.3      (A) Abnormal value       Comments are available for some flowsheets but are not being displayed.                     Assessment and Plan    Diagnoses and all orders for this visit:    1. Type 2 diabetes mellitus with hyperglycemia, with long-term current use of insulin (HCC) (Primary)  -     Hemoglobin A1c; Future  -     Comprehensive Metabolic Panel; Future  -     Lipid Panel; Future  -     Microalbumin / Creatinine Urine Ratio - Urine, Clean Catch; Future  -     TSH; Future  -     T4, Free; Future  -     T3, Free; Future    2. Hypothyroidism due to Hashimoto's thyroiditis  -     Hemoglobin A1c; Future  -     Comprehensive Metabolic Panel; Future  -     Lipid Panel; Future  -     Microalbumin / Creatinine Urine Ratio - Urine, Clean Catch; Future  -     TSH; Future  -     T4, Free; Future  -     T3, Free; Future    3. Complex dyslipidemia  -     Hemoglobin A1c; Future  -     Comprehensive Metabolic Panel; Future  -     Lipid Panel;  Future  -     Microalbumin / Creatinine Urine Ratio - Urine, Clean Catch; Future  -     TSH; Future  -     T4, Free; Future  -     T3, Free; Future        Follow Up   Return in about 4 months (around 4/8/2022).   a1c up a bit but will make diet changes before making medication changes   Continue Soliqua and xigduo  Continue current T4 dose  Continue statin    Patient was given instructions and counseling regarding her condition or for health maintenance advice. Please see specific information pulled into the AVS if appropriate.     Dolores Lan APRN

## 2022-01-01 ENCOUNTER — APPOINTMENT (OUTPATIENT)
Dept: GENERAL RADIOLOGY | Facility: HOSPITAL | Age: 67
End: 2022-01-01

## 2022-01-01 ENCOUNTER — ANESTHESIA (OUTPATIENT)
Dept: PERIOP | Facility: HOSPITAL | Age: 67
End: 2022-01-01

## 2022-01-01 ENCOUNTER — READMISSION MANAGEMENT (OUTPATIENT)
Dept: CALL CENTER | Facility: HOSPITAL | Age: 67
End: 2022-01-01

## 2022-01-01 ENCOUNTER — TELEPHONE (OUTPATIENT)
Dept: ORTHOPEDIC SURGERY | Facility: CLINIC | Age: 67
End: 2022-01-01

## 2022-01-01 ENCOUNTER — HOSPITAL ENCOUNTER (INPATIENT)
Facility: HOSPITAL | Age: 67
LOS: 2 days | End: 2022-03-09
Attending: EMERGENCY MEDICINE | Admitting: STUDENT IN AN ORGANIZED HEALTH CARE EDUCATION/TRAINING PROGRAM

## 2022-01-01 ENCOUNTER — APPOINTMENT (OUTPATIENT)
Dept: CARDIOLOGY | Facility: HOSPITAL | Age: 67
End: 2022-01-01

## 2022-01-01 ENCOUNTER — TELEPHONE (OUTPATIENT)
Dept: ENDOCRINOLOGY | Age: 67
End: 2022-01-01

## 2022-01-01 ENCOUNTER — HOSPITAL ENCOUNTER (EMERGENCY)
Facility: HOSPITAL | Age: 67
Discharge: HOME | End: 2022-01-13
Attending: EMERGENCY MEDICINE | Admitting: EMERGENCY MEDICINE

## 2022-01-01 ENCOUNTER — APPOINTMENT (OUTPATIENT)
Dept: NUCLEAR MEDICINE | Facility: HOSPITAL | Age: 67
End: 2022-01-01

## 2022-01-01 ENCOUNTER — HOME CARE VISIT (OUTPATIENT)
Dept: HOME HEALTH SERVICES | Facility: HOME HEALTHCARE | Age: 67
End: 2022-01-01

## 2022-01-01 ENCOUNTER — TELEPHONE (OUTPATIENT)
Dept: FAMILY MEDICINE CLINIC | Facility: CLINIC | Age: 67
End: 2022-01-01

## 2022-01-01 ENCOUNTER — APPOINTMENT (OUTPATIENT)
Dept: MRI IMAGING | Facility: HOSPITAL | Age: 67
End: 2022-01-01

## 2022-01-01 ENCOUNTER — HOSPITAL ENCOUNTER (EMERGENCY)
Facility: HOSPITAL | Age: 67
Discharge: HOME | End: 2022-02-16
Attending: EMERGENCY MEDICINE | Admitting: EMERGENCY MEDICINE

## 2022-01-01 ENCOUNTER — APPOINTMENT (OUTPATIENT)
Dept: BONE DENSITY | Facility: HOSPITAL | Age: 67
End: 2022-01-01

## 2022-01-01 ENCOUNTER — HOSPITAL ENCOUNTER (OUTPATIENT)
Dept: MRI IMAGING | Facility: HOSPITAL | Age: 67
Discharge: HOME | End: 2022-01-21
Admitting: ORTHOPAEDIC SURGERY

## 2022-01-01 ENCOUNTER — APPOINTMENT (OUTPATIENT)
Dept: ULTRASOUND IMAGING | Facility: HOSPITAL | Age: 67
End: 2022-01-01

## 2022-01-01 ENCOUNTER — HOME HEALTH ADMISSION (OUTPATIENT)
Dept: HOME HEALTH SERVICES | Facility: HOME HEALTHCARE | Age: 67
End: 2022-01-01

## 2022-01-01 ENCOUNTER — TRANSITIONAL CARE MANAGEMENT TELEPHONE ENCOUNTER (OUTPATIENT)
Dept: CALL CENTER | Facility: HOSPITAL | Age: 67
End: 2022-01-01

## 2022-01-01 ENCOUNTER — ANESTHESIA EVENT (OUTPATIENT)
Dept: PERIOP | Facility: HOSPITAL | Age: 67
End: 2022-01-01

## 2022-01-01 ENCOUNTER — HOSPITAL ENCOUNTER (INPATIENT)
Facility: HOSPITAL | Age: 67
LOS: 6 days | Discharge: HOME HEALTH | End: 2022-03-02
Attending: EMERGENCY MEDICINE | Admitting: INTERNAL MEDICINE

## 2022-01-01 VITALS
SYSTOLIC BLOOD PRESSURE: 91 MMHG | TEMPERATURE: 97.9 F | HEIGHT: 69 IN | DIASTOLIC BLOOD PRESSURE: 68 MMHG | BODY MASS INDEX: 28.44 KG/M2 | OXYGEN SATURATION: 83 % | WEIGHT: 192 LBS

## 2022-01-01 VITALS
DIASTOLIC BLOOD PRESSURE: 77 MMHG | OXYGEN SATURATION: 96 % | HEART RATE: 78 BPM | SYSTOLIC BLOOD PRESSURE: 133 MMHG | RESPIRATION RATE: 16 BRPM | TEMPERATURE: 98.2 F

## 2022-01-01 VITALS
RESPIRATION RATE: 18 BRPM | HEART RATE: 70 BPM | SYSTOLIC BLOOD PRESSURE: 130 MMHG | DIASTOLIC BLOOD PRESSURE: 64 MMHG | TEMPERATURE: 98.7 F | OXYGEN SATURATION: 98 %

## 2022-01-01 VITALS
OXYGEN SATURATION: 95 % | WEIGHT: 200 LBS | DIASTOLIC BLOOD PRESSURE: 77 MMHG | HEIGHT: 69 IN | BODY MASS INDEX: 29.62 KG/M2 | HEART RATE: 85 BPM | RESPIRATION RATE: 18 BRPM | SYSTOLIC BLOOD PRESSURE: 147 MMHG | TEMPERATURE: 98.8 F

## 2022-01-01 VITALS
SYSTOLIC BLOOD PRESSURE: 168 MMHG | TEMPERATURE: 98 F | HEART RATE: 82 BPM | RESPIRATION RATE: 20 BRPM | OXYGEN SATURATION: 98 % | DIASTOLIC BLOOD PRESSURE: 78 MMHG

## 2022-01-01 VITALS
BODY MASS INDEX: 31.58 KG/M2 | SYSTOLIC BLOOD PRESSURE: 137 MMHG | WEIGHT: 213.19 LBS | DIASTOLIC BLOOD PRESSURE: 68 MMHG | RESPIRATION RATE: 16 BRPM | OXYGEN SATURATION: 95 % | HEART RATE: 69 BPM | HEIGHT: 69 IN | TEMPERATURE: 98.4 F

## 2022-01-01 DIAGNOSIS — M54.50 CHRONIC BILATERAL LOW BACK PAIN WITHOUT SCIATICA: ICD-10-CM

## 2022-01-01 DIAGNOSIS — Z79.4 TYPE 2 DIABETES MELLITUS WITHOUT COMPLICATION, WITH LONG-TERM CURRENT USE OF INSULIN: ICD-10-CM

## 2022-01-01 DIAGNOSIS — M48.062 LUMBAR STENOSIS WITH NEUROGENIC CLAUDICATION: Primary | ICD-10-CM

## 2022-01-01 DIAGNOSIS — M54.41 BILATERAL LOW BACK PAIN WITH BILATERAL SCIATICA, UNSPECIFIED CHRONICITY: Primary | ICD-10-CM

## 2022-01-01 DIAGNOSIS — M54.50 ACUTE RIGHT-SIDED LOW BACK PAIN WITHOUT SCIATICA: ICD-10-CM

## 2022-01-01 DIAGNOSIS — M25.552 ACUTE HIP PAIN, LEFT: ICD-10-CM

## 2022-01-01 DIAGNOSIS — G89.29 CHRONIC BILATERAL LOW BACK PAIN WITHOUT SCIATICA: ICD-10-CM

## 2022-01-01 DIAGNOSIS — Z98.1 S/P LUMBAR SPINAL FUSION: Primary | ICD-10-CM

## 2022-01-01 DIAGNOSIS — G06.2 EPIDURAL ABSCESS: ICD-10-CM

## 2022-01-01 DIAGNOSIS — M54.59 INTRACTABLE LOW BACK PAIN: Primary | ICD-10-CM

## 2022-01-01 DIAGNOSIS — M40.15 OTHER SECONDARY KYPHOSIS, THORACOLUMBAR REGION: ICD-10-CM

## 2022-01-01 DIAGNOSIS — M48.062 LUMBAR STENOSIS WITH NEUROGENIC CLAUDICATION: ICD-10-CM

## 2022-01-01 DIAGNOSIS — M54.50 THORACOLUMBAR BACK PAIN: ICD-10-CM

## 2022-01-01 DIAGNOSIS — M54.42 BILATERAL LOW BACK PAIN WITH BILATERAL SCIATICA, UNSPECIFIED CHRONICITY: Primary | ICD-10-CM

## 2022-01-01 DIAGNOSIS — M54.6 THORACOLUMBAR BACK PAIN: ICD-10-CM

## 2022-01-01 DIAGNOSIS — M48.062 SPINAL STENOSIS OF LUMBAR REGION WITH NEUROGENIC CLAUDICATION: ICD-10-CM

## 2022-01-01 DIAGNOSIS — M25.551 RIGHT HIP PAIN: ICD-10-CM

## 2022-01-01 DIAGNOSIS — E11.9 TYPE 2 DIABETES MELLITUS WITHOUT COMPLICATION, WITH LONG-TERM CURRENT USE OF INSULIN: ICD-10-CM

## 2022-01-01 DIAGNOSIS — M54.42 ACUTE BILATERAL LOW BACK PAIN WITH LEFT-SIDED SCIATICA: Primary | ICD-10-CM

## 2022-01-01 DIAGNOSIS — T39.1X2A ACETAMINOPHEN OVERDOSE, INTENTIONAL SELF-HARM, INITIAL ENCOUNTER: Primary | ICD-10-CM

## 2022-01-01 LAB
ABO GROUP BLD: NORMAL
ABO GROUP BLD: NORMAL
ALBUMIN SERPL-MCNC: 2.3 G/DL (ref 3.5–5.2)
ALBUMIN SERPL-MCNC: 2.5 G/DL (ref 3.5–5.2)
ALBUMIN SERPL-MCNC: 2.9 G/DL (ref 3.5–5.2)
ALBUMIN SERPL-MCNC: 4.1 G/DL (ref 3.5–5.2)
ALBUMIN SERPL-MCNC: 4.2 G/DL (ref 3.5–5.2)
ALBUMIN/GLOB SERPL: 0.9 G/DL
ALBUMIN/GLOB SERPL: 0.9 G/DL
ALBUMIN/GLOB SERPL: 1 G/DL
ALBUMIN/GLOB SERPL: 1 G/DL
ALBUMIN/GLOB SERPL: 1.1 G/DL
ALP SERPL-CCNC: 114 U/L (ref 39–117)
ALP SERPL-CCNC: 118 U/L (ref 39–117)
ALP SERPL-CCNC: 157 U/L (ref 39–117)
ALP SERPL-CCNC: 169 U/L (ref 39–117)
ALP SERPL-CCNC: 318 U/L (ref 39–117)
ALT SERPL W P-5'-P-CCNC: 170 U/L (ref 1–33)
ALT SERPL W P-5'-P-CCNC: 1783 U/L (ref 1–33)
ALT SERPL W P-5'-P-CCNC: 20 U/L (ref 1–33)
ALT SERPL W P-5'-P-CCNC: 25 U/L (ref 1–33)
ALT SERPL W P-5'-P-CCNC: 3352 U/L (ref 1–33)
AMMONIA BLD-SCNC: 146 UMOL/L (ref 11–51)
AMPHET+METHAMPHET UR QL: NEGATIVE
ANION GAP SERPL CALCULATED.3IONS-SCNC: 10 MMOL/L (ref 5–15)
ANION GAP SERPL CALCULATED.3IONS-SCNC: 11.1 MMOL/L (ref 5–15)
ANION GAP SERPL CALCULATED.3IONS-SCNC: 11.5 MMOL/L (ref 5–15)
ANION GAP SERPL CALCULATED.3IONS-SCNC: 12.5 MMOL/L (ref 5–15)
ANION GAP SERPL CALCULATED.3IONS-SCNC: 14 MMOL/L (ref 5–15)
ANION GAP SERPL CALCULATED.3IONS-SCNC: 16.7 MMOL/L (ref 5–15)
ANION GAP SERPL CALCULATED.3IONS-SCNC: 19 MMOL/L (ref 5–15)
ANION GAP SERPL CALCULATED.3IONS-SCNC: 34 MMOL/L (ref 5–15)
ANION GAP SERPL CALCULATED.3IONS-SCNC: 9 MMOL/L (ref 5–15)
ANION GAP SERPL CALCULATED.3IONS-SCNC: 9.6 MMOL/L (ref 5–15)
ANION GAP SERPL CALCULATED.3IONS-SCNC: 9.7 MMOL/L (ref 5–15)
AORTIC DIMENSIONLESS INDEX: 0.6 (DI)
APAP SERPL-MCNC: 120.8 MCG/ML (ref 0–30)
APAP SERPL-MCNC: 29.4 MCG/ML (ref 0–30)
APTT PPP: 32.3 SECONDS (ref 22.7–35.4)
APTT PPP: 34.1 SECONDS (ref 22.7–35.4)
APTT PPP: 39.3 SECONDS (ref 22.7–35.4)
AST SERPL-CCNC: 29 U/L (ref 1–32)
AST SERPL-CCNC: 35 U/L (ref 1–32)
AST SERPL-CCNC: 392 U/L (ref 1–32)
AST SERPL-CCNC: 5725 U/L (ref 1–32)
AST SERPL-CCNC: >7000 U/L (ref 1–32)
BACTERIA SPEC AEROBE CULT: ABNORMAL
BACTERIA SPEC AEROBE CULT: NORMAL
BACTERIA SPEC AEROBE CULT: NORMAL
BACTERIA SPEC ANAEROBE CULT: NORMAL
BARBITURATES UR QL SCN: NEGATIVE
BASOPHILS # BLD AUTO: 0.01 10*3/MM3 (ref 0–0.2)
BASOPHILS # BLD AUTO: 0.03 10*3/MM3 (ref 0–0.2)
BASOPHILS # BLD AUTO: 0.03 10*3/MM3 (ref 0–0.2)
BASOPHILS # BLD AUTO: 0.04 10*3/MM3 (ref 0–0.2)
BASOPHILS # BLD AUTO: 0.05 10*3/MM3 (ref 0–0.2)
BASOPHILS # BLD AUTO: 0.06 10*3/MM3 (ref 0–0.2)
BASOPHILS NFR BLD AUTO: 0.1 % (ref 0–1.5)
BASOPHILS NFR BLD AUTO: 0.3 % (ref 0–1.5)
BASOPHILS NFR BLD AUTO: 0.5 % (ref 0–1.5)
BASOPHILS NFR BLD AUTO: 0.5 % (ref 0–1.5)
BENZODIAZ UR QL SCN: NEGATIVE
BH BB BLOOD EXPIRATION DATE: NORMAL
BH BB BLOOD TYPE BARCODE: 600
BH BB DISPENSE STATUS: NORMAL
BH BB PRODUCT CODE: NORMAL
BH BB UNIT NUMBER: NORMAL
BH CV ECHO MEAS - ACS: 1.92 CM
BH CV ECHO MEAS - AO MAX PG: 15.5 MMHG
BH CV ECHO MEAS - AO MEAN PG: 6.8 MMHG
BH CV ECHO MEAS - AO V2 MAX: 196.8 CM/SEC
BH CV ECHO MEAS - AO V2 VTI: 39.9 CM
BH CV ECHO MEAS - AVA(I,D): 1.92 CM2
BH CV ECHO MEAS - CONTRAST EF 4CH: 73 CM2
BH CV ECHO MEAS - EDV(CUBED): 135.6 ML
BH CV ECHO MEAS - EDV(MOD-SP2): 166 ML
BH CV ECHO MEAS - EDV(MOD-SP4): 149 ML
BH CV ECHO MEAS - EF(MOD-BP): 73 %
BH CV ECHO MEAS - EF(MOD-SP2): 71.7 %
BH CV ECHO MEAS - EF(MOD-SP4): 75.2 %
BH CV ECHO MEAS - ESV(CUBED): 31.1 ML
BH CV ECHO MEAS - ESV(MOD-SP2): 47 ML
BH CV ECHO MEAS - ESV(MOD-SP4): 37 ML
BH CV ECHO MEAS - FS: 38.8 %
BH CV ECHO MEAS - IVS/LVPW: 0.89 CM
BH CV ECHO MEAS - IVSD: 1.16 CM
BH CV ECHO MEAS - LAT PEAK E' VEL: 6.9 CM/SEC
BH CV ECHO MEAS - LV DIASTOLIC VOL/BSA (35-75): 73.4 CM2
BH CV ECHO MEAS - LV MASS(C)D: 253.2 GRAMS
BH CV ECHO MEAS - LV MAX PG: 8.6 MMHG
BH CV ECHO MEAS - LV MEAN PG: 3.2 MMHG
BH CV ECHO MEAS - LV SYSTOLIC VOL/BSA (12-30): 18.2 CM2
BH CV ECHO MEAS - LV V1 MAX: 146.5 CM/SEC
BH CV ECHO MEAS - LV V1 VTI: 23.7 CM
BH CV ECHO MEAS - LVIDD: 5.1 CM
BH CV ECHO MEAS - LVIDS: 3.1 CM
BH CV ECHO MEAS - LVOT AREA: 3.2 CM2
BH CV ECHO MEAS - LVOT DIAM: 2.03 CM
BH CV ECHO MEAS - LVPWD: 1.31 CM
BH CV ECHO MEAS - MED PEAK E' VEL: 7.3 CM/SEC
BH CV ECHO MEAS - MV A DUR: 0.13 SEC
BH CV ECHO MEAS - MV A MAX VEL: 139.7 CM/SEC
BH CV ECHO MEAS - MV DEC SLOPE: 379.1 CM/SEC2
BH CV ECHO MEAS - MV DEC TIME: 0.27 MSEC
BH CV ECHO MEAS - MV E MAX VEL: 136 CM/SEC
BH CV ECHO MEAS - MV E/A: 0.97
BH CV ECHO MEAS - MV MAX PG: 10.4 MMHG
BH CV ECHO MEAS - MV MEAN PG: 4.8 MMHG
BH CV ECHO MEAS - MV V2 VTI: 49.9 CM
BH CV ECHO MEAS - MVA(VTI): 1.54 CM2
BH CV ECHO MEAS - PA ACC TIME: 0.1 SEC
BH CV ECHO MEAS - PA PR(ACCEL): 33.8 MMHG
BH CV ECHO MEAS - PA V2 MAX: 126.2 CM/SEC
BH CV ECHO MEAS - PULM A REVS DUR: 0.1 SEC
BH CV ECHO MEAS - PULM A REVS VEL: 28.3 CM/SEC
BH CV ECHO MEAS - PULM DIAS VEL: 66.7 CM/SEC
BH CV ECHO MEAS - PULM SYS VEL: 41.4 CM/SEC
BH CV ECHO MEAS - RAP SYSTOLE: 3 MMHG
BH CV ECHO MEAS - RV MAX PG: 2.27 MMHG
BH CV ECHO MEAS - RV V1 MAX: 75.4 CM/SEC
BH CV ECHO MEAS - RV V1 VTI: 16.7 CM
BH CV ECHO MEAS - SI(MOD-SP2): 58.6 ML/M2
BH CV ECHO MEAS - SI(MOD-SP4): 55.2 ML/M2
BH CV ECHO MEAS - SV(LVOT): 76.9 ML
BH CV ECHO MEAS - SV(MOD-SP2): 119 ML
BH CV ECHO MEAS - SV(MOD-SP4): 112 ML
BH CV ECHO MEAS - TAPSE (>1.6): 1.82 CM
BH CV ECHO MEASUREMENTS AVERAGE E/E' RATIO: 19.15
BH CV VAS BP RIGHT ARM: NORMAL MMHG
BH CV VAS HEPATIC PORTAL VEIN DIAMETER: 0.77 CM
BH CV VAS HEPATOPORTAL HEPATIC V LT DIRECTION: NORMAL
BH CV VAS HEPATOPORTAL HEPATIC V MID DIRECTION: NORMAL
BH CV VAS HEPATOPORTAL HEPATIC V RT DIRECTION: NORMAL
BH CV VAS HEPATOPORTAL IVC CONFLUENCE FLOW: NORMAL
BH CV VAS HEPATOPORTAL IVC CONFLUENCE SPONT: NORMAL
BH CV VAS HEPATOPORTAL IVC FLOW: NORMAL
BH CV VAS HEPATOPORTAL IVC SPONT: NORMAL
BH CV VAS HEPATOPORTAL PORTAL V EXTRAHEPATIC DIRECTION: NORMAL
BH CV VAS HEPATOPORTAL PORTAL V EXTRAHEPATIC FLOW: NORMAL
BH CV VAS HEPATOPORTAL PORTAL V LT INTRA DIRECTION: NORMAL
BH CV VAS HEPATOPORTAL PORTAL V LT INTRA FLOW: NORMAL
BH CV VAS HEPATOPORTAL PORTAL V MAIN INTRA DIRECTION: NORMAL
BH CV VAS HEPATOPORTAL PORTAL V MAIN INTRA FLOW: NORMAL
BH CV VAS HEPATOPORTAL PORTAL V RT INTRA DIRECTION: NORMAL
BH CV VAS HEPATOPORTAL PORTAL V RT INTRA FLOW: NORMAL
BH CV VAS HEPATOPORTAL SMV DIRECTION: NORMAL
BH CV VAS HEPATOPORTAL SMV FLOW: NORMAL
BH CV VAS SMA HEPATIC EDV: 35.3 CM/S
BH CV VAS SMA HEPATIC PSV: 202 CM/S
BH CV VAS SMA SPLENIC EDV: 20.5 CM/S
BH CV VAS SMA SPLENIC PSV: 140 CM/S
BH CV XLRA - RV BASE: 3.5 CM
BH CV XLRA - RV LENGTH: 7.7 CM
BH CV XLRA - RV MID: 2.8 CM
BH CV XLRA - TDI S': 8.8 CM/SEC
BILIRUB CONJ SERPL-MCNC: 0.9 MG/DL (ref 0–0.3)
BILIRUB SERPL-MCNC: 0.2 MG/DL (ref 0–1.2)
BILIRUB SERPL-MCNC: 0.2 MG/DL (ref 0–1.2)
BILIRUB SERPL-MCNC: 0.8 MG/DL (ref 0–1.2)
BILIRUB SERPL-MCNC: 1.3 MG/DL (ref 0–1.2)
BILIRUB SERPL-MCNC: 1.6 MG/DL (ref 0–1.2)
BLD GP AB SCN SERPL QL: NEGATIVE
BLD GP AB SCN SERPL QL: NEGATIVE
BUN SERPL-MCNC: 10 MG/DL (ref 8–23)
BUN SERPL-MCNC: 12 MG/DL (ref 8–23)
BUN SERPL-MCNC: 13 MG/DL (ref 8–23)
BUN SERPL-MCNC: 19 MG/DL (ref 8–23)
BUN SERPL-MCNC: 4 MG/DL (ref 8–23)
BUN SERPL-MCNC: 6 MG/DL (ref 8–23)
BUN SERPL-MCNC: 7 MG/DL (ref 8–23)
BUN SERPL-MCNC: 8 MG/DL (ref 8–23)
BUN SERPL-MCNC: 9 MG/DL (ref 8–23)
BUN/CREAT SERPL: 14 (ref 7–25)
BUN/CREAT SERPL: 16.2 (ref 7–25)
BUN/CREAT SERPL: 16.7 (ref 7–25)
BUN/CREAT SERPL: 17.4 (ref 7–25)
BUN/CREAT SERPL: 19 (ref 7–25)
BUN/CREAT SERPL: 20 (ref 7–25)
BUN/CREAT SERPL: 21 (ref 7–25)
BUN/CREAT SERPL: 29.7 (ref 7–25)
BUN/CREAT SERPL: 5.2 (ref 7–25)
BUN/CREAT SERPL: 8.7 (ref 7–25)
BUN/CREAT SERPL: 8.8 (ref 7–25)
CALCIUM SPEC-SCNC: 10.3 MG/DL (ref 8.6–10.5)
CALCIUM SPEC-SCNC: 7.1 MG/DL (ref 8.6–10.5)
CALCIUM SPEC-SCNC: 7.4 MG/DL (ref 8.6–10.5)
CALCIUM SPEC-SCNC: 7.8 MG/DL (ref 8.6–10.5)
CALCIUM SPEC-SCNC: 7.8 MG/DL (ref 8.6–10.5)
CALCIUM SPEC-SCNC: 8.1 MG/DL (ref 8.6–10.5)
CALCIUM SPEC-SCNC: 8.4 MG/DL (ref 8.6–10.5)
CALCIUM SPEC-SCNC: 8.4 MG/DL (ref 8.6–10.5)
CALCIUM SPEC-SCNC: 8.5 MG/DL (ref 8.6–10.5)
CALCIUM SPEC-SCNC: 9.2 MG/DL (ref 8.6–10.5)
CALCIUM SPEC-SCNC: 9.7 MG/DL (ref 8.6–10.5)
CANNABINOIDS SERPL QL: NEGATIVE
CHLORIDE SERPL-SCNC: 102 MMOL/L (ref 98–107)
CHLORIDE SERPL-SCNC: 103 MMOL/L (ref 98–107)
CHLORIDE SERPL-SCNC: 104 MMOL/L (ref 98–107)
CHLORIDE SERPL-SCNC: 104 MMOL/L (ref 98–107)
CHLORIDE SERPL-SCNC: 107 MMOL/L (ref 98–107)
CHLORIDE SERPL-SCNC: 108 MMOL/L (ref 98–107)
CHLORIDE SERPL-SCNC: 109 MMOL/L (ref 98–107)
CK SERPL-CCNC: 211 U/L (ref 20–180)
CO2 SERPL-SCNC: 16 MMOL/L (ref 22–29)
CO2 SERPL-SCNC: 17.3 MMOL/L (ref 22–29)
CO2 SERPL-SCNC: 20.9 MMOL/L (ref 22–29)
CO2 SERPL-SCNC: 21 MMOL/L (ref 22–29)
CO2 SERPL-SCNC: 21.4 MMOL/L (ref 22–29)
CO2 SERPL-SCNC: 21.5 MMOL/L (ref 22–29)
CO2 SERPL-SCNC: 22 MMOL/L (ref 22–29)
CO2 SERPL-SCNC: 23.5 MMOL/L (ref 22–29)
CO2 SERPL-SCNC: 24 MMOL/L (ref 22–29)
CO2 SERPL-SCNC: 26.3 MMOL/L (ref 22–29)
CO2 SERPL-SCNC: 4 MMOL/L (ref 22–29)
COCAINE UR QL: NEGATIVE
CREAT SERPL-MCNC: 0.37 MG/DL (ref 0.57–1)
CREAT SERPL-MCNC: 0.42 MG/DL (ref 0.57–1)
CREAT SERPL-MCNC: 0.46 MG/DL (ref 0.57–1)
CREAT SERPL-MCNC: 0.5 MG/DL (ref 0.57–1)
CREAT SERPL-MCNC: 0.54 MG/DL (ref 0.57–1)
CREAT SERPL-MCNC: 0.6 MG/DL (ref 0.57–1)
CREAT SERPL-MCNC: 0.62 MG/DL (ref 0.57–1)
CREAT SERPL-MCNC: 0.64 MG/DL (ref 0.57–1)
CREAT SERPL-MCNC: 0.69 MG/DL (ref 0.57–1)
CREAT SERPL-MCNC: 1.14 MG/DL (ref 0.57–1)
CREAT SERPL-MCNC: 2.3 MG/DL (ref 0.57–1)
CROSSMATCH INTERPRETATION: NORMAL
CRP SERPL-MCNC: 17.96 MG/DL (ref 0–0.5)
CRP SERPL-MCNC: 5.99 MG/DL (ref 0–0.5)
CRP SERPL-MCNC: 8.89 MG/DL (ref 0–0.5)
CRP SERPL-MCNC: 9.11 MG/DL (ref 0–0.5)
D-LACTATE SERPL-SCNC: 6.4 MMOL/L (ref 0.5–2)
D-LACTATE SERPL-SCNC: 7 MMOL/L (ref 0.5–2)
DEPRECATED RDW RBC AUTO: 38.5 FL (ref 37–54)
DEPRECATED RDW RBC AUTO: 39.8 FL (ref 37–54)
DEPRECATED RDW RBC AUTO: 40.2 FL (ref 37–54)
DEPRECATED RDW RBC AUTO: 40.3 FL (ref 37–54)
DEPRECATED RDW RBC AUTO: 40.4 FL (ref 37–54)
DEPRECATED RDW RBC AUTO: 40.7 FL (ref 37–54)
DEPRECATED RDW RBC AUTO: 40.9 FL (ref 37–54)
DEPRECATED RDW RBC AUTO: 41.8 FL (ref 37–54)
DEPRECATED RDW RBC AUTO: 41.8 FL (ref 37–54)
DEPRECATED RDW RBC AUTO: 42.5 FL (ref 37–54)
DEPRECATED RDW RBC AUTO: 46.1 FL (ref 37–54)
EGFRCR SERPLBLD CKD-EPI 2021: 102.3 ML/MIN/1.73
EGFRCR SERPLBLD CKD-EPI 2021: 104.4 ML/MIN/1.73
EGFRCR SERPLBLD CKD-EPI 2021: 110 ML/MIN/1.73
EGFRCR SERPLBLD CKD-EPI 2021: 22.6 ML/MIN/1.73
EGFRCR SERPLBLD CKD-EPI 2021: 52.5 ML/MIN/1.73
EOSINOPHIL # BLD AUTO: 0 10*3/MM3 (ref 0–0.4)
EOSINOPHIL # BLD AUTO: 0.04 10*3/MM3 (ref 0–0.4)
EOSINOPHIL # BLD AUTO: 0.08 10*3/MM3 (ref 0–0.4)
EOSINOPHIL # BLD AUTO: 0.1 10*3/MM3 (ref 0–0.4)
EOSINOPHIL # BLD AUTO: 0.14 10*3/MM3 (ref 0–0.4)
EOSINOPHIL # BLD AUTO: 0.28 10*3/MM3 (ref 0–0.4)
EOSINOPHIL NFR BLD AUTO: 0 % (ref 0.3–6.2)
EOSINOPHIL NFR BLD AUTO: 0.3 % (ref 0.3–6.2)
EOSINOPHIL NFR BLD AUTO: 0.8 % (ref 0.3–6.2)
EOSINOPHIL NFR BLD AUTO: 0.8 % (ref 0.3–6.2)
EOSINOPHIL NFR BLD AUTO: 1.4 % (ref 0.3–6.2)
EOSINOPHIL NFR BLD AUTO: 3 % (ref 0.3–6.2)
ERYTHROCYTE [DISTWIDTH] IN BLOOD BY AUTOMATED COUNT: 12.5 % (ref 12.3–15.4)
ERYTHROCYTE [DISTWIDTH] IN BLOOD BY AUTOMATED COUNT: 12.6 % (ref 12.3–15.4)
ERYTHROCYTE [DISTWIDTH] IN BLOOD BY AUTOMATED COUNT: 12.7 % (ref 12.3–15.4)
ERYTHROCYTE [DISTWIDTH] IN BLOOD BY AUTOMATED COUNT: 12.8 % (ref 12.3–15.4)
ERYTHROCYTE [DISTWIDTH] IN BLOOD BY AUTOMATED COUNT: 12.9 % (ref 12.3–15.4)
ERYTHROCYTE [DISTWIDTH] IN BLOOD BY AUTOMATED COUNT: 13.3 % (ref 12.3–15.4)
ERYTHROCYTE [DISTWIDTH] IN BLOOD BY AUTOMATED COUNT: 13.4 % (ref 12.3–15.4)
ERYTHROCYTE [DISTWIDTH] IN BLOOD BY AUTOMATED COUNT: 13.6 % (ref 12.3–15.4)
ERYTHROCYTE [DISTWIDTH] IN BLOOD BY AUTOMATED COUNT: 13.7 % (ref 12.3–15.4)
ERYTHROCYTE [SEDIMENTATION RATE] IN BLOOD: 62 MM/HR (ref 0–30)
ERYTHROCYTE [SEDIMENTATION RATE] IN BLOOD: 9 MM/HR (ref 0–30)
ETHANOL BLD-MCNC: <10 MG/DL (ref 0–10)
ETHANOL UR QL: <0.01 %
FERRITIN SERPL-MCNC: 172 NG/ML (ref 13–150)
FOLATE SERPL-MCNC: >20 NG/ML (ref 4.78–24.2)
GFR SERPL CREATININE-BSD FRML MDRD: 112 ML/MIN/1.73
GFR SERPL CREATININE-BSD FRML MDRD: 150 ML/MIN/1.73
GFR SERPL CREATININE-BSD FRML MDRD: 85 ML/MIN/1.73
GFR SERPL CREATININE-BSD FRML MDRD: 92 ML/MIN/1.73
GFR SERPL CREATININE-BSD FRML MDRD: 96 ML/MIN/1.73
GFR SERPL CREATININE-BSD FRML MDRD: 99 ML/MIN/1.73
GLOBULIN UR ELPH-MCNC: 2.6 GM/DL
GLOBULIN UR ELPH-MCNC: 2.7 GM/DL
GLOBULIN UR ELPH-MCNC: 3.3 GM/DL
GLOBULIN UR ELPH-MCNC: 4 GM/DL
GLOBULIN UR ELPH-MCNC: 4.3 GM/DL
GLUCOSE BLDC GLUCOMTR-MCNC: 100 MG/DL (ref 70–130)
GLUCOSE BLDC GLUCOMTR-MCNC: 100 MG/DL (ref 70–130)
GLUCOSE BLDC GLUCOMTR-MCNC: 108 MG/DL (ref 70–130)
GLUCOSE BLDC GLUCOMTR-MCNC: 117 MG/DL (ref 70–130)
GLUCOSE BLDC GLUCOMTR-MCNC: 123 MG/DL (ref 70–130)
GLUCOSE BLDC GLUCOMTR-MCNC: 134 MG/DL (ref 70–130)
GLUCOSE BLDC GLUCOMTR-MCNC: 134 MG/DL (ref 70–130)
GLUCOSE BLDC GLUCOMTR-MCNC: 149 MG/DL (ref 70–130)
GLUCOSE BLDC GLUCOMTR-MCNC: 150 MG/DL (ref 70–130)
GLUCOSE BLDC GLUCOMTR-MCNC: 150 MG/DL (ref 70–130)
GLUCOSE BLDC GLUCOMTR-MCNC: 159 MG/DL (ref 70–130)
GLUCOSE BLDC GLUCOMTR-MCNC: 160 MG/DL (ref 70–130)
GLUCOSE BLDC GLUCOMTR-MCNC: 164 MG/DL (ref 70–130)
GLUCOSE BLDC GLUCOMTR-MCNC: 175 MG/DL (ref 70–130)
GLUCOSE BLDC GLUCOMTR-MCNC: 183 MG/DL (ref 70–130)
GLUCOSE BLDC GLUCOMTR-MCNC: 184 MG/DL (ref 70–130)
GLUCOSE BLDC GLUCOMTR-MCNC: 189 MG/DL (ref 70–130)
GLUCOSE BLDC GLUCOMTR-MCNC: 189 MG/DL (ref 70–130)
GLUCOSE BLDC GLUCOMTR-MCNC: 206 MG/DL (ref 70–130)
GLUCOSE BLDC GLUCOMTR-MCNC: 206 MG/DL (ref 70–130)
GLUCOSE BLDC GLUCOMTR-MCNC: 207 MG/DL (ref 70–130)
GLUCOSE BLDC GLUCOMTR-MCNC: 210 MG/DL (ref 70–130)
GLUCOSE BLDC GLUCOMTR-MCNC: 212 MG/DL (ref 70–130)
GLUCOSE BLDC GLUCOMTR-MCNC: 216 MG/DL (ref 70–130)
GLUCOSE BLDC GLUCOMTR-MCNC: 216 MG/DL (ref 70–130)
GLUCOSE BLDC GLUCOMTR-MCNC: 222 MG/DL (ref 70–130)
GLUCOSE BLDC GLUCOMTR-MCNC: 234 MG/DL (ref 70–130)
GLUCOSE BLDC GLUCOMTR-MCNC: 238 MG/DL (ref 70–130)
GLUCOSE BLDC GLUCOMTR-MCNC: 240 MG/DL (ref 70–130)
GLUCOSE BLDC GLUCOMTR-MCNC: 242 MG/DL (ref 70–130)
GLUCOSE BLDC GLUCOMTR-MCNC: 258 MG/DL (ref 70–130)
GLUCOSE BLDC GLUCOMTR-MCNC: 317 MG/DL (ref 70–130)
GLUCOSE BLDC GLUCOMTR-MCNC: 45 MG/DL (ref 70–130)
GLUCOSE BLDC GLUCOMTR-MCNC: 53 MG/DL (ref 70–130)
GLUCOSE BLDC GLUCOMTR-MCNC: 55 MG/DL (ref 70–130)
GLUCOSE BLDC GLUCOMTR-MCNC: 55 MG/DL (ref 70–130)
GLUCOSE BLDC GLUCOMTR-MCNC: 56 MG/DL (ref 70–130)
GLUCOSE BLDC GLUCOMTR-MCNC: 75 MG/DL (ref 70–130)
GLUCOSE BLDC GLUCOMTR-MCNC: 78 MG/DL (ref 70–130)
GLUCOSE BLDC GLUCOMTR-MCNC: 82 MG/DL (ref 70–130)
GLUCOSE BLDC GLUCOMTR-MCNC: 83 MG/DL (ref 70–130)
GLUCOSE BLDC GLUCOMTR-MCNC: 83 MG/DL (ref 70–130)
GLUCOSE BLDC GLUCOMTR-MCNC: 87 MG/DL (ref 70–130)
GLUCOSE BLDC GLUCOMTR-MCNC: 91 MG/DL (ref 70–130)
GLUCOSE SERPL-MCNC: 137 MG/DL (ref 65–99)
GLUCOSE SERPL-MCNC: 141 MG/DL (ref 65–99)
GLUCOSE SERPL-MCNC: 169 MG/DL (ref 65–99)
GLUCOSE SERPL-MCNC: 185 MG/DL (ref 65–99)
GLUCOSE SERPL-MCNC: 212 MG/DL (ref 65–99)
GLUCOSE SERPL-MCNC: 249 MG/DL (ref 65–99)
GLUCOSE SERPL-MCNC: 282 MG/DL (ref 65–99)
GLUCOSE SERPL-MCNC: 59 MG/DL (ref 65–99)
GLUCOSE SERPL-MCNC: 88 MG/DL (ref 65–99)
GLUCOSE SERPL-MCNC: 98 MG/DL (ref 65–99)
GLUCOSE SERPL-MCNC: 98 MG/DL (ref 65–99)
GRAM STN SPEC: ABNORMAL
HBA1C MFR BLD: 7.7 % (ref 4.8–5.6)
HCT VFR BLD AUTO: 22.6 % (ref 34–46.6)
HCT VFR BLD AUTO: 24.4 % (ref 34–46.6)
HCT VFR BLD AUTO: 25.4 % (ref 34–46.6)
HCT VFR BLD AUTO: 25.6 % (ref 34–46.6)
HCT VFR BLD AUTO: 26.6 % (ref 34–46.6)
HCT VFR BLD AUTO: 27 % (ref 34–46.6)
HCT VFR BLD AUTO: 27.2 % (ref 34–46.6)
HCT VFR BLD AUTO: 28.3 % (ref 34–46.6)
HCT VFR BLD AUTO: 30.6 % (ref 34–46.6)
HCT VFR BLD AUTO: 31.6 % (ref 34–46.6)
HCT VFR BLD AUTO: 37.2 % (ref 34–46.6)
HCT VFR BLD AUTO: 37.4 % (ref 34–46.6)
HGB BLD-MCNC: 10.1 G/DL (ref 12–15.9)
HGB BLD-MCNC: 10.1 G/DL (ref 12–15.9)
HGB BLD-MCNC: 11.5 G/DL (ref 12–15.9)
HGB BLD-MCNC: 12.3 G/DL (ref 12–15.9)
HGB BLD-MCNC: 7.4 G/DL (ref 12–15.9)
HGB BLD-MCNC: 8.2 G/DL (ref 12–15.9)
HGB BLD-MCNC: 8.2 G/DL (ref 12–15.9)
HGB BLD-MCNC: 8.3 G/DL (ref 12–15.9)
HGB BLD-MCNC: 8.4 G/DL (ref 12–15.9)
HGB BLD-MCNC: 8.8 G/DL (ref 12–15.9)
HGB BLD-MCNC: 8.9 G/DL (ref 12–15.9)
HGB BLD-MCNC: 9.2 G/DL (ref 12–15.9)
IMM GRANULOCYTES # BLD AUTO: 0.05 10*3/MM3 (ref 0–0.05)
IMM GRANULOCYTES # BLD AUTO: 0.06 10*3/MM3 (ref 0–0.05)
IMM GRANULOCYTES # BLD AUTO: 0.06 10*3/MM3 (ref 0–0.05)
IMM GRANULOCYTES # BLD AUTO: 0.1 10*3/MM3 (ref 0–0.05)
IMM GRANULOCYTES # BLD AUTO: 0.1 10*3/MM3 (ref 0–0.05)
IMM GRANULOCYTES # BLD AUTO: 0.12 10*3/MM3 (ref 0–0.05)
IMM GRANULOCYTES NFR BLD AUTO: 0.5 % (ref 0–0.5)
IMM GRANULOCYTES NFR BLD AUTO: 0.6 % (ref 0–0.5)
IMM GRANULOCYTES NFR BLD AUTO: 0.8 % (ref 0–0.5)
IMM GRANULOCYTES NFR BLD AUTO: 1.2 % (ref 0–0.5)
INR PPP: 1.08 (ref 0.9–1.1)
INR PPP: 1.43 (ref 0.9–1.1)
INR PPP: 4.47 (ref 0.9–1.1)
INR PPP: >10 (ref 0.9–1.1)
IRON 24H UR-MRATE: 23 MCG/DL (ref 37–145)
IRON SATN MFR SERPL: 8 % (ref 20–50)
LEFT ATRIUM VOLUME INDEX: 34.1 ML/M2
LYMPHOCYTES # BLD AUTO: 0.6 10*3/MM3 (ref 0.7–3.1)
LYMPHOCYTES # BLD AUTO: 1.28 10*3/MM3 (ref 0.7–3.1)
LYMPHOCYTES # BLD AUTO: 1.32 10*3/MM3 (ref 0.7–3.1)
LYMPHOCYTES # BLD AUTO: 1.6 10*3/MM3 (ref 0.7–3.1)
LYMPHOCYTES # BLD AUTO: 2.08 10*3/MM3 (ref 0.7–3.1)
LYMPHOCYTES # BLD AUTO: 2.86 10*3/MM3 (ref 0.7–3.1)
LYMPHOCYTES NFR BLD AUTO: 13.4 % (ref 19.6–45.3)
LYMPHOCYTES NFR BLD AUTO: 13.8 % (ref 19.6–45.3)
LYMPHOCYTES NFR BLD AUTO: 15.4 % (ref 19.6–45.3)
LYMPHOCYTES NFR BLD AUTO: 16.9 % (ref 19.6–45.3)
LYMPHOCYTES NFR BLD AUTO: 21.6 % (ref 19.6–45.3)
LYMPHOCYTES NFR BLD AUTO: 7.8 % (ref 19.6–45.3)
MAGNESIUM SERPL-MCNC: 1.6 MG/DL (ref 1.6–2.4)
MAGNESIUM SERPL-MCNC: 1.8 MG/DL (ref 1.6–2.4)
MAGNESIUM SERPL-MCNC: 1.8 MG/DL (ref 1.6–2.4)
MAXIMAL PREDICTED HEART RATE: 152 BPM
MAXIMAL PREDICTED HEART RATE: 152 BPM
MCH RBC QN AUTO: 27.4 PG (ref 26.6–33)
MCH RBC QN AUTO: 27.5 PG (ref 26.6–33)
MCH RBC QN AUTO: 28.1 PG (ref 26.6–33)
MCH RBC QN AUTO: 28.2 PG (ref 26.6–33)
MCH RBC QN AUTO: 28.3 PG (ref 26.6–33)
MCH RBC QN AUTO: 28.3 PG (ref 26.6–33)
MCH RBC QN AUTO: 28.6 PG (ref 26.6–33)
MCH RBC QN AUTO: 28.7 PG (ref 26.6–33)
MCH RBC QN AUTO: 28.7 PG (ref 26.6–33)
MCH RBC QN AUTO: 28.9 PG (ref 26.6–33)
MCH RBC QN AUTO: 29.2 PG (ref 26.6–33)
MCHC RBC AUTO-ENTMCNC: 30.9 G/DL (ref 31.5–35.7)
MCHC RBC AUTO-ENTMCNC: 30.9 G/DL (ref 31.5–35.7)
MCHC RBC AUTO-ENTMCNC: 32 G/DL (ref 31.5–35.7)
MCHC RBC AUTO-ENTMCNC: 32 G/DL (ref 31.5–35.7)
MCHC RBC AUTO-ENTMCNC: 32.5 G/DL (ref 31.5–35.7)
MCHC RBC AUTO-ENTMCNC: 32.7 G/DL (ref 31.5–35.7)
MCHC RBC AUTO-ENTMCNC: 32.7 G/DL (ref 31.5–35.7)
MCHC RBC AUTO-ENTMCNC: 32.9 G/DL (ref 31.5–35.7)
MCHC RBC AUTO-ENTMCNC: 33 G/DL (ref 31.5–35.7)
MCHC RBC AUTO-ENTMCNC: 33.1 G/DL (ref 31.5–35.7)
MCHC RBC AUTO-ENTMCNC: 33.6 G/DL (ref 31.5–35.7)
MCV RBC AUTO: 85.5 FL (ref 79–97)
MCV RBC AUTO: 85.9 FL (ref 79–97)
MCV RBC AUTO: 86.1 FL (ref 79–97)
MCV RBC AUTO: 86.8 FL (ref 79–97)
MCV RBC AUTO: 86.8 FL (ref 79–97)
MCV RBC AUTO: 87.6 FL (ref 79–97)
MCV RBC AUTO: 87.8 FL (ref 79–97)
MCV RBC AUTO: 88 FL (ref 79–97)
MCV RBC AUTO: 88.2 FL (ref 79–97)
MCV RBC AUTO: 89 FL (ref 79–97)
MCV RBC AUTO: 91.6 FL (ref 79–97)
METHADONE UR QL SCN: NEGATIVE
MONOCYTES # BLD AUTO: 0.55 10*3/MM3 (ref 0.1–0.9)
MONOCYTES # BLD AUTO: 0.6 10*3/MM3 (ref 0.1–0.9)
MONOCYTES # BLD AUTO: 0.7 10*3/MM3 (ref 0.1–0.9)
MONOCYTES # BLD AUTO: 0.76 10*3/MM3 (ref 0.1–0.9)
MONOCYTES # BLD AUTO: 1.1 10*3/MM3 (ref 0.1–0.9)
MONOCYTES # BLD AUTO: 1.24 10*3/MM3 (ref 0.1–0.9)
MONOCYTES NFR BLD AUTO: 10.1 % (ref 5–12)
MONOCYTES NFR BLD AUTO: 5.8 % (ref 5–12)
MONOCYTES NFR BLD AUTO: 7.1 % (ref 5–12)
MONOCYTES NFR BLD AUTO: 7.6 % (ref 5–12)
MONOCYTES NFR BLD AUTO: 7.7 % (ref 5–12)
MONOCYTES NFR BLD AUTO: 8.3 % (ref 5–12)
NEUTROPHILS NFR BLD AUTO: 6.49 10*3/MM3 (ref 1.7–7)
NEUTROPHILS NFR BLD AUTO: 6.92 10*3/MM3 (ref 1.7–7)
NEUTROPHILS NFR BLD AUTO: 68 % (ref 42.7–76)
NEUTROPHILS NFR BLD AUTO: 7.48 10*3/MM3 (ref 1.7–7)
NEUTROPHILS NFR BLD AUTO: 7.98 10*3/MM3 (ref 1.7–7)
NEUTROPHILS NFR BLD AUTO: 71.6 % (ref 42.7–76)
NEUTROPHILS NFR BLD AUTO: 74.8 % (ref 42.7–76)
NEUTROPHILS NFR BLD AUTO: 76 % (ref 42.7–76)
NEUTROPHILS NFR BLD AUTO: 76.9 % (ref 42.7–76)
NEUTROPHILS NFR BLD AUTO: 8.82 10*3/MM3 (ref 1.7–7)
NEUTROPHILS NFR BLD AUTO: 84.2 % (ref 42.7–76)
NEUTROPHILS NFR BLD AUTO: 9.05 10*3/MM3 (ref 1.7–7)
NRBC BLD AUTO-RTO: 0 /100 WBC (ref 0–0.2)
NRBC BLD AUTO-RTO: 0.1 /100 WBC (ref 0–0.2)
OPIATES UR QL: POSITIVE
OXYCODONE UR QL SCN: POSITIVE
PHOSPHATE SERPL-MCNC: 5.7 MG/DL (ref 2.5–4.5)
PHOSPHATE SERPL-MCNC: 9.3 MG/DL (ref 2.5–4.5)
PLATELET # BLD AUTO: 241 10*3/MM3 (ref 140–450)
PLATELET # BLD AUTO: 249 10*3/MM3 (ref 140–450)
PLATELET # BLD AUTO: 260 10*3/MM3 (ref 140–450)
PLATELET # BLD AUTO: 275 10*3/MM3 (ref 140–450)
PLATELET # BLD AUTO: 283 10*3/MM3 (ref 140–450)
PLATELET # BLD AUTO: 299 10*3/MM3 (ref 140–450)
PLATELET # BLD AUTO: 311 10*3/MM3 (ref 140–450)
PLATELET # BLD AUTO: 332 10*3/MM3 (ref 140–450)
PLATELET # BLD AUTO: 341 10*3/MM3 (ref 140–450)
PLATELET # BLD AUTO: 414 10*3/MM3 (ref 140–450)
PLATELET # BLD AUTO: 435 10*3/MM3 (ref 140–450)
PMV BLD AUTO: 10 FL (ref 6–12)
PMV BLD AUTO: 10 FL (ref 6–12)
PMV BLD AUTO: 10.2 FL (ref 6–12)
PMV BLD AUTO: 10.4 FL (ref 6–12)
PMV BLD AUTO: 10.5 FL (ref 6–12)
PMV BLD AUTO: 10.7 FL (ref 6–12)
PMV BLD AUTO: 10.9 FL (ref 6–12)
PMV BLD AUTO: 11 FL (ref 6–12)
PMV BLD AUTO: 9.8 FL (ref 6–12)
POTASSIUM SERPL-SCNC: 3.4 MMOL/L (ref 3.5–5.2)
POTASSIUM SERPL-SCNC: 3.5 MMOL/L (ref 3.5–5.2)
POTASSIUM SERPL-SCNC: 3.6 MMOL/L (ref 3.5–5.2)
POTASSIUM SERPL-SCNC: 3.8 MMOL/L (ref 3.5–5.2)
POTASSIUM SERPL-SCNC: 3.9 MMOL/L (ref 3.5–5.2)
POTASSIUM SERPL-SCNC: 3.9 MMOL/L (ref 3.5–5.2)
POTASSIUM SERPL-SCNC: 4 MMOL/L (ref 3.5–5.2)
POTASSIUM SERPL-SCNC: 4.2 MMOL/L (ref 3.5–5.2)
POTASSIUM SERPL-SCNC: 4.4 MMOL/L (ref 3.5–5.2)
POTASSIUM SERPL-SCNC: 4.4 MMOL/L (ref 3.5–5.2)
POTASSIUM SERPL-SCNC: 4.9 MMOL/L (ref 3.5–5.2)
PROCALCITONIN SERPL-MCNC: 26.1 NG/ML (ref 0–0.25)
PROT SERPL-MCNC: 5 G/DL (ref 6–8.5)
PROT SERPL-MCNC: 5.1 G/DL (ref 6–8.5)
PROT SERPL-MCNC: 6.2 G/DL (ref 6–8.5)
PROT SERPL-MCNC: 8.2 G/DL (ref 6–8.5)
PROT SERPL-MCNC: 8.4 G/DL (ref 6–8.5)
PROTHROMBIN TIME: 14 SECONDS (ref 11.7–14.2)
PROTHROMBIN TIME: 17.4 SECONDS (ref 11.7–14.2)
PROTHROMBIN TIME: 43 SECONDS (ref 11.7–14.2)
PROTHROMBIN TIME: 88.2 SECONDS (ref 11.7–14.2)
QT INTERVAL: 350 MS
RBC # BLD AUTO: 2.58 10*6/MM3 (ref 3.77–5.28)
RBC # BLD AUTO: 2.81 10*6/MM3 (ref 3.77–5.28)
RBC # BLD AUTO: 2.91 10*6/MM3 (ref 3.77–5.28)
RBC # BLD AUTO: 2.95 10*6/MM3 (ref 3.77–5.28)
RBC # BLD AUTO: 2.97 10*6/MM3 (ref 3.77–5.28)
RBC # BLD AUTO: 3.11 10*6/MM3 (ref 3.77–5.28)
RBC # BLD AUTO: 3.11 10*6/MM3 (ref 3.77–5.28)
RBC # BLD AUTO: 3.21 10*6/MM3 (ref 3.77–5.28)
RBC # BLD AUTO: 3.68 10*6/MM3 (ref 3.77–5.28)
RBC # BLD AUTO: 4.18 10*6/MM3 (ref 3.77–5.28)
RBC # BLD AUTO: 4.26 10*6/MM3 (ref 3.77–5.28)
RH BLD: NEGATIVE
RH BLD: NEGATIVE
SALICYLATES SERPL-MCNC: <0.3 MG/DL
SARS-COV-2 ORF1AB RESP QL NAA+PROBE: NOT DETECTED
SARS-COV-2 RNA PNL SPEC NAA+PROBE: NOT DETECTED
SODIUM SERPL-SCNC: 133 MMOL/L (ref 136–145)
SODIUM SERPL-SCNC: 136 MMOL/L (ref 136–145)
SODIUM SERPL-SCNC: 137 MMOL/L (ref 136–145)
SODIUM SERPL-SCNC: 138 MMOL/L (ref 136–145)
SODIUM SERPL-SCNC: 139 MMOL/L (ref 136–145)
SODIUM SERPL-SCNC: 140 MMOL/L (ref 136–145)
SODIUM SERPL-SCNC: 141 MMOL/L (ref 136–145)
STRESS TARGET HR: 129 BPM
STRESS TARGET HR: 129 BPM
T&S EXPIRATION DATE: NORMAL
T&S EXPIRATION DATE: NORMAL
TIBC SERPL-MCNC: 295 MCG/DL (ref 298–536)
TRANSFERRIN SERPL-MCNC: 198 MG/DL (ref 200–360)
TSH SERPL DL<=0.05 MIU/L-ACNC: 1.05 UIU/ML (ref 0.27–4.2)
UNIT  ABO: NORMAL
UNIT  RH: NORMAL
VANCOMYCIN TROUGH SERPL-MCNC: 6.2 MCG/ML (ref 5–20)
VANCOMYCIN TROUGH SERPL-MCNC: 7.3 MCG/ML (ref 5–20)
VANCOMYCIN TROUGH SERPL-MCNC: 7.9 MCG/ML (ref 5–20)
VIT B12 BLD-MCNC: 1891 PG/ML (ref 211–946)
WBC NRBC COR # BLD: 10.37 10*3/MM3 (ref 3.4–10.8)
WBC NRBC COR # BLD: 10.46 10*3/MM3 (ref 3.4–10.8)
WBC NRBC COR # BLD: 11.51 10*3/MM3 (ref 3.4–10.8)
WBC NRBC COR # BLD: 12.32 10*3/MM3 (ref 3.4–10.8)
WBC NRBC COR # BLD: 13.27 10*3/MM3 (ref 3.4–10.8)
WBC NRBC COR # BLD: 13.63 10*3/MM3 (ref 3.4–10.8)
WBC NRBC COR # BLD: 7.44 10*3/MM3 (ref 3.4–10.8)
WBC NRBC COR # BLD: 7.71 10*3/MM3 (ref 3.4–10.8)
WBC NRBC COR # BLD: 9.26 10*3/MM3 (ref 3.4–10.8)
WBC NRBC COR # BLD: 9.49 10*3/MM3 (ref 3.4–10.8)
WBC NRBC COR # BLD: 9.85 10*3/MM3 (ref 3.4–10.8)

## 2022-01-01 PROCEDURE — 0 HYDROMORPHONE HCL PF 50 MG/5ML SOLUTION: Performed by: ORTHOPAEDIC SURGERY

## 2022-01-01 PROCEDURE — 22853 INSJ BIOMECHANICAL DEVICE: CPT | Performed by: ORTHOPAEDIC SURGERY

## 2022-01-01 PROCEDURE — 80307 DRUG TEST PRSMV CHEM ANLYZR: CPT | Performed by: PHYSICIAN ASSISTANT

## 2022-01-01 PROCEDURE — 87075 CULTR BACTERIA EXCEPT BLOOD: CPT | Performed by: ORTHOPAEDIC SURGERY

## 2022-01-01 PROCEDURE — 25010000002 ONDANSETRON PER 1 MG: Performed by: EMERGENCY MEDICINE

## 2022-01-01 PROCEDURE — 85027 COMPLETE CBC AUTOMATED: CPT | Performed by: INTERNAL MEDICINE

## 2022-01-01 PROCEDURE — 25010000002 PHENYLEPHRINE 10 MG/ML SOLUTION: Performed by: INTERNAL MEDICINE

## 2022-01-01 PROCEDURE — 72100 X-RAY EXAM L-S SPINE 2/3 VWS: CPT

## 2022-01-01 PROCEDURE — 73721 MRI JNT OF LWR EXTRE W/O DYE: CPT

## 2022-01-01 PROCEDURE — C1713 ANCHOR/SCREW BN/BN,TIS/BN: HCPCS | Performed by: ORTHOPAEDIC SURGERY

## 2022-01-01 PROCEDURE — 25010000002 ONDANSETRON PER 1 MG: Performed by: INTERNAL MEDICINE

## 2022-01-01 PROCEDURE — 93975 VASCULAR STUDY: CPT

## 2022-01-01 PROCEDURE — 85610 PROTHROMBIN TIME: CPT | Performed by: EMERGENCY MEDICINE

## 2022-01-01 PROCEDURE — 99223 1ST HOSP IP/OBS HIGH 75: CPT | Performed by: ORTHOPAEDIC SURGERY

## 2022-01-01 PROCEDURE — 86850 RBC ANTIBODY SCREEN: CPT | Performed by: ORTHOPAEDIC SURGERY

## 2022-01-01 PROCEDURE — 83036 HEMOGLOBIN GLYCOSYLATED A1C: CPT | Performed by: INTERNAL MEDICINE

## 2022-01-01 PROCEDURE — 99024 POSTOP FOLLOW-UP VISIT: CPT | Performed by: ORTHOPAEDIC SURGERY

## 2022-01-01 PROCEDURE — 82962 GLUCOSE BLOOD TEST: CPT

## 2022-01-01 PROCEDURE — 82140 ASSAY OF AMMONIA: CPT | Performed by: INTERNAL MEDICINE

## 2022-01-01 PROCEDURE — 80053 COMPREHEN METABOLIC PANEL: CPT | Performed by: INTERNAL MEDICINE

## 2022-01-01 PROCEDURE — 99221 1ST HOSP IP/OBS SF/LOW 40: CPT | Performed by: NURSE PRACTITIONER

## 2022-01-01 PROCEDURE — 85025 COMPLETE CBC W/AUTO DIFF WBC: CPT | Performed by: EMERGENCY MEDICINE

## 2022-01-01 PROCEDURE — 82607 VITAMIN B-12: CPT | Performed by: INTERNAL MEDICINE

## 2022-01-01 PROCEDURE — C1889 IMPLANT/INSERT DEVICE, NOC: HCPCS | Performed by: ORTHOPAEDIC SURGERY

## 2022-01-01 PROCEDURE — 25010000002 VANCOMYCIN 10 G RECONSTITUTED SOLUTION: Performed by: INTERNAL MEDICINE

## 2022-01-01 PROCEDURE — P9016 RBC LEUKOCYTES REDUCED: HCPCS

## 2022-01-01 PROCEDURE — 83735 ASSAY OF MAGNESIUM: CPT | Performed by: STUDENT IN AN ORGANIZED HEALTH CARE EDUCATION/TRAINING PROGRAM

## 2022-01-01 PROCEDURE — 99231 SBSQ HOSP IP/OBS SF/LOW 25: CPT | Performed by: INTERNAL MEDICINE

## 2022-01-01 PROCEDURE — 25010000002 HYDROMORPHONE 1 MG/ML SOLUTION: Performed by: NURSE PRACTITIONER

## 2022-01-01 PROCEDURE — 80053 COMPREHEN METABOLIC PANEL: CPT | Performed by: EMERGENCY MEDICINE

## 2022-01-01 PROCEDURE — 25010000002 VANCOMYCIN 10 G RECONSTITUTED SOLUTION: Performed by: ORTHOPAEDIC SURGERY

## 2022-01-01 PROCEDURE — 85018 HEMOGLOBIN: CPT | Performed by: ORTHOPAEDIC SURGERY

## 2022-01-01 PROCEDURE — 85014 HEMATOCRIT: CPT | Performed by: ORTHOPAEDIC SURGERY

## 2022-01-01 PROCEDURE — 25010000002 DEXAMETHASONE PER 1 MG: Performed by: NURSE ANESTHETIST, CERTIFIED REGISTERED

## 2022-01-01 PROCEDURE — 97530 THERAPEUTIC ACTIVITIES: CPT

## 2022-01-01 PROCEDURE — 25010000002 DAPTOMYCIN PER 1 MG: Performed by: INTERNAL MEDICINE

## 2022-01-01 PROCEDURE — 25010000002 PIPERACILLIN SOD-TAZOBACTAM PER 1 G: Performed by: ORTHOPAEDIC SURGERY

## 2022-01-01 PROCEDURE — 73502 X-RAY EXAM HIP UNI 2-3 VIEWS: CPT

## 2022-01-01 PROCEDURE — 99232 SBSQ HOSP IP/OBS MODERATE 35: CPT | Performed by: INTERNAL MEDICINE

## 2022-01-01 PROCEDURE — 84466 ASSAY OF TRANSFERRIN: CPT | Performed by: INTERNAL MEDICINE

## 2022-01-01 PROCEDURE — 99024 POSTOP FOLLOW-UP VISIT: CPT | Performed by: NURSE PRACTITIONER

## 2022-01-01 PROCEDURE — 99283 EMERGENCY DEPT VISIT LOW MDM: CPT

## 2022-01-01 PROCEDURE — 85027 COMPLETE CBC AUTOMATED: CPT | Performed by: ORTHOPAEDIC SURGERY

## 2022-01-01 PROCEDURE — 97161 PT EVAL LOW COMPLEX 20 MIN: CPT | Performed by: PHYSICAL THERAPIST

## 2022-01-01 PROCEDURE — 84100 ASSAY OF PHOSPHORUS: CPT | Performed by: INTERNAL MEDICINE

## 2022-01-01 PROCEDURE — 0SG3071 FUSION OF LUMBOSACRAL JOINT WITH AUTOLOGOUS TISSUE SUBSTITUTE, POSTERIOR APPROACH, POSTERIOR COLUMN, OPEN APPROACH: ICD-10-PCS | Performed by: ORTHOPAEDIC SURGERY

## 2022-01-01 PROCEDURE — 85027 COMPLETE CBC AUTOMATED: CPT | Performed by: STUDENT IN AN ORGANIZED HEALTH CARE EDUCATION/TRAINING PROGRAM

## 2022-01-01 PROCEDURE — 36430 TRANSFUSION BLD/BLD COMPNT: CPT

## 2022-01-01 PROCEDURE — 99223 1ST HOSP IP/OBS HIGH 75: CPT | Performed by: INTERNAL MEDICINE

## 2022-01-01 PROCEDURE — 86901 BLOOD TYPING SEROLOGIC RH(D): CPT | Performed by: ORTHOPAEDIC SURGERY

## 2022-01-01 PROCEDURE — 71045 X-RAY EXAM CHEST 1 VIEW: CPT

## 2022-01-01 PROCEDURE — 87147 CULTURE TYPE IMMUNOLOGIC: CPT | Performed by: ORTHOPAEDIC SURGERY

## 2022-01-01 PROCEDURE — 85025 COMPLETE CBC W/AUTO DIFF WBC: CPT | Performed by: ORTHOPAEDIC SURGERY

## 2022-01-01 PROCEDURE — 85730 THROMBOPLASTIN TIME PARTIAL: CPT | Performed by: PHYSICIAN ASSISTANT

## 2022-01-01 PROCEDURE — 80202 ASSAY OF VANCOMYCIN: CPT | Performed by: INTERNAL MEDICINE

## 2022-01-01 PROCEDURE — 25010000002 CEFAZOLIN IN DEXTROSE 2-4 GM/100ML-% SOLUTION: Performed by: INTERNAL MEDICINE

## 2022-01-01 PROCEDURE — 80143 DRUG ASSAY ACETAMINOPHEN: CPT | Performed by: STUDENT IN AN ORGANIZED HEALTH CARE EDUCATION/TRAINING PROGRAM

## 2022-01-01 PROCEDURE — 0 ACETYLCYSTEINE PER 100 MG: Performed by: EMERGENCY MEDICINE

## 2022-01-01 PROCEDURE — 25010000002 PROPOFOL 10 MG/ML EMULSION: Performed by: NURSE ANESTHETIST, CERTIFIED REGISTERED

## 2022-01-01 PROCEDURE — 72158 MRI LUMBAR SPINE W/O & W/DYE: CPT

## 2022-01-01 PROCEDURE — 25010000002 VANCOMYCIN 1 G RECONSTITUTED SOLUTION 1 EACH VIAL: Performed by: NEUROLOGICAL SURGERY

## 2022-01-01 PROCEDURE — 25010000002 FENTANYL CITRATE (PF) 50 MCG/ML SOLUTION: Performed by: ANESTHESIOLOGY

## 2022-01-01 PROCEDURE — 25010000002 METHYLPREDNISOLONE PER 125 MG: Performed by: EMERGENCY MEDICINE

## 2022-01-01 PROCEDURE — 80048 BASIC METABOLIC PNL TOTAL CA: CPT | Performed by: INTERNAL MEDICINE

## 2022-01-01 PROCEDURE — P9047 ALBUMIN (HUMAN), 25%, 50ML: HCPCS | Performed by: ANESTHESIOLOGY

## 2022-01-01 PROCEDURE — 25010000002 MORPHINE PER 10 MG: Performed by: INTERNAL MEDICINE

## 2022-01-01 PROCEDURE — 25010000002 PIPERACILLIN SOD-TAZOBACTAM PER 1 G: Performed by: INTERNAL MEDICINE

## 2022-01-01 PROCEDURE — 72110 X-RAY EXAM L-2 SPINE 4/>VWS: CPT

## 2022-01-01 PROCEDURE — 84100 ASSAY OF PHOSPHORUS: CPT | Performed by: STUDENT IN AN ORGANIZED HEALTH CARE EDUCATION/TRAINING PROGRAM

## 2022-01-01 PROCEDURE — G0378 HOSPITAL OBSERVATION PER HR: HCPCS

## 2022-01-01 PROCEDURE — 86140 C-REACTIVE PROTEIN: CPT | Performed by: ORTHOPAEDIC SURGERY

## 2022-01-01 PROCEDURE — 80048 BASIC METABOLIC PNL TOTAL CA: CPT | Performed by: ORTHOPAEDIC SURGERY

## 2022-01-01 PROCEDURE — 96374 THER/PROPH/DIAG INJ IV PUSH: CPT

## 2022-01-01 PROCEDURE — 87635 SARS-COV-2 COVID-19 AMP PRB: CPT | Performed by: PHYSICIAN ASSISTANT

## 2022-01-01 PROCEDURE — 72157 MRI CHEST SPINE W/O & W/DYE: CPT

## 2022-01-01 PROCEDURE — 25010000002 ONDANSETRON PER 1 MG: Performed by: ANESTHESIOLOGY

## 2022-01-01 PROCEDURE — 25010000002 HYDROMORPHONE PER 4 MG: Performed by: EMERGENCY MEDICINE

## 2022-01-01 PROCEDURE — 96372 THER/PROPH/DIAG INJ SC/IM: CPT

## 2022-01-01 PROCEDURE — 63710000001 INSULIN LISPRO (HUMAN) PER 5 UNITS: Performed by: ORTHOPAEDIC SURGERY

## 2022-01-01 PROCEDURE — 63267 EXCISE INTRSPINL LESION LMBR: CPT | Performed by: SPECIALIST/TECHNOLOGIST, OTHER

## 2022-01-01 PROCEDURE — 80143 DRUG ASSAY ACETAMINOPHEN: CPT | Performed by: PHYSICIAN ASSISTANT

## 2022-01-01 PROCEDURE — 009U0ZZ DRAINAGE OF SPINAL CANAL, OPEN APPROACH: ICD-10-PCS | Performed by: NEUROLOGICAL SURGERY

## 2022-01-01 PROCEDURE — 97110 THERAPEUTIC EXERCISES: CPT

## 2022-01-01 PROCEDURE — 25010000002 PERFLUTREN (DEFINITY) 8.476 MG IN SODIUM CHLORIDE (PF) 0.9 % 10 ML INJECTION: Performed by: INTERNAL MEDICINE

## 2022-01-01 PROCEDURE — 82550 ASSAY OF CK (CPK): CPT | Performed by: INTERNAL MEDICINE

## 2022-01-01 PROCEDURE — 85652 RBC SED RATE AUTOMATED: CPT | Performed by: EMERGENCY MEDICINE

## 2022-01-01 PROCEDURE — 99284 EMERGENCY DEPT VISIT MOD MDM: CPT

## 2022-01-01 PROCEDURE — 87186 SC STD MICRODIL/AGAR DIL: CPT | Performed by: ORTHOPAEDIC SURGERY

## 2022-01-01 PROCEDURE — 25010000002 HYDROMORPHONE 1 MG/ML SOLUTION: Performed by: EMERGENCY MEDICINE

## 2022-01-01 PROCEDURE — 80179 DRUG ASSAY SALICYLATE: CPT | Performed by: PHYSICIAN ASSISTANT

## 2022-01-01 PROCEDURE — 87040 BLOOD CULTURE FOR BACTERIA: CPT | Performed by: NURSE PRACTITIONER

## 2022-01-01 PROCEDURE — 0SG30AJ FUSION OF LUMBOSACRAL JOINT WITH INTERBODY FUSION DEVICE, POSTERIOR APPROACH, ANTERIOR COLUMN, OPEN APPROACH: ICD-10-PCS | Performed by: ORTHOPAEDIC SURGERY

## 2022-01-01 PROCEDURE — C1831: HCPCS | Performed by: ORTHOPAEDIC SURGERY

## 2022-01-01 PROCEDURE — 85025 COMPLETE CBC W/AUTO DIFF WBC: CPT | Performed by: NURSE PRACTITIONER

## 2022-01-01 PROCEDURE — 86923 COMPATIBILITY TEST ELECTRIC: CPT

## 2022-01-01 PROCEDURE — 72148 MRI LUMBAR SPINE W/O DYE: CPT

## 2022-01-01 PROCEDURE — 85652 RBC SED RATE AUTOMATED: CPT | Performed by: ORTHOPAEDIC SURGERY

## 2022-01-01 PROCEDURE — 84145 PROCALCITONIN (PCT): CPT | Performed by: NURSE PRACTITIONER

## 2022-01-01 PROCEDURE — 69990 MICROSURGERY ADD-ON: CPT | Performed by: NEUROLOGICAL SURGERY

## 2022-01-01 PROCEDURE — 99291 CRITICAL CARE FIRST HOUR: CPT | Performed by: INTERNAL MEDICINE

## 2022-01-01 PROCEDURE — 96375 TX/PRO/DX INJ NEW DRUG ADDON: CPT

## 2022-01-01 PROCEDURE — 86140 C-REACTIVE PROTEIN: CPT | Performed by: EMERGENCY MEDICINE

## 2022-01-01 PROCEDURE — 0 ACETYLCYSTEINE PER 100 MG: Performed by: INTERNAL MEDICINE

## 2022-01-01 PROCEDURE — 22842 INSERT SPINE FIXATION DEVICE: CPT | Performed by: ORTHOPAEDIC SURGERY

## 2022-01-01 PROCEDURE — 86900 BLOOD TYPING SEROLOGIC ABO: CPT | Performed by: ORTHOPAEDIC SURGERY

## 2022-01-01 PROCEDURE — 0 GADOBENATE DIMEGLUMINE 529 MG/ML SOLUTION: Performed by: EMERGENCY MEDICINE

## 2022-01-01 PROCEDURE — 83605 ASSAY OF LACTIC ACID: CPT | Performed by: NURSE PRACTITIONER

## 2022-01-01 PROCEDURE — 86900 BLOOD TYPING SEROLOGIC ABO: CPT

## 2022-01-01 PROCEDURE — 87070 CULTURE OTHR SPECIMN AEROBIC: CPT | Performed by: ORTHOPAEDIC SURGERY

## 2022-01-01 PROCEDURE — 85610 PROTHROMBIN TIME: CPT | Performed by: PHYSICIAN ASSISTANT

## 2022-01-01 PROCEDURE — 25010000002 LORAZEPAM PER 2 MG: Performed by: INTERNAL MEDICINE

## 2022-01-01 PROCEDURE — 93005 ELECTROCARDIOGRAM TRACING: CPT | Performed by: INTERNAL MEDICINE

## 2022-01-01 PROCEDURE — G0299 HHS/HOSPICE OF RN EA 15 MIN: HCPCS

## 2022-01-01 PROCEDURE — 97116 GAIT TRAINING THERAPY: CPT | Performed by: PHYSICAL THERAPIST

## 2022-01-01 PROCEDURE — 25010000002 MIDAZOLAM PER 1 MG: Performed by: ANESTHESIOLOGY

## 2022-01-01 PROCEDURE — 0SB40ZZ EXCISION OF LUMBOSACRAL DISC, OPEN APPROACH: ICD-10-PCS | Performed by: NEUROLOGICAL SURGERY

## 2022-01-01 PROCEDURE — 82728 ASSAY OF FERRITIN: CPT | Performed by: INTERNAL MEDICINE

## 2022-01-01 PROCEDURE — 87040 BLOOD CULTURE FOR BACTERIA: CPT | Performed by: INTERNAL MEDICINE

## 2022-01-01 PROCEDURE — 97110 THERAPEUTIC EXERCISES: CPT | Performed by: PHYSICAL THERAPIST

## 2022-01-01 PROCEDURE — 80053 COMPREHEN METABOLIC PANEL: CPT | Performed by: STUDENT IN AN ORGANIZED HEALTH CARE EDUCATION/TRAINING PROGRAM

## 2022-01-01 PROCEDURE — 82248 BILIRUBIN DIRECT: CPT | Performed by: STUDENT IN AN ORGANIZED HEALTH CARE EDUCATION/TRAINING PROGRAM

## 2022-01-01 PROCEDURE — 85730 THROMBOPLASTIN TIME PARTIAL: CPT | Performed by: EMERGENCY MEDICINE

## 2022-01-01 PROCEDURE — 25010000002 ALBUMIN HUMAN 25% PER 50 ML: Performed by: ANESTHESIOLOGY

## 2022-01-01 PROCEDURE — 93010 ELECTROCARDIOGRAM REPORT: CPT | Performed by: INTERNAL MEDICINE

## 2022-01-01 PROCEDURE — C1751 CATH, INF, PER/CENT/MIDLINE: HCPCS

## 2022-01-01 PROCEDURE — 80202 ASSAY OF VANCOMYCIN: CPT | Performed by: ORTHOPAEDIC SURGERY

## 2022-01-01 PROCEDURE — 99232 SBSQ HOSP IP/OBS MODERATE 35: CPT | Performed by: NEUROLOGICAL SURGERY

## 2022-01-01 PROCEDURE — 86140 C-REACTIVE PROTEIN: CPT | Performed by: INTERNAL MEDICINE

## 2022-01-01 PROCEDURE — 80053 COMPREHEN METABOLIC PANEL: CPT | Performed by: PHYSICIAN ASSISTANT

## 2022-01-01 PROCEDURE — 82746 ASSAY OF FOLIC ACID SERUM: CPT | Performed by: INTERNAL MEDICINE

## 2022-01-01 PROCEDURE — 83735 ASSAY OF MAGNESIUM: CPT | Performed by: INTERNAL MEDICINE

## 2022-01-01 PROCEDURE — 25010000002 ONDANSETRON PER 1 MG: Performed by: PHYSICIAN ASSISTANT

## 2022-01-01 PROCEDURE — 85730 THROMBOPLASTIN TIME PARTIAL: CPT | Performed by: STUDENT IN AN ORGANIZED HEALTH CARE EDUCATION/TRAINING PROGRAM

## 2022-01-01 PROCEDURE — 25010000002 DIAZEPAM PER 5 MG: Performed by: EMERGENCY MEDICINE

## 2022-01-01 PROCEDURE — 25010000002 PHENYLEPHRINE 10 MG/ML SOLUTION

## 2022-01-01 PROCEDURE — 84443 ASSAY THYROID STIM HORMONE: CPT | Performed by: INTERNAL MEDICINE

## 2022-01-01 PROCEDURE — 22015 I&D ABSCESS P-SPINE L/S/LS: CPT | Performed by: ORTHOPAEDIC SURGERY

## 2022-01-01 PROCEDURE — 22633 ARTHRD CMBN 1NTRSPC LUMBAR: CPT | Performed by: ORTHOPAEDIC SURGERY

## 2022-01-01 PROCEDURE — 76000 FLUOROSCOPY <1 HR PHYS/QHP: CPT | Performed by: NEUROLOGICAL SURGERY

## 2022-01-01 PROCEDURE — 25010000002 PHENYLEPHRINE 10 MG/ML SOLUTION: Performed by: NURSE ANESTHETIST, CERTIFIED REGISTERED

## 2022-01-01 PROCEDURE — 63267 EXCISE INTRSPINL LESION LMBR: CPT | Performed by: NEUROLOGICAL SURGERY

## 2022-01-01 PROCEDURE — 76705 ECHO EXAM OF ABDOMEN: CPT

## 2022-01-01 PROCEDURE — 02HV33Z INSERTION OF INFUSION DEVICE INTO SUPERIOR VENA CAVA, PERCUTANEOUS APPROACH: ICD-10-PCS | Performed by: INTERNAL MEDICINE

## 2022-01-01 PROCEDURE — 85610 PROTHROMBIN TIME: CPT | Performed by: INTERNAL MEDICINE

## 2022-01-01 PROCEDURE — 96376 TX/PRO/DX INJ SAME DRUG ADON: CPT

## 2022-01-01 PROCEDURE — 93306 TTE W/DOPPLER COMPLETE: CPT | Performed by: INTERNAL MEDICINE

## 2022-01-01 PROCEDURE — 87205 SMEAR GRAM STAIN: CPT | Performed by: ORTHOPAEDIC SURGERY

## 2022-01-01 PROCEDURE — 85025 COMPLETE CBC W/AUTO DIFF WBC: CPT | Performed by: PHYSICIAN ASSISTANT

## 2022-01-01 PROCEDURE — 25010000002 FENTANYL CITRATE (PF) 50 MCG/ML SOLUTION: Performed by: NURSE ANESTHETIST, CERTIFIED REGISTERED

## 2022-01-01 PROCEDURE — 83540 ASSAY OF IRON: CPT | Performed by: INTERNAL MEDICINE

## 2022-01-01 PROCEDURE — U0004 COV-19 TEST NON-CDC HGH THRU: HCPCS | Performed by: EMERGENCY MEDICINE

## 2022-01-01 PROCEDURE — 85610 PROTHROMBIN TIME: CPT | Performed by: STUDENT IN AN ORGANIZED HEALTH CARE EDUCATION/TRAINING PROGRAM

## 2022-01-01 PROCEDURE — 25010000002 VANCOMYCIN 1 G RECONSTITUTED SOLUTION: Performed by: ORTHOPAEDIC SURGERY

## 2022-01-01 PROCEDURE — 36415 COLL VENOUS BLD VENIPUNCTURE: CPT

## 2022-01-01 PROCEDURE — 93306 TTE W/DOPPLER COMPLETE: CPT

## 2022-01-01 PROCEDURE — 72100 X-RAY EXAM L-S SPINE 2/3 VWS: CPT | Performed by: NEUROLOGICAL SURGERY

## 2022-01-01 PROCEDURE — 82077 ASSAY SPEC XCP UR&BREATH IA: CPT | Performed by: PHYSICIAN ASSISTANT

## 2022-01-01 PROCEDURE — A9577 INJ MULTIHANCE: HCPCS | Performed by: EMERGENCY MEDICINE

## 2022-01-01 DEVICE — ROD PREBNT SPINE EXPEDIUM TI 5.5X85MM: Type: IMPLANTABLE DEVICE | Site: SPINE LUMBAR | Status: FUNCTIONAL

## 2022-01-01 DEVICE — CONN 2SIDE/SIDE MONARCH TI 5.50X5.50: Type: IMPLANTABLE DEVICE | Site: SPINE LUMBAR | Status: FUNCTIONAL

## 2022-01-01 DEVICE — IMPLANTABLE DEVICE: Type: IMPLANTABLE DEVICE | Site: SPINE LUMBAR | Status: FUNCTIONAL

## 2022-01-01 DEVICE — SSC BONE WAX
Type: IMPLANTABLE DEVICE | Site: SPINE LUMBAR | Status: FUNCTIONAL
Brand: SSC BONE WAX

## 2022-01-01 DEVICE — DEV CONTRL TISS STRATAFIX PDS PLS OS6 REV SZ1 18IN 45CM: Type: IMPLANTABLE DEVICE | Site: SPINE LUMBAR | Status: FUNCTIONAL

## 2022-01-01 DEVICE — SCRW EXPEDIUM PA TI 7.5X40MM: Type: IMPLANTABLE DEVICE | Site: SPINE LUMBAR | Status: FUNCTIONAL

## 2022-01-01 DEVICE — SCRW EXPEDIUM PA TI 7.5X45MM: Type: IMPLANTABLE DEVICE | Site: SPINE LUMBAR | Status: FUNCTIONAL

## 2022-01-01 DEVICE — SCRW INNR EXPEDIUM: Type: IMPLANTABLE DEVICE | Site: SPINE LUMBAR | Status: FUNCTIONAL

## 2022-01-01 DEVICE — SCRW EXPEDIUM PA TI 7X40MM: Type: IMPLANTABLE DEVICE | Site: SPINE LUMBAR | Status: FUNCTIONAL

## 2022-01-01 RX ORDER — SODIUM CHLORIDE, SODIUM LACTATE, POTASSIUM CHLORIDE, CALCIUM CHLORIDE 600; 310; 30; 20 MG/100ML; MG/100ML; MG/100ML; MG/100ML
9 INJECTION, SOLUTION INTRAVENOUS CONTINUOUS
Status: DISCONTINUED | OUTPATIENT
Start: 2022-01-01 | End: 2022-01-01 | Stop reason: HOSPADM

## 2022-01-01 RX ORDER — ONDANSETRON 2 MG/ML
4 INJECTION INTRAMUSCULAR; INTRAVENOUS EVERY 6 HOURS PRN
Status: DISCONTINUED | OUTPATIENT
Start: 2022-01-01 | End: 2022-01-01 | Stop reason: HOSPADM

## 2022-01-01 RX ORDER — SODIUM CHLORIDE 0.9 % (FLUSH) 0.9 %
10 SYRINGE (ML) INJECTION AS NEEDED
Status: DISCONTINUED | OUTPATIENT
Start: 2022-01-01 | End: 2022-01-01 | Stop reason: HOSPADM

## 2022-01-01 RX ORDER — ACETAMINOPHEN 325 MG/1
650 TABLET ORAL EVERY 4 HOURS PRN
Status: DISCONTINUED | OUTPATIENT
Start: 2022-01-01 | End: 2022-01-01 | Stop reason: HOSPADM

## 2022-01-01 RX ORDER — METHYLPREDNISOLONE SODIUM SUCCINATE 125 MG/2ML
125 INJECTION, POWDER, LYOPHILIZED, FOR SOLUTION INTRAMUSCULAR; INTRAVENOUS ONCE
Status: COMPLETED | OUTPATIENT
Start: 2022-01-01 | End: 2022-01-01

## 2022-01-01 RX ORDER — NOREPINEPHRINE BIT/0.9 % NACL 8 MG/250ML
.02-.3 INFUSION BOTTLE (ML) INTRAVENOUS
Status: DISCONTINUED | OUTPATIENT
Start: 2022-01-01 | End: 2022-01-01 | Stop reason: HOSPADM

## 2022-01-01 RX ORDER — DEXTROSE MONOHYDRATE 25 G/50ML
25 INJECTION, SOLUTION INTRAVENOUS
Status: DISCONTINUED | OUTPATIENT
Start: 2022-01-01 | End: 2022-01-01 | Stop reason: HOSPADM

## 2022-01-01 RX ORDER — FENTANYL CITRATE 50 UG/ML
50 INJECTION, SOLUTION INTRAMUSCULAR; INTRAVENOUS
Status: DISCONTINUED | OUTPATIENT
Start: 2022-01-01 | End: 2022-01-01 | Stop reason: HOSPADM

## 2022-01-01 RX ORDER — NALOXONE HCL 0.4 MG/ML
0.2 VIAL (ML) INJECTION
Status: DISCONTINUED | OUTPATIENT
Start: 2022-01-01 | End: 2022-01-01 | Stop reason: HOSPADM

## 2022-01-01 RX ORDER — NALOXONE HCL 0.4 MG/ML
0.1 VIAL (ML) INJECTION
Status: DISCONTINUED | OUTPATIENT
Start: 2022-01-01 | End: 2022-01-01 | Stop reason: HOSPADM

## 2022-01-01 RX ORDER — PHENYLEPHRINE HCL IN 0.9% NACL 0.5 MG/5ML
.5-3 SYRINGE (ML) INTRAVENOUS
Status: DISCONTINUED | OUTPATIENT
Start: 2022-01-01 | End: 2022-01-01 | Stop reason: SDUPTHER

## 2022-01-01 RX ORDER — NITROGLYCERIN 0.4 MG/1
0.4 TABLET SUBLINGUAL
Status: DISCONTINUED | OUTPATIENT
Start: 2022-01-01 | End: 2022-01-01 | Stop reason: HOSPADM

## 2022-01-01 RX ORDER — METOPROLOL TARTRATE 50 MG/1
TABLET, FILM COATED ORAL
Qty: 60 TABLET | Refills: 0 | Status: ON HOLD | OUTPATIENT
Start: 2022-01-01 | End: 2022-01-01

## 2022-01-01 RX ORDER — PHENYLEPHRINE HCL IN 0.9% NACL 0.5 MG/5ML
SYRINGE (ML) INTRAVENOUS
Status: COMPLETED
Start: 2022-01-01 | End: 2022-01-01

## 2022-01-01 RX ORDER — INSULIN LISPRO 100 [IU]/ML
0-7 INJECTION, SOLUTION INTRAVENOUS; SUBCUTANEOUS
Status: DISCONTINUED | OUTPATIENT
Start: 2022-01-01 | End: 2022-01-01 | Stop reason: HOSPADM

## 2022-01-01 RX ORDER — LORAZEPAM 2 MG/ML
2 INJECTION INTRAMUSCULAR
Status: DISCONTINUED | OUTPATIENT
Start: 2022-01-01 | End: 2022-01-01 | Stop reason: HOSPADM

## 2022-01-01 RX ORDER — HYDRALAZINE HYDROCHLORIDE 20 MG/ML
10 INJECTION INTRAMUSCULAR; INTRAVENOUS EVERY 6 HOURS PRN
Status: DISCONTINUED | OUTPATIENT
Start: 2022-01-01 | End: 2022-01-01 | Stop reason: HOSPADM

## 2022-01-01 RX ORDER — SODIUM CHLORIDE 9 MG/ML
INJECTION, SOLUTION INTRAVENOUS CONTINUOUS PRN
Status: DISCONTINUED | OUTPATIENT
Start: 2022-01-01 | End: 2022-01-01 | Stop reason: SURG

## 2022-01-01 RX ORDER — DIPHENHYDRAMINE HYDROCHLORIDE 50 MG/ML
12.5 INJECTION INTRAMUSCULAR; INTRAVENOUS
Status: DISCONTINUED | OUTPATIENT
Start: 2022-01-01 | End: 2022-01-01 | Stop reason: HOSPADM

## 2022-01-01 RX ORDER — NICOTINE POLACRILEX 4 MG
15 LOZENGE BUCCAL
Status: DISCONTINUED | OUTPATIENT
Start: 2022-01-01 | End: 2022-01-01 | Stop reason: SDUPTHER

## 2022-01-01 RX ORDER — DIPHENOXYLATE HYDROCHLORIDE AND ATROPINE SULFATE 2.5; .025 MG/1; MG/1
1 TABLET ORAL
Status: DISCONTINUED | OUTPATIENT
Start: 2022-01-01 | End: 2022-01-01 | Stop reason: HOSPADM

## 2022-01-01 RX ORDER — ASPIRIN 81 MG/1
81 TABLET ORAL DAILY
Status: DISCONTINUED | OUTPATIENT
Start: 2022-01-01 | End: 2022-01-01

## 2022-01-01 RX ORDER — HYDROMORPHONE HYDROCHLORIDE 1 MG/ML
0.5 INJECTION, SOLUTION INTRAMUSCULAR; INTRAVENOUS; SUBCUTANEOUS ONCE
Status: COMPLETED | OUTPATIENT
Start: 2022-01-01 | End: 2022-01-01

## 2022-01-01 RX ORDER — ROSUVASTATIN CALCIUM 40 MG/1
40 TABLET, COATED ORAL NIGHTLY
Status: DISCONTINUED | OUTPATIENT
Start: 2022-01-01 | End: 2022-01-01

## 2022-01-01 RX ORDER — NICOTINE POLACRILEX 4 MG
15 LOZENGE BUCCAL
Status: DISCONTINUED | OUTPATIENT
Start: 2022-01-01 | End: 2022-01-01 | Stop reason: HOSPADM

## 2022-01-01 RX ORDER — METOPROLOL TARTRATE 50 MG/1
TABLET, FILM COATED ORAL
Qty: 60 TABLET | Refills: 0 | Status: SHIPPED | OUTPATIENT
Start: 2022-01-01

## 2022-01-01 RX ORDER — SODIUM CHLORIDE 0.9 % (FLUSH) 0.9 %
3 SYRINGE (ML) INJECTION EVERY 12 HOURS SCHEDULED
Status: DISCONTINUED | OUTPATIENT
Start: 2022-01-01 | End: 2022-01-01 | Stop reason: HOSPADM

## 2022-01-01 RX ORDER — HYDROMORPHONE HYDROCHLORIDE 1 MG/ML
0.5 INJECTION, SOLUTION INTRAMUSCULAR; INTRAVENOUS; SUBCUTANEOUS
Status: DISCONTINUED | OUTPATIENT
Start: 2022-01-01 | End: 2022-01-01 | Stop reason: HOSPADM

## 2022-01-01 RX ORDER — METOPROLOL TARTRATE 50 MG/1
50 TABLET, FILM COATED ORAL EVERY 12 HOURS SCHEDULED
Status: DISCONTINUED | OUTPATIENT
Start: 2022-01-01 | End: 2022-01-01 | Stop reason: HOSPADM

## 2022-01-01 RX ORDER — POLYETHYLENE GLYCOL 3350 17 G/17G
17 POWDER, FOR SOLUTION ORAL DAILY
Status: DISCONTINUED | OUTPATIENT
Start: 2022-01-01 | End: 2022-01-01 | Stop reason: HOSPADM

## 2022-01-01 RX ORDER — LORAZEPAM 2 MG/ML
0.5 INJECTION INTRAMUSCULAR
Status: DISCONTINUED | OUTPATIENT
Start: 2022-01-01 | End: 2022-01-01 | Stop reason: HOSPADM

## 2022-01-01 RX ORDER — OXYCODONE HYDROCHLORIDE AND ACETAMINOPHEN 5; 325 MG/1; MG/1
2 TABLET ORAL EVERY 4 HOURS PRN
Status: DISCONTINUED | OUTPATIENT
Start: 2022-01-01 | End: 2022-01-01 | Stop reason: HOSPADM

## 2022-01-01 RX ORDER — MORPHINE SULFATE 2 MG/ML
4 INJECTION, SOLUTION INTRAMUSCULAR; INTRAVENOUS
Status: DISCONTINUED | OUTPATIENT
Start: 2022-01-01 | End: 2022-01-01 | Stop reason: HOSPADM

## 2022-01-01 RX ORDER — DAPAGLIFLOZIN AND METFORMIN HYDROCHLORIDE 5; 1000 MG/1; MG/1
TABLET, FILM COATED, EXTENDED RELEASE ORAL
Qty: 180 TABLET | Refills: 1 | Status: SHIPPED | OUTPATIENT
Start: 2022-01-01

## 2022-01-01 RX ORDER — MIDAZOLAM HYDROCHLORIDE 1 MG/ML
0.5 INJECTION INTRAMUSCULAR; INTRAVENOUS
Status: COMPLETED | OUTPATIENT
Start: 2022-01-01 | End: 2022-01-01

## 2022-01-01 RX ORDER — DIAZEPAM 5 MG/ML
2.5 INJECTION, SOLUTION INTRAMUSCULAR; INTRAVENOUS ONCE
Status: COMPLETED | OUTPATIENT
Start: 2022-01-01 | End: 2022-01-01

## 2022-01-01 RX ORDER — HYDRALAZINE HYDROCHLORIDE 20 MG/ML
5 INJECTION INTRAMUSCULAR; INTRAVENOUS
Status: DISCONTINUED | OUTPATIENT
Start: 2022-01-01 | End: 2022-01-01 | Stop reason: HOSPADM

## 2022-01-01 RX ORDER — DEXAMETHASONE SODIUM PHOSPHATE 10 MG/ML
INJECTION INTRAMUSCULAR; INTRAVENOUS AS NEEDED
Status: DISCONTINUED | OUTPATIENT
Start: 2022-01-01 | End: 2022-01-01 | Stop reason: SURG

## 2022-01-01 RX ORDER — PRAMIPEXOLE DIHYDROCHLORIDE 0.5 MG/1
0.5 TABLET ORAL 2 TIMES DAILY
Status: DISCONTINUED | OUTPATIENT
Start: 2022-01-01 | End: 2022-01-01

## 2022-01-01 RX ORDER — PANTOPRAZOLE SODIUM 40 MG/1
40 TABLET, DELAYED RELEASE ORAL EVERY MORNING
Refills: 1 | Status: DISCONTINUED | OUTPATIENT
Start: 2022-01-01 | End: 2022-01-01 | Stop reason: HOSPADM

## 2022-01-01 RX ORDER — CEFAZOLIN SODIUM 2 G/100ML
2 INJECTION, SOLUTION INTRAVENOUS EVERY 12 HOURS
Status: DISCONTINUED | OUTPATIENT
Start: 2022-01-01 | End: 2022-01-01 | Stop reason: HOSPADM

## 2022-01-01 RX ORDER — EPHEDRINE SULFATE 50 MG/ML
5 INJECTION, SOLUTION INTRAVENOUS ONCE AS NEEDED
Status: DISCONTINUED | OUTPATIENT
Start: 2022-01-01 | End: 2022-01-01 | Stop reason: HOSPADM

## 2022-01-01 RX ORDER — INSULIN LISPRO 100 [IU]/ML
0-9 INJECTION, SOLUTION INTRAVENOUS; SUBCUTANEOUS
Status: DISCONTINUED | OUTPATIENT
Start: 2022-01-01 | End: 2022-01-01 | Stop reason: HOSPADM

## 2022-01-01 RX ORDER — SODIUM CHLORIDE 9 MG/ML
100 INJECTION, SOLUTION INTRAVENOUS CONTINUOUS
Status: DISCONTINUED | OUTPATIENT
Start: 2022-01-01 | End: 2022-01-01

## 2022-01-01 RX ORDER — ROSUVASTATIN CALCIUM 40 MG/1
40 TABLET, COATED ORAL DAILY
Status: DISCONTINUED | OUTPATIENT
Start: 2022-01-01 | End: 2022-01-01 | Stop reason: HOSPADM

## 2022-01-01 RX ORDER — ZOLPIDEM TARTRATE 10 MG/1
10 TABLET ORAL NIGHTLY PRN
Qty: 90 TABLET | Refills: 0 | Status: SHIPPED | OUTPATIENT
Start: 2022-01-01

## 2022-01-01 RX ORDER — GLYCOPYRROLATE 0.2 MG/ML
0.4 INJECTION INTRAMUSCULAR; INTRAVENOUS
Status: DISCONTINUED | OUTPATIENT
Start: 2022-01-01 | End: 2022-01-01 | Stop reason: HOSPADM

## 2022-01-01 RX ORDER — PROMETHAZINE HYDROCHLORIDE 25 MG/1
25 TABLET ORAL ONCE AS NEEDED
Status: DISCONTINUED | OUTPATIENT
Start: 2022-01-01 | End: 2022-01-01 | Stop reason: HOSPADM

## 2022-01-01 RX ORDER — OMEGA-3-ACID ETHYL ESTERS 1 G/1
2 CAPSULE, LIQUID FILLED ORAL 2 TIMES DAILY
Qty: 360 CAPSULE | Refills: 0 | Status: SHIPPED | OUTPATIENT
Start: 2022-01-01

## 2022-01-01 RX ORDER — METOPROLOL TARTRATE 50 MG/1
50 TABLET, FILM COATED ORAL EVERY 12 HOURS SCHEDULED
Status: DISCONTINUED | OUTPATIENT
Start: 2022-01-01 | End: 2022-01-01

## 2022-01-01 RX ORDER — PREDNISONE 50 MG/1
50 TABLET ORAL DAILY
Qty: 5 TABLET | Refills: 0 | Status: SHIPPED | OUTPATIENT
Start: 2022-01-01 | End: 2022-01-01 | Stop reason: HOSPADM

## 2022-01-01 RX ORDER — ONDANSETRON 2 MG/ML
4 INJECTION INTRAMUSCULAR; INTRAVENOUS ONCE
Status: COMPLETED | OUTPATIENT
Start: 2022-01-01 | End: 2022-01-01

## 2022-01-01 RX ORDER — LIDOCAINE HYDROCHLORIDE 20 MG/ML
INJECTION, SOLUTION INFILTRATION; PERINEURAL AS NEEDED
Status: DISCONTINUED | OUTPATIENT
Start: 2022-01-01 | End: 2022-01-01 | Stop reason: SURG

## 2022-01-01 RX ORDER — LIDOCAINE 50 MG/G
1 PATCH TOPICAL
Status: DISCONTINUED | OUTPATIENT
Start: 2022-01-01 | End: 2022-01-01 | Stop reason: HOSPADM

## 2022-01-01 RX ORDER — HYDROMORPHONE HCL IN 0.9% NACL 10 MG/50ML
PATIENT CONTROLLED ANALGESIA SYRINGE INTRAVENOUS CONTINUOUS
Status: DISCONTINUED | OUTPATIENT
Start: 2022-01-01 | End: 2022-01-01

## 2022-01-01 RX ORDER — LORAZEPAM 2 MG/ML
2 CONCENTRATE ORAL
Status: DISCONTINUED | OUTPATIENT
Start: 2022-01-01 | End: 2022-01-01 | Stop reason: HOSPADM

## 2022-01-01 RX ORDER — ONDANSETRON 4 MG/1
4 TABLET, FILM COATED ORAL EVERY 6 HOURS PRN
Status: DISCONTINUED | OUTPATIENT
Start: 2022-01-01 | End: 2022-01-01 | Stop reason: HOSPADM

## 2022-01-01 RX ORDER — PHENYLEPHRINE HYDROCHLORIDE 10 MG/ML
INJECTION INTRAVENOUS AS NEEDED
Status: DISCONTINUED | OUTPATIENT
Start: 2022-01-01 | End: 2022-01-01 | Stop reason: SURG

## 2022-01-01 RX ORDER — LABETALOL HYDROCHLORIDE 5 MG/ML
5 INJECTION, SOLUTION INTRAVENOUS
Status: DISCONTINUED | OUTPATIENT
Start: 2022-01-01 | End: 2022-01-01 | Stop reason: HOSPADM

## 2022-01-01 RX ORDER — HYDROMORPHONE HYDROCHLORIDE 1 MG/ML
0.5 INJECTION, SOLUTION INTRAMUSCULAR; INTRAVENOUS; SUBCUTANEOUS
Status: DISCONTINUED | OUTPATIENT
Start: 2022-01-01 | End: 2022-01-01

## 2022-01-01 RX ORDER — DOCUSATE SODIUM 100 MG/1
100 CAPSULE, LIQUID FILLED ORAL 2 TIMES DAILY
Status: DISCONTINUED | OUTPATIENT
Start: 2022-01-01 | End: 2022-01-01 | Stop reason: HOSPADM

## 2022-01-01 RX ORDER — ACETAMINOPHEN 160 MG/5ML
650 SOLUTION ORAL EVERY 4 HOURS PRN
Status: DISCONTINUED | OUTPATIENT
Start: 2022-01-01 | End: 2022-01-01 | Stop reason: HOSPADM

## 2022-01-01 RX ORDER — ONDANSETRON 2 MG/ML
INJECTION INTRAMUSCULAR; INTRAVENOUS AS NEEDED
Status: DISCONTINUED | OUTPATIENT
Start: 2022-01-01 | End: 2022-01-01 | Stop reason: SURG

## 2022-01-01 RX ORDER — PROMETHAZINE HYDROCHLORIDE 25 MG/1
25 SUPPOSITORY RECTAL ONCE AS NEEDED
Status: DISCONTINUED | OUTPATIENT
Start: 2022-01-01 | End: 2022-01-01 | Stop reason: HOSPADM

## 2022-01-01 RX ORDER — ACETAMINOPHEN 650 MG/1
650 SUPPOSITORY RECTAL EVERY 4 HOURS PRN
Status: DISCONTINUED | OUTPATIENT
Start: 2022-01-01 | End: 2022-01-01 | Stop reason: HOSPADM

## 2022-01-01 RX ORDER — SODIUM CHLORIDE 9 MG/ML
150 INJECTION, SOLUTION INTRAVENOUS CONTINUOUS
Status: DISCONTINUED | OUTPATIENT
Start: 2022-01-01 | End: 2022-01-01 | Stop reason: HOSPADM

## 2022-01-01 RX ORDER — FENTANYL CITRATE 50 UG/ML
INJECTION, SOLUTION INTRAMUSCULAR; INTRAVENOUS AS NEEDED
Status: DISCONTINUED | OUTPATIENT
Start: 2022-01-01 | End: 2022-01-01 | Stop reason: SURG

## 2022-01-01 RX ORDER — SODIUM CHLORIDE 0.9 % (FLUSH) 0.9 %
3-10 SYRINGE (ML) INJECTION AS NEEDED
Status: DISCONTINUED | OUTPATIENT
Start: 2022-01-01 | End: 2022-01-01 | Stop reason: HOSPADM

## 2022-01-01 RX ORDER — OXYCODONE HYDROCHLORIDE 5 MG/1
10 TABLET ORAL EVERY 6 HOURS PRN
Status: DISCONTINUED | OUTPATIENT
Start: 2022-01-01 | End: 2022-01-01 | Stop reason: HOSPADM

## 2022-01-01 RX ORDER — BISACODYL 10 MG
10 SUPPOSITORY, RECTAL RECTAL DAILY PRN
Status: DISCONTINUED | OUTPATIENT
Start: 2022-01-01 | End: 2022-01-01 | Stop reason: HOSPADM

## 2022-01-01 RX ORDER — LIDOCAINE HYDROCHLORIDE 10 MG/ML
0.5 INJECTION, SOLUTION EPIDURAL; INFILTRATION; INTRACAUDAL; PERINEURAL ONCE AS NEEDED
Status: DISCONTINUED | OUTPATIENT
Start: 2022-01-01 | End: 2022-01-01 | Stop reason: HOSPADM

## 2022-01-01 RX ORDER — AMOXICILLIN 250 MG
1 CAPSULE ORAL NIGHTLY
Status: DISCONTINUED | OUTPATIENT
Start: 2022-01-01 | End: 2022-01-01 | Stop reason: HOSPADM

## 2022-01-01 RX ORDER — MORPHINE SULFATE 20 MG/ML
5 SOLUTION ORAL
Status: DISCONTINUED | OUTPATIENT
Start: 2022-01-01 | End: 2022-01-01 | Stop reason: HOSPADM

## 2022-01-01 RX ORDER — PHENYLEPHRINE HCL IN 0.9% NACL 0.5 MG/5ML
.5-3 SYRINGE (ML) INTRAVENOUS
Status: DISCONTINUED | OUTPATIENT
Start: 2022-01-01 | End: 2022-01-01 | Stop reason: HOSPADM

## 2022-01-01 RX ORDER — LORAZEPAM 2 MG/ML
1 INJECTION INTRAMUSCULAR
Status: DISCONTINUED | OUTPATIENT
Start: 2022-01-01 | End: 2022-01-01 | Stop reason: HOSPADM

## 2022-01-01 RX ORDER — ZOLPIDEM TARTRATE 5 MG/1
10 TABLET ORAL NIGHTLY PRN
Status: DISCONTINUED | OUTPATIENT
Start: 2022-01-01 | End: 2022-01-01 | Stop reason: HOSPADM

## 2022-01-01 RX ORDER — DEXTROSE MONOHYDRATE 25 G/50ML
25 INJECTION, SOLUTION INTRAVENOUS
Status: DISCONTINUED | OUTPATIENT
Start: 2022-01-01 | End: 2022-01-01

## 2022-01-01 RX ORDER — VANCOMYCIN HYDROCHLORIDE 1 G/20ML
INJECTION, POWDER, LYOPHILIZED, FOR SOLUTION INTRAVENOUS AS NEEDED
Status: DISCONTINUED | OUTPATIENT
Start: 2022-01-01 | End: 2022-01-01 | Stop reason: HOSPADM

## 2022-01-01 RX ORDER — DULOXETIN HYDROCHLORIDE 60 MG/1
60 CAPSULE, DELAYED RELEASE ORAL 2 TIMES DAILY
Status: DISCONTINUED | OUTPATIENT
Start: 2022-01-01 | End: 2022-01-01

## 2022-01-01 RX ORDER — POLYETHYLENE GLYCOL 3350 17 G/17G
17 POWDER, FOR SOLUTION ORAL DAILY
Status: DISCONTINUED | OUTPATIENT
Start: 2022-01-01 | End: 2022-01-01

## 2022-01-01 RX ORDER — AMOXICILLIN 250 MG
1 CAPSULE ORAL 2 TIMES DAILY
Status: DISCONTINUED | OUTPATIENT
Start: 2022-01-01 | End: 2022-01-01

## 2022-01-01 RX ORDER — ROCURONIUM BROMIDE 10 MG/ML
INJECTION, SOLUTION INTRAVENOUS AS NEEDED
Status: DISCONTINUED | OUTPATIENT
Start: 2022-01-01 | End: 2022-01-01 | Stop reason: SURG

## 2022-01-01 RX ORDER — OMEPRAZOLE 40 MG/1
CAPSULE, DELAYED RELEASE ORAL
Qty: 90 CAPSULE | Refills: 1 | Status: SHIPPED | OUTPATIENT
Start: 2022-01-01

## 2022-01-01 RX ORDER — FERROUS SULFATE 325(65) MG
325 TABLET ORAL
Status: DISCONTINUED | OUTPATIENT
Start: 2022-01-01 | End: 2022-01-01 | Stop reason: HOSPADM

## 2022-01-01 RX ORDER — LEVOTHYROXINE SODIUM 137 UG/1
TABLET ORAL
Qty: 90 TABLET | Refills: 0 | Status: SHIPPED | OUTPATIENT
Start: 2022-01-01

## 2022-01-01 RX ORDER — LIDOCAINE 50 MG/G
2 PATCH TOPICAL
Status: DISCONTINUED | OUTPATIENT
Start: 2022-01-01 | End: 2022-01-01

## 2022-01-01 RX ORDER — GLYCOPYRROLATE 0.2 MG/ML
0.2 INJECTION INTRAMUSCULAR; INTRAVENOUS
Status: DISCONTINUED | OUTPATIENT
Start: 2022-01-01 | End: 2022-01-01 | Stop reason: HOSPADM

## 2022-01-01 RX ORDER — NALOXONE HCL 0.4 MG/ML
0.2 VIAL (ML) INJECTION AS NEEDED
Status: DISCONTINUED | OUTPATIENT
Start: 2022-01-01 | End: 2022-01-01 | Stop reason: HOSPADM

## 2022-01-01 RX ORDER — CEFAZOLIN SODIUM 2 G/100ML
2 INJECTION, SOLUTION INTRAVENOUS EVERY 8 HOURS
Status: DISCONTINUED | OUTPATIENT
Start: 2022-01-01 | End: 2022-01-01

## 2022-01-01 RX ORDER — FAMOTIDINE 10 MG/ML
20 INJECTION, SOLUTION INTRAVENOUS ONCE
Status: COMPLETED | OUTPATIENT
Start: 2022-01-01 | End: 2022-01-01

## 2022-01-01 RX ORDER — METOPROLOL TARTRATE 50 MG/1
TABLET, FILM COATED ORAL
Qty: 60 TABLET | Refills: 0 | Status: SHIPPED | OUTPATIENT
Start: 2022-01-01 | End: 2022-01-01

## 2022-01-01 RX ORDER — INSULIN GLARGINE AND LIXISENATIDE 100; 33 U/ML; UG/ML
60 INJECTION, SOLUTION SUBCUTANEOUS
Qty: 60 ML | Refills: 0 | Status: SHIPPED | OUTPATIENT
Start: 2022-01-01

## 2022-01-01 RX ORDER — PRAMIPEXOLE DIHYDROCHLORIDE 0.5 MG/1
0.5 TABLET ORAL EVERY 12 HOURS SCHEDULED
Status: DISCONTINUED | OUTPATIENT
Start: 2022-01-01 | End: 2022-01-01 | Stop reason: HOSPADM

## 2022-01-01 RX ORDER — LEVOTHYROXINE SODIUM 137 UG/1
137 TABLET ORAL DAILY
Status: DISCONTINUED | OUTPATIENT
Start: 2022-01-01 | End: 2022-01-01 | Stop reason: HOSPADM

## 2022-01-01 RX ORDER — PROPOFOL 10 MG/ML
VIAL (ML) INTRAVENOUS AS NEEDED
Status: DISCONTINUED | OUTPATIENT
Start: 2022-01-01 | End: 2022-01-01 | Stop reason: SURG

## 2022-01-01 RX ORDER — ONDANSETRON 2 MG/ML
4 INJECTION INTRAMUSCULAR; INTRAVENOUS ONCE AS NEEDED
Status: DISCONTINUED | OUTPATIENT
Start: 2022-01-01 | End: 2022-01-01 | Stop reason: HOSPADM

## 2022-01-01 RX ORDER — BACLOFEN 10 MG/1
20 TABLET ORAL 2 TIMES DAILY
Status: DISCONTINUED | OUTPATIENT
Start: 2022-01-01 | End: 2022-01-01 | Stop reason: HOSPADM

## 2022-01-01 RX ORDER — LEVOTHYROXINE SODIUM 137 UG/1
137 TABLET ORAL
Status: DISCONTINUED | OUTPATIENT
Start: 2022-01-01 | End: 2022-01-01 | Stop reason: HOSPADM

## 2022-01-01 RX ORDER — EPHEDRINE SULFATE 50 MG/ML
INJECTION, SOLUTION INTRAVENOUS AS NEEDED
Status: DISCONTINUED | OUTPATIENT
Start: 2022-01-01 | End: 2022-01-01 | Stop reason: SURG

## 2022-01-01 RX ORDER — SODIUM CHLORIDE 0.9 % (FLUSH) 0.9 %
10 SYRINGE (ML) INJECTION EVERY 12 HOURS SCHEDULED
Status: DISCONTINUED | OUTPATIENT
Start: 2022-01-01 | End: 2022-01-01 | Stop reason: HOSPADM

## 2022-01-01 RX ORDER — POLYETHYLENE GLYCOL 3350 17 G/17G
17 POWDER, FOR SOLUTION ORAL EVERY OTHER DAY
Status: DISCONTINUED | OUTPATIENT
Start: 2022-01-01 | End: 2022-01-01 | Stop reason: HOSPADM

## 2022-01-01 RX ORDER — FERROUS SULFATE 325(65) MG
325 TABLET ORAL EVERY OTHER DAY
Status: DISCONTINUED | OUTPATIENT
Start: 2022-01-01 | End: 2022-01-01 | Stop reason: HOSPADM

## 2022-01-01 RX ORDER — DIPHENHYDRAMINE HCL 25 MG
25 CAPSULE ORAL
Status: DISCONTINUED | OUTPATIENT
Start: 2022-01-01 | End: 2022-01-01 | Stop reason: HOSPADM

## 2022-01-01 RX ORDER — MIDAZOLAM HYDROCHLORIDE 1 MG/ML
0.5 INJECTION INTRAMUSCULAR; INTRAVENOUS
Status: DISCONTINUED | OUTPATIENT
Start: 2022-01-01 | End: 2022-01-01 | Stop reason: HOSPADM

## 2022-01-01 RX ORDER — ALBUMIN (HUMAN) 12.5 G/50ML
SOLUTION INTRAVENOUS CONTINUOUS PRN
Status: DISCONTINUED | OUTPATIENT
Start: 2022-01-01 | End: 2022-01-01 | Stop reason: SURG

## 2022-01-01 RX ORDER — LORAZEPAM 2 MG/ML
1 CONCENTRATE ORAL
Status: DISCONTINUED | OUTPATIENT
Start: 2022-01-01 | End: 2022-01-01 | Stop reason: HOSPADM

## 2022-01-01 RX ORDER — KETAMINE HYDROCHLORIDE 10 MG/ML
INJECTION INTRAMUSCULAR; INTRAVENOUS AS NEEDED
Status: DISCONTINUED | OUTPATIENT
Start: 2022-01-01 | End: 2022-01-01 | Stop reason: SURG

## 2022-01-01 RX ORDER — NICOTINE POLACRILEX 4 MG
15 LOZENGE BUCCAL
Status: DISCONTINUED | OUTPATIENT
Start: 2022-01-01 | End: 2022-01-01

## 2022-01-01 RX ORDER — BACLOFEN 10 MG/1
10 TABLET ORAL 2 TIMES DAILY
Status: DISCONTINUED | OUTPATIENT
Start: 2022-01-01 | End: 2022-01-01 | Stop reason: HOSPADM

## 2022-01-01 RX ORDER — LORAZEPAM 2 MG/ML
0.5 CONCENTRATE ORAL
Status: DISCONTINUED | OUTPATIENT
Start: 2022-01-01 | End: 2022-01-01 | Stop reason: HOSPADM

## 2022-01-01 RX ADMIN — MIDAZOLAM 0.5 MG: 1 INJECTION INTRAMUSCULAR; INTRAVENOUS at 13:52

## 2022-01-01 RX ADMIN — PHENYLEPHRINE HYDROCHLORIDE 1.5 MCG/KG/MIN: 10 INJECTION INTRAVENOUS at 15:07

## 2022-01-01 RX ADMIN — INSULIN LISPRO 4 UNITS: 100 INJECTION, SOLUTION INTRAVENOUS; SUBCUTANEOUS at 12:00

## 2022-01-01 RX ADMIN — ROSUVASTATIN CALCIUM 40 MG: 20 TABLET, FILM COATED ORAL at 08:26

## 2022-01-01 RX ADMIN — LEVOTHYROXINE SODIUM 137 MCG: 0.14 TABLET ORAL at 05:51

## 2022-01-01 RX ADMIN — METOPROLOL TARTRATE 50 MG: 50 TABLET, FILM COATED ORAL at 21:35

## 2022-01-01 RX ADMIN — METOPROLOL TARTRATE 50 MG: 50 TABLET, FILM COATED ORAL at 17:26

## 2022-01-01 RX ADMIN — METHYLPREDNISOLONE SODIUM SUCCINATE 125 MG: 125 INJECTION, POWDER, FOR SOLUTION INTRAMUSCULAR; INTRAVENOUS at 12:56

## 2022-01-01 RX ADMIN — LEVOTHYROXINE SODIUM 137 MCG: 0.14 TABLET ORAL at 05:24

## 2022-01-01 RX ADMIN — PHENYLEPHRINE HYDROCHLORIDE 100 MCG: 10 INJECTION, SOLUTION INTRAVENOUS at 16:25

## 2022-01-01 RX ADMIN — INSULIN GLARGINE-YFGN 25 UNITS: 100 INJECTION, SOLUTION SUBCUTANEOUS at 08:23

## 2022-01-01 RX ADMIN — HYDROMORPHONE HYDROCHLORIDE: 10 INJECTION, SOLUTION INTRAMUSCULAR; INTRAVENOUS; SUBCUTANEOUS at 21:09

## 2022-01-01 RX ADMIN — PHENYLEPHRINE HYDROCHLORIDE 100 MCG: 10 INJECTION, SOLUTION INTRAVENOUS at 16:57

## 2022-01-01 RX ADMIN — OXYCODONE AND ACETAMINOPHEN 2 TABLET: 5; 325 TABLET ORAL at 05:24

## 2022-01-01 RX ADMIN — OXYCODONE AND ACETAMINOPHEN 2 TABLET: 5; 325 TABLET ORAL at 09:29

## 2022-01-01 RX ADMIN — ALBUMIN HUMAN: 0.25 SOLUTION INTRAVENOUS at 17:34

## 2022-01-01 RX ADMIN — BACLOFEN 20 MG: 10 TABLET ORAL at 08:26

## 2022-01-01 RX ADMIN — EPHEDRINE SULFATE 10 MG: 50 INJECTION INTRAVENOUS at 16:14

## 2022-01-01 RX ADMIN — METOPROLOL TARTRATE 50 MG: 25 TABLET ORAL at 08:06

## 2022-01-01 RX ADMIN — DULOXETINE HYDROCHLORIDE 60 MG: 60 CAPSULE, DELAYED RELEASE ORAL at 20:49

## 2022-01-01 RX ADMIN — INSULIN GLARGINE-YFGN 20 UNITS: 100 INJECTION, SOLUTION SUBCUTANEOUS at 09:31

## 2022-01-01 RX ADMIN — PRAMIPEXOLE DIHYDROCHLORIDE 0.5 MG: 0.5 TABLET ORAL at 08:16

## 2022-01-01 RX ADMIN — INSULIN GLARGINE-YFGN 20 UNITS: 100 INJECTION, SOLUTION SUBCUTANEOUS at 08:16

## 2022-01-01 RX ADMIN — PANTOPRAZOLE SODIUM 40 MG: 40 TABLET, DELAYED RELEASE ORAL at 08:31

## 2022-01-01 RX ADMIN — Medication 0.02 MCG/KG/MIN: at 21:03

## 2022-01-01 RX ADMIN — HYDROMORPHONE HYDROCHLORIDE 1 MG: 1 INJECTION, SOLUTION INTRAMUSCULAR; INTRAVENOUS; SUBCUTANEOUS at 03:33

## 2022-01-01 RX ADMIN — SODIUM CHLORIDE, POTASSIUM CHLORIDE, SODIUM LACTATE AND CALCIUM CHLORIDE 9 ML/HR: 600; 310; 30; 20 INJECTION, SOLUTION INTRAVENOUS at 23:39

## 2022-01-01 RX ADMIN — METOPROLOL TARTRATE 50 MG: 25 TABLET ORAL at 08:31

## 2022-01-01 RX ADMIN — SUGAMMADEX 200 MG: 100 INJECTION, SOLUTION INTRAVENOUS at 20:05

## 2022-01-01 RX ADMIN — DIAZEPAM 2.5 MG: 5 INJECTION, SOLUTION INTRAMUSCULAR; INTRAVENOUS at 17:26

## 2022-01-01 RX ADMIN — BACLOFEN 20 MG: 10 TABLET ORAL at 08:31

## 2022-01-01 RX ADMIN — LEVOTHYROXINE SODIUM 137 MCG: 0.14 TABLET ORAL at 17:26

## 2022-01-01 RX ADMIN — INSULIN LISPRO 2 UNITS: 100 INJECTION, SOLUTION INTRAVENOUS; SUBCUTANEOUS at 16:57

## 2022-01-01 RX ADMIN — PHENYLEPHRINE HYDROCHLORIDE 3 MCG/KG/MIN: 10 INJECTION INTRAVENOUS at 19:02

## 2022-01-01 RX ADMIN — ROSUVASTATIN CALCIUM 40 MG: 20 TABLET, FILM COATED ORAL at 08:34

## 2022-01-01 RX ADMIN — MORPHINE SULFATE 4 MG: 2 INJECTION, SOLUTION INTRAMUSCULAR; INTRAVENOUS at 08:31

## 2022-01-01 RX ADMIN — LEVOTHYROXINE SODIUM 137 MCG: 0.14 TABLET ORAL at 05:17

## 2022-01-01 RX ADMIN — METOPROLOL TARTRATE 50 MG: 50 TABLET, FILM COATED ORAL at 20:48

## 2022-01-01 RX ADMIN — DAPTOMYCIN 450 MG: 500 INJECTION, POWDER, LYOPHILIZED, FOR SOLUTION INTRAVENOUS at 21:44

## 2022-01-01 RX ADMIN — SODIUM CHLORIDE 100 ML/HR: 9 INJECTION, SOLUTION INTRAVENOUS at 23:57

## 2022-01-01 RX ADMIN — OXYCODONE AND ACETAMINOPHEN 2 TABLET: 5; 325 TABLET ORAL at 02:38

## 2022-01-01 RX ADMIN — ALBUMIN HUMAN: 0.25 SOLUTION INTRAVENOUS at 18:36

## 2022-01-01 RX ADMIN — PANTOPRAZOLE SODIUM 40 MG: 40 TABLET, DELAYED RELEASE ORAL at 05:55

## 2022-01-01 RX ADMIN — ACETYLCYSTEINE 9520 MG: 200 INJECTION, SOLUTION INTRAVENOUS at 17:25

## 2022-01-01 RX ADMIN — LEVOTHYROXINE SODIUM 137 MCG: 0.14 TABLET ORAL at 05:55

## 2022-01-01 RX ADMIN — Medication 3 ML: at 23:47

## 2022-01-01 RX ADMIN — HYDROMORPHONE HYDROCHLORIDE 1 MG: 1 INJECTION, SOLUTION INTRAMUSCULAR; INTRAVENOUS; SUBCUTANEOUS at 05:51

## 2022-01-01 RX ADMIN — VASOPRESSIN 0.03 UNITS/MIN: 20 INJECTION INTRAVENOUS at 04:54

## 2022-01-01 RX ADMIN — Medication 3 ML: at 13:18

## 2022-01-01 RX ADMIN — ONDANSETRON 4 MG: 2 INJECTION INTRAMUSCULAR; INTRAVENOUS at 17:26

## 2022-01-01 RX ADMIN — HYDROMORPHONE HYDROCHLORIDE 1 MG: 1 INJECTION, SOLUTION INTRAMUSCULAR; INTRAVENOUS; SUBCUTANEOUS at 12:37

## 2022-01-01 RX ADMIN — OXYCODONE AND ACETAMINOPHEN 2 TABLET: 5; 325 TABLET ORAL at 10:07

## 2022-01-01 RX ADMIN — METOPROLOL TARTRATE 50 MG: 50 TABLET, FILM COATED ORAL at 08:26

## 2022-01-01 RX ADMIN — BACLOFEN 20 MG: 10 TABLET ORAL at 22:54

## 2022-01-01 RX ADMIN — VANCOMYCIN HYDROCHLORIDE 1250 MG: 10 INJECTION, POWDER, LYOPHILIZED, FOR SOLUTION INTRAVENOUS at 10:34

## 2022-01-01 RX ADMIN — ONDANSETRON 4 MG: 2 INJECTION INTRAMUSCULAR; INTRAVENOUS at 23:17

## 2022-01-01 RX ADMIN — PHENYLEPHRINE HYDROCHLORIDE 100 MCG: 10 INJECTION, SOLUTION INTRAVENOUS at 17:17

## 2022-01-01 RX ADMIN — BACLOFEN 20 MG: 10 TABLET ORAL at 20:03

## 2022-01-01 RX ADMIN — OXYCODONE AND ACETAMINOPHEN 2 TABLET: 5; 325 TABLET ORAL at 12:18

## 2022-01-01 RX ADMIN — HYDROMORPHONE HYDROCHLORIDE 0.5 MG: 1 INJECTION, SOLUTION INTRAMUSCULAR; INTRAVENOUS; SUBCUTANEOUS at 18:17

## 2022-01-01 RX ADMIN — OXYCODONE AND ACETAMINOPHEN 2 TABLET: 5; 325 TABLET ORAL at 16:35

## 2022-01-01 RX ADMIN — ASPIRIN 81 MG: 81 TABLET, COATED ORAL at 08:06

## 2022-01-01 RX ADMIN — ROSUVASTATIN CALCIUM 40 MG: 20 TABLET, FILM COATED ORAL at 08:06

## 2022-01-01 RX ADMIN — ROCURONIUM BROMIDE 50 MG: 50 INJECTION INTRAVENOUS at 15:24

## 2022-01-01 RX ADMIN — SODIUM CHLORIDE: 9 INJECTION, SOLUTION INTRAVENOUS at 18:18

## 2022-01-01 RX ADMIN — PANTOPRAZOLE SODIUM 40 MG: 40 TABLET, DELAYED RELEASE ORAL at 06:18

## 2022-01-01 RX ADMIN — SODIUM CHLORIDE 100 ML/HR: 9 INJECTION, SOLUTION INTRAVENOUS at 06:15

## 2022-01-01 RX ADMIN — DOCUSATE SODIUM 50MG AND SENNOSIDES 8.6MG 1 TABLET: 8.6; 5 TABLET, FILM COATED ORAL at 08:48

## 2022-01-01 RX ADMIN — SODIUM CHLORIDE 500 ML: 9 INJECTION, SOLUTION INTRAVENOUS at 06:29

## 2022-01-01 RX ADMIN — ACETYLCYSTEINE 6.25 MG/KG/HR: 200 INJECTION, SOLUTION INTRAVENOUS at 05:45

## 2022-01-01 RX ADMIN — PRAMIPEXOLE DIHYDROCHLORIDE 0.5 MG: 0.5 TABLET ORAL at 09:01

## 2022-01-01 RX ADMIN — BACLOFEN 20 MG: 10 TABLET ORAL at 20:02

## 2022-01-01 RX ADMIN — LORAZEPAM 1 MG: 2 INJECTION INTRAMUSCULAR; INTRAVENOUS at 08:30

## 2022-01-01 RX ADMIN — VANCOMYCIN HYDROCHLORIDE 1250 MG: 10 INJECTION, POWDER, LYOPHILIZED, FOR SOLUTION INTRAVENOUS at 09:00

## 2022-01-01 RX ADMIN — LEVOTHYROXINE SODIUM 137 MCG: 0.14 TABLET ORAL at 12:47

## 2022-01-01 RX ADMIN — TAZOBACTAM SODIUM AND PIPERACILLIN SODIUM 3.38 G: 375; 3 INJECTION, SOLUTION INTRAVENOUS at 21:42

## 2022-01-01 RX ADMIN — SODIUM CHLORIDE 1000 ML: 9 INJECTION, SOLUTION INTRAVENOUS at 01:39

## 2022-01-01 RX ADMIN — SODIUM CHLORIDE 500 ML: 9 INJECTION, SOLUTION INTRAVENOUS at 08:32

## 2022-01-01 RX ADMIN — INSULIN LISPRO 4 UNITS: 100 INJECTION, SOLUTION INTRAVENOUS; SUBCUTANEOUS at 08:15

## 2022-01-01 RX ADMIN — EPHEDRINE SULFATE 20 MG: 50 INJECTION INTRAVENOUS at 16:03

## 2022-01-01 RX ADMIN — PANTOPRAZOLE SODIUM 40 MG: 40 TABLET, DELAYED RELEASE ORAL at 05:52

## 2022-01-01 RX ADMIN — TAZOBACTAM SODIUM AND PIPERACILLIN SODIUM 3.38 G: 375; 3 INJECTION, SOLUTION INTRAVENOUS at 04:12

## 2022-01-01 RX ADMIN — FERROUS SULFATE TAB 325 MG (65 MG ELEMENTAL FE) 325 MG: 325 (65 FE) TAB at 08:26

## 2022-01-01 RX ADMIN — POLYETHYLENE GLYCOL 3350 17 G: 17 POWDER, FOR SOLUTION ORAL at 08:38

## 2022-01-01 RX ADMIN — MIDAZOLAM 0.5 MG: 1 INJECTION INTRAMUSCULAR; INTRAVENOUS at 13:35

## 2022-01-01 RX ADMIN — PHENYLEPHRINE HYDROCHLORIDE 0.5 MCG/KG/MIN: 10 INJECTION INTRAVENOUS at 09:25

## 2022-01-01 RX ADMIN — CEFAZOLIN SODIUM 2 G: 2 INJECTION, SOLUTION INTRAVENOUS at 21:49

## 2022-01-01 RX ADMIN — BACLOFEN 20 MG: 10 TABLET ORAL at 21:31

## 2022-01-01 RX ADMIN — SODIUM CHLORIDE, POTASSIUM CHLORIDE, SODIUM LACTATE AND CALCIUM CHLORIDE 9 ML/HR: 600; 310; 30; 20 INJECTION, SOLUTION INTRAVENOUS at 13:41

## 2022-01-01 RX ADMIN — HYDROMORPHONE HYDROCHLORIDE 1 MG: 1 INJECTION, SOLUTION INTRAMUSCULAR; INTRAVENOUS; SUBCUTANEOUS at 04:07

## 2022-01-01 RX ADMIN — METOPROLOL TARTRATE 50 MG: 50 TABLET, FILM COATED ORAL at 08:16

## 2022-01-01 RX ADMIN — ACETAMINOPHEN 650 MG: 325 TABLET, FILM COATED ORAL at 01:02

## 2022-01-01 RX ADMIN — ONDANSETRON 4 MG: 2 INJECTION INTRAMUSCULAR; INTRAVENOUS at 17:29

## 2022-01-01 RX ADMIN — INSULIN LISPRO 2 UNITS: 100 INJECTION, SOLUTION INTRAVENOUS; SUBCUTANEOUS at 09:01

## 2022-01-01 RX ADMIN — PHENYLEPHRINE HYDROCHLORIDE 3 MCG/KG/MIN: 10 INJECTION INTRAVENOUS at 04:50

## 2022-01-01 RX ADMIN — DEXTROSE MONOHYDRATE 25 G: 25 INJECTION, SOLUTION INTRAVENOUS at 03:30

## 2022-01-01 RX ADMIN — ROSUVASTATIN CALCIUM 40 MG: 20 TABLET, FILM COATED ORAL at 08:16

## 2022-01-01 RX ADMIN — SODIUM BICARBONATE 150 MEQ: 84 INJECTION, SOLUTION INTRAVENOUS at 04:45

## 2022-01-01 RX ADMIN — PRAMIPEXOLE DIHYDROCHLORIDE 0.5 MG: 0.5 TABLET ORAL at 21:31

## 2022-01-01 RX ADMIN — INSULIN LISPRO 4 UNITS: 100 INJECTION, SOLUTION INTRAVENOUS; SUBCUTANEOUS at 17:10

## 2022-01-01 RX ADMIN — PRAMIPEXOLE DIHYDROCHLORIDE 0.5 MG: 0.5 TABLET ORAL at 09:29

## 2022-01-01 RX ADMIN — FERROUS SULFATE TAB 325 MG (65 MG ELEMENTAL FE) 325 MG: 325 (65 FE) TAB at 12:47

## 2022-01-01 RX ADMIN — ZOLPIDEM TARTRATE 10 MG: 5 TABLET ORAL at 00:25

## 2022-01-01 RX ADMIN — PRAMIPEXOLE DIHYDROCHLORIDE 0.5 MG: 0.5 TABLET ORAL at 08:34

## 2022-01-01 RX ADMIN — TAZOBACTAM SODIUM AND PIPERACILLIN SODIUM 3.38 G: 375; 3 INJECTION, SOLUTION INTRAVENOUS at 21:28

## 2022-01-01 RX ADMIN — INSULIN LISPRO 4 UNITS: 100 INJECTION, SOLUTION INTRAVENOUS; SUBCUTANEOUS at 12:12

## 2022-01-01 RX ADMIN — INSULIN LISPRO 4 UNITS: 100 INJECTION, SOLUTION INTRAVENOUS; SUBCUTANEOUS at 16:47

## 2022-01-01 RX ADMIN — PANTOPRAZOLE SODIUM 40 MG: 40 TABLET, DELAYED RELEASE ORAL at 06:46

## 2022-01-01 RX ADMIN — TAZOBACTAM SODIUM AND PIPERACILLIN SODIUM 3.38 G: 375; 3 INJECTION, SOLUTION INTRAVENOUS at 21:49

## 2022-01-01 RX ADMIN — PRAMIPEXOLE DIHYDROCHLORIDE 0.5 MG: 0.5 TABLET ORAL at 08:31

## 2022-01-01 RX ADMIN — METOPROLOL TARTRATE 50 MG: 50 TABLET, FILM COATED ORAL at 09:29

## 2022-01-01 RX ADMIN — Medication 3 ML: at 21:37

## 2022-01-01 RX ADMIN — DEXTROSE MONOHYDRATE 25 G: 25 INJECTION, SOLUTION INTRAVENOUS at 03:05

## 2022-01-01 RX ADMIN — TAZOBACTAM SODIUM AND PIPERACILLIN SODIUM 3.38 G: 375; 3 INJECTION, SOLUTION INTRAVENOUS at 05:03

## 2022-01-01 RX ADMIN — HYDROMORPHONE HYDROCHLORIDE 1 MG: 1 INJECTION, SOLUTION INTRAMUSCULAR; INTRAVENOUS; SUBCUTANEOUS at 06:23

## 2022-01-01 RX ADMIN — FENTANYL CITRATE 50 MCG: 0.05 INJECTION, SOLUTION INTRAMUSCULAR; INTRAVENOUS at 15:24

## 2022-01-01 RX ADMIN — FENTANYL CITRATE 50 MCG: 50 INJECTION INTRAMUSCULAR; INTRAVENOUS at 14:13

## 2022-01-01 RX ADMIN — VANCOMYCIN HYDROCHLORIDE 2000 MG: 10 INJECTION, POWDER, LYOPHILIZED, FOR SOLUTION INTRAVENOUS at 00:00

## 2022-01-01 RX ADMIN — METOPROLOL TARTRATE 50 MG: 50 TABLET, FILM COATED ORAL at 21:34

## 2022-01-01 RX ADMIN — OXYCODONE AND ACETAMINOPHEN 2 TABLET: 5; 325 TABLET ORAL at 13:17

## 2022-01-01 RX ADMIN — HYDROMORPHONE HYDROCHLORIDE 1 MG: 1 INJECTION, SOLUTION INTRAMUSCULAR; INTRAVENOUS; SUBCUTANEOUS at 16:43

## 2022-01-01 RX ADMIN — BACLOFEN 20 MG: 10 TABLET ORAL at 08:06

## 2022-01-01 RX ADMIN — INSULIN LISPRO 2 UNITS: 100 INJECTION, SOLUTION INTRAVENOUS; SUBCUTANEOUS at 13:17

## 2022-01-01 RX ADMIN — LIDOCAINE 1 PATCH: 50 PATCH CUTANEOUS at 20:09

## 2022-01-01 RX ADMIN — BACLOFEN 20 MG: 10 TABLET ORAL at 21:34

## 2022-01-01 RX ADMIN — Medication 3 ML: at 08:26

## 2022-01-01 RX ADMIN — VANCOMYCIN HYDROCHLORIDE 2000 MG: 10 INJECTION, POWDER, LYOPHILIZED, FOR SOLUTION INTRAVENOUS at 13:14

## 2022-01-01 RX ADMIN — HYDROMORPHONE HYDROCHLORIDE 1 MG: 1 INJECTION, SOLUTION INTRAMUSCULAR; INTRAVENOUS; SUBCUTANEOUS at 08:31

## 2022-01-01 RX ADMIN — HYDROMORPHONE HYDROCHLORIDE 0.5 MG: 1 INJECTION, SOLUTION INTRAMUSCULAR; INTRAVENOUS; SUBCUTANEOUS at 07:22

## 2022-01-01 RX ADMIN — METOPROLOL TARTRATE 50 MG: 50 TABLET, FILM COATED ORAL at 20:03

## 2022-01-01 RX ADMIN — ZOLPIDEM TARTRATE 10 MG: 5 TABLET ORAL at 21:35

## 2022-01-01 RX ADMIN — BACLOFEN 10 MG: 10 TABLET ORAL at 20:49

## 2022-01-01 RX ADMIN — TAZOBACTAM SODIUM AND PIPERACILLIN SODIUM 3.38 G: 375; 3 INJECTION, SOLUTION INTRAVENOUS at 14:16

## 2022-01-01 RX ADMIN — VANCOMYCIN HYDROCHLORIDE 2000 MG: 10 INJECTION, POWDER, LYOPHILIZED, FOR SOLUTION INTRAVENOUS at 11:20

## 2022-01-01 RX ADMIN — FERROUS SULFATE TAB 325 MG (65 MG ELEMENTAL FE) 325 MG: 325 (65 FE) TAB at 08:16

## 2022-01-01 RX ADMIN — VANCOMYCIN HYDROCHLORIDE 1500 MG: 10 INJECTION, POWDER, LYOPHILIZED, FOR SOLUTION INTRAVENOUS at 22:03

## 2022-01-01 RX ADMIN — PANTOPRAZOLE SODIUM 40 MG: 40 TABLET, DELAYED RELEASE ORAL at 05:17

## 2022-01-01 RX ADMIN — OXYCODONE AND ACETAMINOPHEN 2 TABLET: 5; 325 TABLET ORAL at 16:47

## 2022-01-01 RX ADMIN — PHENYLEPHRINE HYDROCHLORIDE 3 MCG/KG/MIN: 10 INJECTION INTRAVENOUS at 07:41

## 2022-01-01 RX ADMIN — PRAMIPEXOLE DIHYDROCHLORIDE 0.5 MG: 0.5 TABLET ORAL at 20:49

## 2022-01-01 RX ADMIN — OXYCODONE AND ACETAMINOPHEN 2 TABLET: 5; 325 TABLET ORAL at 22:28

## 2022-01-01 RX ADMIN — CEFAZOLIN SODIUM 2 G: 2 INJECTION, SOLUTION INTRAVENOUS at 07:12

## 2022-01-01 RX ADMIN — SODIUM CHLORIDE 100 ML/HR: 9 INJECTION, SOLUTION INTRAVENOUS at 05:55

## 2022-01-01 RX ADMIN — KETAMINE HYDROCHLORIDE 20 MG: 10 INJECTION INTRAMUSCULAR; INTRAVENOUS at 15:24

## 2022-01-01 RX ADMIN — SODIUM CHLORIDE 500 ML: 9 INJECTION, SOLUTION INTRAVENOUS at 00:50

## 2022-01-01 RX ADMIN — SODIUM CHLORIDE 100 ML/HR: 9 INJECTION, SOLUTION INTRAVENOUS at 21:08

## 2022-01-01 RX ADMIN — Medication 3 ML: at 08:34

## 2022-01-01 RX ADMIN — CEFAZOLIN SODIUM 2 G: 2 INJECTION, SOLUTION INTRAVENOUS at 15:09

## 2022-01-01 RX ADMIN — ONDANSETRON 4 MG: 2 INJECTION INTRAMUSCULAR; INTRAVENOUS at 20:17

## 2022-01-01 RX ADMIN — ROCURONIUM BROMIDE 10 MG: 50 INJECTION INTRAVENOUS at 19:36

## 2022-01-01 RX ADMIN — DOCUSATE SODIUM 50MG AND SENNOSIDES 8.6MG 1 TABLET: 8.6; 5 TABLET, FILM COATED ORAL at 21:34

## 2022-01-01 RX ADMIN — BACLOFEN 10 MG: 10 TABLET ORAL at 12:47

## 2022-01-01 RX ADMIN — INSULIN LISPRO 4 UNITS: 100 INJECTION, SOLUTION INTRAVENOUS; SUBCUTANEOUS at 17:46

## 2022-01-01 RX ADMIN — Medication 3 ML: at 13:15

## 2022-01-01 RX ADMIN — TAZOBACTAM SODIUM AND PIPERACILLIN SODIUM 3.38 G: 375; 3 INJECTION, SOLUTION INTRAVENOUS at 04:21

## 2022-01-01 RX ADMIN — OXYCODONE 10 MG: 5 TABLET ORAL at 21:01

## 2022-01-01 RX ADMIN — INSULIN LISPRO 2 UNITS: 100 INJECTION, SOLUTION INTRAVENOUS; SUBCUTANEOUS at 11:48

## 2022-01-01 RX ADMIN — HYDROMORPHONE HYDROCHLORIDE 1 MG: 1 INJECTION, SOLUTION INTRAMUSCULAR; INTRAVENOUS; SUBCUTANEOUS at 22:03

## 2022-01-01 RX ADMIN — PERFLUTREN 2 ML: 6.52 INJECTION, SUSPENSION INTRAVENOUS at 17:49

## 2022-01-01 RX ADMIN — HYDROMORPHONE HYDROCHLORIDE 1 MG: 1 INJECTION, SOLUTION INTRAMUSCULAR; INTRAVENOUS; SUBCUTANEOUS at 00:03

## 2022-01-01 RX ADMIN — VANCOMYCIN HYDROCHLORIDE 1500 MG: 10 INJECTION, POWDER, LYOPHILIZED, FOR SOLUTION INTRAVENOUS at 10:07

## 2022-01-01 RX ADMIN — OXYCODONE AND ACETAMINOPHEN 2 TABLET: 5; 325 TABLET ORAL at 05:17

## 2022-01-01 RX ADMIN — PRAMIPEXOLE DIHYDROCHLORIDE 0.5 MG: 0.5 TABLET ORAL at 20:03

## 2022-01-01 RX ADMIN — DOCUSATE SODIUM 100 MG: 100 CAPSULE, LIQUID FILLED ORAL at 21:00

## 2022-01-01 RX ADMIN — PROPOFOL 150 MG: 10 INJECTION, EMULSION INTRAVENOUS at 15:24

## 2022-01-01 RX ADMIN — ZOLPIDEM TARTRATE 10 MG: 5 TABLET ORAL at 23:33

## 2022-01-01 RX ADMIN — PHENYLEPHRINE HYDROCHLORIDE 100 MCG: 10 INJECTION, SOLUTION INTRAVENOUS at 17:27

## 2022-01-01 RX ADMIN — ONDANSETRON 4 MG: 2 INJECTION INTRAMUSCULAR; INTRAVENOUS at 06:23

## 2022-01-01 RX ADMIN — SODIUM CHLORIDE, POTASSIUM CHLORIDE, SODIUM LACTATE AND CALCIUM CHLORIDE: 600; 310; 30; 20 INJECTION, SOLUTION INTRAVENOUS at 15:50

## 2022-01-01 RX ADMIN — HYDROMORPHONE HYDROCHLORIDE 1 MG: 1 INJECTION, SOLUTION INTRAMUSCULAR; INTRAVENOUS; SUBCUTANEOUS at 10:36

## 2022-01-01 RX ADMIN — ROSUVASTATIN CALCIUM 40 MG: 40 TABLET, FILM COATED ORAL at 20:49

## 2022-01-01 RX ADMIN — INSULIN LISPRO 2 UNITS: 100 INJECTION, SOLUTION INTRAVENOUS; SUBCUTANEOUS at 09:30

## 2022-01-01 RX ADMIN — BACLOFEN 20 MG: 10 TABLET ORAL at 08:34

## 2022-01-01 RX ADMIN — DULOXETINE HYDROCHLORIDE 60 MG: 60 CAPSULE, DELAYED RELEASE ORAL at 22:55

## 2022-01-01 RX ADMIN — INSULIN LISPRO 2 UNITS: 100 INJECTION, SOLUTION INTRAVENOUS; SUBCUTANEOUS at 08:33

## 2022-01-01 RX ADMIN — BACLOFEN 20 MG: 10 TABLET ORAL at 09:29

## 2022-01-01 RX ADMIN — Medication 10 ML: at 08:34

## 2022-01-01 RX ADMIN — PRAMIPEXOLE DIHYDROCHLORIDE 0.5 MG: 0.5 TABLET ORAL at 21:34

## 2022-01-01 RX ADMIN — VANCOMYCIN HYDROCHLORIDE 1750 MG: 10 INJECTION, POWDER, LYOPHILIZED, FOR SOLUTION INTRAVENOUS at 23:48

## 2022-01-01 RX ADMIN — INSULIN LISPRO 4 UNITS: 100 INJECTION, SOLUTION INTRAVENOUS; SUBCUTANEOUS at 17:50

## 2022-01-01 RX ADMIN — PANTOPRAZOLE SODIUM 40 MG: 40 TABLET, DELAYED RELEASE ORAL at 08:06

## 2022-01-01 RX ADMIN — ROCURONIUM BROMIDE 20 MG: 50 INJECTION INTRAVENOUS at 16:10

## 2022-01-01 RX ADMIN — ASPIRIN 81 MG: 81 TABLET, COATED ORAL at 08:31

## 2022-01-01 RX ADMIN — DIAZEPAM 2.5 MG: 5 INJECTION, SOLUTION INTRAMUSCULAR; INTRAVENOUS at 18:17

## 2022-01-01 RX ADMIN — PRAMIPEXOLE DIHYDROCHLORIDE 0.5 MG: 0.5 TABLET ORAL at 20:02

## 2022-01-01 RX ADMIN — INSULIN GLARGINE-YFGN 20 UNITS: 100 INJECTION, SOLUTION SUBCUTANEOUS at 09:04

## 2022-01-01 RX ADMIN — METOPROLOL TARTRATE 50 MG: 25 TABLET ORAL at 21:49

## 2022-01-01 RX ADMIN — OXYCODONE AND ACETAMINOPHEN 2 TABLET: 5; 325 TABLET ORAL at 18:06

## 2022-01-01 RX ADMIN — ACETYLCYSTEINE 6.25 MG/KG/HR: 200 INJECTION, SOLUTION INTRAVENOUS at 12:47

## 2022-01-01 RX ADMIN — PHENYLEPHRINE HYDROCHLORIDE 3 MCG/KG/MIN: 10 INJECTION INTRAVENOUS at 01:47

## 2022-01-01 RX ADMIN — METOPROLOL TARTRATE 50 MG: 25 TABLET ORAL at 22:54

## 2022-01-01 RX ADMIN — DOCUSATE SODIUM 50MG AND SENNOSIDES 8.6MG 1 TABLET: 8.6; 5 TABLET, FILM COATED ORAL at 09:02

## 2022-01-01 RX ADMIN — OXYCODONE 10 MG: 5 TABLET ORAL at 00:20

## 2022-01-01 RX ADMIN — LEVOTHYROXINE SODIUM 137 MCG: 0.14 TABLET ORAL at 06:28

## 2022-01-01 RX ADMIN — HYDROMORPHONE HYDROCHLORIDE 1 MG: 1 INJECTION, SOLUTION INTRAMUSCULAR; INTRAVENOUS; SUBCUTANEOUS at 08:03

## 2022-01-01 RX ADMIN — Medication 3 ML: at 20:03

## 2022-01-01 RX ADMIN — FERROUS SULFATE TAB 325 MG (65 MG ELEMENTAL FE) 325 MG: 325 (65 FE) TAB at 09:29

## 2022-01-01 RX ADMIN — ROCURONIUM BROMIDE 20 MG: 50 INJECTION INTRAVENOUS at 17:51

## 2022-01-01 RX ADMIN — ZOLPIDEM TARTRATE 10 MG: 5 TABLET ORAL at 22:54

## 2022-01-01 RX ADMIN — SODIUM CHLORIDE 500 ML: 9 INJECTION, SOLUTION INTRAVENOUS at 00:00

## 2022-01-01 RX ADMIN — ZOLPIDEM TARTRATE 10 MG: 5 TABLET ORAL at 22:28

## 2022-01-01 RX ADMIN — TAZOBACTAM SODIUM AND PIPERACILLIN SODIUM 3.38 G: 375; 3 INJECTION, SOLUTION INTRAVENOUS at 20:02

## 2022-01-01 RX ADMIN — Medication 3 ML: at 09:01

## 2022-01-01 RX ADMIN — OXYCODONE AND ACETAMINOPHEN 2 TABLET: 5; 325 TABLET ORAL at 20:58

## 2022-01-01 RX ADMIN — FAMOTIDINE 20 MG: 10 INJECTION INTRAVENOUS at 13:35

## 2022-01-01 RX ADMIN — BACLOFEN 10 MG: 10 TABLET ORAL at 21:49

## 2022-01-01 RX ADMIN — VANCOMYCIN HYDROCHLORIDE 2000 MG: 10 INJECTION, POWDER, LYOPHILIZED, FOR SOLUTION INTRAVENOUS at 23:40

## 2022-01-01 RX ADMIN — LIDOCAINE HYDROCHLORIDE 80 MG: 20 INJECTION, SOLUTION INFILTRATION; PERINEURAL at 15:24

## 2022-01-01 RX ADMIN — DULOXETINE HYDROCHLORIDE 60 MG: 60 CAPSULE, DELAYED RELEASE ORAL at 08:31

## 2022-01-01 RX ADMIN — Medication 3 ML: at 21:43

## 2022-01-01 RX ADMIN — DOCUSATE SODIUM 50MG AND SENNOSIDES 8.6MG 1 TABLET: 8.6; 5 TABLET, FILM COATED ORAL at 20:02

## 2022-01-01 RX ADMIN — ROSUVASTATIN CALCIUM 40 MG: 20 TABLET, FILM COATED ORAL at 09:29

## 2022-01-01 RX ADMIN — VANCOMYCIN HYDROCHLORIDE 2000 MG: 10 INJECTION, POWDER, LYOPHILIZED, FOR SOLUTION INTRAVENOUS at 13:18

## 2022-01-01 RX ADMIN — SODIUM CHLORIDE 150 ML/HR: 9 INJECTION, SOLUTION INTRAVENOUS at 22:33

## 2022-01-01 RX ADMIN — FAMOTIDINE 20 MG: 10 INJECTION INTRAVENOUS at 01:54

## 2022-01-01 RX ADMIN — BACLOFEN 20 MG: 10 TABLET ORAL at 08:16

## 2022-01-01 RX ADMIN — FENTANYL CITRATE 50 MCG: 0.05 INJECTION, SOLUTION INTRAMUSCULAR; INTRAVENOUS at 17:58

## 2022-01-01 RX ADMIN — VANCOMYCIN HYDROCHLORIDE 2000 MG: 10 INJECTION, POWDER, LYOPHILIZED, FOR SOLUTION INTRAVENOUS at 20:03

## 2022-01-01 RX ADMIN — SODIUM CHLORIDE 150 ML/HR: 9 INJECTION, SOLUTION INTRAVENOUS at 08:32

## 2022-01-01 RX ADMIN — TAZOBACTAM SODIUM AND PIPERACILLIN SODIUM 3.38 G: 375; 3 INJECTION, SOLUTION INTRAVENOUS at 05:56

## 2022-01-01 RX ADMIN — PRAMIPEXOLE DIHYDROCHLORIDE 0.5 MG: 0.5 TABLET ORAL at 21:35

## 2022-01-01 RX ADMIN — KETAMINE HYDROCHLORIDE 30 MG: 10 INJECTION INTRAMUSCULAR; INTRAVENOUS at 16:06

## 2022-01-01 RX ADMIN — BACLOFEN 20 MG: 10 TABLET ORAL at 09:01

## 2022-01-01 RX ADMIN — TAZOBACTAM SODIUM AND PIPERACILLIN SODIUM 3.38 G: 375; 3 INJECTION, SOLUTION INTRAVENOUS at 14:32

## 2022-01-01 RX ADMIN — INSULIN GLARGINE-YFGN 20 UNITS: 100 INJECTION, SOLUTION SUBCUTANEOUS at 08:33

## 2022-01-01 RX ADMIN — VANCOMYCIN HYDROCHLORIDE 1250 MG: 10 INJECTION, POWDER, LYOPHILIZED, FOR SOLUTION INTRAVENOUS at 23:46

## 2022-01-01 RX ADMIN — SODIUM CHLORIDE 100 ML/HR: 9 INJECTION, SOLUTION INTRAVENOUS at 18:56

## 2022-01-01 RX ADMIN — GADOBENATE DIMEGLUMINE 18.14 ML: 529 INJECTION, SOLUTION INTRAVENOUS at 10:28

## 2022-01-01 RX ADMIN — INSULIN LISPRO 4 UNITS: 100 INJECTION, SOLUTION INTRAVENOUS; SUBCUTANEOUS at 12:17

## 2022-01-01 RX ADMIN — HYDROMORPHONE HYDROCHLORIDE 2 MG: 1 INJECTION, SOLUTION INTRAMUSCULAR; INTRAVENOUS; SUBCUTANEOUS at 12:53

## 2022-01-01 RX ADMIN — Medication 10 ML: at 21:55

## 2022-01-01 RX ADMIN — OXYCODONE AND ACETAMINOPHEN 2 TABLET: 5; 325 TABLET ORAL at 21:41

## 2022-01-01 RX ADMIN — OXYCODONE AND ACETAMINOPHEN 2 TABLET: 5; 325 TABLET ORAL at 08:16

## 2022-01-01 RX ADMIN — Medication 0.3 MCG/KG/MIN: at 04:49

## 2022-01-01 RX ADMIN — ROSUVASTATIN CALCIUM 40 MG: 20 TABLET, FILM COATED ORAL at 08:31

## 2022-01-01 RX ADMIN — Medication 10 ML: at 20:48

## 2022-01-01 RX ADMIN — DEXAMETHASONE SODIUM PHOSPHATE 8 MG: 10 INJECTION INTRAMUSCULAR; INTRAVENOUS at 15:55

## 2022-01-01 RX ADMIN — BACLOFEN 20 MG: 10 TABLET ORAL at 21:00

## 2022-01-01 RX ADMIN — OXYCODONE AND ACETAMINOPHEN 2 TABLET: 5; 325 TABLET ORAL at 21:35

## 2022-01-01 RX ADMIN — PRAMIPEXOLE DIHYDROCHLORIDE 0.5 MG: 0.5 TABLET ORAL at 21:00

## 2022-01-01 RX ADMIN — VANCOMYCIN HYDROCHLORIDE 2000 MG: 10 INJECTION, POWDER, LYOPHILIZED, FOR SOLUTION INTRAVENOUS at 21:35

## 2022-01-01 RX ADMIN — LEVOTHYROXINE SODIUM 137 MCG: 0.14 TABLET ORAL at 08:16

## 2022-01-01 RX ADMIN — Medication 3 ML: at 22:07

## 2022-01-01 RX ADMIN — ACETYLCYSTEINE 14280 MG: 200 INJECTION, SOLUTION INTRAVENOUS at 11:03

## 2022-01-01 RX ADMIN — HYDROMORPHONE HYDROCHLORIDE 0.5 MG: 1 INJECTION, SOLUTION INTRAMUSCULAR; INTRAVENOUS; SUBCUTANEOUS at 17:29

## 2022-01-01 RX ADMIN — FENTANYL CITRATE 50 MCG: 50 INJECTION INTRAMUSCULAR; INTRAVENOUS at 13:35

## 2022-01-01 RX ADMIN — ACETYLCYSTEINE 4760 MG: 200 INJECTION, SOLUTION INTRAVENOUS at 12:46

## 2022-01-01 RX ADMIN — SODIUM CHLORIDE 100 ML/HR: 9 INJECTION, SOLUTION INTRAVENOUS at 17:25

## 2022-01-01 RX ADMIN — SODIUM CHLORIDE 500 ML: 9 INJECTION, SOLUTION INTRAVENOUS at 05:10

## 2022-01-01 RX ADMIN — FENTANYL CITRATE 50 MCG: 50 INJECTION INTRAMUSCULAR; INTRAVENOUS at 21:10

## 2022-01-01 RX ADMIN — POLYETHYLENE GLYCOL 3350 17 G: 17 POWDER, FOR SOLUTION ORAL at 17:29

## 2022-01-01 RX ADMIN — PANTOPRAZOLE SODIUM 40 MG: 40 TABLET, DELAYED RELEASE ORAL at 08:16

## 2022-01-01 RX ADMIN — OXYCODONE AND ACETAMINOPHEN 2 TABLET: 5; 325 TABLET ORAL at 12:12

## 2022-01-01 RX ADMIN — BACLOFEN 20 MG: 10 TABLET ORAL at 21:35

## 2022-01-01 RX ADMIN — POLYETHYLENE GLYCOL 3350 17 G: 17 POWDER, FOR SOLUTION ORAL at 09:01

## 2022-01-01 RX ADMIN — INSULIN LISPRO 7 UNITS: 100 INJECTION, SOLUTION INTRAVENOUS; SUBCUTANEOUS at 08:26

## 2022-01-01 RX ADMIN — HYDROMORPHONE HYDROCHLORIDE 1 MG: 1 INJECTION, SOLUTION INTRAMUSCULAR; INTRAVENOUS; SUBCUTANEOUS at 13:48

## 2022-01-01 RX ADMIN — FENTANYL CITRATE 50 MCG: 50 INJECTION INTRAMUSCULAR; INTRAVENOUS at 21:55

## 2022-01-01 RX ADMIN — TAZOBACTAM SODIUM AND PIPERACILLIN SODIUM 3.38 G: 375; 3 INJECTION, SOLUTION INTRAVENOUS at 13:18

## 2022-01-01 RX ADMIN — DULOXETINE HYDROCHLORIDE 60 MG: 60 CAPSULE, DELAYED RELEASE ORAL at 21:31

## 2022-01-01 RX ADMIN — ROCURONIUM BROMIDE 10 MG: 50 INJECTION INTRAVENOUS at 18:53

## 2022-01-01 RX ADMIN — METOPROLOL TARTRATE 50 MG: 50 TABLET, FILM COATED ORAL at 14:32

## 2022-01-01 RX ADMIN — PRAMIPEXOLE DIHYDROCHLORIDE 0.5 MG: 0.5 TABLET ORAL at 00:52

## 2022-01-01 RX ADMIN — DOCUSATE SODIUM 50MG AND SENNOSIDES 8.6MG 1 TABLET: 8.6; 5 TABLET, FILM COATED ORAL at 23:46

## 2022-01-01 RX ADMIN — PRAMIPEXOLE DIHYDROCHLORIDE 0.5 MG: 0.5 TABLET ORAL at 08:26

## 2022-01-01 RX ADMIN — INSULIN GLARGINE-YFGN 45 UNITS: 100 INJECTION, SOLUTION SUBCUTANEOUS at 11:15

## 2022-01-01 RX ADMIN — METOPROLOL TARTRATE 50 MG: 50 TABLET, FILM COATED ORAL at 21:00

## 2022-01-01 RX ADMIN — TAZOBACTAM SODIUM AND PIPERACILLIN SODIUM 3.38 G: 375; 3 INJECTION, SOLUTION INTRAVENOUS at 12:38

## 2022-01-01 RX ADMIN — DULOXETINE HYDROCHLORIDE 60 MG: 60 CAPSULE, DELAYED RELEASE ORAL at 08:06

## 2022-01-01 RX ADMIN — Medication 10 ML: at 17:41

## 2022-01-01 RX ADMIN — ROSUVASTATIN CALCIUM 40 MG: 20 TABLET, FILM COATED ORAL at 09:00

## 2022-01-01 RX ADMIN — HYDROMORPHONE HYDROCHLORIDE 0.5 MG: 1 INJECTION, SOLUTION INTRAMUSCULAR; INTRAVENOUS; SUBCUTANEOUS at 20:40

## 2022-01-01 RX ADMIN — PHENYLEPHRINE HYDROCHLORIDE 100 MCG: 10 INJECTION, SOLUTION INTRAVENOUS at 17:23

## 2022-01-01 RX ADMIN — DEXTROSE MONOHYDRATE 25 G: 25 INJECTION, SOLUTION INTRAVENOUS at 17:29

## 2022-01-01 RX ADMIN — PHENYLEPHRINE HYDROCHLORIDE 3 MCG/KG/MIN: 10 INJECTION INTRAVENOUS at 21:51

## 2022-01-01 RX ADMIN — LEVOTHYROXINE SODIUM 137 MCG: 0.14 TABLET ORAL at 06:18

## 2022-01-13 NOTE — ED PROVIDER NOTES
EMERGENCY DEPARTMENT ENCOUNTER    CHIEF COMPLAINT  Chief Complaint: Back/leg pain  History given by: Patient  History limited by: None  Room Number: B03/03  PMD: Flo Temple MD      HPI:  Pt is a 66 y.o. female who presents complaining of gradual in onset but progressively worsening low back pain that has been present for the last 5 days.  The patient has chronic low back pain but this pain differs now that it is now radiating down her left leg.  She denies any known trauma or precipitating factors.  She is currently in pain management and takes hydrocodone and baclofen but states that those medications have not been helping her discomfort.  She states that she does have some associated numbness occasionally to the posterior aspect of the left leg but denies leg weakness, groin numbness, urinary retention, or fecal incontinence.    Duration:  5 days  Onset: gradual  Location: low back/L leg  Radiation: into L hip and down L leg  Quality: sharp/aching  Intensity/Severity: moderate  Progression: worsening  Aggravating Factors: movement/walking  Alleviating Factors: none  Previous Episodes: yes, chronic low back pain, worse recently  Treatment before arrival: lortab/baclofen    PAST MEDICAL HISTORY  Active Ambulatory Problems     Diagnosis Date Noted   • Type 2 diabetes mellitus without complication ([facility]) 09/20/2017   • Post-operative state 09/17/2018   • S/P lumbar fusion 09/17/2018   • Insomnia 02/26/2019   • Abnormal EKG 03/21/2013   • Cellulitis of breast 03/12/2013   • Chest pain 03/21/2013   • Dyspnea on exertion 03/21/2013   • Fibromyalgia 12/01/2005   • Hyperhidrosis 10/15/2015   • LAFB (left anterior fascicular block) 10/15/2015   • Mass of right breast 03/12/2013   • Obesity 03/21/2013   • PVC (premature ventricular contraction) 01/01/1999   • Rheumatoid arthritis ([facility]) 10/01/2005   • S/P catheter ablation of slow pathway 03/21/2013   • Seizures ([facility]) 01/01/2014   • Sinus tachycardia 09/30/2013   •  Chronic fatigue 09/05/2019   • Vitamin D deficiency 09/05/2019   • Complex dyslipidemia 09/05/2019   • Hypothyroidism due to Hashimoto's thyroiditis 09/05/2019   • Hypertension 01/08/2020   • Hyperlipidemia 01/08/2020   • Acute bilateral low back pain without sciatica 11/20/2020   • Spinal stenosis of lumbar region with neurogenic claudication 02/02/2021   • Sleep apnea    • Pain 04/09/2021   • Lumbar stenosis with neurogenic claudication 04/09/2021   • Cystitis 04/14/2021   • Acute blood loss anemia 04/16/2021   • GERD (gastroesophageal reflux disease) 04/17/2021     Resolved Ambulatory Problems     Diagnosis Date Noted   • Lumbar radiculopathy 05/31/2018   • Cauda equina syndrome (HCC) 06/08/2018   • L3/4 Lumbar disc herniation 06/08/2018   • Incontinence of feces 06/08/2018     Past Medical History:   Diagnosis Date   • Depression    • Diabetes mellitus (HCC)    • Hiatal hernia    • History of Clostridioides difficile colitis    • Hypothyroidism    • Irregular heart beat    • Liver disease    • Low back pain    • Peripheral neuropathy    • Type 2 diabetes mellitus (HCC)    • West Nile fever 2002       PAST SURGICAL HISTORY  Past Surgical History:   Procedure Laterality Date   • ABDOMINOPLASTY     • ADENOIDECTOMY     • BILATERAL BREAST REDUCTION     • BRONCHOSCOPY N/A 12/15/2016    Procedure: BRONCHOSCOPY WITH BAL AND ENDOBRONCHIAL ULTRASOUND WITH FNA;  Surgeon: Diego Ponce MD;  Location: [facility] ENDOSCOPY;  Service:    • CARDIAC ABLATION     • CATARACT EXTRACTION, BILATERAL     • COLONOSCOPY     • ENDOSCOPY     • EYE SURGERY     • HYSTERECTOMY     • LASIK     • LUMBAR DISCECTOMY FUSION INSTRUMENTATION Left 6/8/2018    Procedure: LEFT L3/4 lumbar discectomy;  Surgeon: Miguelangel Goldberg MD;  Location: [facility] MAIN OR;  Service: Neurosurgery   • LUMBAR FUSION Left 7/12/2018    Procedure: Left L3 4 lumbar decompression and discectomy with transforaminal lumbar interbody fusion and posterior instrumentation with  posterior lateral arthrodesis;  Surgeon: Miguelangel Goldberg MD;  Location: [facility] OR;  Service: Neurosurgery   • LUMBAR LAMINECTOMY WITH FUSION N/A 2/15/2021    Procedure: Lumbar 2-3, lumbar 3 4, lumbar 4 5 fusion with instrumentation and BMP,  L2-L3 lift with cage, and removal of implants L3-4 with laminectomies by Dr. Segundo;  Surgeon: Juancarlos Montelongo MD;  Location: [facility] OR;  Service: Orthopedic Spine;  Laterality: N/A;   • LUMBAR LAMINECTOMY WITH FUSION N/A 2/15/2021    Procedure: Lumbar 2 3, Lumbar 3 4 and lumbar 4 5 laminectomy with a fusion by orthopedics;  Surgeon: Jef Randall MD;  Location: [facility];  Service: Neurosurgery;  Laterality: N/A;   • LUMBAR LAMINECTOMY WITH FUSION N/A 4/15/2021    Procedure: T10-L2 fusion with instrumentation and revision of current fixation, laminectomy by Dr. Segundo;  Surgeon: Juancarlos Montelongo MD;  Location: [facility];  Service: Orthopedic Spine;  Laterality: N/A;   • LUMBAR LAMINECTOMY WITH FUSION N/A 4/15/2021    Procedure: LUMBAR 2 LUMBAR 3 LAMINECTOMY FACETECTOMY AND NEUROLYSIS;  Surgeon: Jef Randall MD;  Location: [facility];  Service: Neurosurgery;  Laterality: N/A;   • MOUTH SURGERY     • REPLACEMENT TOTAL KNEE Left    • TONSILLECTOMY     • TONSILLECTOMY AND ADENOIDECTOMY  1966       FAMILY HISTORY  Family History   Problem Relation Age of Onset   • Diabetes Mother    • Neuropathy Father    • Colon polyps Father    • Diabetes Sister    • No Known Problems Son    • Diabetes Maternal Grandmother    • Cancer Maternal Grandmother    • Diabetes Paternal Grandmother    • Cancer Paternal Grandmother    • Malig Hyperthermia Neg Hx        SOCIAL HISTORY  Social History     Socioeconomic History   • Marital status:    • Number of children: 1   • Years of education: 14   Tobacco Use   • Smoking status: Never Smoker   • Smokeless tobacco: Never Used   Vaping Use   • Vaping Use: Never used   Substance and Sexual Activity   • Alcohol use:  Yes     Comment: social   • Drug use: No   • Sexual activity: Defer       ALLERGIES  Adhesive tape, Sulfamethoxazole w/trimethoprim 800-160  [sulfamethoxazole-trimethoprim], Cefpodoxime, Cephalosporins, Metformin and related, Sulfa antibiotics, Topiramate, and Latex    REVIEW OF SYSTEMS  Review of Systems   Constitutional: Negative for fever.   HENT: Negative for sore throat.    Eyes: Negative.    Respiratory: Negative for cough and shortness of breath.    Cardiovascular: Negative for chest pain.   Gastrointestinal: Negative for abdominal pain, diarrhea and vomiting.   Genitourinary: Negative for dysuria.   Musculoskeletal: Positive for back pain. Negative for neck pain.        Left hip pain   Skin: Negative for rash.   Allergic/Immunologic: Negative.    Neurological: Negative for weakness, numbness and headaches.   Hematological: Negative.    Psychiatric/Behavioral: Negative.    All other systems reviewed and are negative.      PHYSICAL EXAM  ED Triage Vitals [01/13/22 1029]   Temp Heart Rate Resp BP SpO2   98.2 °F (36.8 °C) 78 18 138/74 98 %      Temp src Heart Rate Source Patient Position BP Location FiO2 (%)   Tympanic -- Sitting -- --       Physical Exam  Vitals and nursing note reviewed.   Constitutional:       General: She is not in acute distress.  HENT:      Head: Normocephalic and atraumatic.   Eyes:      Pupils: Pupils are equal, round, and reactive to light.   Cardiovascular:      Rate and Rhythm: Normal rate and regular rhythm.      Heart sounds: Normal heart sounds.   Pulmonary:      Effort: Pulmonary effort is normal. No respiratory distress.      Breath sounds: Normal breath sounds.   Abdominal:      Palpations: Abdomen is soft.      Tenderness: There is no abdominal tenderness. There is no guarding or rebound.   Musculoskeletal:         General: Normal range of motion.      Cervical back: Normal range of motion and neck supple.      Comments: Tenderness to palpation to the left hip as well as the  lumbar spine without step-off or deformity noted.  Straight leg raise negative   Skin:     General: Skin is warm and dry.      Findings: No rash.   Neurological:      Mental Status: She is alert and oriented to person, place, and time.      Sensory: Sensation is intact.   Psychiatric:         Mood and Affect: Mood and affect normal.         LAB RESULTS  Lab Results (last 24 hours)     ** No results found for the last 24 hours. **          I ordered the above labs and reviewed the results    RADIOLOGY  XR Spine Lumbar Complete 4+VW   Final Result       As described.               This report was finalized on 1/13/2022 1:35 PM by Dr. Kalin Hinkle M.D.          XR Hip With or Without Pelvis 2 - 3 View Left   Final Result   1. No acute process.       This report was finalized on 1/13/2022 11:53 AM by Dr. Dennis Pan M.D.               I ordered the above noted radiological studies. Interpreted by radiologist.  Reviewed by me in PACS.       PROCEDURES  Procedures      PROGRESS AND CONSULTS     The patient was wearing a facemask upon entrance into the room and remained in such throughout their visit.  I was wearing PPE including a facemask, eye protection, as well as gloves at any point entering the room and throughout the visit    0155  On reevaluation, the patient states that her pain has improved with the administration of pain medication.  I did inform her that her x-rays do show some improvement and is without acute abnormality.  We will discharge her with a prescription for steroids and she will call her pain management doctor for ongoing pain treatment.  The patient is in agreement with the plan and all questions have been answered.    MEDICAL DECISION MAKING  Results were reviewed/discussed with the patient and they were also made aware of online access. Pt also made aware that some labs, such as cultures, will not be resulted during ER visit and follow up with PMD is necessary.     MDM  Number of  Diagnoses or Management Options     Amount and/or Complexity of Data Reviewed  Tests in the radiology section of CPT®: ordered and reviewed  Tests in the medicine section of CPT®: ordered and reviewed  Review and summarize past medical records: yes (Upon medical records review, the patient was last seen and evaluated on 4/13/2021 secondary to acute low back pain with sciatica as well as cystitis.)  Independent visualization of images, tracings, or specimens: yes (No acute abnormality seen on x-rays of the left hip/lumbar spine)           DIAGNOSIS  Final diagnoses:   Acute bilateral low back pain with left-sided sciatica   Acute hip pain, left       DISPOSITION  DISCHARGE    Patient discharged in stable condition.    Reviewed implications of results, diagnosis, meds, responsibility to follow up, warning signs and symptoms of possible worsening, potential complications and reasons to return to ER.    Patient/Family voiced understanding of above instructions.    Discussed plan for discharge, as there is no emergent indication for admission. Patient referred to primary care provider for BP management due to today's BP. Pt/family is agreeable and understands need for follow up and repeat testing.  Pt is aware that discharge does not mean that nothing is wrong but it indicates no emergency is present that requires admission and they must continue care with follow-up as given below or physician of their choice.     FOLLOW-UP  Flo Temple MD  [address] [id number] [phone]    Schedule an appointment as soon as possible for a visit            Medication List      New Prescriptions    predniSONE 50 MG tablet  Commonly known as: DELTASONE  Take 1 tablet by mouth Daily.           Where to Get Your Medications      These medications were sent to [id number] - [place], [place]  [address] [facility] [address] AT [facility] -[phone] [facility] [phone] FX  [address]  [place], [facility] [address]    Phone: [phone]   · predniSONE 50 MG tablet           Latest Documented Vital Signs:  As of 17:26 EST  BP- 133/77 HR- 78 Temp- 98.2 °F (36.8 °C) (Tympanic) O2 sat- 96%         Fawad Sherwood MD  01/13/22 4168

## 2022-01-13 NOTE — ED TRIAGE NOTES
Patient to er from home with c/o increasing in pain in left hip and pain moving down left leg. Patient reported she had back surgery in April. Patient reported no new injury to cause this pain. Patient walks with cane at this time. Patient has mask on in triage along with staff.

## 2022-01-13 NOTE — ED NOTES
L hip pain x 3 days, had spinal fusion sx in April. Has been taking norco 7.5 at home for pain. Last night was last pain pill.     Cannot walk well, does use a cane. Sees pain management.     Pt wearing face mask during their stay in ER. This RN wore capr and gloves while providing patient care.        Lainey Patiño, RN  01/13/22 1823

## 2022-01-20 NOTE — TELEPHONE ENCOUNTER
Is it okay to add this pt on for tomorrow? You have a same day opening at 920. xr in epic from 1/13/2022

## 2022-01-20 NOTE — TELEPHONE ENCOUNTER
PER HUB WORK FLOW ATTEMPTED TO WARM TRANSFER *NO APPT IN TIME FRAME     Provider: ADONIS  Caller: YAEL    Phone Number: [phone]    Reason for Call: PT IS REPORTING THAT SHE IS HAVING EXTREME INCREASES IN PAIN AND HER PAIN MANAGEMENT DOCTOR TOLD HER HE THING HER BACK MIGHT BE BROKEN, X-RAYS IN THE CHART FROM ER.

## 2022-01-21 NOTE — TELEPHONE ENCOUNTER
I spoke personally with Ms. Ramos.  The pain is lumbosacral.  She was doing pretty well at last visit with regard to the fusion and none of her symptoms suggest infection.  Sounds like the pain makes it feel like her legs are going to give way but she denies weakness.  In the ER she reported no bowel or bladder complaints nor any at present.  I reviewed the images from the ER and the hardware is intact she does not have a broken paul or screw.  I suspect she is either developed a problem that L5-S1 or possibly even a sacral fracture.  I suggested we obtain MRI scan of the lumbar spine, in this case with and without contrast and lets get that done today.  Get it done at [facility] so I can look at it.  Meantime Dr. Gan is managing her pain management and she does not want additional medication.

## 2022-01-21 NOTE — TELEPHONE ENCOUNTER
Order for STAT mri lumbar spine with and without contrast has been placed and pt informed. And gave pt Yarsanism scheduling number for her to call to work on getting MRI done today.

## 2022-01-23 NOTE — TELEPHONE ENCOUNTER
Received call from patient. She spoke with TRAE on 1/21 and he ordered a stat MRI of her lumbar spine. At that time she felt like her legs were going to give out but denied any weakness.  Since this morning has had some weakness in the LLE and feels like it cannot support her. She is able to move her toes, lift the leg, etc but is having a hard time ambulating due to weakness. No bowel or bladder incontinence. Continues to deny any fever, chills, or signs of infection. Reviewed precautions that would warrant ER evaluation but otherwise will have her call the office tomorrow for results and further recommendations.

## 2022-01-24 NOTE — TELEPHONE ENCOUNTER
Have made her an appointment to see ART PRINGLE on February 2.  Information has been given to the patient

## 2022-01-24 NOTE — TELEPHONE ENCOUNTER
----- Message from Juancarlos Montelongo MD sent at 1/24/2022  9:47 AM EST -----  I spoke personally to the patient.  Her pain is no better over the weekend.  MRI however looks unremarkable and fully what I expected to see postoperatively and L5-S1 looks just fine and there is no sacral fracture.  I do not know how to account for her pain but I told her I would see her in the office in the next week or 2 for further evaluation.  Please find the least crowded spot to put her, on a Wednesday afternoon if need be sometime in the next week or 2

## 2022-02-16 NOTE — TELEPHONE ENCOUNTER
"Provider: DR MONTELONGO  Caller: YAEL CANNON  Relationship to Patient: SELF  Phone Number: [phone]  Reason for Call:  ESTABLISHED PATIENT WHO WAS SEEN IN  ER 02/16/22.  PATIENT IS CURRENTLY SCHEDULED WITH AN ANNUAL F/UP 04/12/22 AND IS REQUESTING AN IMMEDIATE FOLLOW UP FROM ER VISIT.    FOLLOWING IS AN EXCERPT FROM ED  Wed Feb 16, 2022   Discussed with Dr. Moreira.  In absence of new focal deficits there is no indication for advanced imaging.  Will attempt symptomatic treatment.  He recommended admission to medicine if symptoms were not controllable. [TJ]   Reevaluation: Patient states that she is doing well with better.  Would like to have a little bit more pain medicine and plans to follow-up with Dr. Montelongo as outpatient. [TJ]         PATIENT SAYS this is an ongoing issue but has gotten worse over the past month. Pt has had 4 back surgeries in 2 years and pt says her MD is worried there may be an injury to the spine. Denies NVD, CP, SOA. Pain is 9/10 constantly and then she gets \"jolts\" of 10/10 pain where she feels like she is going to \"pass out.\"    she had an MRI 3 weeks ago and it was normal. Pt reports back pain, numbness, and weakness from waist down worsening over the last month. Pt reports that she cannot move and it is altering her quality of life. Pt reports a constant 9/10 pain in back.   "

## 2022-02-16 NOTE — ED PROVIDER NOTES
EMERGENCY DEPARTMENT ENCOUNTER    Room Number:  09/09  Date of encounter:  2/16/2022  PCP: Flo Temple MD  Historian: Patient      HPI:  Chief Complaint: Low back pain leg numbness and pain  A complete HPI/ROS/PMH/PSH/SH/FH are unobtainable due to: None    Context: Wendi Ramos is a 66 y.o. female who presents to the ED c/o worsening chronic back pain to back and lower extremities.  Past that she had a MRI 3 weeks ago which was essentially normal.  Reports back pain numbness and weakness when she ambulates over the last month.  Patient reports that she feels weak with ambulation but is able to move her legs.  Reports diffuse numbness that is not in any distribution states it has been present for at least a month feels like her symptoms are getting worse.  States that she called her orthopedist on-call nurse who recommended she come to the ER to be seen.  Denies any difficulty with bowel or bladder.  Patient states she was seen by her pain management doctor yesterday and they tried trigger point injections which did not improve her pain.      PAST MEDICAL HISTORY  Active Ambulatory Problems     Diagnosis Date Noted   • Type 2 diabetes mellitus without complication ([facility]) 09/20/2017   • Post-operative state 09/17/2018   • S/P lumbar fusion 09/17/2018   • Insomnia 02/26/2019   • Abnormal EKG 03/21/2013   • Cellulitis of breast 03/12/2013   • Chest pain 03/21/2013   • Dyspnea on exertion 03/21/2013   • Fibromyalgia 12/01/2005   • Hyperhidrosis 10/15/2015   • LAFB (left anterior fascicular block) 10/15/2015   • Mass of right breast 03/12/2013   • Obesity 03/21/2013   • PVC (premature ventricular contraction) 01/01/1999   • Rheumatoid arthritis ([facility]) 10/01/2005   • S/P catheter ablation of slow pathway 03/21/2013   • Seizures ([facility]) 01/01/2014   • Sinus tachycardia 09/30/2013   • Chronic fatigue 09/05/2019   • Vitamin D deficiency 09/05/2019   • Complex dyslipidemia 09/05/2019   • Hypothyroidism due to Hashimoto's  thyroiditis 09/05/2019   • Hypertension 01/08/2020   • Hyperlipidemia 01/08/2020   • Acute bilateral low back pain without sciatica 11/20/2020   • Spinal stenosis of lumbar region with neurogenic claudication 02/02/2021   • Sleep apnea    • Pain 04/09/2021   • Lumbar stenosis with neurogenic claudication 04/09/2021   • Cystitis 04/14/2021   • Acute blood loss anemia 04/16/2021   • GERD (gastroesophageal reflux disease) 04/17/2021     Resolved Ambulatory Problems     Diagnosis Date Noted   • Lumbar radiculopathy 05/31/2018   • Cauda equina syndrome (HCC) 06/08/2018   • L3/4 Lumbar disc herniation 06/08/2018   • Incontinence of feces 06/08/2018     Past Medical History:   Diagnosis Date   • Depression    • Diabetes mellitus (HCC)    • Hiatal hernia    • History of Clostridioides difficile colitis    • Hypothyroidism    • Irregular heart beat    • Liver disease    • Low back pain    • Peripheral neuropathy    • Type 2 diabetes mellitus (HCC)    • West Nile fever 2002         PAST SURGICAL HISTORY  Past Surgical History:   Procedure Laterality Date   • ABDOMINOPLASTY     • ADENOIDECTOMY     • BILATERAL BREAST REDUCTION     • BRONCHOSCOPY N/A 12/15/2016    Procedure: BRONCHOSCOPY WITH BAL AND ENDOBRONCHIAL ULTRASOUND WITH FNA;  Surgeon: Diego Ponce MD;  Location: [facility] ENDOSCOPY;  Service:    • CARDIAC ABLATION     • CATARACT EXTRACTION, BILATERAL     • COLONOSCOPY     • ENDOSCOPY     • EYE SURGERY     • HYSTERECTOMY     • LASIK     • LUMBAR DISCECTOMY FUSION INSTRUMENTATION Left 6/8/2018    Procedure: LEFT L3/4 lumbar discectomy;  Surgeon: Miguelangel Goldberg MD;  Location: [facility] OR;  Service: Neurosurgery   • LUMBAR FUSION Left 7/12/2018    Procedure: Left L3 4 lumbar decompression and discectomy with transforaminal lumbar interbody fusion and posterior instrumentation with posterior lateral arthrodesis;  Surgeon: Miguelangel Goldberg MD;  Location: [facility] OR;  Service: Neurosurgery   • LUMBAR  LAMINECTOMY WITH FUSION N/A 2/15/2021    Procedure: Lumbar 2-3, lumbar 3 4, lumbar 4 5 fusion with instrumentation and BMP,  L2-L3 lift with cage, and removal of implants L3-4 with laminectomies by Dr. Segundo;  Surgeon: Juancarlos Montelongo MD;  Location: [facility];  Service: Orthopedic Spine;  Laterality: N/A;   • LUMBAR LAMINECTOMY WITH FUSION N/A 2/15/2021    Procedure: Lumbar 2 3, Lumbar 3 4 and lumbar 4 5 laminectomy with a fusion by orthopedics;  Surgeon: Jef Randall MD;  Location: [facility] OR;  Service: Neurosurgery;  Laterality: N/A;   • LUMBAR LAMINECTOMY WITH FUSION N/A 4/15/2021    Procedure: T10-L2 fusion with instrumentation and revision of current fixation, laminectomy by Dr. Segundo;  Surgeon: Juancarlos Montelongo MD;  Location: [facility] OR;  Service: Orthopedic Spine;  Laterality: N/A;   • LUMBAR LAMINECTOMY WITH FUSION N/A 4/15/2021    Procedure: LUMBAR 2 LUMBAR 3 LAMINECTOMY FACETECTOMY AND NEUROLYSIS;  Surgeon: Jef Randall MD;  Location: [facility];  Service: Neurosurgery;  Laterality: N/A;   • MOUTH SURGERY     • REPLACEMENT TOTAL KNEE Left    • TONSILLECTOMY     • TONSILLECTOMY AND ADENOIDECTOMY  1966         FAMILY HISTORY  Family History   Problem Relation Age of Onset   • Diabetes Mother    • Neuropathy Father    • Colon polyps Father    • Diabetes Sister    • No Known Problems Son    • Diabetes Maternal Grandmother    • Cancer Maternal Grandmother    • Diabetes Paternal Grandmother    • Cancer Paternal Grandmother    • Malig Hyperthermia Neg Hx          SOCIAL HISTORY  Social History     Socioeconomic History   • Marital status:    • Number of children: 1   • Years of education: 14   Tobacco Use   • Smoking status: Never Smoker   • Smokeless tobacco: Never Used   Vaping Use   • Vaping Use: Never used   Substance and Sexual Activity   • Alcohol use: Yes     Comment: social   • Drug use: No   • Sexual activity: Defer         ALLERGIES  Adhesive tape, Sulfamethoxazole  w/trimethoprim 800-160  [sulfamethoxazole-trimethoprim], Cefpodoxime, Cephalosporins, Metformin and related, Sulfa antibiotics, Topiramate, and Latex        REVIEW OF SYSTEMS  Review of Systems     All systems reviewed and negative except for those discussed in HPI.       PHYSICAL EXAM    I have reviewed the triage vital signs and nursing notes.    ED Triage Vitals   Temp Heart Rate Resp BP SpO2   02/16/22 0447 02/16/22 0447 02/16/22 0447 02/16/22 0504 02/16/22 0447   96 °F (35.6 °C) 74 18 127/73 98 %      Temp src Heart Rate Source Patient Position BP Location FiO2 (%)   02/16/22 0447 02/16/22 0447 02/16/22 0504 02/16/22 0504 --   Temporal Monitor Sitting Right arm        Physical Exam  GENERAL: Mild distressed  HENT: nares patent  EYES: no scleral icterus  CV: regular rhythm, regular rate  RESPIRATORY: normal effort  ABDOMEN: soft  MUSCULOSKELETAL: no deformity, diffuse tenderness to the lateral legs, sensation intact to inner thighs  NEURO: alert, moves all extremities, follows commands, 5 out of 5 strength lower extremities  SKIN: warm, dry        LAB RESULTS  Recent Results (from the past 24 hour(s))   Protime-INR    Collection Time: 02/16/22  6:15 AM    Specimen: Blood   Result Value Ref Range    Protime 14.0 11.7 - 14.2 Seconds    INR 1.08 0.90 - 1.10   aPTT    Collection Time: 02/16/22  6:15 AM    Specimen: Blood   Result Value Ref Range    PTT 32.3 22.7 - 35.4 seconds   CBC Auto Differential    Collection Time: 02/16/22  6:15 AM    Specimen: Blood   Result Value Ref Range    WBC 13.27 (H) 3.40 - 10.80 10*3/mm3    RBC 4.26 3.77 - 5.28 10*6/mm3    Hemoglobin 12.3 12.0 - 15.9 g/dL    Hematocrit 37.4 34.0 - 46.6 %    MCV 87.8 79.0 - 97.0 fL    MCH 28.9 26.6 - 33.0 pg    MCHC 32.9 31.5 - 35.7 g/dL    RDW 12.6 12.3 - 15.4 %    RDW-SD 40.3 37.0 - 54.0 fl    MPV 10.4 6.0 - 12.0 fL    Platelets 435 140 - 450 10*3/mm3    Neutrophil % 68.0 42.7 - 76.0 %    Lymphocyte % 21.6 19.6 - 45.3 %    Monocyte % 8.3 5.0 - 12.0 %     Eosinophil % 0.8 0.3 - 6.2 %    Basophil % 0.5 0.0 - 1.5 %    Immature Grans % 0.8 (H) 0.0 - 0.5 %    Neutrophils, Absolute 9.05 (H) 1.70 - 7.00 10*3/mm3    Lymphocytes, Absolute 2.86 0.70 - 3.10 10*3/mm3    Monocytes, Absolute 1.10 (H) 0.10 - 0.90 10*3/mm3    Eosinophils, Absolute 0.10 0.00 - 0.40 10*3/mm3    Basophils, Absolute 0.06 0.00 - 0.20 10*3/mm3    Immature Grans, Absolute 0.10 (H) 0.00 - 0.05 10*3/mm3    nRBC 0.0 0.0 - 0.2 /100 WBC       Ordered the above labs and independently reviewed the results.        RADIOLOGY  No Radiology Exams Resulted Within Past 24 Hours    I ordered the above noted radiological studies. Reviewed by me and discussed with radiologist.  See dictation for official radiology interpretation.      PROCEDURES    Procedures      MEDICATIONS GIVEN IN ER    Medications   sodium chloride 0.9 % flush 10 mL (has no administration in time range)   HYDROmorphone (DILAUDID) injection 1 mg (1 mg Intravenous Given 2/16/22 0623)   ondansetron (ZOFRAN) injection 4 mg (4 mg Intravenous Given 2/16/22 0623)   HYDROmorphone (DILAUDID) injection 0.5 mg (0.5 mg Intravenous Given 2/16/22 0722)         PROGRESS, DATA ANALYSIS, CONSULTS, AND MEDICAL DECISION MAKING    All labs have been independently reviewed by me.  All radiology studies have been reviewed by me and discussed with radiologist dictating the report.   EKG's independently viewed and interpreted by me.  Discussion below represents my analysis of pertinent findings related to patient's condition, differential diagnosis, treatment plan and final disposition.        ED Course as of 02/16/22 0724   Wed Feb 16, 2022   0603 Discussed with Dr. Moreira.  In absence of new focal deficits there is no indication for advanced imaging.  Will attempt symptomatic treatment.  He recommended admission to medicine if symptoms were not controllable. [TJ]   0651 Reevaluation: Patient states that she is doing well with better.  Would like to have a little bit  more pain medicine and plans to follow-up with Dr. Montelongo as outpatient. [TJ]      ED Course User Index  [TJ] Storm Pretty MD           PPE: The patient wore a surgical mask throughout the entire patient encounter. I wore an N95.    AS OF 07:24 EST VITALS:    BP - 122/78  HR - 71  TEMP - 98.4 °F (36.9 °C) (Oral)  O2 SATS - 95%        DIAGNOSIS  Final diagnoses:   Bilateral low back pain with bilateral sciatica, unspecified chronicity         DISPOSITION  Discharge           Storm Pretty MD  02/16/22 7593

## 2022-02-16 NOTE — ED NOTES
Pt reports chronic back pain due to back surgeries. Pt is followed by surgeon and pain management. Pt reports she had an MRI 3 weeks ago and it was normal. Pt reports back pain, numbness, and weakness from waist down worsening over the last month. Pt reports that she cannot move and it is altering her quality of life. Pt reports a constant 9/10 pain in back.             Regis Lee RN  02/16/22 7008

## 2022-02-17 NOTE — TELEPHONE ENCOUNTER
Patient called and states she is in terrible pain and unable to walk.   She states that Dr. Moreira had recommended that she be admitted but she was sent home.  She is asking if you can admit her because she is getting worse. She states she can barely walk with the walker,

## 2022-02-17 NOTE — TELEPHONE ENCOUNTER
The last MRI on the 21st look fine.  X-rays last night looked fine.  I do not know that I am going to find anything for which I can admit her to the hospital.  If she is doing that badly she should have let them admit her last night and I would have seen her today and we would have had medical specialist available because I am concerned that her problem may lie outside of what I can take care of.  You can make her another appointment but I am not going to admit her to the hospital.  See if you think we need to get a thoracic MRI.

## 2022-02-17 NOTE — TELEPHONE ENCOUNTER
Spoke with the patient.  She is complaining of severe low back and buttock pain.  There is no radicular symptoms.  Denies any numbness or tingling or any bladder or bowel incontinence.  Patient states that she has had pain since the first of the year and is progressively gotten worse over the last few weeks.  She has severe pain with walking and is now using a walker but is having difficulty doing that.  We discussed her MRI results as well as her plain film results.  MRI and x-rays have been reviewed by ART PRINGLE.  They do not show anything significant.  Patient has had a sacral fracture in the past and could possibly have a sacral insufficiency fracture.0.  I did talk to her pain management physician about changing her pain medicine and he is agreed to contact her tomorrow to change her narcotics.  He did state that the patient had an epidural on January 26 of this year and trigger point injections on February 15 with no relief.  In the meantime we will get a bone scan and I have made her an appointment to see ART PRINGLE on March 16.  Have left it open for the patient to call if she has any other questions or concerns

## 2022-02-18 NOTE — TELEPHONE ENCOUNTER
Patient called to doris Shetty know the bone scan was scheduled for 3/1 @11:30 and she did get some stronger pain medication.

## 2022-02-23 PROBLEM — M54.9 BACK PAIN: Status: ACTIVE | Noted: 2022-01-01

## 2022-02-23 NOTE — TELEPHONE ENCOUNTER
Call returned to the patient.  She continues to complain of severe right leg pain.  States that she can hardly stand on her leg or sit on the toilet because of the pain.  Her only comfortable position is in fetal position laying on her left side.  Patient has been instructed to go to the ER by her pain management physician.  Have ART PRINGLE discussed this with Dr. Gan

## 2022-02-23 NOTE — TELEPHONE ENCOUNTER
Patient called asking for Sena. She states Dr. Gan wants her to go to the ER bacause she cant walk.

## 2022-02-24 PROBLEM — M54.59 INTRACTABLE LOW BACK PAIN: Status: ACTIVE | Noted: 2022-01-01

## 2022-02-25 PROBLEM — G06.2 EPIDURAL ABSCESS: Status: ACTIVE | Noted: 2022-01-01

## 2022-02-25 NOTE — ANESTHESIA PREPROCEDURE EVALUATION
Anesthesia Evaluation     Patient summary reviewed and Nursing notes reviewed   NPO Solid Status: > 8 hours  NPO Liquid Status: > 2 hours           Airway   Mallampati: II  TM distance: >3 FB  Neck ROM: full  Dental      Pulmonary    (+) shortness of breath, sleep apnea,   Cardiovascular     ECG reviewed  Patient on routine beta blocker  Rhythm: regular  Rate: normal    (+) hypertension, dysrhythmias Paroxysmal Atrial Fib, PVC, hyperlipidemia,       Neuro/Psych  (+) seizures, numbness, psychiatric history Anxiety and Depression,    GI/Hepatic/Renal/Endo    (+) obesity,  hiatal hernia, GERD,  liver disease fatty liver disease, diabetes mellitus type 2 using insulin, thyroid problem hypothyroidism    Musculoskeletal     (+) back pain, radiculopathy      ROS comment: Rheumatoid arthritis (HCC)  Abdominal    Substance History - negative use     OB/GYN negative ob/gyn ROS         Other   arthritis,                        Anesthesia Plan    ASA 3     general   (  )    Anesthetic plan, all risks, benefits, and alternatives have been provided, discussed and informed consent has been obtained with: patient.

## 2022-02-25 NOTE — ANESTHESIA PROCEDURE NOTES
Airway  Urgency: elective    Date/Time: 2/25/2022 3:26 PM  Airway not difficult    General Information and Staff    Patient location during procedure: OR  CRNA: Olga Cristina CRNA    Indications and Patient Condition  Indications for airway management: airway protection    Preoxygenated: yes  Mask difficulty assessment: 1 - vent by mask    Final Airway Details  Final airway type: endotracheal airway      Successful airway: ETT  Cuffed: yes   Successful intubation technique: direct laryngoscopy  Endotracheal tube insertion site: oral  Blade: Brett  Blade size: 3  ETT size (mm): 7.0  Cormack-Lehane Classification: grade I - full view of glottis  Placement verified by: chest auscultation and capnometry   Measured from: lips  ETT/EBT  to lips (cm): 22  Number of attempts at approach: 1  Assessment: lips, teeth, and gum same as pre-op and atraumatic intubation    Additional Comments   ett cuff up at MOP

## 2022-02-26 NOTE — ANESTHESIA PROCEDURE NOTES
Peripheral IV    Line placed for Fluids/Medication Admin.  Preanesthetic Checklist  Completed: patient identified, IV checked, site marked, risks and benefits discussed, surgical consent, monitors and equipment checked, pre-op evaluation and timeout performed  Peripheral IV Prep   Prep: ChloraPrep  Peripheral IV Procedure   Laterality:left  Location:  Hand  Catheter size: 18 G         Post Assessment   Dressing Type: tape and transparent.    IV Dressing/Site: clean, dry and intact

## 2022-02-26 NOTE — ANESTHESIA POSTPROCEDURE EVALUATION
Patient: Wendi Ramos    Procedure Summary     Date: 02/25/22 Room / Location: [facility] OR [address] [place] [address] MAIN OR    Anesthesia Start: 1518 Anesthesia Stop: 2040    Procedures:       Lumbar 4-lumbar 5, lumbar 5-sacral 1 irrigation and debridement, lumbar 5-sacral 1 discectomy and fusion with extension of instrumentation, lumbar 5-sacral 1 interbody fusion with cage by Dr. Juancarlos Montelongo.  Lumbar 4-lumbar 5, lumbar 5-sacral 1 treatment of epidural abscess by Dr. Tyrone Randall (N/A Spine Lumbar)      Lumbar 4-5, lumbar 5-sacral 1 irrigation and debridement , lumbar 5-sacral 1 laminectomy and discectomy with removal of epidural abscess (N/A Spine Lumbar) Diagnosis:     Surgeons: Juancarlos Montelongo MD; Jef Randall MD Provider: Dexter Alexandra MD    Anesthesia Type: general ASA Status: 3          Anesthesia Type: general    Vitals  Vitals Value Taken Time   /66 02/25/22 2146   Temp 37.1 °C (98.8 °F) 02/25/22 2036   Pulse 92 02/25/22 2158   Resp 12 02/25/22 2145   SpO2 98 % 02/25/22 2158   Vitals shown include unvalidated device data.        Post Anesthesia Care and Evaluation    Patient location during evaluation: bedside  Patient participation: complete - patient participated  Level of consciousness: awake and alert  Pain management: adequate  Airway patency: patent  Anesthetic complications: No anesthetic complications  PONV Status: controlled  Cardiovascular status: blood pressure returned to baseline and acceptable  Respiratory status: acceptable  Hydration status: acceptable

## 2022-03-01 NOTE — TELEPHONE ENCOUNTER
Caller: PADMAJA    Relationship:     Best call back number:[phone]   What orders are you requesting (i.e. lab or imaging): HOME HEALTH     In what timeframe would the patient need to come in:1-2 DAY AFTER DISCHARGE     Where will you receive your lab/imaging services: HOME     Additional notes: [facility]

## 2022-03-03 NOTE — OUTREACH NOTE
Call Center TCM Note      Responses   [facility] patient discharged from? [facility]   Does the patient have one of the following disease processes/diagnoses(primary or secondary)? General Surgery   TCM attempt successful? Yes   Call start time 1435   Call end time 1439   Discharge diagnosis Epidural abscess,   underwent L5-S1 laminectomy, foraminotomy, facetectomy and bilateral discectomy with removal of epidural abscess anterior to the thecal sac followed by [id number] posterior lateral fusion with [employer] expedient segmental instrumentation and hardware revision L4-S1, bilateral L5-S1 interbody fusion    Meds reviewed with patient/caregiver? Yes   Is the patient having any side effects they believe may be caused by any medication additions or changes? No   Does the patient have all medications related to this admission filled (includes all antibiotics, pain medications, etc.) Yes   Is the patient taking all medications as directed (includes completed medication regime)? Yes   Does the patient have a follow up appointment scheduled with their surgeon? Yes   Comments HOSP DC FU appt with PCP 3/7/22 @ 11 AM.    What is the Home health agency?   [facility]    Has home health visited the patient within 72 hours of discharge? Yes   Psychosocial issues? No   Did the patient receive a copy of their discharge instructions? Yes   Nursing interventions Reviewed instructions with patient   What is the patient's perception of their health status since discharge? Improving   Nursing interventions Nurse provided patient education   Is the patient /caregiver able to teach back basic post-op care? Lifting as instructed by MD in discharge instructions,  No tub bath, swimming, or hot tub until instructed by MD,  Drive as instructed by MD in discharge instructions,  Take showers only when approved by MD-sponge bathe until then,  Keep incision areas clean,dry and protected,  Continue use of incentive spirometry at least 1  "week post discharge   Is the patient/caregiver able to teach back signs and symptoms of incisional infection? Pus or odor from incision,  Incisional warmth,  Increased drainage or bleeding,  Increased redness, swelling or pain at the incisonal site,  Fever   Is the patient/caregiver able to teach back steps to recovery at home? Eat a well-balance diet,  Rest and rebuild strength, gradually increase activity   Is the patient/caregiver able to teach back the hierarchy of who to call/visit for symptoms/problems? PCP, Specialist, Home health nurse, Urgent Care, ED, 911 Yes   TCM call completed? Yes   Wrap up additional comments Pt reports she is doing well at this time and is aware of restrictions as she \"went throught this last year\".           Keke Bourgeois RN    3/3/2022, 14:40 EST      "

## 2022-03-03 NOTE — HOME HEALTH
SOC 3/3  DIAG: epidural abscess  HX:  chronic back pain, dm, htn,   Patient is a 67 yo female lives in her home with wife. She is up with walker, has chronic back pain, and pain to right hip/ leg. She recently had back surgery , has had a total of 5,   home now on IV vancomycin, q12 hrs, at 9 and 9. She has a picc line to left arm as she is left handed. Nursing t/i on how to adminster med and she was able to demonstrate for nursing   at visit. Her pain is always severe with very little relief. She is seeing pain mgmt.  Rev meds in home no issues noted. T/I on use of epi pen/ benadryl if needed

## 2022-03-03 NOTE — OUTREACH NOTE
Prep Survey      Responses   [facility] patient discharged from? [facility]   Is LACE score < 7 ? No   Emergency Room discharge w/ pulse ox? No   Eligibility [facility]   Date of Admission 02/23/22   Date of Discharge 03/02/22   Discharge Disposition Home or Self Care   Discharge diagnosis Epidural abscess,   underwent L5-S1 laminectomy, foraminotomy, facetectomy and bilateral discectomy with removal of epidural abscess anterior to the thecal sac followed by [id number] posterior lateral fusion with AcroMed expedient segmental instrumentation and hardware revision L4-S1, bilateral L5-S1 interbody fusion    Does the patient have one of the following disease processes/diagnoses(primary or secondary)? General Surgery   Does the patient have Home health ordered? Yes   What is the Home health agency?   [facility]    Is there a DME ordered? No   Prep survey completed? Yes          Ruth Trejo RN

## 2022-03-07 PROBLEM — T39.1X2A ACETAMINOPHEN OVERDOSE, INTENTIONAL SELF-HARM, INITIAL ENCOUNTER (HCC): Status: ACTIVE | Noted: 2022-01-01

## 2022-03-07 NOTE — ED TRIAGE NOTES
After signing pt in her  approached the desk and asked the EMT about a bottle of pills that the pt took. Asked  what he was referring to.  stated that the pt took 20 norco's last night at 2100. When this RN asked the pt about it pt denied it. States that she takes 2-3 pills a day as prescribed. Pt states that it was filled on 2/18 from her pain management doctor. Pt denies SI/HI at this time.

## 2022-03-07 NOTE — ED NOTES
Nursing report ED to floor  Wendi Ramos  66 y.o.  female    HPI :   Chief Complaint   Patient presents with   • Hip Pain       Admitting doctor:   Анна Howard MD    Admitting diagnosis:   The primary encounter diagnosis was Acetaminophen overdose, intentional self-harm, initial encounter ([facility]). A diagnosis of Right hip pain was also pertinent to this visit.    Code status:   Current Code Status     Date Active Code Status Order ID Comments User Context       3/7/2022 1336 CPR (Attempt to Resuscitate) [id number]  Анна Howard MD ED     Advance Care Planning Activity      Questions for Current Code Status     Question Answer    Code Status (Patient has no pulse and is not breathing) CPR (Attempt to Resuscitate)    Medical Interventions (Patient has pulse or is breathing) Full Support    Level Of Support Discussed With Patient          Allergies:   Adhesive tape, Sulfamethoxazole w/trimethoprim 800-160  [sulfamethoxazole-trimethoprim], Cefpodoxime, Cephalosporins, Metformin and related, Sulfa antibiotics, Topiramate, and Latex    Intake and Output  No intake or output data in the 24 hours ending 03/07/22 1510    Weight:       03/07/22  0839   Weight: 95.2 kg (209 lb 12.8 oz)       Most recent vitals:   Vitals:    03/07/22 1430 03/07/22 1459 03/07/22 1503 03/07/22 1509   BP: 124/52 120/46 129/49    BP Location: Right arm      Patient Position: Lying      Pulse: 77 79 75    Resp: 18   17   Temp: 97.7 °F (36.5 °C)      TempSrc: Oral      SpO2: 97% 96% 96%    Weight:           Active LDAs/IV Access:   Lines, Drains & Airways     Active LDAs     Name Placement date Placement time Site Days    PICC Single Lumen 03/01/22 Left Basilic 03/01/22 2120  Basilic  5                Labs (abnormal labs have a star):   Labs Reviewed   COMPREHENSIVE METABOLIC PANEL - Abnormal; Notable for the following components:       Result Value    BUN 7 (*)     Creatinine 0.50 (*)     Potassium 3.4 (*)     CO2 17.3 (*)      Calcium 8.4 (*)     Albumin 2.90 (*)     ALT (SGPT) 170 (*)     AST (SGOT) 392 (*)     Alkaline Phosphatase 169 (*)     Anion Gap 16.7 (*)     All other components within normal limits    Narrative:     GFR Normal >60  Chronic Kidney Disease <60  Kidney Failure <15     ACETAMINOPHEN LEVEL - Abnormal; Notable for the following components:    Acetaminophen 120.8 (*)     All other components within normal limits   URINE DRUG SCREEN - Abnormal; Notable for the following components:    Opiate Screen Positive (*)     Oxycodone Screen, Urine Positive (*)     All other components within normal limits    Narrative:     Negative Thresholds Per Drugs Screened:    Amphetamines                 500 ng/ml  Barbiturates                 200 ng/ml  Benzodiazepines              100 ng/ml  Cocaine                      300 ng/ml  Methadone                    300 ng/ml  Opiates                      300 ng/ml  Oxycodone                    100 ng/ml  THC                           50 ng/ml    The Normal Value for all drugs tested is negative. This report includes final unconfirmed screening results to be used for medical treatment purposes only. Unconfirmed results must not be used for non-medical purposes such as employment or legal testing. Clinical consideration should be applied to any drug of abuse test, particularly when unconfirmed results are used.           CBC WITH AUTO DIFFERENTIAL - Abnormal; Notable for the following components:    RBC 3.11 (*)     Hemoglobin 8.8 (*)     Hematocrit 26.6 (*)     Neutrophil % 84.2 (*)     Lymphocyte % 7.8 (*)     Eosinophil % 0.0 (*)     Immature Grans % 0.8 (*)     Lymphocytes, Absolute 0.60 (*)     Immature Grans, Absolute 0.06 (*)     All other components within normal limits   PROTIME-INR - Abnormal; Notable for the following components:    Protime 17.4 (*)     INR 1.43 (*)     All other components within normal limits   COVID-19,[facility] CEPHEID/GUILLE, NP SWAB IN TRANSPORT MEDIA 8-12  HR TAT - Normal    Narrative:     Fact sheet for providers: https://www.fda.gov/media/006173/download    Fact sheet for patients: https://www.fda.gov/media/972337/download    Test performed by PCR.   SALICYLATE LEVEL - Normal    Narrative:     Therapeutic range for Salicylates:  3.0 - 10.0 mg/dL for antipyretic/analgesic conditions  15.0 - 30.0 mg/dL for anti-inflammatory conditions   APTT - Normal   COVID PRE-OP / PRE-PROCEDURE SCREENING ORDER (NO ISOLATION)    Narrative:     The following orders were created for panel order COVID PRE-OP / PRE-PROCEDURE SCREENING ORDER (NO ISOLATION) - Swab, Nasopharynx.  Procedure                               Abnormality         Status                     ---------                               -----------         ------                     COVID-19,[facility]...[[id number]]  Normal              Final result                 Please view results for these tests on the individual orders.   ETHANOL   POCT GLUCOSE FINGERSTICK   POCT GLUCOSE FINGERSTICK   POCT GLUCOSE FINGERSTICK   CBC AND DIFFERENTIAL    Narrative:     The following orders were created for panel order CBC & Differential.  Procedure                               Abnormality         Status                     ---------                               -----------         ------                     CBC Auto Differential[[id number]]        Abnormal            Final result                 Please view results for these tests on the individual orders.       EKG:   No orders to display       Meds given in ED:   Medications   acetylcysteine (ACETADOTE) 14,280 mg in dextrose (D5W) 5 % 200 mL infusion (0 mg/kg × 95.2 kg Intravenous Stopped 3/7/22 1243)     Followed by   acetylcysteine (ACETADOTE) 4,760 mg in dextrose (D5W) 5 % 500 mL infusion (4,760 mg Intravenous New Bag 3/7/22 1246)     Followed by   acetylcysteine (ACETADOTE) 9,520 mg in dextrose (D5W) 5 % 1,000 mL infusion (has no administration in time range)   naloxone  (NARCAN) injection 0.2 mg (has no administration in time range)   ondansetron (ZOFRAN) injection 4 mg (has no administration in time range)   nitroglycerin (NITROSTAT) SL tablet 0.4 mg (has no administration in time range)   sodium chloride 0.9 % flush 10 mL (has no administration in time range)   sodium chloride 0.9 % flush 10 mL (has no administration in time range)   baclofen (LIORESAL) tablet 10 mg (has no administration in time range)   DULoxetine (CYMBALTA) DR capsule 60 mg (has no administration in time range)   levothyroxine (SYNTHROID, LEVOTHROID) tablet 137 mcg (has no administration in time range)   metoprolol tartrate (LOPRESSOR) tablet 50 mg (has no administration in time range)   pantoprazole (PROTONIX) EC tablet 40 mg (has no administration in time range)   rosuvastatin (CRESTOR) tablet 40 mg (has no administration in time range)   pramipexole (MIRAPEX) tablet 0.5 mg (has no administration in time range)   dextrose (GLUTOSE) oral gel 15 g (has no administration in time range)   dextrose (D50W) (25 g/50 mL) IV injection 25 g (has no administration in time range)   glucagon (human recombinant) (GLUCAGEN DIAGNOSTIC) injection 1 mg (has no administration in time range)   insulin lispro (ADMELOG) injection 0-7 Units (has no administration in time range)   HYDROmorphone (DILAUDID) injection 0.5 mg (has no administration in time range)   oxyCODONE (ROXICODONE) immediate release tablet 10 mg (has no administration in time range)   ferrous sulfate tablet 325 mg (has no administration in time range)       Imaging results:  XR Chest 1 View    Result Date: 3/7/2022  New left PICC line as described. No significant cardiopulmonary abnormality identified.  This report was finalized on 3/7/2022 11:16 AM by Dr. Coleman Hinton M.D.      XR Hip With or Without Pelvis 2 - 3 View Right    Result Date: 3/7/2022  Negative.  This report was finalized on 3/7/2022 9:57 AM by Dr. Coleman Hinton M.D.        Ambulatory status:    - bedrest    Social issues:   Social History     Socioeconomic History   • Marital status:    • Number of children: 1   • Years of education: 14   Tobacco Use   • Smoking status: Never Smoker   • Smokeless tobacco: Never Used   Vaping Use   • Vaping Use: Never used   Substance and Sexual Activity   • Alcohol use: Yes     Comment: social   • Drug use: No   • Sexual activity: Defer       NIH Stroke Scale:        Nursing report ED to floor:       Nena Vega RN  03/07/22 1109

## 2022-03-07 NOTE — ED PROVIDER NOTES
MD ATTESTATION NOTE    The REJI and I have discussed this patient's history, physical exam, and treatment plan.  I have reviewed the documentation and personally had a face to face interaction with the patient. I affirm the documentation and agree with the treatment and plan.  The attached note describes my personal findings.      I provided a substantive portion of the care of the patient.  I personally performed the physical exam in its entirety, and below are my findings.  For this patient encounter, the patient wore surgical mask, I wore full protective PPE including N95 and eye protection.      Brief HPI: Patient presents for right hip pain and intentional hydrocodone overdose.  Patient states she has had back pain and then bursitis in her right hip.  Patient states the pain got so bad last night that she wanted to end it all and took at least 20 hydrocodone last night.  Patient states she was trying to kill herself.  Patient also took some Aleve.  Patient states she has history of fatty liver disease    PHYSICAL EXAM  ED Triage Vitals [03/07/22 0821]   Temp Heart Rate Resp BP SpO2   98.5 °F (36.9 °C) 83 16 146/57 98 %      Temp src Heart Rate Source Patient Position BP Location FiO2 (%)   Tympanic Monitor Lying -- --         GENERAL: Tearful with no distress  HENT: nares patent  EYES: no scleral icterus  CV: regular rhythm, normal rate  RESPIRATORY: normal effort  ABDOMEN: soft  MUSCULOSKELETAL: no deformity.  Does have some tenderness to her lower back and her right hip  NEURO: alert, moves all extremities, follows commands  PSYCH:  calm, cooperative  SKIN: warm, dry    Vital signs and nursing notes reviewed.        Plan: Patient with potentially lethal acetaminophen overdose.  Will contact poison center       Jef Alves MD  03/07/22 1317

## 2022-03-07 NOTE — ED TRIAGE NOTES
Pt arrives via EMS from home. States that she was diagnosed with bursitis of her right hip 1 week ago and has been on pain medication. States that she is still in pain. Patient masked at arrival and triage staff wore all appropriate PPE during entire encounter with patient.

## 2022-03-07 NOTE — OUTREACH NOTE
General Surgery Week 2 Survey    Flowsheet Row Responses   [facility] facility patient discharged from? [place]   Does the patient have one of the following disease processes/diagnoses(primary or secondary)? General Surgery   Week 2 attempt successful? No   Revoke Readmitted          YORDAN CAMARILLO - Registered Nurse

## 2022-03-07 NOTE — ED PROVIDER NOTES
EMERGENCY DEPARTMENT ENCOUNTER    Room Number:  E452/1  Date seen:  3/7/2022  Time seen: 08:37 EST  PCP: Flo Temple MD  Historian: patient and       HPI:  Chief Complaint: intentional overdose    A complete HPI/ROS/PMH/PSH/SH/FH are unobtainable due to: none    Context: Wendi Ramos is a 66 y.o. female who presents to the ED for evaluation of an intentional overdose that occurred at approximately 9 PM last night.  Patient recently had surgery on her back about a week and a half ago, is on Norco 10/325. Took at least 20 tablets at 9pm last night in an attempt to take her own life and relieve her pain.   noticed that her speech was slurred this morning and that she appeared intoxicated.  Noted that her bottle of pain medication was entirely empty when it was previously three-quarter full.  She does not know how many she took, he estimates it was at least 20.  She denies any other drug or alcohol use.  She reports right hip pain that started while hospitalized.  Has a history of right hip pain chronically for years that occasionally flares up, feels that it is flared.  No falls or injuries to the hip, has a history of bursitis.  She denies any fever chills.  Has intermittently had a couple episodes of vomiting and couple episodes of diarrhea at home.  Has a PICC line in the left upper extremity and was initially on vancomycin but related to post infusion vomiting and diarrhea at home antibiotic was changed yesterday.            PAST MEDICAL HISTORY  Active Ambulatory Problems     Diagnosis Date Noted   • Type 2 diabetes mellitus without complication (HCC) 09/20/2017   • Post-operative state 09/17/2018   • S/P lumbar fusion 09/17/2018   • Insomnia 02/26/2019   • Abnormal EKG 03/21/2013   • Cellulitis of breast 03/12/2013   • Chest pain 03/21/2013   • Dyspnea on exertion 03/21/2013   • Fibromyalgia 12/01/2005   • Hyperhidrosis 10/15/2015   • LAFB (left anterior fascicular block) 10/15/2015   • Mass  of right breast 03/12/2013   • Obesity 03/21/2013   • PVC (premature ventricular contraction) 01/01/1999   • Rheumatoid arthritis (HCC) 10/01/2005   • S/P catheter ablation of slow pathway 03/21/2013   • Seizures (HCC) 01/01/2014   • Sinus tachycardia 09/30/2013   • Chronic fatigue 09/05/2019   • Vitamin D deficiency 09/05/2019   • Complex dyslipidemia 09/05/2019   • Hypothyroidism due to Hashimoto's thyroiditis 09/05/2019   • Hypertension 01/08/2020   • Hyperlipidemia 01/08/2020   • Acute bilateral low back pain without sciatica 11/20/2020   • Spinal stenosis of lumbar region with neurogenic claudication 02/02/2021   • Sleep apnea    • Pain 04/09/2021   • Lumbar stenosis with neurogenic claudication 04/09/2021   • Cystitis 04/14/2021   • Acute blood loss anemia 04/16/2021   • GERD (gastroesophageal reflux disease) 04/17/2021   • Back pain 02/23/2022   • Intractable low back pain 02/24/2022   • Epidural abscess 02/25/2022     Resolved Ambulatory Problems     Diagnosis Date Noted   • Lumbar radiculopathy 05/31/2018   • Cauda equina syndrome (HCC) 06/08/2018   • L3/4 Lumbar disc herniation 06/08/2018   • Incontinence of feces 06/08/2018     Past Medical History:   Diagnosis Date   • Depression    • Diabetes mellitus (HCC)    • Hiatal hernia    • History of Clostridioides difficile colitis    • Hypothyroidism    • Irregular heart beat    • Liver disease    • Low back pain    • Peripheral neuropathy    • Type 2 diabetes mellitus (HCC)    • West Nile fever 2002         PAST SURGICAL HISTORY  Past Surgical History:   Procedure Laterality Date   • ABDOMINOPLASTY     • ADENOIDECTOMY     • BILATERAL BREAST REDUCTION     • BRONCHOSCOPY N/A 12/15/2016    Procedure: BRONCHOSCOPY WITH BAL AND ENDOBRONCHIAL ULTRASOUND WITH FNA;  Surgeon: Diego Ponce MD;  Location: [facility] ENDOSCOPY;  Service:    • CARDIAC ABLATION     • CATARACT EXTRACTION, BILATERAL     • COLONOSCOPY     • ENDOSCOPY     • EYE SURGERY     • HYSTERECTOMY      • LASIK     • LUMBAR DISCECTOMY FUSION INSTRUMENTATION Left 6/8/2018    Procedure: LEFT L3/4 lumbar discectomy;  Surgeon: Miguelangel Goldberg MD;  Location: [facility];  Service: Neurosurgery   • LUMBAR FUSION Left 7/12/2018    Procedure: Left L3 4 lumbar decompression and discectomy with transforaminal lumbar interbody fusion and posterior instrumentation with posterior lateral arthrodesis;  Surgeon: Miguelangel Goldberg MD;  Location: [facility];  Service: Neurosurgery   • LUMBAR LAMINECTOMY WITH FUSION N/A 2/15/2021    Procedure: Lumbar 2-3, lumbar 3 4, lumbar 4 5 fusion with instrumentation and BMP,  L2-L3 lift with cage, and removal of implants L3-4 with laminectomies by Dr. Segundo;  Surgeon: Juancarlos Montelongo MD;  Location: [facility];  Service: Orthopedic Spine;  Laterality: N/A;   • LUMBAR LAMINECTOMY WITH FUSION N/A 2/15/2021    Procedure: Lumbar 2 3, Lumbar 3 4 and lumbar 4 5 laminectomy with a fusion by orthopedics;  Surgeon: Jef Randall MD;  Location: [facility];  Service: Neurosurgery;  Laterality: N/A;   • LUMBAR LAMINECTOMY WITH FUSION N/A 4/15/2021    Procedure: T10-L2 fusion with instrumentation and revision of current fixation, laminectomy by Dr. Segundo;  Surgeon: Juancarlos Montelongo MD;  Location: [facility];  Service: Orthopedic Spine;  Laterality: N/A;   • LUMBAR LAMINECTOMY WITH FUSION N/A 4/15/2021    Procedure: LUMBAR 2 LUMBAR 3 LAMINECTOMY FACETECTOMY AND NEUROLYSIS;  Surgeon: Jef Randall MD;  Location: [facility];  Service: Neurosurgery;  Laterality: N/A;   • MOUTH SURGERY     • REPLACEMENT TOTAL KNEE Left    • TONSILLECTOMY     • TONSILLECTOMY AND ADENOIDECTOMY  1966         FAMILY HISTORY  Family History   Problem Relation Age of Onset   • Diabetes Mother    • Neuropathy Father    • Colon polyps Father    • Diabetes Sister    • No Known Problems Son    • Diabetes Maternal Grandmother    • Cancer Maternal Grandmother    • Diabetes Paternal Grandmother    • Cancer  Paternal Grandmother    • Malig Hyperthermia Neg Hx          SOCIAL HISTORY  Social History     Socioeconomic History   • Marital status:    • Number of children: 1   • Years of education: 14   Tobacco Use   • Smoking status: Never Smoker   • Smokeless tobacco: Never Used   Vaping Use   • Vaping Use: Never used   Substance and Sexual Activity   • Alcohol use: Yes     Comment: social   • Drug use: No   • Sexual activity: Defer         ALLERGIES  Adhesive tape, Sulfamethoxazole w/trimethoprim 800-160  [sulfamethoxazole-trimethoprim], Cefpodoxime, Cephalosporins, Metformin and related, Sulfa antibiotics, Topiramate, and Latex        REVIEW OF SYSTEMS  Review of Systems     All systems reviewed and negative except for those discussed in HPI.       PHYSICAL EXAM  ED Triage Vitals [03/07/22 0821]   Temp Heart Rate Resp BP SpO2   98.5 °F (36.9 °C) 83 16 146/57 98 %      Temp src Heart Rate Source Patient Position BP Location FiO2 (%)   Tympanic Monitor Lying -- --         GENERAL: not distressed, back brace on, removed for exam  HENT: atraumatic  EYES: no scleral icterus  CV: regular rhythm, regular rate  RESPIRATORY: normal effort CTA B  ABDOMEN: soft, nontender nondistended  MUSCULOSKELETAL: no deformity.  Inspection of the right hip is unremarkable.  No edema erythema or ecchymosis.  Hip is diffusely nontender.  She is able to flex and extend.  Distal neurovascular exam intact.  NEURO: alert, moves all extremities, follows commands  SKIN: warm, dry.  Midline lumbosacral incision well approximated with staples and Dermabond.  Lower portion of the surgical wound appears to have had quite a bit of drainage, bandage is saturated.  No.  Incisional erythema.  There is diffuse tenderness in the area.    Vital signs and nursing notes reviewed.          LAB RESULTS  Recent Results (from the past 24 hour(s))   Urine Drug Screen - Urine, Clean Catch    Collection Time: 03/07/22  9:05 AM    Specimen: Urine, Clean Catch    Result Value Ref Range    Amphet/Methamphet, Screen Negative Negative    Barbiturates Screen, Urine Negative Negative    Benzodiazepine Screen, Urine Negative Negative    Cocaine Screen, Urine Negative Negative    Opiate Screen Positive (A) Negative    THC, Screen, Urine Negative Negative    Methadone Screen, Urine Negative Negative    Oxycodone Screen, Urine Positive (A) Negative   [facility] SWAB IN TRANSPORT MEDIA 8-12 HR TAT - Swab, Nasopharynx    Collection Time: 03/07/22  9:06 AM    Specimen: Nasopharynx; Swab   Result Value Ref Range    COVID19 Not Detected Not Detected - Ref. Range   Comprehensive Metabolic Panel    Collection Time: 03/07/22  9:11 AM    Specimen: Blood   Result Value Ref Range    Glucose 88 65 - 99 mg/dL    BUN 7 (L) 8 - 23 mg/dL    Creatinine 0.50 (L) 0.57 - 1.00 mg/dL    Sodium 137 136 - 145 mmol/L    Potassium 3.4 (L) 3.5 - 5.2 mmol/L    Chloride 103 98 - 107 mmol/L    CO2 17.3 (L) 22.0 - 29.0 mmol/L    Calcium 8.4 (L) 8.6 - 10.5 mg/dL    Total Protein 6.2 6.0 - 8.5 g/dL    Albumin 2.90 (L) 3.50 - 5.20 g/dL    ALT (SGPT) 170 (H) 1 - 33 U/L    AST (SGOT) 392 (H) 1 - 32 U/L    Alkaline Phosphatase 169 (H) 39 - 117 U/L    Total Bilirubin 0.8 0.0 - 1.2 mg/dL    Globulin 3.3 gm/dL    A/G Ratio 0.9 g/dL    BUN/Creatinine Ratio 14.0 7.0 - 25.0    Anion Gap 16.7 (H) 5.0 - 15.0 mmol/L    eGFR 102.3 >60.0 mL/min/1.73   Acetaminophen Level    Collection Time: 03/07/22  9:11 AM    Specimen: Blood   Result Value Ref Range    Acetaminophen 120.8 (C) 0.0 - 30.0 mcg/mL   Ethanol    Collection Time: 03/07/22  9:11 AM    Specimen: Blood   Result Value Ref Range    Ethanol <10 0 - 10 mg/dL    Ethanol % <0.010 %   Salicylate Level    Collection Time: 03/07/22  9:11 AM    Specimen: Blood   Result Value Ref Range    Salicylate <0.3 <=30.0 mg/dL   CBC Auto Differential    Collection Time: 03/07/22  9:11 AM    Specimen: Blood   Result Value Ref Range    WBC 7.71 3.40 - 10.80  10*3/mm3    RBC 3.11 (L) 3.77 - 5.28 10*6/mm3    Hemoglobin 8.8 (L) 12.0 - 15.9 g/dL    Hematocrit 26.6 (L) 34.0 - 46.6 %    MCV 85.5 79.0 - 97.0 fL    MCH 28.3 26.6 - 33.0 pg    MCHC 33.1 31.5 - 35.7 g/dL    RDW 13.4 12.3 - 15.4 %    RDW-SD 40.9 37.0 - 54.0 fl    MPV 10.4 6.0 - 12.0 fL    Platelets 241 140 - 450 10*3/mm3    Neutrophil % 84.2 (H) 42.7 - 76.0 %    Lymphocyte % 7.8 (L) 19.6 - 45.3 %    Monocyte % 7.1 5.0 - 12.0 %    Eosinophil % 0.0 (L) 0.3 - 6.2 %    Basophil % 0.1 0.0 - 1.5 %    Immature Grans % 0.8 (H) 0.0 - 0.5 %    Neutrophils, Absolute 6.49 1.70 - 7.00 10*3/mm3    Lymphocytes, Absolute 0.60 (L) 0.70 - 3.10 10*3/mm3    Monocytes, Absolute 0.55 0.10 - 0.90 10*3/mm3    Eosinophils, Absolute 0.00 0.00 - 0.40 10*3/mm3    Basophils, Absolute 0.01 0.00 - 0.20 10*3/mm3    Immature Grans, Absolute 0.06 (H) 0.00 - 0.05 10*3/mm3    nRBC 0.1 0.0 - 0.2 /100 WBC   aPTT    Collection Time: 03/07/22  9:11 AM    Specimen: Blood   Result Value Ref Range    PTT 34.1 22.7 - 35.4 seconds   Protime-INR    Collection Time: 03/07/22  9:11 AM    Specimen: Blood   Result Value Ref Range    Protime 17.4 (H) 11.7 - 14.2 Seconds    INR 1.43 (H) 0.90 - 1.10       Ordered the above labs and independently reviewed the results.        RADIOLOGY      XR Chest 1 View    Result Date: 3/7/2022  Narrative: PORTABLE CHEST X-RAY  HISTORY: PICC line placement.  Portable chest x-ray is provided. Correlation: Chest x-ray 03/01/2022.  FINDINGS: There is a new left PICC line which has its tip along the lower portion of the superior vena cava within 1 cm of the the mitral junction. The cardiomediastinal silhouette is normal. The lungs are clear. The costophrenic sulci are dry and the bones appear normal. There is no pneumothorax.      Impression: New left PICC line as described. No significant cardiopulmonary abnormality identified.  This report was finalized on 3/7/2022 11:16 AM by Dr. Coleman Hinton M.D.          XR Hip With or Without  Pelvis 2 - 3 View Right    Result Date: 3/7/2022  Narrative: PELVIS AND RIGHT HIP X-RAYS  HISTORY: Recent spine surgery. Severe right hip pain. No injury.  TECHNIQUE: An AP view the pelvis and two images of the right hip are provided.  FINDINGS: Lower lumbar spine fusion hardware is partially demonstrated. The sacroiliac joints, symphysis pubis, and hip joint spaces appear normal. The bones of the pelvis and proximal femurs appear normal.      Impression: Negative.  This report was finalized on 3/7/2022 9:57 AM by Dr. Coleman Hinton M.D.        I ordered the above noted radiological studies. Reviewed by me and discussed with radiologist.  See dictation for official radiology interpretation.    PROCEDURES  Critical Care  Performed by: Rebekah Berg PA  Authorized by: Jef Alves MD     Critical care provider statement:     Critical care time (minutes):  31    Critical care time was exclusive of:  Separately billable procedures and treating other patients and teaching time    Critical care was necessary to treat or prevent imminent or life-threatening deterioration of the following conditions:  Hepatic failure    Critical care was time spent personally by me on the following activities:  Development of treatment plan with patient or surrogate, discussions with consultants, examination of patient, obtaining history from patient or surrogate, review of old charts, re-evaluation of patient's condition, pulse oximetry, ordering and review of radiographic studies, ordering and review of laboratory studies and ordering and performing treatments and interventions            MEDICATIONS GIVEN IN ER  Medications   acetylcysteine (ACETADOTE) 14,280 mg in dextrose (D5W) 5 % 200 mL infusion (0 mg/kg × 95.2 kg Intravenous Stopped 3/7/22 1243)     Followed by   acetylcysteine (ACETADOTE) 4,760 mg in dextrose (D5W) 5 % 500 mL infusion (4,760 mg Intravenous New Bag 3/7/22 1246)     Followed by   acetylcysteine (ACETADOTE)  9,520 mg in dextrose (D5W) 5 % 1,000 mL infusion (has no administration in time range)   naloxone (NARCAN) injection 0.2 mg (has no administration in time range)   ondansetron (ZOFRAN) injection 4 mg (has no administration in time range)   nitroglycerin (NITROSTAT) SL tablet 0.4 mg (has no administration in time range)   sodium chloride 0.9 % flush 10 mL (has no administration in time range)   sodium chloride 0.9 % flush 10 mL (has no administration in time range)   baclofen (LIORESAL) tablet 10 mg (has no administration in time range)   DULoxetine (CYMBALTA) DR capsule 60 mg (has no administration in time range)   levothyroxine (SYNTHROID, LEVOTHROID) tablet 137 mcg (has no administration in time range)   metoprolol tartrate (LOPRESSOR) tablet 50 mg (has no administration in time range)   pantoprazole (PROTONIX) EC tablet 40 mg (has no administration in time range)   rosuvastatin (CRESTOR) tablet 40 mg (has no administration in time range)   pramipexole (MIRAPEX) tablet 0.5 mg (has no administration in time range)   dextrose (GLUTOSE) oral gel 15 g (has no administration in time range)   dextrose (D50W) (25 g/50 mL) IV injection 25 g (has no administration in time range)   glucagon (human recombinant) (GLUCAGEN DIAGNOSTIC) injection 1 mg (has no administration in time range)   insulin lispro (ADMELOG) injection 0-7 Units (has no administration in time range)   HYDROmorphone (DILAUDID) injection 0.5 mg (has no administration in time range)   oxyCODONE (ROXICODONE) immediate release tablet 10 mg (has no administration in time range)   ferrous sulfate tablet 325 mg (has no administration in time range)             PROGRESS AND CONSULTS        ED Course as of 03/07/22 1555   Mon Mar 07, 2022   0855 Discussed the patient with Deja of poison control.  She relates charcoal irrelevant at this time secondary to ingestion time almost 12 hours ago.  She recommends Narcan as needed, labs.  No other interventions recommended  at this time. [KA]   1019 ALT (SGPT)(!): 170 [KA]   1019 AST (SGOT)(!): 392 [KA]   1019 Alkaline Phosphatase(!): 169  LFTs newly elevated from 1 month ago in which they were normal. Will discuss with ER pharmacist and poison control   [KA]   1021 Acetaminophen level delayed secondary to a lab equipment issue.  Patient's LFTs newly elevated.  I discussed with orthopedics and control and they are agreeable with initiation of Acetadote which [facility] ER pharmacist has already ordered. [KA]   1102 I discussed the patient with Dr. Howard, hospitalist.  We reviewed the patient's history presentation labs and imaging and he agrees to admit to telemetry for further evaluation and treatment. [KA]   1105 Medical chart reviewed.  Patient admitted from 2/23/2022 until 3/2/2022 when she presented to the ER for severe back pain, MRI showed epidural abscess.  Dr. Montelongo and Dr. Segundo performed surgery.  Operative wound culture grew is Duffell coccus epidermidis sensitive to vancomycin and was discharged on 8-week course via PICC. [KA]      ED Course User Index  [KA] Rebekah Berg PA             Patient was placed in face mask in first look. Patient was wearing facemask each time I entered the room and throughout our encounter. I wore protective equipment throughout this patient encounter including a face mask, eye shield and gloves. Hand hygiene was performed before donning protective equipment and after removal when leaving the room.        DIAGNOSIS  Final diagnoses:   Acetaminophen overdose, intentional self-harm, initial encounter (HCC)   Right hip pain             Latest Documented Vital Signs:  As of 15:55 EST  BP- 129/49 HR- 75 Temp- 98 °F (36.7 °C) (Oral) O2 sat- 96%       Rebekah Berg PA  03/07/22 1430       Rebekah Berg PA  03/07/22 1555

## 2022-03-07 NOTE — CONSULTS
Consults     New patient or new problem visit    CC: Right hip pain    HPI: She was admitted from the ER for complaints of severe right hip pain.  Painful to the extent that she apparentl had an overdose of her Percocet.  There was concern for acetaminophen levels and I am not sure about the oxycodone.  She is presently awake alert oriented but has a sitter in the room.  She states that she is not suicidal was simply trying to get some pain relief.  In my experience with her we have never had any encounters like this.  Recently she underwent extensive L4-S1 revision surgery and L5-S1 irrigation and debridement for epidural abscess.  The severe left-sided pain she had at that time improved immensely but she is complaining of right hip pain.  Certainly had a lot of right-sided disc space and vertebral destruction at surgery.  Cultures ultimately grew staph epidermidis.  He has been on long-term IV daptomycin at home.  She was thought to have right hip bursitis.  No bowel or bladder complaints no wound drainage which she is aware.  Expected postoperative back pain nothing too bad.  Some radiation of pain down the thigh but nothing into the leg.  No numbness or tingling bowel or bladder complaints    PFSH: See attached    ROS: As above    PE: Awake alert oriented afebrile answering questions appropriately vital signs are all stable blood pressure is a little bit up.  Breathing comfortably color good.  Excellent strength in the legs bilaterally and sensation appears intact.  Minimal tenderness to palpation over the right greater trochanter but no redness fluctuance or induration.  The wound is clean and dressed without surrounding fluctuance redness or induration.  I spoke to her nurse who just change the dressing an hour ago and had a spot of dried blood but no drainage and the wound was intact.    XRAY: Hip x-rays are unremarkable.    Other: n/a    Impression: I do not think it is from her hip but we will keep that in  mind.  I suspect this is lumbar radicular pain and we need to be alert for possibility of hardware failure, fracture and/or recurrence of epidural abscess.  Of course she is on IV antibiotics for the selected organism.    Plan: I am ordering x-rays of the lumbar spine tonight and probably lumbar MRI for tomorrow.  She can be seen by therapy with activity as tolerated meantime.

## 2022-03-07 NOTE — HOME HEALTH
Patient had a reaction to vancomycin, having some emesis.  She contacted infusion center and they contacted MD and had discontinued vanc., patient now on Cubicin 600mg/100mL NS q24hr over 30 minutes.  Watched patient change/prime tubing and administer medication and she seems compotent.  CP assessment WNL.  She will need labs, dressing change tomorrow.

## 2022-03-07 NOTE — ED NOTES
Pt wearing mask. Myself had mask, and eye wear/PPE when present with pt. Hand hygiene performed before and after pt care.     Jami Webb, PCT  03/07/22 2425

## 2022-03-07 NOTE — CONSULTS
Referring Provider: Анна Howard MD  [id number]  [address] [address]  Reason for Consultation: Lumbar epidural abscess    Subjective   History of present illness: This is a 66-year-old female with a history of type 2 diabetes, fibromyalgia, hypertension, hypothyroidism T10-L3 bilateral posterior fusion, and staph epidermidis epidural abscess status post irrigation and debridement with hardware revision and interbody fusion with titanium cage placement on February 25 who was admitted on March 7 with increasing right-sided hip pain.    The patient was discharged on March 2 an 8-week course of IV vancomycin through April 22.  The patient states while in the hospital she developed right-sided hip pain.  She states she has had hip pain in the past and was diagnosed with bursitis.  Once home her right hip pain continued to worsen.  She also started having some vomiting and diarrhea associated with the vancomycin.  She called our group over the weekend and was switched to daptomycin.  Her first dose of antibiotic was on Sunday.  That right hip pain got so bad that the patient overdosed on pain medication last night and was brought to the emergency room.  The patient denied any fevers chills or night sweats.  She states her back incision is healing well and the pain is well controlled in that area.    Past Medical History:   Diagnosis Date   • Depression    • Diabetes mellitus (HCC)    • Fibromyalgia    • GERD (gastroesophageal reflux disease)    • Hyperlipidemia    • Hypertension    • Hypothyroidism    • Fatty liver    • Peripheral neuropathy    • Rheumatoid arthritis (HCC)    • Seizures (HCC)    • Sleep apnea     NO CPAP   • Type 2 diabetes mellitus (HCC)    • West Nile fever 2002       Past Surgical History:   Procedure Laterality Date   • ABDOMINOPLASTY     • BILATERAL BREAST REDUCTION     • HYSTERECTOMY     • LUMBAR DISCECTOMY FUSION INSTRUMENTATION Left 6/8/2018   • LUMBAR FUSION Left 7/12/2018    • MOUTH SURGERY     • REPLACEMENT TOTAL KNEE Left    • TONSILLECTOMY AND ADENOIDECTOMY  1966        reports that she has never smoked. She has never used smokeless tobacco. She reports current alcohol use. She reports that she does not use drugs.    family history includes Cancer in her maternal grandmother and paternal grandmother; Colon polyps in her father; Diabetes in her maternal grandmother, mother, paternal grandmother, and sister; Neuropathy in her father; No Known Problems in her son.    Allergies   Allergen Reactions   • Adhesive Tape Other (See Comments)     SKIN ON HANDS SHED OFF   • Sulfamethoxazole W/Trimethoprim 800-160  [Sulfamethoxazole-Trimethoprim] Hives   • Cefpodoxime    • Cephalosporins Other (See Comments)     C-DIFF   • Metformin And Related Nausea Only     Regular Metformin(NOT EXTENDED RELEASE)   • Sulfa Antibiotics Hives   • Topiramate Hives and Itching   • Latex Dermatitis and Rash     Bandaids       Medication:  Antibiotics:  None    Please refer to the medical record for a full medication list    Review of Systems  Pertinent items are noted in HPI, all other systems reviewed and negative    Objective   Vital Signs   Temp:  [97.7 °F (36.5 °C)-98.5 °F (36.9 °C)] 98 °F (36.7 °C)  Heart Rate:  [73-87] 87  Resp:  [16-18] 17  BP: (120-191)/(46-77) 191/77    Physical Exam:   General: In no acute distress  HEENT: Normocephalic, atraumatic, no scleral icterus. O  Neck: Supple, trachea is midline  Cardiovascular: Normal rate, regular rhythm, normal S1 and S2, no murmurs, rubs, or gallops   Respiratory: Lungs are clear to auscultation bilaterally, no wheezing  GI: Abdomen is soft, nontender, nondistended, positive bowel sounds bilaterally, no masses  Musculoskeletal:  no edema, tenderness or deformity  Skin: No rashes   Extremities: No E/C/C  Neurological: Alert and oriented, moving all 4 extremities  Psychiatric: Normal mood and affect     Results Review:   I reviewed the patient's new clinical  results.  I reviewed the patient's new imaging results and agree with the interpretation.    Lab Results   Component Value Date    WBC 7.71 03/07/2022    HGB 8.8 (L) 03/07/2022    HCT 26.6 (L) 03/07/2022    MCV 85.5 03/07/2022     03/07/2022       Lab Results   Component Value Date    GLUCOSE 88 03/07/2022    BUN 7 (L) 03/07/2022    CREATININE 0.50 (L) 03/07/2022    EGFRIFNONA 150 02/28/2022    EGFRIFAFRI 104 11/24/2021    BCR 14.0 03/07/2022    CO2 17.3 (L) 03/07/2022    CALCIUM 8.4 (L) 03/07/2022    PROTENTOTREF 7.6 11/24/2021    ALBUMIN 2.90 (L) 03/07/2022    LABIL2 1.7 11/24/2021     (H) 03/07/2022     (H) 03/07/2022     CRP 9.11 -> 8.89  ESR 62    Microbiology:  3/7 COVID neg    2/25 operative cultures with staph epidermidis  2/23 COVID neg    Radiology:  X-ray of the right hip without any fracture no abnormalities    Assessment/Plan   Epidural abscess  status post irrigation and debridement with hardware revision and interbody fusion with titanium cage placement on February 25  S/p T10-L3 bilateral posterior fusion with removal of instrumentation at L2 and L3 with local bone graft and allograft bone graft and bone graft extender in 4/2021  Poorly controlled type 2 diabetes complicating above  Rheumatoid arthritis  History of C. difficile colitis  Overdose  Elevated LFTs  Right hip pain    Restart daptomycin 6 mg/kg IV every 24 hours.  We will check a CPK level in the morning.  We will need to asked the microbiology lab to test for daptomycin sensitivities of staph epidermidis.  Given recent infection in her spine we need to rule out infection as the cause of her increasing right hip pain.  Recommend obtaining an MRI of the right hip.      I discussed the patient's findings and my recommendations with patient and nursing staff

## 2022-03-07 NOTE — H&P
Patient Name:  Wendi Ramos  YOB: 1955  MRN:  [id number]  Admit Date:  3/7/2022  Patient Care Team:  Flo Temple MD as PCP - General (Family Medicine)  Jomar Steve MD as Consulting Physician (Interventional Cardiology)  Donny Gan MD as Consulting Physician (Pain Medicine)      Subjective   History Present Illness     Chief Complaint   Patient presents with   • Hip Pain         History of Present Illness   eWndi Ramos is a 66 y.o. female with past medical history including but not limited to depression, DM, fibromyalgia, GERD, hiatal hernia, hyperlipidemia, hypertension and hypothyroidism presents to the ED with intentional hydrocodone overdose.  Patient states she has been having back pain and right hip pain but primarily issue for her was her right hip pain since discharge.  States she was diagnosed with bursitis.  Rates it as severe, 10 out of 10.  Was very difficult for her to get into the bed and out of the bed.  Pain radiates down her leg with movement.  Dull aching pain.  Better with rest.  Patient states she got tired of pain and took at least 20 hydrocodone pills at once to end it all.  She was also taking Advil as needed for pain as well.     Patient was recently admitted to the hospital from 02/23 until 03/02 when she presents with the back pain.  She was diagnosed with epidural abscess..  Neurosurgery, orthopedic/spine surgery, and infectious disease were consulted.  She was placed on broad-spectrum antibiotics and underwent L5-S1 laminectomy, foraminotomy, facetectomy and bilateral discectomy. Operative wound culture grew Staphylococcus epidermidis sensitive to vancomycin.  Antibiotics were narrowed to this by infectious disease and they have recommended an 8-week course of IV vancomycin.  PICC line has been placed.    Patient states she was on IV vancomycin until 03/06 will be switched to daptomycin as she was not able to tolerate vancomycin .  Per  patient although she has mild back pain primarily problem for her has been right hip pain.    Review of Systems   GENERAL: No change in appetite or weight;   No fevers, chills, sweats.    SKIN: No nonhealing lesions.   No rashes.  HEME/LYMPH: No easy bruising, bleeding.   No swollen nodes.   EYES: No vision changes or diplopia.   ENT: No tinnitus, hearing loss, gum bleeding, epistaxis, hoarseness or dysphagia.   RESPIRATORY: No cough, shortness of breath, hemoptysis or wheezing.   CVS: No chest pain, palpitations, orthopnea, dyspnea on exertion or PND.   GI: No melena or hematochezia.   No abdominal pain.  No nausea, vomiting, constipation, diarrhea  : No lower tract obstructive symptoms, dysuria or hematuria.   MUSCULOSKELETAL: + Right hip pain,+ back pain  No joint stiffness.   NEUROLOGICAL: No global weakness, loss of consciousness or seizures.   PSYCHIATRIC: No increased nervousness, mood changes or depression.       Personal History     Past Medical History:   Diagnosis Date   • Chronic fatigue    • Depression    • Diabetes mellitus (HCC)    • Fibromyalgia    • GERD (gastroesophageal reflux disease)    • Hiatal hernia    • History of Clostridioides difficile colitis    • Hyperlipidemia    • Hypertension    • Hypothyroidism    • Irregular heart beat    • Liver disease     FATTY LIVER, NON ALCOHOLIC   • Low back pain    • Peripheral neuropathy    • Rheumatoid arthritis (HCC)    • Rheumatoid arthritis (HCC) 2005    DR SMYTH   • Seizures (HCC)    • Sleep apnea     NO CPAP   • Type 2 diabetes mellitus (HCC)    • West Nile fever 2002     Past Surgical History:   Procedure Laterality Date   • ABDOMINOPLASTY     • ADENOIDECTOMY     • BILATERAL BREAST REDUCTION     • BRONCHOSCOPY N/A 12/15/2016    Procedure: BRONCHOSCOPY WITH BAL AND ENDOBRONCHIAL ULTRASOUND WITH FNA;  Surgeon: Diego Ponce MD;  Location: [facility] ENDOSCOPY;  Service:    • CARDIAC ABLATION     • CATARACT EXTRACTION, BILATERAL     • COLONOSCOPY      • ENDOSCOPY     • EYE SURGERY     • HYSTERECTOMY     • LASIK     • LUMBAR DISCECTOMY FUSION INSTRUMENTATION Left 6/8/2018    Procedure: LEFT L3/4 lumbar discectomy;  Surgeon: Miguelangel Goldberg MD;  Location: [facility];  Service: Neurosurgery   • LUMBAR FUSION Left 7/12/2018    Procedure: Left L3 4 lumbar decompression and discectomy with transforaminal lumbar interbody fusion and posterior instrumentation with posterior lateral arthrodesis;  Surgeon: Miguelangel Goldberg MD;  Location: [facility];  Service: Neurosurgery   • LUMBAR LAMINECTOMY WITH FUSION N/A 2/15/2021    Procedure: Lumbar 2-3, lumbar 3 4, lumbar 4 5 fusion with instrumentation and BMP,  L2-L3 lift with cage, and removal of implants L3-4 with laminectomies by Dr. Segundo;  Surgeon: Juancarlos Montelongo MD;  Location: [facility];  Service: Orthopedic Spine;  Laterality: N/A;   • LUMBAR LAMINECTOMY WITH FUSION N/A 2/15/2021    Procedure: Lumbar 2 3, Lumbar 3 4 and lumbar 4 5 laminectomy with a fusion by orthopedics;  Surgeon: Jef Randall MD;  Location: [facility];  Service: Neurosurgery;  Laterality: N/A;   • LUMBAR LAMINECTOMY WITH FUSION N/A 4/15/2021    Procedure: T10-L2 fusion with instrumentation and revision of current fixation, laminectomy by Dr. Segundo;  Surgeon: Juancarlos Montelongo MD;  Location: [facility];  Service: Orthopedic Spine;  Laterality: N/A;   • LUMBAR LAMINECTOMY WITH FUSION N/A 4/15/2021    Procedure: LUMBAR 2 LUMBAR 3 LAMINECTOMY FACETECTOMY AND NEUROLYSIS;  Surgeon: Jef Randall MD;  Location: [facility];  Service: Neurosurgery;  Laterality: N/A;   • MOUTH SURGERY     • REPLACEMENT TOTAL KNEE Left    • TONSILLECTOMY     • TONSILLECTOMY AND ADENOIDECTOMY  1966     Family History   Problem Relation Age of Onset   • Diabetes Mother    • Neuropathy Father    • Colon polyps Father    • Diabetes Sister    • No Known Problems Son    • Diabetes Maternal Grandmother    • Cancer Maternal Grandmother    • Diabetes  Paternal Grandmother    • Cancer Paternal Grandmother    • Malig Hyperthermia Neg Hx      Social History     Tobacco Use   • Smoking status: Never Smoker   • Smokeless tobacco: Never Used   Vaping Use   • Vaping Use: Never used   Substance Use Topics   • Alcohol use: Yes     Comment: social   • Drug use: No     No current facility-administered medications on file prior to encounter.     Current Outpatient Medications on File Prior to Encounter   Medication Sig Dispense Refill   • B Complex Vitamins (VITAMIN B COMPLEX PO) Take 1 tablet by mouth Daily.     • baclofen (LIORESAL) 20 MG tablet Take 20 mg by mouth 2 (Two) Times a Day.     • Droplet Pen Needles 32G X 4 MM misc USE THREE TIMES DAILY TO INJECT INSULIN 300 each 3   • DULoxetine (CYMBALTA) 60 MG capsule Take 60 mg by mouth 2 (Two) Times a Day.     • fenofibrate 160 MG tablet TAKE 1 TABLET EVERY DAY 90 tablet 1   • Ferrous Sulfate Dried (High Potency Iron) 65 MG tablet Take 65 mg by mouth Every Other Day.     • glucose blood test strip 1 each by Other route Daily. PT TO CHECK BS DAILY E11.8 100 each 6   • HYDROcodone-acetaminophen (NORCO) 7.5-325 MG per tablet TAKE 1 TABLET BY MOUTH EVERY DAY AS NEEDED FOR PAIN     • levothyroxine (SYNTHROID, LEVOTHROID) 137 MCG tablet TAKE 1 TABLET EVERY DAY 90 tablet 0   • metoprolol tartrate (LOPRESSOR) 50 MG tablet TAKE 1 TABLET EVERY 12 HOURS (NEED MD APPOINTMENT) 60 tablet 0   • omega-3 acid ethyl esters (LOVAZA) 1 g capsule Take 2 capsules by mouth 2 (Two) Times a Day. 360 capsule 0   • omeprazole (priLOSEC) 40 MG capsule TAKE 1 CAPSULE EVERY DAY 90 capsule 1   • pramipexole (MIRAPEX) 0.5 MG tablet Take 0.5 mg by mouth 2 (two) times a day.     • rosuvastatin (CRESTOR) 40 MG tablet Take 1 tablet by mouth Daily. 90 tablet 1   • Soliqua 100-33 UNT-MCG/ML solution pen-injector injection INJECT 60 UNITS UNDER THE SKIN INTO THE APPROPRIATE AREA AS DIRECTED DAILY WITH BREAKFAST 60 mL 0   • TRUEplus Lancets 33G misc TO CHECK BS  DAILY 100 each 3   • [] vancomycin 2000 mg/500 mL 0.9% NS IVPB ([facility]) Infuse 500 mL into a venous catheter Every 12 (Twelve) Hours for 8 doses. Indications: lumbar epidural abscess 4000 mL 0   • vitamin D3 125 MCG (5000 UT) capsule capsule Take 5,000 Units by mouth Daily.     • Xigduo XR 5-1000 MG tablet TAKE 2 TABLETS EVERY  tablet 1   • zolpidem (AMBIEN) 10 MG tablet Take 1 tablet by mouth At Night As Needed for Sleep. 90 tablet 0   • [DISCONTINUED] dapagliflozin-metformin HCl ER (Xigduo XR) 5-1000 MG tablet Take 2 tablets by mouth Daily. 180 tablet 1   • [DISCONTINUED] levothyroxine (SYNTHROID, LEVOTHROID) 137 MCG tablet TAKE 1 TABLET EVERY DAY 90 tablet 0     Allergies   Allergen Reactions   • Adhesive Tape Other (See Comments)     SKIN ON HANDS SHED OFF   • Sulfamethoxazole W/Trimethoprim 800-160  [Sulfamethoxazole-Trimethoprim] Hives   • Cefpodoxime    • Cephalosporins Other (See Comments)     C-DIFF   • Metformin And Related Nausea Only     Regular Metformin(NOT EXTENDED RELEASE)   • Sulfa Antibiotics Hives   • Topiramate Hives and Itching   • Latex Dermatitis and Rash     Bandaids       Objective    Objective     Vital Signs  Temp:  [98.5 °F (36.9 °C)] 98.5 °F (36.9 °C)  Heart Rate:  [73-83] 73  Resp:  [16-17] 17  BP: (122-146)/(55-65) 139/56  SpO2:  [96 %-99 %] 99 %  on   ;   Device (Oxygen Therapy): room air  Body mass index is 30.98 kg/m².    Physical Exam  General: Alert and oriented x3, no acute distress, ill-appearing, obese  HEENT: Normocephalic, atraumatic  Eyes: PERRL, EOMI, anicteric sclera  Lungs: Clear to auscultation bilaterally, no crackles or wheezes  CV: Regular rate and rhythm, no murmurs rubs or gallops  Abdomen: Soft, nontender, nondistended.  Normoactive bowel sounds  Extremities: Tender to palpation in the right hip.  Pain with active and passive movement.  Skin: Clean/dry/intact, no rashes  Neuro: Cranial nerves II through XII intact, no gross focal neurological deficits  appreciated  Psych: Appropriate mood and affect    Results Review:  I reviewed the patient's new clinical results.  I reviewed the patient's new imaging results and agree with the interpretation.  I reviewed the patient's other test results and agree with the interpretation  I personally viewed and interpreted the patient's EKG/Telemetry data  Discussed with ED provider.    Lab Results (last 24 hours)     Procedure Component Value Units Date/Time    Urine Drug Screen - Urine, Clean Catch [[id number]]  (Abnormal) Collected: 03/07/22 0905    Specimen: Urine, Clean Catch Updated: 03/07/22 1125     Amphet/Methamphet, Screen Negative     Barbiturates Screen, Urine Negative     Benzodiazepine Screen, Urine Negative     Cocaine Screen, Urine Negative     Opiate Screen Positive     THC, Screen, Urine Negative     Methadone Screen, Urine Negative     Oxycodone Screen, Urine Positive    Narrative:      Negative Thresholds Per Drugs Screened:    Amphetamines                 500 ng/ml  Barbiturates                 200 ng/ml  Benzodiazepines              100 ng/ml  Cocaine                      300 ng/ml  Methadone                    300 ng/ml  Opiates                      300 ng/ml  Oxycodone                    100 ng/ml  THC                           50 ng/ml    The Normal Value for all drugs tested is negative. This report includes final unconfirmed screening results to be used for medical treatment purposes only. Unconfirmed results must not be used for non-medical purposes such as employment or legal testing. Clinical consideration should be applied to any drug of abuse test, particularly when unconfirmed results are used.            COVID PRE-OP / PRE-PROCEDURE SCREENING ORDER (NO ISOLATION) - Swab, Nasopharynx [[id number]]  (Normal) Collected: 03/07/22 0906    Specimen: Swab from Nasopharynx Updated: 03/07/22 0945    Narrative:      The following orders were created for panel order COVID PRE-OP / PRE-PROCEDURE SCREENING  ORDER (NO ISOLATION) - Swab, Nasopharynx.  Procedure                               Abnormality         Status                     ---------                               -----------         ------                     COVID-19,[facility] IN-HOUSE...[[id number]]  Normal              Final result                 Please view results for these tests on the individual orders.    COVID-19,[facility] IN-HOUSE CEPHEID/GUILLE NP SWAB IN TRANSPORT MEDIA 8-12 HR TAT - Swab, Nasopharynx [[id number]]  (Normal) Collected: 03/07/22 0906    Specimen: Swab from Nasopharynx Updated: 03/07/22 0945     COVID19 Not Detected    Narrative:      Fact sheet for providers: https://www.fda.gov/media/379407/download    Fact sheet for patients: https://www.fda.gov/media/957568/download    Test performed by PCR.    CBC & Differential [[id number]]  (Abnormal) Collected: 03/07/22 0911    Specimen: Blood Updated: 03/07/22 0930    Narrative:      The following orders were created for panel order CBC & Differential.  Procedure                               Abnormality         Status                     ---------                               -----------         ------                     CBC Auto Differential[[id number]]        Abnormal            Final result                 Please view results for these tests on the individual orders.    Comprehensive Metabolic Panel [[id number]]  (Abnormal) Collected: 03/07/22 0911    Specimen: Blood Updated: 03/07/22 0952     Glucose 88 mg/dL      BUN 7 mg/dL      Creatinine 0.50 mg/dL      Sodium 137 mmol/L      Potassium 3.4 mmol/L      Comment: Slight hemolysis detected by analyzer. Results may be affected.        Chloride 103 mmol/L      CO2 17.3 mmol/L      Calcium 8.4 mg/dL      Total Protein 6.2 g/dL      Albumin 2.90 g/dL      ALT (SGPT) 170 U/L      AST (SGOT) 392 U/L      Alkaline Phosphatase 169 U/L      Total Bilirubin 0.8 mg/dL      Globulin 3.3 gm/dL      A/G Ratio 0.9 g/dL      BUN/Creatinine Ratio 14.0     Anion  Gap 16.7 mmol/L      eGFR 102.3 mL/min/1.73      Comment: National Kidney Foundation and American Society of Nephrology (ASN) Task Force recommended calculation based on the Chronic Kidney Disease Epidemiology Collaboration (CKD-EPI) equation refit without adjustment for race.       Narrative:      GFR Normal >60  Chronic Kidney Disease <60  Kidney Failure <15      Acetaminophen Level [[id number]]  (Abnormal) Collected: 03/07/22 0911    Specimen: Blood Updated: 03/07/22 1112     Acetaminophen 120.8 mcg/mL     Ethanol [[id number]] Collected: 03/07/22 0911    Specimen: Blood Updated: 03/07/22 0948     Ethanol <10 mg/dL      Ethanol % <0.010 %     Salicylate Level [[id number]]  (Normal) Collected: 03/07/22 0911    Specimen: Blood Updated: 03/07/22 1054     Salicylate <0.3 mg/dL     Narrative:      Therapeutic range for Salicylates:  3.0 - 10.0 mg/dL for antipyretic/analgesic conditions  15.0 - 30.0 mg/dL for anti-inflammatory conditions    CBC Auto Differential [[id number]]  (Abnormal) Collected: 03/07/22 0911    Specimen: Blood Updated: 03/07/22 0930     WBC 7.71 10*3/mm3      RBC 3.11 10*6/mm3      Hemoglobin 8.8 g/dL      Hematocrit 26.6 %      MCV 85.5 fL      MCH 28.3 pg      MCHC 33.1 g/dL      RDW 13.4 %      RDW-SD 40.9 fl      MPV 10.4 fL      Platelets 241 10*3/mm3      Neutrophil % 84.2 %      Lymphocyte % 7.8 %      Monocyte % 7.1 %      Eosinophil % 0.0 %      Basophil % 0.1 %      Immature Grans % 0.8 %      Neutrophils, Absolute 6.49 10*3/mm3      Lymphocytes, Absolute 0.60 10*3/mm3      Monocytes, Absolute 0.55 10*3/mm3      Eosinophils, Absolute 0.00 10*3/mm3      Basophils, Absolute 0.01 10*3/mm3      Immature Grans, Absolute 0.06 10*3/mm3      nRBC 0.1 /100 WBC     aPTT [[id number]]  (Normal) Collected: 03/07/22 0911    Specimen: Blood Updated: 03/07/22 0938     PTT 34.1 seconds     Protime-INR [[id number]]  (Abnormal) Collected: 03/07/22 0911    Specimen: Blood Updated: 03/07/22 0938     Protime 17.4  Seconds      INR 1.43          Imaging Results (Last 24 Hours)     Procedure Component Value Units Date/Time    XR Chest 1 View [[id number]] Collected: 03/07/22 1115     Updated: 03/07/22 1119    Narrative:      PORTABLE CHEST X-RAY     HISTORY: PICC line placement.     Portable chest x-ray is provided. Correlation: Chest x-ray 03/01/2022.     FINDINGS: There is a new left PICC line which has its tip along the  lower portion of the superior vena cava within 1 cm of the the mitral  junction. The cardiomediastinal silhouette is normal. The lungs are  clear. The costophrenic sulci are dry and the bones appear normal. There  is no pneumothorax.       Impression:      New left PICC line as described. No significant  cardiopulmonary abnormality identified.     This report was finalized on 3/7/2022 11:16 AM by Dr. Coleman Hinton M.D.       XR Hip With or Without Pelvis 2 - 3 View Right [[id number]] Collected: 03/07/22 0957     Updated: 03/07/22 1001    Narrative:      PELVIS AND RIGHT HIP X-RAYS     HISTORY: Recent spine surgery. Severe right hip pain. No injury.     TECHNIQUE: An AP view the pelvis and two images of the right hip are  provided.     FINDINGS: Lower lumbar spine fusion hardware is partially demonstrated.  The sacroiliac joints, symphysis pubis, and hip joint spaces appear  normal. The bones of the pelvis and proximal femurs appear normal.       Impression:      Negative.     This report was finalized on 3/7/2022 9:57 AM by Dr. Coleman Hinton M.D.                 No orders to display        Assessment/Plan     Active Hospital Problems    Diagnosis  POA   • Acetaminophen overdose, intentional self-harm, initial encounter ([facility]) [T39.1X2A]  Yes      Resolved Hospital Problems   No resolved problems to display.     Patient is is a 66 y.o. female with past medical history including but not limited to depression, DM, fibromyalgia, GERD, hiatal hernia, hyperlipidemia, hypertension and hypothyroidism presents to  the ED with intentional hydrocodone overdose.    Intentional hydrocodone overdose  -Taking at least 20 Norco pills (7.5-325 mg) on 03/07, 20 pills equals at least 6500 mg of Tylenol that patient has taken  -Patient is alert, oriented x4.  Likely out of window for opioid side effects [facility] the [facility] secondary to sedation.  Ordered as needed Narcan.  -Acetaminophen level was elevated at 120.8, (upper level of normal is 30)  -, ALT continue INR slightly elevated at 1.43.  Total bilirubin normal.  -Started on Acetadote for Tylenol overdose  -Monitor daily liver functions  -Psychiatry consult      Severe right hip pain  -X-ray of the hip showed lower lumbar spine fusion hardware is partially demonstrated.The sacroiliac joints, symphysis pubis, and hip joint spaces appear normal. The bones of the pelvis and proximal femurs appear normal.  -Orthopedic consult:  -As needed Dilaudid and oxycodone ordered.      Recent epidural abscess status post L5-S1 laminectomy, foraminotomy, facetectomy and bilateral discectomy: Was discharged on IV antibiotics..  Infection disease consult.    DM type II:  Sliding scale insulin, hypoglycemic protocol.  Hypothyroidism: Continue levothyroxine  Anemia: Hemoglobin 8.8 admission, slightly higher from last Hgb at discharge on 03/02 when it was 8.3.  Continue p.o. iron.    VTE Prophylaxis - SCDs.  Code Status - Full code.       Анна Howard MD  [facility]  03/07/22  13:55 EST

## 2022-03-08 NOTE — CODE DOCUMENTATION
Called rapid due to hypotension. Bedside nurse had spoke with [facility] Lainey Mtz and had received orders to bolus 2LNS. Pt remains hypotensive but is asymptomatic. Pt alert and oriented. BS 53, 1 amp of dextrose given. When looking at the MAR, noticed pt received 50 mg PO metoprolol at 1726 and 50 mg PO metoprolol 2048. Also, oxycodone was given at 2101. Bedside nurse to call [facility] and make aware, as this could contribute to the hypotension.

## 2022-03-08 NOTE — PLAN OF CARE
Goal Outcome Evaluation:     65 yo female admitted 3/7 with intentional overdose. Hypotensive overnight, total of 3L bolus given with minimal response. Liver labs trending up into 1000s this morning. Hx recent back surgery, states she wanted to kill herself r/t pain. Suicide precautions. Incision to lower back, dressing CDI. PICC line L UA. Pulmonology, GI consulted this morning. Lactic acid, INR critically elevated this morning. HR stable, temp stable. Transferred to ICU.

## 2022-03-08 NOTE — PROGRESS NOTES
"Pharmacy Note - Allergy Review    Wendi Ramos is a 66 y.o. female who has a documented allergy of \"cephalosporins\". Pharmacy has thoroughly reviewed the patient's allergy and past antibiotic tolerability.    Beta-lactam antibiotic allergy and date reported in Epic: \"cephalosporins\" (note 2016); patient also had \"cefpodoxime\" allergy reported in 2013 but could not confirm this \"allergy\" so it was deleted.    Patient has tolerated the following beta-lactam antibiotics previously per Epic review (with dates): Cephalexin (April 2021)    History:    What was the suspected medication name and route? \"cephalosporin\" (unsure which one)-oral route    When was the reaction? \"a long time ago\" (several years)    How soon after taking the antibiotic did the reaction occur? unknown    What symptoms were experienced during the reaction? Patient developed a C. Diff infection after taking a cephalosporin (noted that she was treated with PO vanco) so her doctor told her to avoid cephalosporins in the future, if possible    Were you hospitalized or have to go to the emergency department? No    Did you have to receive a medication for treatment following antibiotic administration? PO Vanco to treat C. diff    What antibiotics have you tolerated in the past? Penicillin with no issues    I spoke with patient and explained that C. Difficile is an unfortunate side effect from all antibiotics and not a class effect of cephalosporins. She understands that C. Diff from one cephalosporin does not contraindicate her to receive other agents in this class. Allergies have been updated in Epic and this conversation with discussed with ID physician, Dr. Lexus Schaffer.    If you have any questions about this review, please contact pharmacy.    Michaela Gnog, PharmD, BCPS, BCIDP  Clinical Pharmacy Specialist, Infectious Diseases   phone: [phone]        "

## 2022-03-08 NOTE — SIGNIFICANT NOTE
Currently in MRI. Appropriate monitors on. Blood glucose check -b57- attempted to treat with PO. Patient unwilling to drink more than a couple sips. Recheck B/G revealed 55- treated with IVP D50 25mg

## 2022-03-08 NOTE — CONSULTS
[facility]  Initial Inpatient Consult Note    Referring Provider: Anita    Reason for Consultation: APAP overdose    Subjective     History of present illness:    66 y.o. female admitted with intentional tylenol overdose.  Pt took in excess of 20 hydrocodone pills (7.5/325) prior to presentation.  APAP level was 120.8 on presentation (29 this am), she was quickly started on acetadote protocol.  LFTS on admission  and . INR 1.43.  This am AST 5700 ALT 1800 and INR 4.47.  Lactic acid is elevated to 6.4 and patient has been hypotensive overnight.  Transfer to ICU is being arranged with plans for vasopressor support.  Patient has underlying fatty liver and chronic depression.      Past Medical History:  Past Medical History:   Diagnosis Date   • Chronic fatigue    • Depression    • Diabetes mellitus (HCC)    • Fibromyalgia    • GERD (gastroesophageal reflux disease)    • Hiatal hernia    • History of Clostridioides difficile colitis    • Hyperlipidemia    • Hypertension    • Hypothyroidism    • Irregular heart beat    • Liver disease     FATTY LIVER, NON ALCOHOLIC   • Low back pain    • Peripheral neuropathy    • Rheumatoid arthritis (HCC)    • Rheumatoid arthritis (HCC) 2005    DR SMYTH   • Seizures (HCC)    • Sleep apnea     NO CPAP   • Type 2 diabetes mellitus (HCC)    • West Nile fever 2002     Past Surgical History:  Past Surgical History:   Procedure Laterality Date   • ABDOMINOPLASTY     • ADENOIDECTOMY     • BILATERAL BREAST REDUCTION     • BRONCHOSCOPY N/A 12/15/2016    Procedure: BRONCHOSCOPY WITH BAL AND ENDOBRONCHIAL ULTRASOUND WITH FNA;  Surgeon: Diego Ponce MD;  Location: [facility] ENDOSCOPY;  Service:    • CARDIAC ABLATION     • CATARACT EXTRACTION, BILATERAL     • COLONOSCOPY     • ENDOSCOPY     • EYE SURGERY     • HYSTERECTOMY     • LASIK     • LUMBAR DISCECTOMY FUSION INSTRUMENTATION Left 6/8/2018    Procedure: LEFT L3/4 lumbar discectomy;  Surgeon:  Miguelangel Goldberg MD;  Location: [facility] OR;  Service: Neurosurgery   • LUMBAR FUSION Left 7/12/2018    Procedure: Left L3 4 lumbar decompression and discectomy with transforaminal lumbar interbody fusion and posterior instrumentation with posterior lateral arthrodesis;  Surgeon: Miguelangel Goldberg MD;  Location: [facility] OR;  Service: Neurosurgery   • LUMBAR LAMINECTOMY WITH FUSION N/A 2/15/2021    Procedure: Lumbar 2-3, lumbar 3 4, lumbar 4 5 fusion with instrumentation and BMP,  L2-L3 lift with cage, and removal of implants L3-4 with laminectomies by Dr. Segundo;  Surgeon: Juancarlos Montelongo MD;  Location: [facility];  Service: Orthopedic Spine;  Laterality: N/A;   • LUMBAR LAMINECTOMY WITH FUSION N/A 2/15/2021    Procedure: Lumbar 2 3, Lumbar 3 4 and lumbar 4 5 laminectomy with a fusion by orthopedics;  Surgeon: Jef Randall MD;  Location: [facility];  Service: Neurosurgery;  Laterality: N/A;   • LUMBAR LAMINECTOMY WITH FUSION N/A 4/15/2021    Procedure: T10-L2 fusion with instrumentation and revision of current fixation, laminectomy by Dr. Segundo;  Surgeon: Juancarlos Montelongo MD;  Location: [facility];  Service: Orthopedic Spine;  Laterality: N/A;   • LUMBAR LAMINECTOMY WITH FUSION N/A 4/15/2021    Procedure: LUMBAR 2 LUMBAR 3 LAMINECTOMY FACETECTOMY AND NEUROLYSIS;  Surgeon: Jef Randall MD;  Location: [facility];  Service: Neurosurgery;  Laterality: N/A;   • MOUTH SURGERY     • REPLACEMENT TOTAL KNEE Left    • TONSILLECTOMY     • TONSILLECTOMY AND ADENOIDECTOMY  1966      Social History:   Social History     Tobacco Use   • Smoking status: Never Smoker   • Smokeless tobacco: Never Used   Substance Use Topics   • Alcohol use: Yes     Comment: social      Family History:  Family History   Problem Relation Age of Onset   • Diabetes Mother    • Neuropathy Father    • Colon polyps Father    • Diabetes Sister    • No Known Problems Son    • Diabetes Maternal Grandmother    • Cancer Maternal  Grandmother    • Diabetes Paternal Grandmother    • Cancer Paternal Grandmother    • Malig Hyperthermia Neg Hx        Home Meds:  Medications Prior to Admission   Medication Sig Dispense Refill Last Dose   • B Complex Vitamins (VITAMIN B COMPLEX PO) Take 1 tablet by mouth Daily.      • baclofen (LIORESAL) 20 MG tablet Take 20 mg by mouth 2 (Two) Times a Day.      • Droplet Pen Needles 32G X 4 MM misc USE THREE TIMES DAILY TO INJECT INSULIN 300 each 3    • DULoxetine (CYMBALTA) 60 MG capsule Take 60 mg by mouth 2 (Two) Times a Day.      • fenofibrate 160 MG tablet TAKE 1 TABLET EVERY DAY 90 tablet 1    • Ferrous Sulfate Dried (High Potency Iron) 65 MG tablet Take 65 mg by mouth Every Other Day.      • glucose blood test strip 1 each by Other route Daily. PT TO CHECK BS DAILY E11.8 100 each 6    • HYDROcodone-acetaminophen (NORCO) 7.5-325 MG per tablet TAKE 1 TABLET BY MOUTH EVERY DAY AS NEEDED FOR PAIN      • levothyroxine (SYNTHROID, LEVOTHROID) 137 MCG tablet TAKE 1 TABLET EVERY DAY 90 tablet 0    • metoprolol tartrate (LOPRESSOR) 50 MG tablet TAKE 1 TABLET EVERY 12 HOURS (NEED MD APPOINTMENT) 60 tablet 0    • omega-3 acid ethyl esters (LOVAZA) 1 g capsule Take 2 capsules by mouth 2 (Two) Times a Day. 360 capsule 0    • omeprazole (priLOSEC) 40 MG capsule TAKE 1 CAPSULE EVERY DAY 90 capsule 1    • pramipexole (MIRAPEX) 0.5 MG tablet Take 0.5 mg by mouth 2 (two) times a day.      • rosuvastatin (CRESTOR) 40 MG tablet Take 1 tablet by mouth Daily. 90 tablet 1    • Soliqua 100-33 UNT-MCG/ML solution pen-injector injection INJECT 60 UNITS UNDER THE SKIN INTO THE APPROPRIATE AREA AS DIRECTED DAILY WITH BREAKFAST 60 mL 0    • TRUEplus Lancets 33G misc TO CHECK BS DAILY 100 each 3    • [] vancomycin 2000 mg/500 mL 0.9% NS IVPB ([facility]) Infuse 500 mL into a venous catheter Every 12 (Twelve) Hours for 8 doses. Indications: lumbar epidural abscess 4000 mL 0    • vitamin D3 125 MCG (5000 UT) capsule capsule Take 5,000  Units by mouth Daily.      • Xigduo XR 5-1000 MG tablet TAKE 2 TABLETS EVERY  tablet 1    • zolpidem (AMBIEN) 10 MG tablet Take 1 tablet by mouth At Night As Needed for Sleep. 90 tablet 0      Current Meds:   acetylcysteine, 100 mg/kg, Intravenous, Once  baclofen, 10 mg, Oral, BID  DAPTOmycin, 6 mg/kg (Adjusted), Intravenous, Q24H  DULoxetine, 60 mg, Oral, BID  enoxaparin, 40 mg, Subcutaneous, Q24H  ferrous sulfate, 325 mg, Oral, Every Other Day  insulin lispro, 0-7 Units, Subcutaneous, TID AC  levothyroxine, 137 mcg, Oral, Daily  pantoprazole, 40 mg, Oral, QAM  pramipexole, 0.5 mg, Oral, BID  rosuvastatin, 40 mg, Oral, Nightly  sodium chloride, 10 mL, Intravenous, Q12H      Allergies:  Allergies   Allergen Reactions   • Adhesive Tape Other (See Comments)     SKIN ON HANDS SHED OFF   • Sulfamethoxazole W/Trimethoprim 800-160  [Sulfamethoxazole-Trimethoprim] Hives   • Cefpodoxime    • Cephalosporins Other (See Comments)     C-DIFF   • Metformin And Related Nausea Only     Regular Metformin(NOT EXTENDED RELEASE)   • Sulfa Antibiotics Hives   • Topiramate Hives and Itching   • Latex Dermatitis and Rash     Bandaids     Review of Systems  All systems were reviewed and negative except for:  Musculoskeletal: positive for  joint pain     Objective     Vital Signs  Temp:  [97 °F (36.1 °C)-98.5 °F (36.9 °C)] 97 °F (36.1 °C)  Heart Rate:  [57-87] 61  Resp:  [16-18] 16  BP: ()/(38-77) 84/49  Physical Exam:  General Appearance:     Alert, cooperative, in no acute distress   Head:    Normocephalic, without obvious abnormality, atraumatic   Eyes:            Lids and lashes normal, conjunctivae and sclerae normal, no icterus   Throat:   No oral lesions, no thrush, oral mucosa moist   Neck:   No adenopathy, supple, trachea midline, no thyromegaly, no carotid bruit, no JVD   Lungs:     Clear to auscultation,respirations regular, even and            unlabored    Heart:    Regular rhythm and normal rate, normal S1 and S2,  no        murmur, no gallop, no rub, no click   Chest Wall:    No abnormalities observed   Abdomen:     Normal bowel sounds, no masses, no organomegaly, soft     nontender, nondistended, no guarding, no rebound                 tenderness   Rectal:     Deferred   Extremities:   no edema, no cyanosis, no redness   Skin:   No bleeding, bruising or rash   Lymph nodes:   No palpable adenopathy   Psychiatric:  Judgement and insight: normal   Orientation to person place and time: normal   Mood and affect: normal   Results Review:   I reviewed the patient's new clinical results.    Results from last 7 days   Lab Units 03/08/22  0508 03/07/22  0911 03/02/22  0721   WBC 10*3/mm3 13.63* 7.71 7.44   HEMOGLOBIN g/dL 8.4* 8.8* 8.3*   HEMATOCRIT % 27.2* 26.6* 25.4*   PLATELETS 10*3/mm3 249 241 311     Results from last 7 days   Lab Units 03/08/22  0508 03/07/22  0911 03/02/22  0721   SODIUM mmol/L 139 137 140   POTASSIUM mmol/L 3.5 3.4* 3.8   CHLORIDE mmol/L 104 103 109*   CO2 mmol/L 16.0* 17.3* 21.4*   BUN mg/dL 10 7* 4*   CREATININE mg/dL 1.14* 0.50* 0.46*   CALCIUM mg/dL 7.4* 8.4* 8.5*   BILIRUBIN mg/dL 1.3* 0.8  --    ALK PHOS U/L 157* 169*  --    ALT (SGPT) U/L 1,783* 170*  --    AST (SGOT) U/L 5,725* 392*  --    GLUCOSE mg/dL 98 88 282*     Results from last 7 days   Lab Units 03/08/22  0628 03/07/22  0911   INR  4.47* 1.43*     No results found for: LIPASE    Radiology:  XR Spine Lumbar 2 or 3 View   Final Result      XR Chest 1 View   Final Result   New left PICC line as described. No significant   cardiopulmonary abnormality identified.       This report was finalized on 3/7/2022 11:16 AM by Dr. Coleman Hinton M.D.          XR Hip With or Without Pelvis 2 - 3 View Right   Final Result   Negative.       This report was finalized on 3/7/2022 9:57 AM by Dr. Coleman Hinton M.D.           Liver    (Results Pending)       Assessment/Plan   Assessment:   1.  APAP overdose, intentional  2.  Acute liver injury secondary to  #1  3.  Depression  4.  Hypotension requiring pressor support    Plan:   Acetadote protocol has been initiated, would continue with 100mg/kg dose until further notice  Obtain ABG and ammonia levels  Check US liver and hepatic dopplers  Vasopressor support as needed for hypotension.  Monitor renal function closely  Discontinue any potentially hepatoxic medications (Crestor DC'd)    Guarded prognosis.  Given that she had relatively low overdose ingestion (7.5gm) and timely administration of NAC, hopefully will not progress to fulminant failure.  Did have conversation with patient about possible transfer to tertiary center, she states she would never want to undergo liver transplant, does not wish to transfer facilities and wants to be DNR/DNI but with continued current aggressive medical management.      32 minutes of critical care provided. This time excludes other billable procedures. Time does include preparation of documents, medical consultations, review of old records, and direct bedside care. Patient was at high risk for life-threatening deterioration due to acute liver injury to do tylenol overdose.     I discussed the patients findings and my recommendations with patient, nursing staff and consulting provider.         Jose Way M.D.  [facility]  [address] [place] [address]  Office: [phone]

## 2022-03-08 NOTE — CONSULTS
IDENTIFYING INFORMATION: The patient is a 66-year-old white female admitted following the ingestion of approximately 20 Norco tablets.    CHIEF COMPLAINT: None given    INFORMANT: Patient and chart    RELIABILITY: Good    HISTORY OF PRESENT ILLNESS: The patient is a 58-year-old white female who is now in the intensive care unit with extensive hepatic damage related to her recent ingestion of Norco.  The patient reports that she took approximately 20 Norco tablets but claims that this was not a suicide attempt but rather an attempt to address her ongoing chronic pain.  She denies prior suicide attempts or gestures.  The patient reports a history of treatment for depression and neuropathy with Cymbalta.  This medication is currently being held secondary to the patient's elevated liver function.  The patient underwent back surgery approximately 1-1/2 weeks ago.  She denies that this was a suicide attempt and denies any current suicidal or homicidal ideation.  She denies recent changes in sleep or appetite.    PAST PSYCHIATRIC HISTORY: As above    PAST MEDICAL HISTORY: Significant for history of recent back surgery, fibromyalgia, peripheral neuropathy, rheumatoid arthritis, seizure disorder, hyperlipidemia, GERD, history of hiatal hernia, diabetes mellitus, chronic fatigue    MEDICATIONS:   Current Facility-Administered Medications   Medication Dose Route Frequency Provider Last Rate Last Admin   • acetylcysteine (ACETADOTE) 6,000 mg in dextrose (D5W) 5 % 1,000 mL infusion  6.25 mg/kg/hr Intravenous Continuous BeJose henrandez MD 93.9 mL/hr at 03/08/22 1247 6.25 mg/kg/hr at 03/08/22 1247   • baclofen (LIORESAL) tablet 10 mg  10 mg Oral BID Анна Howard MD   10 mg at 03/08/22 1247   • ceFAZolin in dextrose (ANCEF) IVPB solution 2 g  2 g Intravenous Q8H Lexus Schaffer MD       • dextrose (D50W) (25 g/50 mL) IV injection 25 g  25 g Intravenous Q15 Min PRN Анна Howard MD   25 g at 03/08/22 0305   •  dextrose (GLUTOSE) oral gel 15 g  15 g Oral Q15 Min PRN Анна Howard MD       • ferrous sulfate tablet 325 mg  325 mg Oral Every Other Day Анна Howard MD   325 mg at 03/08/22 1247   • glucagon (human recombinant) (GLUCAGEN DIAGNOSTIC) injection 1 mg  1 mg Intramuscular Q15 Min PRN Анна Howard MD       • hydrALAZINE (APRESOLINE) injection 10 mg  10 mg Intravenous Q6H PRN Анна Howard MD       • insulin lispro (ADMELOG) injection 0-7 Units  0-7 Units Subcutaneous TID AC Анна Howard MD       • levothyroxine (SYNTHROID, LEVOTHROID) tablet 137 mcg  137 mcg Oral Daily Анна Howard MD   137 mcg at 03/08/22 1247   • naloxone (NARCAN) injection 0.2 mg  0.2 mg Intravenous Q5 Min PRN Анна Howard MD       • nitroglycerin (NITROSTAT) SL tablet 0.4 mg  0.4 mg Sublingual Q5 Min PRN Анна Howard MD       • oxyCODONE (ROXICODONE) immediate release tablet 10 mg  10 mg Oral Q6H PRN Анна Howard MD   10 mg at 03/07/22 2101   • pantoprazole (PROTONIX) EC tablet 40 mg  40 mg Oral QAM Анна Howard MD   40 mg at 03/08/22 0552   • phenylephrine (CURTIS-SYNEPHRINE) 50 mg in 250 mL NS infusion  0.5-3 mcg/kg/min Intravenous Titrated Edgar Trejo MD 39.4 mL/hr at 03/08/22 1000 1.5 mcg/kg/min at 03/08/22 1000   • sodium chloride 0.9 % flush 10 mL  10 mL Intravenous Q12H Анна Howard MD   10 mL at 03/08/22 0834   • sodium chloride 0.9 % flush 10 mL  10 mL Intravenous PRN Анна Howard MD       • sodium chloride 0.9 % infusion  150 mL/hr Intravenous Continuous Rita Rojas  mL/hr at 03/08/22 0832 150 mL/hr at 03/08/22 0832         ALLERGIES: The patient will state medical allergies which can be obtained from the chart    FAMILY HISTORY: Noncontributory    SOCIAL HISTORY: The patient is disabled secondary to her multiple medical issues.  She denies use of alcohol tobacco street drugs.  She had previously worked as a .    MENTAL  STATUS EXAM: Patient is well-developed well-nourished white female appearing her stated age.  She is no apparent physical distress at the time examination.  She is awake alert and oriented seers.  Her mood is euthymic affect congruent.  Speech is well coherent.  There are no deficits memory or cognition noted.  Intelligence is judged to be in the average range based on fund of knowledge, the patient is cooperative [place] review.  She is currently reporting no suicidal or homicidal ideation or psychotic features.  Her judgment insight intact.    ASSETS/LIABILITIES: To be assessed    DIAGNOSTIC IMPRESSION: Adjustment disorder with depressed mood, multiple medical problems described previously    PLAN: I would recommend that the patient remain on suicide precautions given some inconsistencies regarding her version of insulin hospitalization.  Unfortunately, we cannot restart Cymbalta until the patient's liver functions normalized to some degree.  I will continue to follow with you.

## 2022-03-08 NOTE — PROGRESS NOTES
Discharge Planning Assessment  [facility]     Patient Name: Wendi Ramos  MRN: [id number]  Today's Date: 3/8/2022    Admit Date: 3/7/2022     Discharge Needs Assessment     Row Name 03/08/22 1256       Living Environment    People in Home spouse    Current Living Arrangements home    Potentially Unsafe Housing Conditions unable to assess    Primary Care Provided by self    Provides Primary Care For no one    Family Caregiver if Needed none    Quality of Family Relationships supportive    Able to Return to Prior Arrangements yes       Resource/Environmental Concerns    Resource/Environmental Concerns none       Transition Planning    Patient/Family Anticipates Transition to home with family    Patient/Family Anticipated Services at Transition none    Transportation Anticipated family or friend will provide       Discharge Needs Assessment    Equipment Currently Used at Home walker, standard;glucometer;cane, straight    Concerns to be Addressed discharge planning    Anticipated Changes Related to Illness none    Equipment Needed After Discharge none    Provided Post Acute Provider List? N/A               Discharge Plan     Row Name 03/08/22 9198       Plan    Plan Plan is home  No needs    Plan Comments IMM noted.  CCP spoke with patient at bedside.   CCP role explained and discharge planning discussed.  Face sheet verified.  Pt PCP is Dr. Flo Temple.  Pt’s emergency contact is her  Jose, [phone].  She lives in a two-story house with her .  She reports she does not use the upstairs.   She uses a cane or a walker to ambulate.  She is independent with ADL’s.  She has not been to rehab in the past.  She has no history of Home Health.  Pt obtains her medications from [facility] on [address].  Plan is home. Pt denies needs. CCP following.              Continued Care and Services - Admitted Since 3/7/2022    Coordination has not been started for this encounter.     Selected Continued  Care - Prior Encounters Includes selections from prior encounters from 12/7/2021 to 3/8/2022    Discharged on 3/2/2022 Admission date: 2/23/2022 - Discharge disposition: Home or Self Care    Dialysis/Infusion     Service Provider Selected Services Address Phone Fax Patient Preferred    [facility]  [place] and IV Therapy [address] [place] [id number] [phone] --          Home Medical Care     Service Provider Selected Services Address Phone Fax Patient Preferred    [facility]  Home Health Services [address] [address] [phone] --                       Demographic Summary    No documentation.                Functional Status    No documentation.                Psychosocial    No documentation.                Abuse/Neglect    No documentation.                Legal    No documentation.                Substance Abuse    No documentation.                Patient Forms    No documentation.                   Brooklyn Gibbons RN

## 2022-03-08 NOTE — PROGRESS NOTES
Name: Wendi Ramos ADMIT: 3/7/2022   : 1955  PCP: Flo Temple MD    MRN: [id number] LOS: 1 days   AGE/SEX: 66 y.o. female  ROOM: [facility]     Subjective   Subjective   Patient seen this morning.  Overnight patient blood pressure dropped with systolic up to 70s.  Patient was given metoprolol titrate 50 mg twice daily about 3 hours apart, which likely contributed most of her hypotension.  Discussed with RN.  Discontinued metoprolol.  Discontinued IV Dilaudid as needed.  Received 2 doses of bolus overnight, last systolic BP in 80s, map 64.  Review of Systems   As above  Objective   Objective   Vital Signs  Temp:  [97 °F (36.1 °C)-98.5 °F (36.9 °C)] 97 °F (36.1 °C)  Heart Rate:  [57-87] 61  Resp:  [16-18] 16  BP: ()/(38-77) 84/49  SpO2:  [95 %-99 %] 97 %  on   ;   Device (Oxygen Therapy): room air  Body mass index is 28.49 kg/m².  Physical Exam     General: Alert and oriented x3, uncomfortable due to pain.,Ill-appearing, states she feels dizzy when she sits up  HEENT: Normocephalic, atraumatic  Eyes: PERRL, EOMI, anicteric sclera  Lungs: Clear to auscultation bilaterally, no crackles or wheezes  CV: Bradycardic(heart rate high 50s) no murmurs rubs or gallops  Abdomen: Soft, nontender, nondistended.  Normoactive bowel sounds  Extremities: Tender to palpation in the right hip.  Pain with active and passive movement.         I reviewed the patient's new clinical results.  Results from last 7 days   Lab Units 22  0508 22  0911 22  0721   WBC 10*3/mm3 13.63* 7.71 7.44   HEMOGLOBIN g/dL 8.4* 8.8* 8.3*   PLATELETS 10*3/mm3 249 241 311     Results from last 7 days   Lab Units 22  0508 22  0911 22  0721   SODIUM mmol/L 139 137 140   POTASSIUM mmol/L 3.5 3.4* 3.8   CHLORIDE mmol/L 104 103 109*   CO2 mmol/L 16.0* 17.3* 21.4*   BUN mg/dL 10 7* 4*   CREATININE mg/dL 1.14* 0.50* 0.46*   GLUCOSE mg/dL 98 88 282*   Estimated Creatinine Clearance: 55.7 mL/min (A) (by C-G formula  based on SCr of 1.14 mg/dL (H)).  Results from last 7 days   Lab Units 03/08/22  0508 03/07/22  0911   ALBUMIN g/dL 2.50* 2.90*   BILIRUBIN mg/dL 1.3* 0.8   ALK PHOS U/L 157* 169*   AST (SGOT) U/L 5,725* 392*   ALT (SGPT) U/L 1,783* 170*     Results from last 7 days   Lab Units 03/08/22  0508 03/07/22  0911 03/02/22  0721   CALCIUM mg/dL 7.4* 8.4* 8.5*   ALBUMIN g/dL 2.50* 2.90*  --    MAGNESIUM mg/dL 1.6  --   --    PHOSPHORUS mg/dL 5.7*  --   --        COVID19   Date Value Ref Range Status   03/07/2022 Not Detected Not Detected - Ref. Range Final   02/23/2022 Not Detected Not Detected - Ref. Range Final     Glucose   Date/Time Value Ref Range Status   03/08/2022 0548 100 70 - 130 mg/dL Final     Comment:     Meter: [id number] : [id number] [place] NA   03/08/2022 0320 123 70 - 130 mg/dL Final     Comment:     Meter: [id number] : [id number] Gavin Machelle NA   03/08/2022 0259 53 (L) 70 - 130 mg/dL Final     Comment:     Meter: [id number] : [id number] [place] NA   03/08/2022 0258 55 (L) 70 - 130 mg/dL Final     Comment:     Meter: [id number] : [id number] [place] NA   03/07/2022 2049 87 70 - 130 mg/dL Final     Comment:     Meter: [id number] : [id number] Gavin Machelle NA   03/07/2022 1602 134 (H) 70 - 130 mg/dL Final     Comment:     Meter: [id number] : [id number] Orville Abraham RN       XR Spine Lumbar 2 or 3 View  XR SPINE LUMBAR 2 OR 3 VW-     HISTORY: Status post lumbar surgery with pain.     COMPARISON: 03/02/2022, 01/13/2022.      FINDINGS: There has been no interval change in the appearance of the  thoracolumbar fusion hardware. There are surgical skin staples in place.  There are also intervertebral spacers without interval change in  position or appearance. As before there is volume loss of the L3  vertebrae, similar to 03/02/2022 and 01/13/2022. No subluxation, no  acute compression deformity. Posterior soft tissues have a satisfactory  appearance.     CONCLUSION: Stable  appearance compared to 03/02/2022.     This report was finalized on 3/7/2022 9:05 PM by Dr. Arvin Zaman M.D.     XR Chest 1 View  Narrative: PORTABLE CHEST X-RAY     HISTORY: PICC line placement.     Portable chest x-ray is provided. Correlation: Chest x-ray 03/01/2022.     FINDINGS: There is a new left PICC line which has its tip along the  lower portion of the superior vena cava within 1 cm of the the mitral  junction. The cardiomediastinal silhouette is normal. The lungs are  clear. The costophrenic sulci are dry and the bones appear normal. There  is no pneumothorax.     Impression: New left PICC line as described. No significant  cardiopulmonary abnormality identified.     This report was finalized on 3/7/2022 11:16 AM by Dr. Coleman Hinton M.D.     XR Hip With or Without Pelvis 2 - 3 View Right  Narrative: PELVIS AND RIGHT HIP X-RAYS     HISTORY: Recent spine surgery. Severe right hip pain. No injury.     TECHNIQUE: An AP view the pelvis and two images of the right hip are  provided.     FINDINGS: Lower lumbar spine fusion hardware is partially demonstrated.  The sacroiliac joints, symphysis pubis, and hip joint spaces appear  normal. The bones of the pelvis and proximal femurs appear normal.     Impression: Negative.     This report was finalized on 3/7/2022 9:57 AM by Dr. Coleman Hinton M.D.       Scheduled Medications  acetylcysteine, 100 mg/kg, Intravenous, Once  baclofen, 10 mg, Oral, BID  DAPTOmycin, 6 mg/kg (Adjusted), Intravenous, Q24H  DULoxetine, 60 mg, Oral, BID  ferrous sulfate, 325 mg, Oral, Every Other Day  insulin lispro, 0-7 Units, Subcutaneous, TID AC  levothyroxine, 137 mcg, Oral, Daily  pantoprazole, 40 mg, Oral, QAM  pramipexole, 0.5 mg, Oral, BID  rosuvastatin, 40 mg, Oral, Nightly  sodium chloride, 500 mL, Intravenous, Once  sodium chloride, 10 mL, Intravenous, Q12H    Infusions  sodium chloride, 100 mL/hr, Last Rate: 100 mL/hr (03/07/22 1734)    Diet  Diet Regular; Cardiac,  Consistent Carbohydrate       Assessment/Plan     Active Hospital Problems    Diagnosis  POA   • Acetaminophen overdose, intentional self-harm, initial encounter ([facility]) [T39.1X2A]  Yes      Resolved Hospital Problems   No resolved problems to display.     Patient is is a 66 y.o. female with past medical history including but not limited to depression, DM, fibromyalgia, GERD, hiatal hernia, hyperlipidemia, hypertension and hypothyroidism presents to the ED with intentional hydrocodone overdose.     Hypotension  -Patient still blood pressures were down to 70s 03/07  -So metoprolol tartrate was given 3 hours apart, discontinued metoprolol  -Discontinued IV Dilaudid  -Status post IV fluid bolus, systolic BP improved to 80s 03/08  -Pulmonology consult      Intentional hydrocodone overdose  -Taking at least 20 Norco pills (7.5-325 mg) on 03/07, 20 pills equals at least 6500 mg of Tylenol that patient has taken  -Patient is alert, oriented x4.  Likely out of window for opioid side effects [facility] secondary to sedation.  Ordered as needed Narcan.  -Acetaminophen level was elevated at 120.8, (upper level of normal is 30)  -, ALT continue INR slightly elevated at 1.43.  Total bilirubin normal.  -Started on Acetadote for Tylenol overdose  -Monitor daily liver functions  -Psychiatry consult   -Tylenol  Level WNL  05/08  -LFTs trending up up to thousands today.     Severely elevated LFTs:  -ALT 1783, AST 5725 on 03/08  -Secondary to Tylenol overdose and/or hypotension  -GI consult  -Us of the liver   -Pulmonology consult      Severe right hip pain  -X-ray of the hip showed lower lumbar spine fusion hardware is partially demonstrated.The sacroiliac joints, symphysis pubis, and hip joint spaces appear normal. The bones of the pelvis and proximal femurs appear normal.  -Orthopedic consult:  -As needed Dilaudid and oxycodone ordered.        Recent epidural abscess status post L5-S1  laminectomy, foraminotomy, facetectomy and bilateral discectomy: Was discharged on IV antibiotics..  Infection disease consult.  Started on IV daptomycin.     DM type II:  Sliding scale insulin, hypoglycemic protocol.  Hypothyroidism: Continue levothyroxine  Anemia: Hemoglobin 8.8 admission, slightly higher from last Hgb at discharge on 03/02 when it was 8.3.  Continue p.o. iron.     VTE Prophylaxis - SCDs.  Code Status - Full code.  Анна Howard MD  [facility]  03/08/22  06:19 EST

## 2022-03-08 NOTE — NURSING NOTE
Rapid response called per order from ALESSANDRA Chun, to call rapid if no response from 1L bolus. Pt remains hypotensive with systolic pressure in 70s. Dextrose given from blood sugar 53. BP improved to 85/43 after dextrose. Will continue to monitor vitals closely.

## 2022-03-08 NOTE — CONSULTS
Referring Provider: Dr. Howard  Reason for Consultation: Hypotension    Patient Care Team:  Flo Temple MD as PCP - General (Family Medicine)  Jomar Steve MD as Consulting Physician (Interventional Cardiology)  Donny Gan MD as Consulting Physician (Pain Medicine)    Chief complaint:   Right hip pain    History of present illness:    Subjective     This is a 66-year-old female patient who presented to the hospital on 3/7 for right hip pain.  She recently had Epidural abscess  status post irrigation and debridement with hardware revision and interbody fusion with titanium cage placement on 2/25.  Since discharge, she has been experiencing right hip pain.  She took at least 20 Norco pills (7.5-325 mg) on 03/07.  Tylenol level was elevated.  She is being treated with NAC.    Tonight, she was noted to be hypotensive.  On presentation, she had normal blood pressure.  Her alvarado BP was 82/38 and currently she is 84/49 with a MAP of 64 after receiving 2 L of fluid.  Another 1 L of IV fluid bolus was ordered.    She has received 50 mg PO metoprolol at 1726 and 50 mg PO metoprolol 2048. Also, oxycodone was given at 2101.  Her blood pressure usually runs around 120-130 per her reports.  She has RA but takes steroid.  She seems to be relatively asymptomatic with exception of mild dizziness.    Labs:  Bicarb 16; ALT/AST 1783/5725; WBC 13; hemoglobin 8; albumin 2.5; glucose 53 @2:59 today    Review of Systems  Constitutional: No fever or chills.   ENMT: No sinus congestion  Cardiovascular: No chest pain, palpitation or legs swelling.    Respiratory: No dyspnea, cough or wheezing.  Gastrointestinal: No constipation, diarrhea or abdominal pain.  No nausea or vomiting  Neurology: No headache, numbness or dizziness but generalized weakness.   Musculoskeletal: No joints pain, stiffness or swelling.   Psychiatry: No depression.  Genitourinary: No dysuria or frequent urination  Endo: No weight  changes. No cold or warm intolerance.  Lymphatic: No swollen glands.  Integumentary: No rash.    History  Past Medical History:   Diagnosis Date   • Chronic fatigue    • Depression    • Diabetes mellitus (HCC)    • Fibromyalgia    • GERD (gastroesophageal reflux disease)    • Hiatal hernia    • History of Clostridioides difficile colitis    • Hyperlipidemia    • Hypertension    • Hypothyroidism    • Irregular heart beat    • Liver disease     FATTY LIVER, NON ALCOHOLIC   • Low back pain    • Peripheral neuropathy    • Rheumatoid arthritis (HCC)    • Rheumatoid arthritis (HCC) 2005    DR SMYTH   • Seizures (HCC)    • Sleep apnea     NO CPAP   • Type 2 diabetes mellitus (HCC)    • West Nile fever 2002   ,   Past Surgical History:   Procedure Laterality Date   • ABDOMINOPLASTY     • ADENOIDECTOMY     • BILATERAL BREAST REDUCTION     • BRONCHOSCOPY N/A 12/15/2016    Procedure: BRONCHOSCOPY WITH BAL AND ENDOBRONCHIAL ULTRASOUND WITH FNA;  Surgeon: Diego Ponce MD;  Location: [facility] ENDOSCOPY;  Service:    • CARDIAC ABLATION     • CATARACT EXTRACTION, BILATERAL     • COLONOSCOPY     • ENDOSCOPY     • EYE SURGERY     • HYSTERECTOMY     • LASIK     • LUMBAR DISCECTOMY FUSION INSTRUMENTATION Left 6/8/2018    Procedure: LEFT L3/4 lumbar discectomy;  Surgeon: Miguelangel Goldberg MD;  Location: [facility] OR;  Service: Neurosurgery   • LUMBAR FUSION Left 7/12/2018    Procedure: Left L3 4 lumbar decompression and discectomy with transforaminal lumbar interbody fusion and posterior instrumentation with posterior lateral arthrodesis;  Surgeon: Miguelangel Goldberg MD;  Location: [facility] OR;  Service: Neurosurgery   • LUMBAR LAMINECTOMY WITH FUSION N/A 2/15/2021    Procedure: Lumbar 2-3, lumbar 3 4, lumbar 4 5 fusion with instrumentation and BMP,  L2-L3 lift with cage, and removal of implants L3-4 with laminectomies by Dr. Segundo;  Surgeon: Juancarlos Montelongo MD;  Location: [facility] OR;  Service: Orthopedic Spine;   Laterality: N/A;   • LUMBAR LAMINECTOMY WITH FUSION N/A 2/15/2021    Procedure: Lumbar 2 3, Lumbar 3 4 and lumbar 4 5 laminectomy with a fusion by orthopedics;  Surgeon: Jef Randall MD;  Location: [facility];  Service: Neurosurgery;  Laterality: N/A;   • LUMBAR LAMINECTOMY WITH FUSION N/A 4/15/2021    Procedure: T10-L2 fusion with instrumentation and revision of current fixation, laminectomy by Dr. Segundo;  Surgeon: Juancarlos Montelongo MD;  Location: [facility];  Service: Orthopedic Spine;  Laterality: N/A;   • LUMBAR LAMINECTOMY WITH FUSION N/A 4/15/2021    Procedure: LUMBAR 2 LUMBAR 3 LAMINECTOMY FACETECTOMY AND NEUROLYSIS;  Surgeon: Jef Randall MD;  Location: [facility];  Service: Neurosurgery;  Laterality: N/A;   • MOUTH SURGERY     • REPLACEMENT TOTAL KNEE Left    • TONSILLECTOMY     • TONSILLECTOMY AND ADENOIDECTOMY  1966   ,   Family History   Problem Relation Age of Onset   • Diabetes Mother    • Neuropathy Father    • Colon polyps Father    • Diabetes Sister    • No Known Problems Son    • Diabetes Maternal Grandmother    • Cancer Maternal Grandmother    • Diabetes Paternal Grandmother    • Cancer Paternal Grandmother    • Malig Hyperthermia Neg Hx    ,   Social History     Socioeconomic History   • Marital status:    • Number of children: 1   • Years of education: 14   Tobacco Use   • Smoking status: Never Smoker   • Smokeless tobacco: Never Used   Vaping Use   • Vaping Use: Never used   Substance and Sexual Activity   • Alcohol use: Yes     Comment: social   • Drug use: No   • Sexual activity: Defer     E-cigarette/Vaping   • E-cigarette/Vaping Use Never User      E-cigarette/Vaping Substances     E-cigarette/Vaping Devices       ,   Medications Prior to Admission   Medication Sig Dispense Refill Last Dose   • B Complex Vitamins (VITAMIN B COMPLEX PO) Take 1 tablet by mouth Daily.      • baclofen (LIORESAL) 20 MG tablet Take 20 mg by mouth 2 (Two) Times a Day.      • Droplet Pen  Needles 32G X 4 MM misc USE THREE TIMES DAILY TO INJECT INSULIN 300 each 3    • DULoxetine (CYMBALTA) 60 MG capsule Take 60 mg by mouth 2 (Two) Times a Day.      • fenofibrate 160 MG tablet TAKE 1 TABLET EVERY DAY 90 tablet 1    • Ferrous Sulfate Dried (High Potency Iron) 65 MG tablet Take 65 mg by mouth Every Other Day.      • glucose blood test strip 1 each by Other route Daily. PT TO CHECK BS DAILY E11.8 100 each 6    • HYDROcodone-acetaminophen (NORCO) 7.5-325 MG per tablet TAKE 1 TABLET BY MOUTH EVERY DAY AS NEEDED FOR PAIN      • levothyroxine (SYNTHROID, LEVOTHROID) 137 MCG tablet TAKE 1 TABLET EVERY DAY 90 tablet 0    • metoprolol tartrate (LOPRESSOR) 50 MG tablet TAKE 1 TABLET EVERY 12 HOURS (NEED MD APPOINTMENT) 60 tablet 0    • omega-3 acid ethyl esters (LOVAZA) 1 g capsule Take 2 capsules by mouth 2 (Two) Times a Day. 360 capsule 0    • omeprazole (priLOSEC) 40 MG capsule TAKE 1 CAPSULE EVERY DAY 90 capsule 1    • pramipexole (MIRAPEX) 0.5 MG tablet Take 0.5 mg by mouth 2 (two) times a day.      • rosuvastatin (CRESTOR) 40 MG tablet Take 1 tablet by mouth Daily. 90 tablet 1    • Soliqua 100-33 UNT-MCG/ML solution pen-injector injection INJECT 60 UNITS UNDER THE SKIN INTO THE APPROPRIATE AREA AS DIRECTED DAILY WITH BREAKFAST 60 mL 0    • TRUEplus Lancets 33G misc TO CHECK BS DAILY 100 each 3    • [] vancomycin 2000 mg/500 mL 0.9% NS IVPB ([facility]) Infuse 500 mL into a venous catheter Every 12 (Twelve) Hours for 8 doses. Indications: lumbar epidural abscess 4000 mL 0    • vitamin D3 125 MCG (5000 UT) capsule capsule Take 5,000 Units by mouth Daily.      • Xigduo XR 5-1000 MG tablet TAKE 2 TABLETS EVERY  tablet 1    • zolpidem (AMBIEN) 10 MG tablet Take 1 tablet by mouth At Night As Needed for Sleep. 90 tablet 0    , Scheduled Meds:  acetylcysteine, 100 mg/kg, Intravenous, Once  baclofen, 10 mg, Oral, BID  DAPTOmycin, 6 mg/kg (Adjusted), Intravenous, Q24H  DULoxetine, 60 mg, Oral, BID  enoxaparin,  40 mg, Subcutaneous, Q24H  ferrous sulfate, 325 mg, Oral, Every Other Day  insulin lispro, 0-7 Units, Subcutaneous, TID AC  levothyroxine, 137 mcg, Oral, Daily  pantoprazole, 40 mg, Oral, QAM  pramipexole, 0.5 mg, Oral, BID  rosuvastatin, 40 mg, Oral, Nightly  sodium chloride, 500 mL, Intravenous, Once  sodium chloride, 10 mL, Intravenous, Q12H    , Continuous Infusions:  sodium chloride, 100 mL/hr, Last Rate: 100 mL/hr (03/07/22 2482)     and Allergies:  Adhesive tape, Sulfamethoxazole w/trimethoprim 800-160  [sulfamethoxazole-trimethoprim], Cefpodoxime, Cephalosporins, Metformin and related, Sulfa antibiotics, Topiramate, and Latex    Objective     Vital Signs   Temp:  [97 °F (36.1 °C)-98.5 °F (36.9 °C)] 97 °F (36.1 °C)  Heart Rate:  [57-87] 61  Resp:  [16-18] 16  BP: ()/(38-77) 84/49    PPE used per hospital policy    Physical Exam:  Constitutional: Not in acute distress.  Eyes: Injected conjunctivae, EOMI. pupils equal reactive to light.  ENMT: [place] 3. No oral thrush.  Dry tongue.  External ears normal  Neck:  Trachea midline. No thyromegaly  Heart: RRR, no murmur  Lungs: Good and equal air entry bilaterally.  Non labored breathing.   Abdomen: Obese. Soft. No tenderness or dullness. No HSM.  Extremities: No cyanosis, clubbing or pitting edema.  Warm extremities and well-perfused.  Neuro: Conscious, alert, oriented x3.  Strength 5/5 in arms.  Psych: Appropriate mood and affect.    Integumentary: No rash.  Normal skin turgor  Lymphatic: No palpable cervical or supraclavicular lymph nodes.      Diagnostic imaging:  I personally and independently reviewed the following images:   CXR 3/7/2022: No pulmonary infiltrates        Assessment     1. Acute liver failure  2. Acetaminophen toxicity  3. Shock  4. Hypoglycemia  5. High anion gap metabolic acidosis  6. Coagulopathy, secondary #1  7. Rheumatoid arthritis  8. Right hip pain, suspected radiculitis  9. Epidural abscess s/p debridement, hardware revision  2/25/2022    Pertinent medical problems:  · s/p T10-L3 bilateral posterior fusion with removal of instrumentation, bone graft and allograft April 2021  · History of C. difficile colitis  · ALBA, not on Pap therapy  · Hypothyroidism    Recommendations:    · Check ABG and lactic acid stat  · Stop Rosuvastatin due to liver failure  · Continue NAC  · Transfer to intensive care unit.  At risk of further deterioration with organ failure.  Consult GI  · Initiate Clark-Synephrine drip  · Insulin PRN  · Daptomycin per OD  · Check TSH.  Continue levothyroxine  · Metoprolol oxycodone stopped due to hypotension  · Stop prophylactic Lovenox due to coagulopathy  · Check echo due to hypotension          Zachary Griffin MD  03/08/22  06:32 EST    Time: Critical care 40 min

## 2022-03-08 NOTE — PLAN OF CARE
Goal Outcome Evaluation:  Plan of Care Reviewed With: patient        Progress: improving  Outcome Evaluation: VSS. Acetalcysteine continues. NS started. No reports of pain. Xrays ordered of lumbar spine. Will continue to monitor.

## 2022-03-08 NOTE — PROGRESS NOTES
[facility] is current with home care for SN educating in  IV ABX and are following for safe D/C plan for this patient.

## 2022-03-08 NOTE — PLAN OF CARE
Goal Outcome Evaluation:     During initial assessment with GI MD ( Dr. Mcqueen) at bedside, MD spoke with patient about code status. Mrs Ramos stated clearly that she is a DNR status. She states she has paper work that will be brought in. MD spoke about the possibility of being transferred to a different facility for a liver transplant work-up. MD states that he is unsure if she would be a candidate. Patient sates that she is not willing to be transferred and does not want to pursue liver transplant. Patient states if she dies to let her go, that she is ready. Patient's pain has flared up during the day but relieved with positioning. Patient remains oriented x 4 but has become more somnolent. Clark gtt has been titrated up to achieve a MAP of 65. Will continue to monitor

## 2022-03-08 NOTE — PROGRESS NOTES
[facility]         Dr Rita Rojas   LOS: 1 day   Patient Care Team:  Flo Temple MD as PCP - General (Family Medicine)  Jomar Steve MD as Consulting Physician (Interventional Cardiology)  Donny Gan MD as Consulting Physician (Pain Medicine)    Chief Complaint: Acute liver injury due to acetaminophen toxicity shock metabolic acidosis coagulopathy underlying history of multiple back surgery epidural abscess status post debridement    Interval History: Events noted chart reviewed.  Called to evaluate patient with persistent hypotension despite fluid resuscitation.  She is awake denies any abdominal pain nausea or vomiting.  No active bleeding or bruising noted.    REVIEW OF SYSTEMS:   CARDIOVASCULAR: No chest pain, chest pressure or chest discomfort. No palpitations or edema.   RESPIRATORY: No shortness of breath, cough or sputum.   GASTROINTESTINAL: No anorexia, nausea, vomiting or diarrhea. No abdominal pain or blood.   HEMATOLOGIC: No bleeding or bruising.     Ventilator/Non-Invasive Ventilation Settings (From admission, onward)            None            Vital Signs  Temp:  [97 °F (36.1 °C)-98 °F (36.7 °C)] 97.5 °F (36.4 °C)  Heart Rate:  [57-87] 61  Resp:  [16-18] 18  BP: ()/(38-77) 79/46    Intake/Output Summary (Last 24 hours) at 3/8/2022 0920  Last data filed at 3/8/2022 0648  Gross per 24 hour   Intake 210 ml   Output 150 ml   Net 60 ml     Flowsheet Rows    Flowsheet Row First Filed Value   Admission Height --   Admission Weight 95.2 kg (209 lb 12.8 oz) Documented at 03/07/2022 0839          Physical Exam:  Patient is examined using the personal protective equipment as per guidelines from infection control for this particular patient as enacted.  Hand hygiene was performed before and after patient interaction.   General Appearance:    Alert, cooperative, in no acute distress.  Following simple commands  ENT Mallampati between 3 and 4 no nasal  congestion  Neck midline trachea, no thyromegaly   Lungs:     Clear to auscultation, respirations regular, even and                  unlabored    Heart:    Regular rhythm and normal rate, normal S1 and S2, no            murmur, no gallop, no rub, no click   Chest Wall:    No abnormalities observed   Abdomen:     Normal bowel sounds, no masses, no organomegaly, soft        nontender, nondistended, no guarding, no rebound                tenderness   Extremities:   Moves all extremities well, no edema, no cyanosis, no             redness  CNS no focal neurological deficits normal sensory exam  Skin no rashes no nodules  Musculoskeletal no cyanosis no clubbing normal range of motion     Results Review:        Results from last 7 days   Lab Units 03/08/22  0508 03/07/22  0911 03/02/22  0721   SODIUM mmol/L 139 137 140   POTASSIUM mmol/L 3.5 3.4* 3.8   CHLORIDE mmol/L 104 103 109*   CO2 mmol/L 16.0* 17.3* 21.4*   BUN mg/dL 10 7* 4*   CREATININE mg/dL 1.14* 0.50* 0.46*   GLUCOSE mg/dL 98 88 282*   CALCIUM mg/dL 7.4* 8.4* 8.5*     Results from last 7 days   Lab Units 03/08/22  0508   CK TOTAL U/L 211*     Results from last 7 days   Lab Units 03/08/22  0508 03/07/22  0911 03/02/22  0721   WBC 10*3/mm3 13.63* 7.71 7.44   HEMOGLOBIN g/dL 8.4* 8.8* 8.3*   HEMATOCRIT % 27.2* 26.6* 25.4*   PLATELETS 10*3/mm3 249 241 311     Results from last 7 days   Lab Units 03/08/22  0628 03/07/22  0911   INR  4.47* 1.43*   APTT seconds 39.3* 34.1         Results from last 7 days   Lab Units 03/08/22  0508   MAGNESIUM mg/dL 1.6               I reviewed the patient's new clinical results.  I personally viewed and interpreted the patient's chest x-ray.        Medication Review:   acetylcysteine, 100 mg/kg, Intravenous, Once  baclofen, 10 mg, Oral, BID  DAPTOmycin, 6 mg/kg (Adjusted), Intravenous, Q24H  DULoxetine, 60 mg, Oral, BID  enoxaparin, 40 mg, Subcutaneous, Q24H  ferrous sulfate, 325 mg, Oral, Every Other Day  insulin lispro, 0-7 Units,  Subcutaneous, TID AC  levothyroxine, 137 mcg, Oral, Daily  pantoprazole, 40 mg, Oral, QAM  phenylephrine, , ,   pramipexole, 0.5 mg, Oral, BID  sodium chloride, 500 mL, Intravenous, Once  sodium chloride, 10 mL, Intravenous, Q12H        phenylephrine, 0.5-3 mcg/kg/min  sodium chloride, 150 mL/hr, Last Rate: 150 mL/hr (03/08/22 0832)        ASSESSMENT:     1. Acute liver injury  2. Acetaminophen toxicity  3. Shock  4. Hypoglycemia  5. High anion gap metabolic acidosis  6. Coagulopathy, secondary #1  7. Rheumatoid arthritis  8. Right hip pain, suspected radiculitis  9. Epidural abscess s/p debridement, hardware revision 2/25/2022  10. Acute kidney injury     Pertinent medical problems:  · s/p T10-L3 bilateral posterior fusion with removal of instrumentation, bone graft and allograft April 2021  · History of C. difficile colitis  · ALBA, not on Pap therapy  · Hypothyroidism      PLAN:  Events noted chart reviewed.  Hypotensive despite aggressive fluid resuscitation.  Will initiate pressors hopefully temporarily.  Since patient does not appear to be in septic shock and this is all from liver failure I would attempt to keep map rater than 60.  Long discussion with gastroenterologist.  At this time they wish to continue the acetylcysteine for now the third dose until liver function start stabilizing.  Patient is now coagulopathic likely from liver failure.  Supportive care will be done.  I will discontinue some of the medications which I feel can make the liver dysfunction worse.  Watch creatinine closely likely all due to liver injury.  Antibiotics per ID.  Recommendations noted per ID.  Possibly MRI of the back  Overall prognosis is guarded at this time with acute liver failure.  Long discussion with patient's  confirmed DNR status with him.  Discussed options moving forward regarding aggressive care such as liver transplant if needed.  At this time he will have further discussion with his wife.  ICU core  measures    Critical care time 35 minutes      Rita Rojas MD  03/08/22  09:20 EST

## 2022-03-08 NOTE — PROGRESS NOTES
LOS: 1 day     Chief Complaint: Epidural abscess    Interval History: Afebrile, hypotensive overnight not responsive to IV fluid resuscitation.  Worsening liver failure.  Transferred to the ICU.  Remains on room air denies shortness of breath or cough.  Pain well controlled.  No abdominal pain diarrhea or vomiting    Vital Signs  Temp:  [97 °F (36.1 °C)-98 °F (36.7 °C)] 97.5 °F (36.4 °C)  Heart Rate:  [53-87] 53  Resp:  [16-18] 18  BP: ()/(37-77) 71/38    Physical Exam:  General: In no acute distress  Cardiovascular: RRR, no lower extremity edema  Respiratory: Breathing comfortably on room air  GI: Soft, NT/ND, + bowel sounds bilaterally  Skin: No rashes     Antibiotics:  Daptomycin 6 mg/kg IV every 24 hours     Results Review:    Lab Results   Component Value Date    WBC 13.63 (H) 03/08/2022    HGB 8.4 (L) 03/08/2022    HCT 27.2 (L) 03/08/2022    MCV 91.6 03/08/2022     03/08/2022     Lab Results   Component Value Date    GLUCOSE 98 03/08/2022    BUN 10 03/08/2022    CREATININE 1.14 (H) 03/08/2022    EGFRIFNONA 150 02/28/2022    EGFRIFAFRI 104 11/24/2021    BCR 8.8 03/08/2022    CO2 16.0 (L) 03/08/2022    CALCIUM 7.4 (L) 03/08/2022    PROTENTOTREF 7.6 11/24/2021    ALBUMIN 2.50 (L) 03/08/2022    LABIL2 1.7 11/24/2021    AST 5,725 (H) 03/08/2022    ALT 1,783 (H) 03/08/2022     Procalcitonin 26.1    Microbiology:  3/7 COVID neg     2/25 operative cultures with staph epidermidis  2/23 COVID neg    Assessment/Plan   Epidural abscess  status post irrigation and debridement with hardware revision and interbody fusion with titanium cage placement on February 25  S/p T10-L3 bilateral posterior fusion with removal of instrumentation at L2 and L3 with local bone graft and allograft bone graft and bone graft extender in 4/2021  Poorly controlled type 2 diabetes complicating above  Rheumatoid arthritis  History of C. difficile colitis  Overdose  Liver failure in the setting of overdose  Right hip  pain    Orthopedic surgery note reviewed their primary concern is that her right hip pain is actually referred pain from her back.  Planning on imaging.  Despite the elevated procalcitonin there is no evidence that infection is playing any role with her current presentation.  Having said that though we need to make sure she is not having infection in her right hip. We will also check blood cultures x2 .     Pharmacist discussed patient's allergies.  Please see their note for details.  We will start cefazolin 2 g IV every 8 hours.

## 2022-03-09 PROBLEM — K72.90 FULMINANT LIVER FAILURE (HCC): Status: ACTIVE | Noted: 2022-01-01

## 2022-03-09 NOTE — NURSING NOTE
Paged MD Griffin multiple times in regards to continuous low pressures, update with , and inability to collect cbc.    0636 MD returned page no new orders at this time

## 2022-03-09 NOTE — PLAN OF CARE
Pt had significant decline beginning at 0300, morning labs collected early. Hypoglycemia noted and treated with 1 amp of D50 (dextrose). Hypotension, Vasopressin added. Maxed rate on Levophed and Clark. Sodium Bicarb gtt started. MD Griffin updated appropriately. Significant other also updated of status change.   Problem: Adult Inpatient Plan of Care  Goal: Plan of Care Review  Outcome: Ongoing, Not Progressing  Flowsheets (Taken 3/7/2022 1933 by Leigh Jacinto, RN)  Plan of Care Reviewed With: patient   Goal Outcome Evaluation:

## 2022-03-09 NOTE — PROGRESS NOTES
[facility]         Dr Rita Rojas   LOS: 2 days   Patient Care Team:  Flo Temple MD as PCP - General (Family Medicine)  Jomar Steve MD as Consulting Physician (Interventional Cardiology)  Donny Gan MD as Consulting Physician (Pain Medicine)    Chief Complaint: Acute liver injury due to acetaminophen toxicity shock metabolic acidosis coagulopathy underlying history of multiple back surgery epidural abscess status post debridement    Interval History: Called to evaluate patient multiple pressors.  She is confused declining quickly.  She has multiple bruising in her skin and mottled from leg up.    REVIEW OF SYSTEMS:   Confused    Ventilator/Non-Invasive Ventilation Settings (From admission, onward)            None            Vital Signs  Temp:  [97.4 °F (36.3 °C)-98 °F (36.7 °C)] 97.9 °F (36.6 °C)  Heart Rate:  [] 92  Resp:  [16-23] 20  BP: ()/(21-94) 105/94    Intake/Output Summary (Last 24 hours) at 3/9/2022 0801  Last data filed at 3/8/2022 1755  Gross per 24 hour   Intake 3155 ml   Output --   Net 3155 ml     Flowsheet Rows    Flowsheet Row First Filed Value   Admission Height --   Admission Weight 95.2 kg (209 lb 12.8 oz) Documented at 03/07/2022 0839          Physical Exam:  Patient is examined using the personal protective equipment as per guidelines from infection control for this particular patient as enacted.  Hand hygiene was performed before and after patient interaction.   General Appearance:   Confused agitated and in some distress  ENT Mallampati between 3 and 4 no nasal congestion  Neck midline trachea, no thyromegaly   Lungs:    Diminished breath sounds tachypneic    Heart:    Regular rhythm and normal rate, normal S1 and S2, no            murmur, no gallop, no rub, no click   Chest Wall:    No abnormalities observed   Abdomen:    Mild diffuse tenderness   Extremities:   Moves all extremities well, no edema, cyanotic with mottling all  the way to bilateral lower extremity up to the hip  CNS confused  Skin no rashes no nodules  Musculoskeletal no cyanosis no clubbing normal range of motion     Results Review:        Results from last 7 days   Lab Units 03/09/22 0323 03/08/22  0508 03/07/22 0911   SODIUM mmol/L 141 139 137   POTASSIUM mmol/L 4.9 3.5 3.4*   CHLORIDE mmol/L 103 104 103   CO2 mmol/L 4.0* 16.0* 17.3*   BUN mg/dL 12 10 7*   CREATININE mg/dL 2.30* 1.14* 0.50*   GLUCOSE mg/dL 59* 98 88   CALCIUM mg/dL 7.1* 7.4* 8.4*     Results from last 7 days   Lab Units 03/08/22  0508   CK TOTAL U/L 211*     Results from last 7 days   Lab Units 03/08/22  0508 03/07/22 0911   WBC 10*3/mm3 13.63* 7.71   HEMOGLOBIN g/dL 8.4* 8.8*   HEMATOCRIT % 27.2* 26.6*   PLATELETS 10*3/mm3 249 241     Results from last 7 days   Lab Units 03/09/22  0323 03/08/22  0628 03/07/22 0911   INR  >10.00* 4.47* 1.43*   APTT seconds  --  39.3* 34.1         Results from last 7 days   Lab Units 03/09/22  0323   MAGNESIUM mg/dL 1.8               I reviewed the patient's new clinical results.  I personally viewed and interpreted the patient's chest x-ray.        Medication Review:   baclofen, 10 mg, Oral, BID  ceFAZolin, 2 g, Intravenous, Q8H  docusate sodium, 100 mg, Oral, BID  ferrous sulfate, 325 mg, Oral, Every Other Day  insulin lispro, 0-7 Units, Subcutaneous, TID AC  levothyroxine, 137 mcg, Oral, Daily  pantoprazole, 40 mg, Oral, QAM  phytonadione (VITAMIN K) IVPB, 10 mg, Intravenous, Once  polyethylene glycol, 17 g, Oral, Daily  sodium chloride, 10 mL, Intravenous, Q12H        acetylcysteine (ACETADOTE) infusion *fourth dose*, 6.25 mg/kg/hr, Last Rate: 6.25 mg/kg/hr (03/09/22 0545)  norepinephrine, 0.02-0.3 mcg/kg/min, Last Rate: 0.3 mcg/kg/min (03/09/22 0449)  phenylephrine, 0.5-3 mcg/kg/min, Last Rate: 3 mcg/kg/min (03/09/22 0741)  sodium bicarbonate, 150 mEq, Last Rate: 150 mEq (03/09/22 0445)  sodium chloride, 150 mL/hr, Last Rate: Stopped (03/09/22  0400)  vasopressin, 0.03 Units/min, Last Rate: 0.03 Units/min (03/09/22 0813)        ASSESSMENT:     1. Acute liver injury  2. Multiorgan system failure  3. Acetaminophen toxicity  4. Shock  5. Hypoglycemia  6. High anion gap metabolic acidosis  7. Coagulopathy, secondary #1  8. Rheumatoid arthritis  9. Right hip pain, suspected radiculitis  10. Epidural abscess s/p debridement, hardware revision 2/25/2022  11. Acute kidney injury     Pertinent medical problems:  · s/p T10-L3 bilateral posterior fusion with removal of instrumentation, bone graft and allograft April 2021  · History of C. difficile colitis  · ALBA, not on Pap therapy  · Hypothyroidism      PLAN:  She has progressed to multiorgan system failure.  She is coagulopathic hypotensive worsening kidney function.  She is declining quickly and is on multiple pressors at this time.  Previously we had a Long discussion with gastroenterologist.  At this time they wish to continue the acetylcysteine for now the third dose until liver function start stabilizing.  Antibiotics per ID.  Recommendations noted per ID.  Possibly MRI of the back  Long discussion with patient's  with nurse Alcala present.  I discussed with him about her declining fast having severe coagulopathy and multiorgan system failure.  At this time she was already a DNR and  wishes to proceed with comfort care.  I answered all questions regarding comfort care palliative care and he is agreeable.  All questions were answered.  We will proceed as per family's wishes and patient's wishes to proceed with comfort care.  Palliative care order set has been initiated    Critical care time 35 minutes with coordination of care discussions      Rita Rojas MD  03/09/22  08:01 EST

## 2022-03-09 NOTE — DISCHARGE SUMMARY
Patient Name: Wendi Ramos  : 1955  MRN: [id number]    Date of Admission: 3/7/2022  Date of Discharge:  3/9/2022  Primary Care Physician: Flo Temple MD      Chief Complaint:   Hip Pain      Discharge Diagnoses     Active Hospital Problems    Diagnosis  POA   • Fulminant liver failure (HCC) [K72.90]  Unknown   • Acetaminophen overdose, intentional self-harm, initial encounter (HCC) [T39.1X2A]  Yes   • Hypertension [I10]  Yes   • Hyperlipidemia [E78.5]  Yes   • S/P lumbar fusion [Z98.1]  Not Applicable   • Post-operative state [Z98.890]  Not Applicable   • Type 2 diabetes mellitus without complication (HCC) [E11.9]  Yes   • S/P catheter ablation of slow pathway [Z98.890, Z86.79]  Not Applicable   • Fibromyalgia [M79.7]  Yes   • Rheumatoid arthritis (HCC) [M06.9]  Yes      Resolved Hospital Problems   No resolved problems to display.        Hospital Course       Patient is is a 66 y.o. female with past medical history including but not limited to depression, DM, fibromyalgia, GERD, hiatal hernia, hyperlipidemia, hypertension and hypothyroidism presents to the ED with intentional hydrocodone overdose.History of Present Illness. Patient was recently admitted to the hospital from  until  when she presents with the back pain.  She was diagnosed with epidural abscess..  Neurosurgery, orthopedic/spine surgery, and infectious disease were consulted.  She was placed on broad-spectrum antibiotics and underwent L5-S1 laminectomy, foraminotomy, facetectomy and bilateral discectomy. Operative wound culture grew Staphylococcus epidermidis sensitive to vancomycin. Patein was discharged home on pain medications and IV antibiotics. Patient states she has been having back pain and right hip pain but primarily issue for her was her right hip pain since discharge and she was tired of years of pain and decided to end it all.  She swallowed all  bottle of  Norco pills (7.5-325 mg ) at least 20 Norco  pills (7.5-325 mg) on 03/07, 20 pills equals at least 6500 mg of Tylenol that patient has taken  -Patient is alert, oriented x4.Acetaminophen level was elevated at 120.8, (upper level of normal is 30)  -, ,   INR slightly elevated at 1.43.  Was Started on Acetadote for Tylenol overdose.  However patient quickly deteriorated overnight and became hypotensive, LFTs rapidly elevated in 2007 patient progressed into fulminant liver failure.  Patient was also quite severe.  Despite all supportive care, IV pressors, NAC, patient continued to deteriorate.  Patient will go for possible transfer to tertiary center for liver transplant is available, but patient states that she would never want to undergo liver transplant he did not want to be transferred.  Due to further deterioration , fulminant liver failure and multiorgan failure goals of care were changed to DNR comfort care only after discussion with the .      Date of Admission:  3/7/2022  Date and Time of Death:  3/9/2022 at 8:55 AM            Day of Discharge     Subjective:  Lying in bed.  Confused.  On multiple pressors due to hypotension.  Physical Exam:  Temp:  [97.4 °F (36.3 °C)-98 °F (36.7 °C)] 97.9 °F (36.6 °C)  Heart Rate:  [0-102] 0  Resp:  [0-20] 0  BP: ()/(21-94) 91/68  Body mass index is 28.35 kg/m².  Physical Exam     General: Confused, mild distress, acutely ill-appearing.  HEENT: Normocephalic, atraumatic  CV: Tachycardic, no murmurs rubs or gallops  Lungs:  diminished breath sounds, no crackles or wheezes  Abdomen: Soft, distended,  Extremities: No significant peripheral edema , no cyanosis       Consultants     Consult Orders (all) (From admission, onward)     Start     Ordered    03/08/22 0631  Inpatient Gastroenterology Consult  STAT        Specialty:  Gastroenterology  Provider:  Jose Yanez MD    03/08/22 0631    03/08/22 0622  Inpatient Gastroenterology Consult  Once,   Status:  Canceled        Specialty:   Gastroenterology  Provider:  (Not yet assigned)    03/08/22 0622    03/08/22 0611  Inpatient Pulmonology Consult  Once        Specialty:  Pulmonary Disease  Provider:  Zachary Griffin MD    03/08/22 0611    03/07/22 1624  Inpatient Psychiatrist Consult  Once        Specialty:  Psychiatry  Provider:  Stanford Elizondo III, MD    03/07/22 1623    03/07/22 1345  Inpatient Orthopedic Surgery Consult  Once,   Status:  Canceled        Specialty:  Orthopedic Surgery  Provider:  Juancarlos Montelongo MD    03/07/22 1345    03/07/22 1107  Inpatient Infectious Diseases Consult  Once        Specialty:  Infectious Diseases  Provider:  Lexus Schaffer MD    03/07/22 1106    03/07/22 1107  Inpatient Psychiatrist Consult  Once,   Status:  Canceled        Specialty:  Psychiatry  Provider:  Stanford Elizondo III, MD    03/07/22 1107    03/07/22 1043  [facility] (on-call MD unless specified) Details  Once,   Status:  Canceled        Specialty:  Hospitalist  Provider:  (Not yet assigned)    03/07/22 1042              Procedures     * Surgery not found *      Imaging Results (All)     Procedure Component Value Units Date/Time    MRI Hip Right Without Contrast [[id number]] Collected: 03/08/22 1509     Updated: 03/08/22 1541    Narrative:      MRI RIGHT HIP WITHOUT CONTRAST     HISTORY: Chronic right hip pain for several years.     TECHNIQUE: MRI right hip includes coronal T1, STIR as well as axial T1,  STIR sequences through both hips and sagittal PD weighted sequence  through the right hip.     FINDINGS: There is mild generalized body wall edema with edematous  subcutaneous fat. Edema tracks deep to the iliacus musculature within  the pelvis. There is also mild muscular edema that appears greatest  involving the gluteus minimus and medius muscles though is present to a  lesser degree involving the hip adductor musculature and the proximal  quadriceps musculature. There is mild free fluid within the pelvis. Hip  joint fluid volume is  upper normal. There is evidence for hematopoietic  reconversion of marrow without focal bone marrow signal abnormality.  There is no hip fracture or osteonecrosis. The pubic symphyseal joint  and the sacroiliac joints appear within normal limits. There has been  previous posterolateral instrumented fusion within the lower lumbar  spine and sacrum where there is magnetic susceptibility artifact.  Visualized colon is mostly decompressed.       Impression:      1.  No hip fracture or osteonecrosis or evidence for acute abnormality  of either hip. Hip joint fluid volume is upper normal.  2.  Mild ascites. Mild generalized body wall edema and muscular edema.  3.  Mild hematopoietic reconversion of marrow.  4.  Previous posterolateral instrumented fusion lumbosacral spine.     This report was finalized on 3/8/2022 3:38 PM by Dr. Uziel Self M.D.       US Liver [[id number]] Collected: 03/08/22 1256     Updated: 03/08/22 1306    Narrative:      US LIVER-     Clinical: Elevated liver enzymes, Tylenol overdose     Ultrasound findings: Hepatic size shape and echotexture within normal  limits.     The right kidney is 13.9 cm in length and normal in appearance.     Sludge within the dependent portion of the gallbladder, no shadowing  gallstones seen. Mild gallbladder wall thickening. CBD diameter is  normal at 6 mm. No free fluid within the right upper quadrant.     The overall pancreatic echotexture is within normal limits no pancreatic  ductal dilatation. Either arising on the surface or adjacent to the  pancreatic head is a small 12 mm solid nodule. While this may simply  represent a peripancreatic lymph node, I would recommend CT pancreatic  protocol as follow-up when patient's condition permits. The remainder of  examination is unremarkable.     This report was finalized on 3/8/2022 1:03 PM by Dr. Arvin Zaman M.D.       XR Spine Lumbar 2 or 3 View [[id number]] Collected: 03/07/22 2030     Updated: 03/07/22 5428     Narrative:      XR SPINE LUMBAR 2 OR 3 VW-     HISTORY: Status post lumbar surgery with pain.     COMPARISON: 03/02/2022, 01/13/2022.      FINDINGS: There has been no interval change in the appearance of the  thoracolumbar fusion hardware. There are surgical skin staples in place.  There are also intervertebral spacers without interval change in  position or appearance. As before there is volume loss of the L3  vertebrae, similar to 03/02/2022 and 01/13/2022. No subluxation, no  acute compression deformity. Posterior soft tissues have a satisfactory  appearance.     CONCLUSION: Stable appearance compared to 03/02/2022.     This report was finalized on 3/7/2022 9:05 PM by Dr. Arvin Zaman M.D.       XR Chest 1 View [[id number]] Collected: 03/07/22 1115     Updated: 03/07/22 1119    Narrative:      PORTABLE CHEST X-RAY     HISTORY: PICC line placement.     Portable chest x-ray is provided. Correlation: Chest x-ray 03/01/2022.     FINDINGS: There is a new left PICC line which has its tip along the  lower portion of the superior vena cava within 1 cm of the the mitral  junction. The cardiomediastinal silhouette is normal. The lungs are  clear. The costophrenic sulci are dry and the bones appear normal. There  is no pneumothorax.       Impression:      New left PICC line as described. No significant  cardiopulmonary abnormality identified.     This report was finalized on 3/7/2022 11:16 AM by Dr. Coleman Hinton M.D.       XR Hip With or Without Pelvis 2 - 3 View Right [[id number]] Collected: 03/07/22 0957     Updated: 03/07/22 1001    Narrative:      PELVIS AND RIGHT HIP X-RAYS     HISTORY: Recent spine surgery. Severe right hip pain. No injury.     TECHNIQUE: An AP view the pelvis and two images of the right hip are  provided.     FINDINGS: Lower lumbar spine fusion hardware is partially demonstrated.  The sacroiliac joints, symphysis pubis, and hip joint spaces appear  normal. The bones of the pelvis and proximal  femurs appear normal.       Impression:      Negative.     This report was finalized on 3/7/2022 9:57 AM by Dr. Coleman Hinton M.D.           Results for orders placed during the hospital encounter of 03/07/22    Duplex Portal Hepatic Complete CAR    Interpretation Summary  · Normal portal hepatic duplex scan.    Results for orders placed during the hospital encounter of 03/07/22    Adult Transthoracic Echo Complete W/ Cont if Necessary Per Protocol    Interpretation Summary  · Left ventricular ejection fraction appears to be 61 - 65%. Left ventricular systolic function is normal.  · Left ventricular wall thickness is consistent with mild concentric hypertrophy  · Left ventricular diastolic function was normal.  · Normal right ventricular cavity size and systolic function noted  · The left atrial cavity is mild to moderately dilated.  · Mild tricuspid valve regurgitation is present.  · There is no evidence of pericardial effusion.    Pertinent Labs     Results from last 7 days   Lab Units 03/08/22 0508 03/07/22  0911   WBC 10*3/mm3 13.63* 7.71   HEMOGLOBIN g/dL 8.4* 8.8*   PLATELETS 10*3/mm3 249 241     Results from last 7 days   Lab Units 03/09/22 0323 03/08/22 0508 03/07/22  0911   SODIUM mmol/L 141 139 137   POTASSIUM mmol/L 4.9 3.5 3.4*   CHLORIDE mmol/L 103 104 103   CO2 mmol/L 4.0* 16.0* 17.3*   BUN mg/dL 12 10 7*   CREATININE mg/dL 2.30* 1.14* 0.50*   GLUCOSE mg/dL 59* 98 88   Estimated Creatinine Clearance: 27.6 mL/min (A) (by C-G formula based on SCr of 2.3 mg/dL (H)).  Results from last 7 days   Lab Units 03/09/22 0323 03/08/22 0508 03/07/22  0911   ALBUMIN g/dL 2.30* 2.50* 2.90*   BILIRUBIN mg/dL 1.6* 1.3* 0.8   ALK PHOS U/L 318* 157* 169*   AST (SGOT) U/L >7,000* 5,725* 392*   ALT (SGPT) U/L 3,352* 1,783* 170*     Results from last 7 days   Lab Units 03/09/22 0323 03/08/22  0508 03/07/22  0911   CALCIUM mg/dL 7.1* 7.4* 8.4*   ALBUMIN g/dL 2.30* 2.50* 2.90*   MAGNESIUM mg/dL 1.8 1.6  --     PHOSPHORUS mg/dL 9.3* 5.7*  --      Results from last 7 days   Lab Units 03/09/22  0323   AMMONIA umol/L 146*     Results from last 7 days   Lab Units 03/08/22  0508   CK TOTAL U/L 211*           Invalid input(s): LDLCALC      Results from last 7 days   Lab Units 03/07/22  0906   COVID19  Not Detected       Test Results Pending at Discharge       Discharge Details        Discharge Medications      Continue These Medications      Instructions Start Date   Droplet Pen Needles 32G X 4 MM misc  Generic drug: Insulin Pen Needle   USE THREE TIMES DAILY TO INJECT INSULIN      TRUEplus Lancets 33G misc   TO CHECK BS DAILY         ASK your doctor about these medications      Instructions Start Date   baclofen 20 MG tablet  Commonly known as: LIORESAL   20 mg, Oral, 2 Times Daily      DULoxetine 60 MG capsule  Commonly known as: CYMBALTA   60 mg, Oral, 2 Times Daily      fenofibrate 160 MG tablet   TAKE 1 TABLET EVERY DAY      glucose blood test strip   1 each, Other, Daily, PT TO CHECK BS DAILY E11.8      High Potency Iron 65 MG tablet   65 mg, Oral, Every Other Day      HYDROcodone-acetaminophen 7.5-325 MG per tablet  Commonly known as: NORCO   TAKE 1 TABLET BY MOUTH EVERY DAY AS NEEDED FOR PAIN      levothyroxine 137 MCG tablet  Commonly known as: SYNTHROID, LEVOTHROID  Ask about: Which instructions should I use?   TAKE 1 TABLET EVERY DAY      metoprolol tartrate 50 MG tablet  Commonly known as: LOPRESSOR   TAKE 1 TABLET EVERY 12 HOURS (NEED MD APPOINTMENT)      omega-3 acid ethyl esters 1 g capsule  Commonly known as: LOVAZA   2 g, Oral, 2 Times Daily      omeprazole 40 MG capsule  Commonly known as: priLOSEC   TAKE 1 CAPSULE EVERY DAY      pramipexole 0.5 MG tablet  Commonly known as: MIRAPEX   0.5 mg, Oral, 2 times daily      rosuvastatin 40 MG tablet  Commonly known as: CRESTOR   40 mg, Oral, Daily      Soliqua 100-33 UNT-MCG/ML solution pen-injector injection  Generic drug: Insulin Glargine-Lixisenatide   60 Units,  "Subcutaneous, Daily With Breakfast      vancomycin  Ask about: Should I take this medication?   2,000 mg, Intravenous, Every 12 Hours      VITAMIN B COMPLEX PO   1 tablet, Oral, Daily      vitamin D3 125 MCG (5000 UT) capsule capsule   5,000 Units, Oral, Daily      Xigduo XR 5-1000 MG tablet  Generic drug: dapagliflozin-metformin HCl ER  Ask about: Which instructions should I use?   TAKE 2 TABLETS EVERY DAY      zolpidem 10 MG tablet  Commonly known as: AMBIEN   10 mg, Oral, Nightly PRN             Allergies   Allergen Reactions   • Adhesive Tape Other (See Comments)     SKIN ON HANDS SHED OFF   • Cephalosporins Other (See Comments)     Patient unsure which exact antibiotic but one \"cephalosporin\" caused her to have C. Diff \"several years ago\". Appears to have received cephalexin in 2021 per chart review-Michaela Gong, PharmD   • Sulfa Antibiotics Hives   • Sulfamethoxazole W/Trimethoprim 800-160  [Sulfamethoxazole-Trimethoprim] Hives   • Metformin And Related Nausea Only     Regular Metformin(NOT EXTENDED RELEASE)   • Topiramate Hives and Itching   • Latex Dermatitis and Rash     Bandaids   • Vancomycin GI Intolerance     Vomiting and diarrhea       Discharge Disposition:        Discharge Diet:  No active diet order      Discharge Activity:       CODE STATUS:    Code Status and Medical Interventions:   Ordered at: 22 0801     Code Status (Patient has no pulse and is not breathing):    No CPR (Do Not Attempt to Resuscitate)     Medical Interventions (Patient has pulse or is breathing):    Comfort Measures       No future appointments.   Follow-up Information     Flo Temple MD .    Specialty: Family Medicine  Contact information:  [address] [place] [address]  [phone]                         Time Spent on Discharge:  Greater than 30 minutes      Анна Howard MD  [facility]  22  18:46 EST              "

## 2022-03-09 NOTE — NURSING NOTE
Due to Vasopressors running through single lumen PICC and inability to retrieve labs from a butterfly stick. Unable to collect CBC at this time.

## 2022-03-09 NOTE — PROGRESS NOTES
[facility]  Inpatient Progress Note    Reason for Followup:  Tylenol overdose    Subjective     Interval History:   Pt requiring increased dose of pressors overnight.  She has had hypoglycemia.  She is non-responsive.    Current Facility-Administered Medications:   •  acetylcysteine (ACETADOTE) 6,000 mg in dextrose (D5W) 5 % 1,000 mL infusion, 6.25 mg/kg/hr, Intravenous, Continuous, Jose Way MD, Last Rate: 93.9 mL/hr at 03/09/22 0545, 6.25 mg/kg/hr at 03/09/22 0545  •  baclofen (LIORESAL) tablet 10 mg, 10 mg, Oral, BID, Zachary Griffin MD, 10 mg at 03/08/22 2149  •  ceFAZolin in dextrose (ANCEF) IVPB solution 2 g, 2 g, Intravenous, Q8H, Lexus Schaffer MD, 2 g at 03/09/22 0712  •  dextrose (D50W) (25 g/50 mL) IV injection 25 g, 25 g, Intravenous, Q15 Min PRN, Zachary Griffin MD, 25 g at 03/09/22 0330  •  dextrose (GLUTOSE) oral gel 15 g, 15 g, Oral, Q15 Min PRN, Zachary Griffin MD  •  docusate sodium (COLACE) capsule 100 mg, 100 mg, Oral, BID, Rita Rojas MD, 100 mg at 03/08/22 2100  •  ferrous sulfate tablet 325 mg, 325 mg, Oral, Every Other Day, Zachary Griffin MD, 325 mg at 03/08/22 1247  •  glucagon (human recombinant) (GLUCAGEN DIAGNOSTIC) injection 1 mg, 1 mg, Intramuscular, Q15 Min PRN, Zachary Griffin MD  •  hydrALAZINE (APRESOLINE) injection 10 mg, 10 mg, Intravenous, Q6H PRN, Zachary Griffin MD  •  insulin lispro (ADMELOG) injection 0-7 Units, 0-7 Units, Subcutaneous, TID AC, Zachary Griffin MD  •  levothyroxine (SYNTHROID, LEVOTHROID) tablet 137 mcg, 137 mcg, Oral, Daily, Zachary Griffin MD, 137 mcg at 03/08/22 1247  •  naloxone (NARCAN) injection 0.2 mg, 0.2 mg, Intravenous, Q5 Min PRN, Zachary Griffin MD  •  nitroglycerin (NITROSTAT) SL tablet 0.4 mg, 0.4 mg, Sublingual, Q5 Min PRN, Zachary Griffin MD  •  norepinephrine (LEVOPHED) 8 mg in 250 mL NS infusion (premix), 0.02-0.3 mcg/kg/min, Intravenous, Titrated, Rita Rojas MD, Last Rate: 49 mL/hr at 03/09/22 0449, 0.3  mcg/kg/min at 03/09/22 0449  •  ondansetron (ZOFRAN) injection 4 mg, 4 mg, Intravenous, Q6H PRN, Rita Rojas MD, 4 mg at 03/08/22 2317  •  oxyCODONE (ROXICODONE) immediate release tablet 10 mg, 10 mg, Oral, Q6H PRN, Zachary Griffin MD, 10 mg at 03/09/22 0020  •  pantoprazole (PROTONIX) EC tablet 40 mg, 40 mg, Oral, QAM, Zachary Griffin MD, 40 mg at 03/08/22 0552  •  phenylephrine (CURTSI-SYNEPHRINE) 50 mg in 250 mL NS infusion, 0.5-3 mcg/kg/min, Intravenous, Titrated, Edgar Trejo MD, Last Rate: 78.8 mL/hr at 03/09/22 0450, 3 mcg/kg/min at 03/09/22 0450  •  polyethylene glycol (MIRALAX) packet 17 g, 17 g, Oral, Daily, Rita Rojas MD, 17 g at 03/08/22 1729  •  sodium bicarbonate 150 mEq/1000 mL D5W infusion, 150 mEq, Intravenous, Continuous, Zachary Griffin MD, Last Rate: 150 mL/hr at 03/09/22 0445, 150 mEq at 03/09/22 0445  •  sodium chloride 0.9 % flush 10 mL, 10 mL, Intravenous, Q12H, Zachary Griffin MD, 10 mL at 03/08/22 2155  •  sodium chloride 0.9 % flush 10 mL, 10 mL, Intravenous, PRN, Zachary Griffin MD  •  sodium chloride 0.9 % infusion, 150 mL/hr, Intravenous, Continuous, Rita Rojas MD, Last Rate: 150 mL/hr at 03/08/22 2233, 150 mL/hr at 03/08/22 2233  •  Vasopressin (PITRESSIN) 20 Units in sodium chloride 0.9 % 100 mL infusion, 0.03 Units/min, Intravenous, Continuous, Zachary Griffin MD, Last Rate: 9 mL/hr at 03/09/22 0454, 0.03 Units/min at 03/09/22 0454  Review of Systems:   Unable to assess due to non-responsive    Objective     Vital Signs  Temp:  [97.4 °F (36.3 °C)-98 °F (36.7 °C)] 97.9 °F (36.6 °C)  Heart Rate:  [] 95  Resp:  [16-23] 20  BP: ()/(21-85) 82/57  Body mass index is 28.35 kg/m².    Intake/Output Summary (Last 24 hours) at 3/9/2022 0722  Last data filed at 3/8/2022 1755  Gross per 24 hour   Intake 3155 ml   Output --   Net 3155 ml     No intake/output data recorded.     Physical Exam:   General: patient non responsive, pale   Abdomen: soft, nontender, nondistended;  normal bowel sounds   Rectal: deferred   Extremities: no rash or edema       Results Review:     I reviewed the patient's new clinical results.    Results from last 7 days   Lab Units 03/08/22  0508 03/07/22  0911   WBC 10*3/mm3 13.63* 7.71   HEMOGLOBIN g/dL 8.4* 8.8*   HEMATOCRIT % 27.2* 26.6*   PLATELETS 10*3/mm3 249 241     Results from last 7 days   Lab Units 03/09/22  0323 03/08/22  0508 03/07/22  0911   SODIUM mmol/L 141 139 137   POTASSIUM mmol/L 4.9 3.5 3.4*   CHLORIDE mmol/L 103 104 103   CO2 mmol/L 4.0* 16.0* 17.3*   BUN mg/dL 12 10 7*   CREATININE mg/dL 2.30* 1.14* 0.50*   CALCIUM mg/dL 7.1* 7.4* 8.4*   BILIRUBIN mg/dL 1.6* 1.3* 0.8   ALK PHOS U/L 318* 157* 169*   ALT (SGPT) U/L 3,352* 1,783* 170*   AST (SGOT) U/L >7,000* 5,725* 392*   GLUCOSE mg/dL 59* 98 88     Results from last 7 days   Lab Units 03/09/22  0323 03/08/22  0628 03/07/22  0911   INR  >10.00* 4.47* 1.43*     No results found for: LIPASE      Assessment/Plan   Assessment:   1.  Tylenol overdose  2   Acute liver injury with progression to fulminant liver failure  3.  Persistent hypotension requiring pressor support    Plan:   Unfortunately pt appears to have progressed to fulminant liver failure, with worsening coagulopathy, transaminitis, and now elevation of ammonia levels despite continued use of NAC.  Decision has been made to make patient comfort care only, and she appears to be actively dying.  Family is aware.            Jose Way M.D.  [facility]  [address] [place], [place] [address].[facility]. 350  [phone]

## 2022-03-11 ENCOUNTER — HOME CARE VISIT (OUTPATIENT)
Dept: HOME HEALTH SERVICES | Facility: HOME HEALTHCARE | Age: 67
End: 2022-03-11

## 2022-03-11 ENCOUNTER — TELEPHONE (OUTPATIENT)
Dept: FAMILY MEDICINE CLINIC | Facility: CLINIC | Age: 67
End: 2022-03-11

## 2022-03-11 NOTE — TELEPHONE ENCOUNTER
Caller: Norberto Ramos    Relationship: Emergency Contact    Best call back number: [phone] (H)    What test was performed: ALL RECENT LABS/ MRI IMAGES/ ULTRASOUND, ETC       When was the test performed: 22- CURRENT DATE     Where was the test performed: [facility]     Additional notes:      CALLED, ON  VERBAL STATING YAEL'S SON IS CURIOUS ABOUT ALL THESE LABS ON THE PATIENT. SHE  ON  FROM LIVER FAILURE BUT OTHER IMAGES AND TESTS WERE TAKEN TO LOOK INTO OTHER THINGS AND THEY WOULD LIKE TO GET SOME CLOSURE.    ALSO:     NORBERTO ALSO ASKED: DOES HE NEED TO CALL [employer] AND EXPLAIN THAT HOME HEALTH NURSING VISITS WERE APPROVED? 5 IN TOTAL. PATIENT HAD 1 VISIT WITH HOME HEALTH  AND THEN WAS ADMITTED TO THE HOSPITAL AND PASSED AWAY  AND NEVER HAD THE OTHER 4 VISITS. HE WANTS TO BE SURE THEY AREN'T CHARGED FOR ALL VISITS SHE NEVER COMPLETED?

## 2024-06-11 NOTE — ED NOTES
"Pt came from home to ED via PV ( Jose drove) for c/o back and leg pain with numbness from waist down. Pt says this is an ongoing issue but has gotten worse over the past month. Pt has had 4 back surgeries in 2 years and pt says her MD is worried there may be an injury to the spine. Denies NVD, CP, SOA. Pain is 9/10 constantly and then she gets \"jolts\" of 10/10 pain where she feels like she is going to \"pass out.\"       Janet Zamarripa, RN  02/16/22 1888    " General Sunscreen Counseling: I recommended a broad spectrum sunscreen with a SPF of 30 or higher.  I explained that SPF 30 sunscreens block approximately 97 percent of the sun's harmful rays.  Sunscreens should be applied at least 15 minutes prior to expected sun exposure and then every 2 hours after that as long as sun exposure continues. If swimming or exercising sunscreen should be reapplied every 45 minutes to an hour after getting wet or sweating.  One ounce, or the equivalent of a shot glass full of sunscreen, is adequate to protect the skin not covered by a bathing suit. I also recommended a lip balm with a sunscreen as well. Sun protective clothing can be used in lieu of sunscreen but must be worn the entire time you are exposed to the sun's rays. Detail Level: Detailed

## (undated) DEVICE — GLV SURG PREMIERPRO ORTHO LTX PF SZ8 BRN

## (undated) DEVICE — DISPOSABLE IRRIGATION BIPOLAR CORD, M1000 TYPE: Brand: KIRWAN

## (undated) DEVICE — SUT MNCRYL PLS ANTIB UD 4/0 PS2 18IN

## (undated) DEVICE — ENCORE® LATEX ORTHO SIZE 8.5, STERILE LATEX POWDER-FREE SURGICAL GLOVE: Brand: ENCORE

## (undated) DEVICE — PENCL E/S ULTRAVAC TELESCP NOSE HOLSTR 10FT

## (undated) DEVICE — ANTIBACTERIAL UNDYED BRAIDED (POLYGLACTIN 910), SYNTHETIC ABSORBABLE SUTURE: Brand: COATED VICRYL

## (undated) DEVICE — SMOKE EVACUATION TUBING WITH 7/8 IN TO 1/4 IN REDUCER: Brand: BUFFALO FILTER

## (undated) DEVICE — COL BONE ANSPACH

## (undated) DEVICE — TOTAL TRAY, 16FR 10ML SIL FOLEY, URN: Brand: MEDLINE

## (undated) DEVICE — APPL CHLORAPREP W/TINT 26ML ORNG

## (undated) DEVICE — DRSNG GZ PETROLTM XEROFORM CURAD 1X8IN STRL

## (undated) DEVICE — SPNG GZ WOVN 4X4IN 12PLY 10/BX STRL

## (undated) DEVICE — TOOL MR8-15MH30 MR8 15CM MATCH 3MM: Brand: MIDAS REX MR8

## (undated) DEVICE — SUT VIC 1 OS8 27IN J699H

## (undated) DEVICE — GLV SURG BIOGEL LTX PF 7 1/2

## (undated) DEVICE — CODMAN® SURGICAL PATTIES 3/4" X 3/4" (1.91CM X 1.91CM): Brand: CODMAN®

## (undated) DEVICE — GOWN,ECLIPSE,FABRIC-REINFORCED,X-LARGE: Brand: MEDLINE

## (undated) DEVICE — GOWN,NON-REINFORCED,SIRUS,SET IN SLV,XL: Brand: MEDLINE

## (undated) DEVICE — PK NEURO SPINE 40

## (undated) DEVICE — GOWN,PREVENTION PLUS,XXLARGE,STERILE: Brand: MEDLINE

## (undated) DEVICE — TRAP FLD MINIVAC MEGADYNE 100ML

## (undated) DEVICE — Device

## (undated) DEVICE — CODMAN® SURGICAL PATTIES 1/2" X 3" (1.27CM X 7.62CM): Brand: CODMAN®

## (undated) DEVICE — PENCL ES MEGADINE EZ/CLEAN BUTN W/HOLSTR 10FT

## (undated) DEVICE — APPL CHLORAPREP HI/LITE 26ML ORNG

## (undated) DEVICE — SOL NACL 0.9PCT 1000ML

## (undated) DEVICE — HEMOST ABS GZ GELFOAM 12 TO 7MM GELATIN

## (undated) DEVICE — 3.0MM PRECISION NEURO (MATCH HEAD)

## (undated) DEVICE — BLOOD TRANSFUSION FILTER: Brand: HAEMONETICS

## (undated) DEVICE — 1 ML TUBERCULIN SYRINGE REGULAR TIP: Brand: MONOJECT

## (undated) DEVICE — SHLD ANGIO 2LAYR CIR FEN

## (undated) DEVICE — 1010 S-DRAPE TOWEL DRAPE 10/BX: Brand: STERI-DRAPE™

## (undated) DEVICE — BONE MARROW ASPIRATION NEEDLES ASPIRATOR KIT 3-HOLE 4 INCHES NDLE

## (undated) DEVICE — HANDPIECE SET WITH COAXIAL HIGH FLOW TIP AND SUCTION TUBE: Brand: INTERPULSE

## (undated) DEVICE — ADHS SKIN DERMABOND PROPEN

## (undated) DEVICE — ADHS SKIN SURG TISS VISC PREMIERPRO EXOFIN HI/VISC FAST/DRY

## (undated) DEVICE — GLV SURG BIOGEL LTX PF 7

## (undated) DEVICE — NDL SPINE 20G 3 1/2 YEL STRL 1P/U

## (undated) DEVICE — DRP MICROSCOPE 4 BINOCULAR CV 54X150IN

## (undated) DEVICE — DRP STRL STERILEZ ZFLD

## (undated) DEVICE — MEDI-VAC YANKAUER SUCTION HANDLE W/BULBOUS TIP: Brand: CARDINAL HEALTH

## (undated) DEVICE — GLV SURG SENSICARE W/ALOE PF LF 7.5 STRL

## (undated) DEVICE — SPONGE,LAP,12"X12",XR,ST,5/PK,40PK/CS: Brand: MEDLINE

## (undated) DEVICE — NDL HYPO PRECISIONGLIDE REG 25G 1 1/2

## (undated) DEVICE — GUIDEWIRE 2345050 BLUNT THREADED 24IN

## (undated) DEVICE — DISPOSABLE 9450020 NIM PEDICLE NEEDLE

## (undated) DEVICE — DRP MICROSCP LEICA W/GLASS LENS

## (undated) DEVICE — GLV SURG SENSICARE MICRO PF LF 8 STRL

## (undated) DEVICE — DRP SLUSH WARMR MACH 52X66IN OM-ORS-301

## (undated) DEVICE — 3M™ IOBAN™ 2 ANTIMICROBIAL INCISE DRAPE 6650EZ: Brand: IOBAN™ 2

## (undated) DEVICE — TBG PENCL TELESCP MEGADYNE SMOKE EVAC 10FT

## (undated) DEVICE — PAD,ABDOMINAL,8"X10",ST,LF: Brand: MEDLINE

## (undated) DEVICE — TOOL MR8-9MC30 MR8 9CM MTL CUT 3MM C: Brand: MIDAS REX MR8

## (undated) DEVICE — SYR LUERLOK 20CC

## (undated) DEVICE — PATIENT RETURN ELECTRODE, SINGLE-USE, CONTACT QUALITY MONITORING, ADULT, WITH 9FT CORD, FOR PATIENTS WEIGING OVER 33LBS. (15KG): Brand: MEGADYNE

## (undated) DEVICE — TOOL MR8-15BA50T MR8 15CM BAL SYMTRI 5MM: Brand: MIDAS REX MR8

## (undated) DEVICE — INTENDED FOR TISSUE SEPARATION, AND OTHER PROCEDURES THAT REQUIRE A SHARP SURGICAL BLADE TO PUNCTURE OR CUT.: Brand: BARD-PARKER ® STAINLESS STEEL BLADES

## (undated) DEVICE — DRP C/ARM 41X74IN

## (undated) DEVICE — DISPOSABLE 9450003 ELECTRO TWST PAIR 8CH

## (undated) DEVICE — 3M™ STERI-DRAPE™ INSTRUMENT POUCH 1018: Brand: STERI-DRAPE™

## (undated) DEVICE — SPHR MARKR STEALTH STATION

## (undated) DEVICE — CABLE 2344000 POWEREASE NIM: Brand: POWEREASE™ INSTRUMENTS

## (undated) DEVICE — PK ATS CUST W CARDIOTOMY RESEVOIR

## (undated) DEVICE — CONN TBG Y 5 IN 1 LF STRL

## (undated) DEVICE — ADHS SKIN DERMABOND TOP ADVANCED

## (undated) DEVICE — INSTRUMENT 8675424 LG DISPOSABLE DILATOR